# Patient Record
Sex: MALE | Race: BLACK OR AFRICAN AMERICAN | NOT HISPANIC OR LATINO | Employment: OTHER | URBAN - METROPOLITAN AREA
[De-identification: names, ages, dates, MRNs, and addresses within clinical notes are randomized per-mention and may not be internally consistent; named-entity substitution may affect disease eponyms.]

---

## 2018-12-31 ENCOUNTER — OFFICE VISIT (OUTPATIENT)
Dept: URGENT CARE | Facility: CLINIC | Age: 83
End: 2018-12-31
Payer: COMMERCIAL

## 2018-12-31 VITALS
TEMPERATURE: 98 F | HEART RATE: 67 BPM | SYSTOLIC BLOOD PRESSURE: 135 MMHG | HEIGHT: 70 IN | BODY MASS INDEX: 32.93 KG/M2 | WEIGHT: 230 LBS | DIASTOLIC BLOOD PRESSURE: 79 MMHG | RESPIRATION RATE: 16 BRPM | OXYGEN SATURATION: 97 %

## 2018-12-31 DIAGNOSIS — K92.2 LOWER GI BLEEDING: Primary | ICD-10-CM

## 2018-12-31 PROCEDURE — 99204 OFFICE O/P NEW MOD 45 MIN: CPT | Mod: S$GLB,,, | Performed by: EMERGENCY MEDICINE

## 2018-12-31 RX ORDER — FAMOTIDINE 40 MG/1
TABLET, FILM COATED ORAL
Refills: 3 | COMMUNITY
Start: 2018-11-28 | End: 2021-03-29

## 2018-12-31 RX ORDER — AMLODIPINE BESYLATE 10 MG/1
10 TABLET ORAL EVERY MORNING
Refills: 2 | COMMUNITY
Start: 2018-11-12 | End: 2021-03-29 | Stop reason: SDUPTHER

## 2018-12-31 RX ORDER — LOSARTAN POTASSIUM 100 MG/1
50 TABLET ORAL
Refills: 2 | COMMUNITY
Start: 2018-10-23 | End: 2021-03-29 | Stop reason: SDUPTHER

## 2018-12-31 NOTE — PROGRESS NOTES
"Subjective:       Patient ID: Zion Vasquez is a 83 y.o. male.    Vitals:    12/31/18 1236   BP: 135/79   Pulse: 67   Resp: 16   Temp: 97.5 °F (36.4 °C)   SpO2: 97%   Weight: 104.3 kg (230 lb)   Height: 5' 9.5" (1.765 m)       Chief Complaint: Diarrhea    Pt states diarrhea x 2 days after eating fruitcake and drinking spiked eggnog. Pt states he has taken immodium AD. Pt states diarrhea this am was reddish brown causing him concern.     Patient denies being on prescription blood thinners or taking NSAIDs.  Patient has had no previous abdominal surgeries or abdominal medical history.      Diarrhea    This is a new problem. The current episode started in the past 7 days. The problem occurs 5 to 10 times per day. The problem has been unchanged. The stool consistency is described as watery. The patient states that diarrhea does not awaken him from sleep. Pertinent negatives include no abdominal pain, chills, fever or vomiting. Nothing aggravates the symptoms. Treatments tried: immodium AD. The treatment provided mild relief.     Review of Systems   Constitution: Negative for chills and fever.   Cardiovascular: Negative for chest pain.   Respiratory: Negative for shortness of breath.    Musculoskeletal: Negative for back pain.   Gastrointestinal: Positive for diarrhea, hematochezia and melena. Negative for abdominal pain, constipation, hematemesis, nausea and vomiting.   Genitourinary: Negative for dysuria.   All other systems reviewed and are negative.      Objective:      Physical Exam   Constitutional: He is oriented to person, place, and time. He appears well-developed and well-nourished.   HENT:   Head: Normocephalic and atraumatic.   Right Ear: External ear normal.   Left Ear: External ear normal.   Nose: Nose normal.   Mouth/Throat: Mucous membranes are normal.   Eyes: Conjunctivae and lids are normal.   Neck: Trachea normal and full passive range of motion without pain. Neck supple.   Cardiovascular: Normal " rate, regular rhythm and normal heart sounds.   Pulmonary/Chest: Effort normal and breath sounds normal. No respiratory distress.   Abdominal: Soft. Normal appearance and bowel sounds are normal. He exhibits no distension, no abdominal bruit, no pulsatile midline mass and no mass. There is no tenderness.   Genitourinary: Rectal exam shows no mass and no tenderness.   Genitourinary Comments: Patient has gross blood with clots on rectal examination   Musculoskeletal: Normal range of motion. He exhibits no edema.   Neurological: He is alert and oriented to person, place, and time. He has normal strength.   Skin: Skin is warm, dry and intact. He is not diaphoretic. No pallor.   Psychiatric: He has a normal mood and affect. His speech is normal and behavior is normal. Judgment and thought content normal. Cognition and memory are normal.   Nursing note and vitals reviewed.      Assessment:       1. Lower GI bleeding        Plan:       Zion was seen today for diarrhea.    Diagnoses and all orders for this visit:    Lower GI bleeding     Medical decision making:  Patient with 24 hr of diarrhea and blood per rectum.  Patient denies pain or vomiting. Patient denies using blood thinners or NSAIDs at this time.  Patient with no previous abdominal history.  Vital signs are stable at this time.  Patient recommended to go to the emergency room for definitive evaluation and treatment.  Patient will be sent to [facility] for further care.          Patient Instructions       Go to ER NOW    When You Have Gastrointestinal (GI) Bleeding    Blood in your vomit or stool can be a sign of gastrointestinal (GI) bleeding. GI bleeding can be scary. But the cause may not be serious. You should always see a doctor if GI bleeding occurs.  The GI tract  The GI tract is the path through which food travels in the body. Food passes from the mouth down the esophagus (the tube from the mouth to the stomach). Food begins to break down in the  stomach. It then moves through the duodenum, the first part of the small intestine. Nutrients are absorbed as food travels through the small intestine. What is left passes into the colon (large intestine) as waste. The colon removes water from the waste. Waste continues from the colon to the rectum (where stool is stored). Waste then leaves the body through the anus.  Causes of GI bleeding  GI bleeding can be caused by many different problems. Some of the more common causes include:  · Swollen veins in the anus (hemorrhoids)  · Swollen veins in the esophagus (varices)  · Sore on the lining of the GI tract (ulcer)  · Cuts or scrapes in the mouth or throat  · Infection caused by germs such as bacteria or parasites  · Food allergies, such as milk allergy in young children  · Medicines  · Inflammation of the GI tract (gastritis or esophagitis)  · Colitis (Crohn's disease or ulcerative colitis)  · Cancer (tumors or polyps)  · Abnormal pouches in the colon (diverticula)  · Tears in the esophagus or anus  · Nosebleed  · Abnormal blood vessels in the GI tract (angiodysplasia)  Diagnosing the cause of blood in stool  If blood is coming out in your stool, you may have a lower GI tract problem or a very fast upper GI tract bleed. Bleeding from the GI tract can be bright red. Or it may look dark and tarry. Tests may also find blood in your stool that cant be seen with the eye (occult blood). To find out the cause, tests that may be ordered include:  · Blood tests. A blood sample is taken and sent to a lab for exam.  · Hemoccult test. Checks a stool sample for blood.  · Stool culture. Checks a stool sample for bacteria or parasites.  · X-ray, ultrasound, or CT scan. Imaging tests that take pictures of the digestive tract.  · Colonoscopy or sigmoidoscopy. This test uses a flexible tube with a tiny camera. The tube is inserted through your anus into your rectum to see the inside of your colon. Your provider can also take a tiny  tissue sample (biopsy) and treat a bleeding source  Diagnosing the cause of blood in vomit  If you are vomiting blood or something that looks like coffee grounds, you may have an upper GI tract problem. To find the cause, tests that may be done include:  · Upper Endoscopy. A flexible tube with a tiny camera is inserted through your mouth and throat to see inside your upper GI tract. This lets your provider take a tiny tissue sample (biopsy) and treat a bleeding source.  · Nasogastric lavage. This can tell if you have upper GI or lower GI bleeding.  · X-ray, ultrasound, or CT scan. Imaging tests that take pictures of your digestive tract.  · Upper GI series. X-rays of the upper part of your GI tract taken from inside your body.  · Enteroscopy. This sends a flexible tube or a small, swallowed capsule camera into your small intestine.  When to call your healthcare provider  Call your healthcare provider right away if you have any of the following:  · Bleeding from your mouth or anus that can't be stopped  · Fever of 100.4°F (38.0°) or higher  · Bleeding along with feeling lightheaded or dizzy  · Signs of fluid loss (dehydration). These include a dry, sticky mouth, decreased urine output; and very dark urine.  · Belly (abdominal) pain   Date Last Reviewed: 7/1/2016  © 2079-0798 [employer]. [address] [place] [address], [place] [address]. All rights reserved. This information is not intended as a substitute for professional medical care. Always follow your healthcare professional's instructions.

## 2018-12-31 NOTE — PATIENT INSTRUCTIONS
Go to ER NOW    When You Have Gastrointestinal (GI) Bleeding    Blood in your vomit or stool can be a sign of gastrointestinal (GI) bleeding. GI bleeding can be scary. But the cause may not be serious. You should always see a doctor if GI bleeding occurs.  The GI tract  The GI tract is the path through which food travels in the body. Food passes from the mouth down the esophagus (the tube from the mouth to the stomach). Food begins to break down in the stomach. It then moves through the duodenum, the first part of the small intestine. Nutrients are absorbed as food travels through the small intestine. What is left passes into the colon (large intestine) as waste. The colon removes water from the waste. Waste continues from the colon to the rectum (where stool is stored). Waste then leaves the body through the anus.  Causes of GI bleeding  GI bleeding can be caused by many different problems. Some of the more common causes include:  · Swollen veins in the anus (hemorrhoids)  · Swollen veins in the esophagus (varices)  · Sore on the lining of the GI tract (ulcer)  · Cuts or scrapes in the mouth or throat  · Infection caused by germs such as bacteria or parasites  · Food allergies, such as milk allergy in young children  · Medicines  · Inflammation of the GI tract (gastritis or esophagitis)  · Colitis (Crohn's disease or ulcerative colitis)  · Cancer (tumors or polyps)  · Abnormal pouches in the colon (diverticula)  · Tears in the esophagus or anus  · Nosebleed  · Abnormal blood vessels in the GI tract (angiodysplasia)  Diagnosing the cause of blood in stool  If blood is coming out in your stool, you may have a lower GI tract problem or a very fast upper GI tract bleed. Bleeding from the GI tract can be bright red. Or it may look dark and tarry. Tests may also find blood in your stool that cant be seen with the eye (occult blood). To find out the cause, tests that may be ordered include:  · Blood tests. A blood  sample is taken and sent to a lab for exam.  · Hemoccult test. Checks a stool sample for blood.  · Stool culture. Checks a stool sample for bacteria or parasites.  · X-ray, ultrasound, or CT scan. Imaging tests that take pictures of the digestive tract.  · Colonoscopy or sigmoidoscopy. This test uses a flexible tube with a tiny camera. The tube is inserted through your anus into your rectum to see the inside of your colon. Your provider can also take a tiny tissue sample (biopsy) and treat a bleeding source  Diagnosing the cause of blood in vomit  If you are vomiting blood or something that looks like coffee grounds, you may have an upper GI tract problem. To find the cause, tests that may be done include:  · Upper Endoscopy. A flexible tube with a tiny camera is inserted through your mouth and throat to see inside your upper GI tract. This lets your provider take a tiny tissue sample (biopsy) and treat a bleeding source.  · Nasogastric lavage. This can tell if you have upper GI or lower GI bleeding.  · X-ray, ultrasound, or CT scan. Imaging tests that take pictures of your digestive tract.  · Upper GI series. X-rays of the upper part of your GI tract taken from inside your body.  · Enteroscopy. This sends a flexible tube or a small, swallowed capsule camera into your small intestine.  When to call your healthcare provider  Call your healthcare provider right away if you have any of the following:  · Bleeding from your mouth or anus that can't be stopped  · Fever of 100.4°F (38.0°) or higher  · Bleeding along with feeling lightheaded or dizzy  · Signs of fluid loss (dehydration). These include a dry, sticky mouth, decreased urine output; and very dark urine.  · Belly (abdominal) pain   Date Last Reviewed: 7/1/2016  © 5479-4976 [employer]. [address] [place] [address], [place] [address]. All rights reserved. This information is not intended as a substitute for professional medical care. Always follow your  healthcare professional's instructions.

## 2019-04-14 ENCOUNTER — OFFICE VISIT (OUTPATIENT)
Dept: URGENT CARE | Facility: CLINIC | Age: 84
End: 2019-04-14
Payer: COMMERCIAL

## 2019-04-14 DIAGNOSIS — B30.9 VIRAL CONJUNCTIVITIS OF BOTH EYES: Primary | ICD-10-CM

## 2019-04-14 PROCEDURE — 99202 PR OFFICE/OUTPT VISIT, NEW, LEVL II, 15-29 MIN: ICD-10-PCS | Mod: S$GLB,,, | Performed by: HOSPITALIST

## 2019-04-14 PROCEDURE — 99202 OFFICE O/P NEW SF 15 MIN: CPT | Mod: S$GLB,,, | Performed by: HOSPITALIST

## 2019-04-15 NOTE — PROGRESS NOTES
"Subjective:       Patient ID: Zion Vasquez is a 83 y.o. male.    Vitals:    04/15/19 1328   BP: (!) 167/81   Pulse: 71   Resp: 18   Temp: 97.3 °F (36.3 °C)   TempSrc: Oral   SpO2: 95%   Weight: 104.3 kg (230 lb)   Height: 5' 9.5" (1.765 m)       Chief Complaint: Eye Problem    All documentation is being transferred from paper due to downtime with [facility]. Documenting for MA yesterday.    Eye redness, yellowish drainage x2 days.     Eye Problem    Pertinent negatives include no blurred vision, eye redness, fever, nausea, photophobia or vomiting.     Review of Systems   Constitution: Negative for chills and fever.   HENT: Negative for congestion.    Eyes: Negative for blurred vision, pain, photophobia and redness.   Gastrointestinal: Negative for nausea and vomiting.   Neurological: Negative for headaches.       Objective:      Physical Exam    Assessment:       No diagnosis found.    Plan:       There are no diagnoses linked to this encounter.        Erythema and sclera injection b/l. No vision changes. Viral conjunctivitis b/l. See scanned note for more information.     "

## 2019-04-16 VITALS
BODY MASS INDEX: 32.93 KG/M2 | WEIGHT: 230 LBS | RESPIRATION RATE: 18 BRPM | HEIGHT: 70 IN | TEMPERATURE: 97 F | DIASTOLIC BLOOD PRESSURE: 81 MMHG | HEART RATE: 71 BPM | OXYGEN SATURATION: 95 % | SYSTOLIC BLOOD PRESSURE: 167 MMHG

## 2019-11-11 ENCOUNTER — OFFICE VISIT (OUTPATIENT)
Dept: URGENT CARE | Facility: CLINIC | Age: 84
End: 2019-11-11
Payer: COMMERCIAL

## 2019-11-11 VITALS
WEIGHT: 230 LBS | BODY MASS INDEX: 32.93 KG/M2 | HEART RATE: 77 BPM | HEIGHT: 70 IN | OXYGEN SATURATION: 95 % | TEMPERATURE: 98 F | DIASTOLIC BLOOD PRESSURE: 76 MMHG | SYSTOLIC BLOOD PRESSURE: 154 MMHG | RESPIRATION RATE: 18 BRPM

## 2019-11-11 DIAGNOSIS — H93.13 TINNITUS OF BOTH EARS: ICD-10-CM

## 2019-11-11 DIAGNOSIS — H61.23 EXCESSIVE CERUMEN IN BOTH EAR CANALS: Primary | ICD-10-CM

## 2019-11-11 PROCEDURE — 99214 OFFICE O/P EST MOD 30 MIN: CPT | Mod: 25,S$GLB,, | Performed by: NURSE PRACTITIONER

## 2019-11-11 PROCEDURE — 69209 EAR CERUMEN REMOVAL: ICD-10-PCS | Mod: 50,S$GLB,, | Performed by: NURSE PRACTITIONER

## 2019-11-11 PROCEDURE — 99214 PR OFFICE/OUTPT VISIT, EST, LEVL IV, 30-39 MIN: ICD-10-PCS | Mod: 25,S$GLB,, | Performed by: NURSE PRACTITIONER

## 2019-11-11 PROCEDURE — 69209 REMOVE IMPACTED EAR WAX UNI: CPT | Mod: 50,S$GLB,, | Performed by: NURSE PRACTITIONER

## 2019-11-11 NOTE — PATIENT INSTRUCTIONS
Follow up closely with your ear doctor.  Tinnitus (Ringing in the Ears)     Treatment may include maskers and hearing aids.     Tinnitus is the term for a noise in your ear not caused by an outside sound. The noise might be a ringing, clicking, hiss, or roar. It can vary in pitch and may be soft or quite loud. For some people, tinnitus is a minor nuisance. But for others, the noise can make it hard to hear, work, and even sleep. When tinnitus can't be cured, a number of treatments may offer relief.  What causes tinnitus?  Loud noises, hearing loss, and ear wax can cause tinnitus. So can certain medicines. Large amounts of aspirin or caffeine are sometimes to blame. In many cases, the exact cause of tinnitus is unknown.  How is tinnitus treated?  Identifying and removing the cause is the best way to treat tinnitus. For that reason, your healthcare provider may refer you to an otolaryngologist (ear, nose, and throat doctor). Your hearing may also be checked by an audiologist (hearing specialist). If you have hearing loss, wearing a hearing aid may help your tinnitus. When the cause can't be found, the tinnitus itself may be treated. Some of the treatments are listed below, and your healthcare provider can tell you more about them:  · Maskers are small devices that look like hearing aids. They emit a pleasant sound that helps cover up the ringing in your ears. Hearing aids and maskers are sometimes used together.  · Medicines that treat anxiety and depression may ease tinnitus in some people.  · Hypnosis or relaxation therapy may help head noise seem less severe.  · Tinnitus retraining therapy combines counseling and maskers. Both can help take your mind off the tinnitus.  For more information  · American Speech-Hearing-Language Association [phone] www.saida.org  · American Tinnitus Association 235-285-8687 www.nicholas.org  · National [place] on Deafness and other Communication Disorders 120-784-7852  www.nidcd.nih.gov   Date Last Reviewed: 7/1/2016  © 0230-1167 [employer]. [address] [place] [address], [place] [address]. All rights reserved. This information is not intended as a substitute for professional medical care. Always follow your healthcare professional's instructions.

## 2019-11-11 NOTE — PROGRESS NOTES
"Subjective:       Patient ID: Zion Vasquez is a 83 y.o. male.    Vitals:  height is 5' 9.5" (1.765 m) and weight is 104.3 kg (230 lb). His temperature is 98.1 °F (36.7 °C). His blood pressure is 154/76 (abnormal) and his pulse is 77. His respiration is 18 and oxygen saturation is 95%.     Chief Complaint: Otalgia    Patient presents with a complaint of a recurring sound in both of his ears sometimes when he lays down at night for the past week.  Denies any actual pain.  Denies any URI symptoms or drainage. He does go swimming 2 to 3 times a week.  He also wears hearing aids.  He states that he sees his audiologist next in January.    Otalgia    There is pain in both ears. This is a new problem. The current episode started in the past 7 days. The problem occurs constantly. The problem has been unchanged. There has been no fever. Pertinent negatives include no abdominal pain, coughing, diarrhea, ear discharge, headaches, hearing loss, neck pain, rash, rhinorrhea, sore throat or vomiting. He has tried nothing for the symptoms.       Constitution: Negative for chills, fatigue and fever.   HENT: Positive for tinnitus. Negative for ear pain, ear discharge, hearing loss, congestion and sore throat.    Neck: Negative for neck pain and painful lymph nodes.   Cardiovascular: Negative for chest pain and leg swelling.   Eyes: Negative for double vision and blurred vision.   Respiratory: Negative for cough and shortness of breath.    Gastrointestinal: Negative for abdominal pain, nausea, vomiting and diarrhea.   Genitourinary: Negative for dysuria, frequency and urgency.   Musculoskeletal: Negative for joint pain, joint swelling, muscle cramps and muscle ache.   Skin: Negative for color change, pale and rash.   Allergic/Immunologic: Negative for seasonal allergies.   Neurological: Negative for dizziness, history of vertigo, light-headedness, passing out and headaches.   Hematologic/Lymphatic: Negative for swollen lymph " nodes, easy bruising/bleeding and history of blood clots. Does not bruise/bleed easily.   Psychiatric/Behavioral: Negative for nervous/anxious, sleep disturbance and depression. The patient is not nervous/anxious.        Objective:      Physical Exam   Constitutional: He is oriented to person, place, and time. He appears well-developed and well-nourished. He is cooperative.  Non-toxic appearance. He does not have a sickly appearance. He does not appear ill. No distress.   HENT:   Head: Normocephalic and atraumatic.   Right Ear: Hearing, tympanic membrane, external ear and ear canal normal.   Left Ear: Hearing, tympanic membrane, external ear and ear canal normal.   Nose: Nose normal. No mucosal edema, rhinorrhea or nasal deformity. No epistaxis. Right sinus exhibits no maxillary sinus tenderness and no frontal sinus tenderness. Left sinus exhibits no maxillary sinus tenderness and no frontal sinus tenderness.   Mouth/Throat: Uvula is midline, oropharynx is clear and moist and mucous membranes are normal. No trismus in the jaw. Normal dentition. No uvula swelling. No oropharyngeal exudate, posterior oropharyngeal edema or posterior oropharyngeal erythema.   Excessive dried cerumen to the canals   Eyes: Conjunctivae and lids are normal. No scleral icterus.   Neck: Trachea normal, full passive range of motion without pain and phonation normal. Neck supple. No neck rigidity. No edema and no erythema present.   Cardiovascular: Normal rate, regular rhythm, normal heart sounds, intact distal pulses and normal pulses.   Pulmonary/Chest: Effort normal and breath sounds normal. No respiratory distress. He has no decreased breath sounds. He has no rhonchi.   Abdominal: Normal appearance.   Musculoskeletal: Normal range of motion. He exhibits no edema or deformity.   Neurological: He is alert and oriented to person, place, and time. He exhibits normal muscle tone. Coordination normal.   Skin: Skin is warm, dry, intact, not  diaphoretic and not pale.   Psychiatric: He has a normal mood and affect. His speech is normal and behavior is normal. Judgment and thought content normal. Cognition and memory are normal.   Nursing note and vitals reviewed.        Assessment:       1. Excessive cerumen in both ear canals    2. Tinnitus of both ears        Plan:         Excessive cerumen in both ear canals  -     Ear Cerumen Removal    Tinnitus of both ears      Patient Instructions     Follow up closely with your ear doctor.  Tinnitus (Ringing in the Ears)     Treatment may include maskers and hearing aids.     Tinnitus is the term for a noise in your ear not caused by an outside sound. The noise might be a ringing, clicking, hiss, or roar. It can vary in pitch and may be soft or quite loud. For some people, tinnitus is a minor nuisance. But for others, the noise can make it hard to hear, work, and even sleep. When tinnitus can't be cured, a number of treatments may offer relief.  What causes tinnitus?  Loud noises, hearing loss, and ear wax can cause tinnitus. So can certain medicines. Large amounts of aspirin or caffeine are sometimes to blame. In many cases, the exact cause of tinnitus is unknown.  How is tinnitus treated?  Identifying and removing the cause is the best way to treat tinnitus. For that reason, your healthcare provider may refer you to an otolaryngologist (ear, nose, and throat doctor). Your hearing may also be checked by an audiologist (hearing specialist). If you have hearing loss, wearing a hearing aid may help your tinnitus. When the cause can't be found, the tinnitus itself may be treated. Some of the treatments are listed below, and your healthcare provider can tell you more about them:  · Maskers are small devices that look like hearing aids. They emit a pleasant sound that helps cover up the ringing in your ears. Hearing aids and maskers are sometimes used together.  · Medicines that treat anxiety and depression may ease  tinnitus in some people.  · Hypnosis or relaxation therapy may help head noise seem less severe.  · Tinnitus retraining therapy combines counseling and maskers. Both can help take your mind off the tinnitus.  For more information  · American Speech-Hearing-Language Association [phone] www.saida.org  · American Tinnitus Association 300-875-9705 www.nicholas.org  · National [place] on Deafness and other Communication Disorders 886-189-7927 www.nidcd.nih.gov   Date Last Reviewed: 7/1/2016  © 5628-4964 [employer] [address] [place] [address]. All rights reserved. This information is not intended as a substitute for professional medical care. Always follow your healthcare professional's instructions.

## 2019-11-11 NOTE — PROCEDURES
"Ear Cerumen Removal  Date/Time: 11/11/2019 10:40 AM  Performed by: Sole Pierce MA  Authorized by: Bella Beckwith NP     Time out: Immediately prior to procedure a "time out" was called to verify the correct patient, procedure, equipment, support staff and site/side marked as required.    Consent Done?:  Yes (Verbal)    Local anesthetic:  None  Medication Used:  Debrox  Location details:  Both ears  Procedure type: irrigation    Cerumen  Removal Results:  Cerumen completely removed  Patient tolerance:  Patient tolerated the procedure well with no immediate complications     Canals and TM normal post procedure.      "

## 2020-06-01 RX ORDER — AMLODIPINE BESYLATE 10 MG/1
TABLET ORAL
Qty: 90 TABLET | OUTPATIENT
Start: 2020-06-01

## 2020-11-20 ENCOUNTER — OFFICE VISIT (OUTPATIENT)
Dept: URGENT CARE | Facility: CLINIC | Age: 85
End: 2020-11-20
Payer: COMMERCIAL

## 2020-11-20 VITALS
SYSTOLIC BLOOD PRESSURE: 130 MMHG | HEIGHT: 70 IN | WEIGHT: 230 LBS | DIASTOLIC BLOOD PRESSURE: 67 MMHG | TEMPERATURE: 98 F | OXYGEN SATURATION: 97 % | HEART RATE: 88 BPM | BODY MASS INDEX: 32.93 KG/M2 | RESPIRATION RATE: 16 BRPM

## 2020-11-20 DIAGNOSIS — S00.93XA CONTUSION OF HEAD, UNSPECIFIED PART OF HEAD, INITIAL ENCOUNTER: ICD-10-CM
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
## 2020-11-20 PROCEDURE — 99214 OFFICE O/P EST MOD 30 MIN: CPT | Mod: S$GLB,,, | Performed by: FAMILY MEDICINE

## 2020-11-20 PROCEDURE — 99214 PR OFFICE/OUTPT VISIT, EST, LEVL IV, 30-39 MIN: ICD-10-PCS | Mod: S$GLB,,, | Performed by: FAMILY MEDICINE

## 2020-11-20 RX ORDER — A/SINGAPORE/GP1908/2015 IVR-180 (AN A/MICHIGAN/45/2015 (H1N1)PDM09-LIKE VIRUS, A/HONG KONG/4801/2014, NYMC X-263B (H3N2) (AN A/HONG KONG/4801/2014-LIKE VIRUS), AND B/BRISBANE/60/2008, WILD TYPE (A B/BRISBANE/60/2008-LIKE VIRUS) 15; 15; 15 UG/.5ML; UG/.5ML; UG/.5ML
INJECTION, SUSPENSION INTRAMUSCULAR
COMMUNITY
Start: 2020-10-15

## 2020-11-20 RX ORDER — ATORVASTATIN CALCIUM 10 MG/1
TABLET, FILM COATED ORAL
COMMUNITY
Start: 2020-11-12 | End: 2021-03-29 | Stop reason: SDUPTHER

## 2020-11-20 RX ORDER — PANTOPRAZOLE SODIUM 40 MG/1
TABLET, DELAYED RELEASE ORAL
COMMUNITY
Start: 2020-09-16 | End: 2021-03-29 | Stop reason: SDUPTHER

## 2020-11-20 NOTE — PROGRESS NOTES
"Subjective:       Patient ID: Zion Vasquez is a 84 y.o. male.    Vitals:  height is 5' 9.5" (1.765 m) and weight is 104.3 kg (230 lb). His temporal temperature is 98.3 °F (36.8 °C). His blood pressure is 130/67 and his pulse is 88. His respiration is 16 and oxygen saturation is 97%.     Chief Complaint: Head Injury    Patient reports tripped and fell two days ago  In his kitchenand hit the back of his head.Patient  States after he initially dizzy that resolved.  He never had bruising by the severe headache neck pain blurry vision vomiting slurred speech confusion facial numbness extremity weakness.  Yesterday felt normal.  This morning when he woke up the light above bed was moving slightly for 3-4 minutes and then resolved.  And has returned.  He is not on any anticoagulants    Head Injury   The incident occurred 3 to 5 days ago. The injury mechanism was a fall. There was no loss of consciousness. There was no blood loss. The patient is experiencing no pain. Pertinent negatives include no blurred vision. He has tried ice for the symptoms.       Constitution: Negative for fatigue.   HENT: Negative for facial swelling and facial trauma.    Neck: Negative for neck stiffness.   Cardiovascular: Negative for chest trauma.   Eyes: Negative for eye trauma, double vision and blurred vision.   Gastrointestinal: Negative for abdominal trauma, abdominal pain and rectal bleeding.   Genitourinary: Negative for hematuria, genital trauma and pelvic pain.   Musculoskeletal: Positive for trauma (back of head). Negative for pain, joint swelling, abnormal ROM of joint and pain with walking.   Skin: Negative for color change, wound, abrasion and laceration.   Neurological: Negative for dizziness, history of vertigo, light-headedness, coordination disturbances, altered mental status and loss of consciousness.   Hematologic/Lymphatic: Negative for history of bleeding disorder.   Psychiatric/Behavioral: Negative for altered mental " status.       Objective:      Physical Exam   Constitutional: He is oriented to person, place, and time. He appears well-developed. He is cooperative.  Non-toxic appearance. He does not appear ill. No distress.      Comments: Alert oriented well-appearing    HENT:   Head: Normocephalic and atraumatic. Head is without abrasion, without contusion and without laceration.   Ears:   Right Ear: Hearing, tympanic membrane, external ear and ear canal normal. No hemotympanum.   Left Ear: Hearing, tympanic membrane, external ear and ear canal normal. No hemotympanum.   Nose: Nose normal. No mucosal edema, rhinorrhea or nasal deformity. No epistaxis. Right sinus exhibits no maxillary sinus tenderness and no frontal sinus tenderness. Left sinus exhibits no maxillary sinus tenderness and no frontal sinus tenderness.   Mouth/Throat: Uvula is midline, oropharynx is clear and moist and mucous membranes are normal. No trismus in the jaw. Normal dentition. No uvula swelling. No posterior oropharyngeal erythema.   No occipital tenderness, swelling or wounds      Comments: No occipital tenderness, swelling or wounds  Eyes: Pupils are equal, round, and reactive to light. Conjunctivae, EOM and lids are normal. Right eye exhibits no discharge. Left eye exhibits no discharge. No scleral icterus. extraocular movement intact  Neck: Trachea normal, normal range of motion, full passive range of motion without pain and phonation normal. Neck supple. No spinous process tenderness, no muscular tenderness and no pain with movement present. No neck rigidity. No tracheal deviation and normal range of motion present.   Cardiovascular: Normal rate, regular rhythm, normal heart sounds and normal pulses.   Pulmonary/Chest: Effort normal and breath sounds normal. No respiratory distress. He has no decreased breath sounds. He has no wheezes. He has no rhonchi. He has no rales.   Abdominal: Soft. Normal appearance and bowel sounds are normal. He exhibits  no distension, no pulsatile midline mass and no mass. There is no abdominal tenderness.   Musculoskeletal: Normal range of motion.         General: No deformity.   Neurological: He is alert and oriented to person, place, and time. He has normal motor skills, normal sensation, normal strength and intact cranial nerves. No cranial nerve deficit or sensory deficit. He exhibits normal muscle tone. He displays no seizure activity. Gait and coordination normal. Coordination normal. GCS eye subscore is 4. GCS verbal subscore is 5. GCS motor subscore is 6.      Comments: Normal finger to nose, normal EOM, no nystagmus, normal sensation to face and extremities, CN intact,  strength equal bilateral, extremity strength equal bilaterally, no pronator drift     Skin: Skin is warm, dry, intact, not diaphoretic and not pale. Capillary refill takes less than 2 seconds. abrasion, burn, bruising and ecchymosisPsychiatric: His speech is normal and behavior is normal. Judgment and thought content normal.   Nursing note and vitals reviewed.        Assessment:       1. Fall, initial encounter    2. Contusion of head, unspecified part of head, initial encounter        Plan:         Fall, initial encounter    Contusion of head, unspecified part of head, initial encounter     patient with head injury 2 days ago this morning had a brief episode of dizziness.  Otherwise neuro exam normal dizziness has since resolved.  Discussed patient based on age alone  [place] head ct recommends after head injury to get a CT scan,  However he did not want to do that today. Discussed reasoning for imaging.  He does not have any abnormalities on his exam.  Discussed with him ER precautions he is agreeable to this    Patient Instructions     PLEASE READ YOUR DISCHARGE INSTRUCTIONS ENTIRELY AS IT CONTAINS IMPORTANT INFORMATION.    Please go to the emergency room if you experience headache, blurred vision, weakness in one arm or leg, slurred speech,  numbness, inability to walk or talk, confusion, constant dizziness.       Please return or see your primary care doctor if you develop new or worsening symptoms.     You must understand that you have received an Urgent Care treatment only and that you may be released before all of your medical problems are known or treated.    First Aid: Head Injuries  A strong blow to the head may cause swelling and bleeding inside the skull. The resulting pressure can injure the brain (concussion). If you have any doubts identifying a concussion, have a healthcare provider check the victim.  Seek medical help if any of the following is true:  · The victim loses consciousness.  · The victim has convulsions or seizures.  · The victim has unequal pupil size (the black part in the center of th eye is bigger one one side than the other)  · The victim shows any of the following signs of concussion:  ¨ confusion or inability to follow normal conversation  ¨ slurred speech  ¨ dizziness or vision problems  ¨ nausea or vomiting  ¨ muscle weakness or loss of mobility  ¨ memory loss  ¨ sensitivity to noise  ¨ fatigue  Call 911 immediately if the victim has any of the following:  · Prolonged loss of consciousness  · A depressed or spongy area in the skull, or visible bone fragments  · Clear fluid draining out of  the ears or nose  While you wait for help:  1. Reassure the person.  2. Treat for shock by maintaining body temperature and keeping the victim calm.  3. Provide rescue breathing or CPR, if needed.  4. If the person has neck or back pain or is unconscious, he or she might have a spine fracture. They should only  be moved with great precaution and if it is absolutely necessary.   Step 1: Control bleeding  · Apply direct pressure to control bleeding. (Wear gloves or use other protection to avoid contact with victim's blood.)  · Wash a minor surface injury with soap and water after the bleeding stops or is reduced.  · Cover the wound with  a clean dressing and bandage.  Step 2: Ice bumps and bruises  · Place a cold pack or ice on the injury to reduce swelling and pain. Placing a cloth between the injury and the ice pack helps prevent tissue damage from severe cold.  Step 3: Observe the victim  · Watch for vomiting or changes in mood or alertness. If you notice changes, call for medical help. Signs of concussion may not appear for up to 48 hours.  · Tell the person's partner, parent, or roommate about the injury so he or she can continue to observe the victim.  Stitches  If a cut is deep or continues to bleed, or the edges of skin do not stay together evenly, the wound may need to be closed with stitches, tape, staples, or medical glue. All can help speed healing and reduce the risk of infection and the size of the scar. These may be especially important concerns with large wounds, and wounds on the head or other visible body parts.  If you think a wound may need medical care, visit a health care professional as soon as possible. If stitches are needed, they must be applied in the first few hours. A wound that is not properly closed is at risk of serious infection.  Date Last Reviewed: 10/19/2015  © 9519-4417 [employer]. [address] [place] [address], [place] [address]. All rights reserved. This information is not intended as a substitute for professional medical care. Always follow your healthcare professional's instructions.

## 2020-11-20 NOTE — PATIENT INSTRUCTIONS
PLEASE READ YOUR DISCHARGE INSTRUCTIONS ENTIRELY AS IT CONTAINS IMPORTANT INFORMATION.    Please go to the emergency room if you experience headache, blurred vision, weakness in one arm or leg, slurred speech, numbness, inability to walk or talk, confusion, constant dizziness.       Please return or see your primary care doctor if you develop new or worsening symptoms.     You must understand that you have received an Urgent Care treatment only and that you may be released before all of your medical problems are known or treated.    First Aid: Head Injuries  A strong blow to the head may cause swelling and bleeding inside the skull. The resulting pressure can injure the brain (concussion). If you have any doubts identifying a concussion, have a healthcare provider check the victim.  Seek medical help if any of the following is true:  · The victim loses consciousness.  · The victim has convulsions or seizures.  · The victim has unequal pupil size (the black part in the center of th eye is bigger one one side than the other)  · The victim shows any of the following signs of concussion:  ¨ confusion or inability to follow normal conversation  ¨ slurred speech  ¨ dizziness or vision problems  ¨ nausea or vomiting  ¨ muscle weakness or loss of mobility  ¨ memory loss  ¨ sensitivity to noise  ¨ fatigue  Call 911 immediately if the victim has any of the following:  · Prolonged loss of consciousness  · A depressed or spongy area in the skull, or visible bone fragments  · Clear fluid draining out of  the ears or nose  While you wait for help:  1. Reassure the person.  2. Treat for shock by maintaining body temperature and keeping the victim calm.  3. Provide rescue breathing or CPR, if needed.  4. If the person has neck or back pain or is unconscious, he or she might have a spine fracture. They should only  be moved with great precaution and if it is absolutely necessary.   Step 1: Control bleeding  · Apply direct pressure  to control bleeding. (Wear gloves or use other protection to avoid contact with victim's blood.)  · Wash a minor surface injury with soap and water after the bleeding stops or is reduced.  · Cover the wound with a clean dressing and bandage.  Step 2: Ice bumps and bruises  · Place a cold pack or ice on the injury to reduce swelling and pain. Placing a cloth between the injury and the ice pack helps prevent tissue damage from severe cold.  Step 3: Observe the victim  · Watch for vomiting or changes in mood or alertness. If you notice changes, call for medical help. Signs of concussion may not appear for up to 48 hours.  · Tell the person's partner, parent, or roommate about the injury so he or she can continue to observe the victim.  Stitches  If a cut is deep or continues to bleed, or the edges of skin do not stay together evenly, the wound may need to be closed with stitches, tape, staples, or medical glue. All can help speed healing and reduce the risk of infection and the size of the scar. These may be especially important concerns with large wounds, and wounds on the head or other visible body parts.  If you think a wound may need medical care, visit a health care professional as soon as possible. If stitches are needed, they must be applied in the first few hours. A wound that is not properly closed is at risk of serious infection.  Date Last Reviewed: 10/19/2015  © 8552-9390 [employer]. [address] [place] [address], [place] [address]. All rights reserved. This information is not intended as a substitute for professional medical care. Always follow your healthcare professional's instructions.

## 2021-03-15 ENCOUNTER — PATIENT OUTREACH (OUTPATIENT)
Dept: ADMINISTRATIVE | Facility: HOSPITAL | Age: 86
End: 2021-03-15

## 2021-03-29 ENCOUNTER — OFFICE VISIT (OUTPATIENT)
Dept: PRIMARY CARE CLINIC | Facility: CLINIC | Age: 86
End: 2021-03-29
Payer: COMMERCIAL

## 2021-03-29 VITALS
SYSTOLIC BLOOD PRESSURE: 136 MMHG | DIASTOLIC BLOOD PRESSURE: 70 MMHG | OXYGEN SATURATION: 97 % | TEMPERATURE: 98 F | BODY MASS INDEX: 34.95 KG/M2 | HEIGHT: 68 IN | WEIGHT: 230.63 LBS | HEART RATE: 79 BPM

## 2021-03-29 DIAGNOSIS — K44.9 HIATAL HERNIA: ICD-10-CM

## 2021-03-29 DIAGNOSIS — K21.9 GASTROESOPHAGEAL REFLUX DISEASE WITHOUT ESOPHAGITIS: ICD-10-CM

## 2021-03-29 DIAGNOSIS — E78.2 MIXED HYPERLIPIDEMIA: ICD-10-CM

## 2021-03-29 DIAGNOSIS — I10 ESSENTIAL HYPERTENSION: ICD-10-CM

## 2021-03-29 DIAGNOSIS — K64.8 INTERNAL HEMORRHOIDS: ICD-10-CM

## 2021-03-29 DIAGNOSIS — Z76.89 ENCOUNTER TO ESTABLISH CARE: Primary | ICD-10-CM

## 2021-03-29 DIAGNOSIS — Z86.39 PERSONAL HISTORY OF OTHER ENDOCRINE, NUTRITIONAL AND METABOLIC DISEASE: ICD-10-CM

## 2021-03-29 DIAGNOSIS — N18.31 STAGE 3A CHRONIC KIDNEY DISEASE: ICD-10-CM

## 2021-03-29 DIAGNOSIS — E66.01 CLASS 2 SEVERE OBESITY DUE TO EXCESS CALORIES WITH SERIOUS COMORBIDITY AND BODY MASS INDEX (BMI) OF 35.0 TO 35.9 IN ADULT: ICD-10-CM

## 2021-03-29 DIAGNOSIS — N52.03 COMBINED ARTERIAL INSUFFICIENCY AND CORPORO-VENOUS OCCLUSIVE ERECTILE DYSFUNCTION: ICD-10-CM

## 2021-03-29 DIAGNOSIS — Z87.19 HISTORY OF BARRETT'S ESOPHAGUS: ICD-10-CM

## 2021-03-29 DIAGNOSIS — K57.30 DIVERTICULOSIS OF COLON: ICD-10-CM

## 2021-03-29 DIAGNOSIS — Z85.46 HISTORY OF PROSTATE CANCER: ICD-10-CM

## 2021-03-29 DIAGNOSIS — Z86.2 HISTORY OF ANEMIA: ICD-10-CM

## 2021-03-29 PROCEDURE — 1101F PR PT FALLS ASSESS DOC 0-1 FALLS W/OUT INJ PAST YR: ICD-10-PCS | Mod: CPTII,S$GLB,, | Performed by: FAMILY MEDICINE

## 2021-03-29 PROCEDURE — 3078F PR MOST RECENT DIASTOLIC BLOOD PRESSURE < 80 MM HG: ICD-10-PCS | Mod: CPTII,S$GLB,, | Performed by: FAMILY MEDICINE

## 2021-03-29 PROCEDURE — 3288F PR FALLS RISK ASSESSMENT DOCUMENTED: ICD-10-PCS | Mod: CPTII,S$GLB,, | Performed by: FAMILY MEDICINE

## 2021-03-29 PROCEDURE — 99215 OFFICE O/P EST HI 40 MIN: CPT | Mod: S$GLB,,, | Performed by: FAMILY MEDICINE

## 2021-03-29 PROCEDURE — 3075F SYST BP GE 130 - 139MM HG: CPT | Mod: CPTII,S$GLB,, | Performed by: FAMILY MEDICINE

## 2021-03-29 PROCEDURE — 99999 PR PBB SHADOW E&M-EST. PATIENT-LVL IV: ICD-10-PCS | Mod: PBBFAC,,, | Performed by: FAMILY MEDICINE

## 2021-03-29 PROCEDURE — 3288F FALL RISK ASSESSMENT DOCD: CPT | Mod: CPTII,S$GLB,, | Performed by: FAMILY MEDICINE

## 2021-03-29 PROCEDURE — 99999 PR PBB SHADOW E&M-EST. PATIENT-LVL IV: CPT | Mod: PBBFAC,,, | Performed by: FAMILY MEDICINE

## 2021-03-29 PROCEDURE — 1159F MED LIST DOCD IN RCRD: CPT | Mod: S$GLB,,, | Performed by: FAMILY MEDICINE

## 2021-03-29 PROCEDURE — 3075F PR MOST RECENT SYSTOLIC BLOOD PRESS GE 130-139MM HG: ICD-10-PCS | Mod: CPTII,S$GLB,, | Performed by: FAMILY MEDICINE

## 2021-03-29 PROCEDURE — 3078F DIAST BP <80 MM HG: CPT | Mod: CPTII,S$GLB,, | Performed by: FAMILY MEDICINE

## 2021-03-29 PROCEDURE — 1159F PR MEDICATION LIST DOCUMENTED IN MEDICAL RECORD: ICD-10-PCS | Mod: S$GLB,,, | Performed by: FAMILY MEDICINE

## 2021-03-29 PROCEDURE — 99215 PR OFFICE/OUTPT VISIT, EST, LEVL V, 40-54 MIN: ICD-10-PCS | Mod: S$GLB,,, | Performed by: FAMILY MEDICINE

## 2021-03-29 PROCEDURE — 1126F AMNT PAIN NOTED NONE PRSNT: CPT | Mod: S$GLB,,, | Performed by: FAMILY MEDICINE

## 2021-03-29 PROCEDURE — 1101F PT FALLS ASSESS-DOCD LE1/YR: CPT | Mod: CPTII,S$GLB,, | Performed by: FAMILY MEDICINE

## 2021-03-29 PROCEDURE — 1126F PR PAIN SEVERITY QUANTIFIED, NO PAIN PRESENT: ICD-10-PCS | Mod: S$GLB,,, | Performed by: FAMILY MEDICINE

## 2021-03-29 RX ORDER — ATORVASTATIN CALCIUM 10 MG/1
10 TABLET, FILM COATED ORAL NIGHTLY
Qty: 90 TABLET | Refills: 3 | Status: SHIPPED | OUTPATIENT
Start: 2021-03-29 | End: 2022-04-06 | Stop reason: SDUPTHER

## 2021-03-29 RX ORDER — AMLODIPINE BESYLATE 10 MG/1
10 TABLET ORAL EVERY MORNING
Qty: 90 TABLET | Refills: 3 | Status: SHIPPED | OUTPATIENT
Start: 2021-03-29 | End: 2022-04-06 | Stop reason: SDUPTHER

## 2021-03-29 RX ORDER — PANTOPRAZOLE SODIUM 40 MG/1
40 TABLET, DELAYED RELEASE ORAL DAILY
Qty: 90 TABLET | Refills: 3 | Status: SHIPPED | OUTPATIENT
Start: 2021-03-29 | End: 2022-04-06 | Stop reason: SDUPTHER

## 2021-03-29 RX ORDER — LOSARTAN POTASSIUM 50 MG/1
50 TABLET ORAL DAILY
Qty: 90 TABLET | Refills: 3 | Status: SHIPPED | OUTPATIENT
Start: 2021-03-29 | End: 2022-04-06 | Stop reason: SDUPTHER

## 2021-09-29 ENCOUNTER — LAB VISIT (OUTPATIENT)
Dept: LAB | Facility: HOSPITAL | Age: 86
End: 2021-09-29
Attending: FAMILY MEDICINE
Payer: COMMERCIAL

## 2021-09-29 DIAGNOSIS — Z86.39 PERSONAL HISTORY OF OTHER ENDOCRINE, NUTRITIONAL AND METABOLIC DISEASE: ICD-10-CM

## 2021-09-29 DIAGNOSIS — E78.2 MIXED HYPERLIPIDEMIA: ICD-10-CM

## 2021-09-29 DIAGNOSIS — Z85.46 HISTORY OF PROSTATE CANCER: ICD-10-CM

## 2021-09-29 DIAGNOSIS — Z86.2 HISTORY OF ANEMIA: ICD-10-CM

## 2021-09-29 DIAGNOSIS — I10 ESSENTIAL HYPERTENSION: ICD-10-CM

## 2021-09-29 LAB
ALBUMIN SERPL BCP-MCNC: 3.8 G/DL (ref 3.5–5.2)
ALP SERPL-CCNC: 87 U/L (ref 55–135)
ALT SERPL W/O P-5'-P-CCNC: 18 U/L (ref 10–44)
ANION GAP SERPL CALC-SCNC: 14 MMOL/L (ref 8–16)
AST SERPL-CCNC: 19 U/L (ref 10–40)
BASOPHILS # BLD AUTO: 0.04 K/UL (ref 0–0.2)
BASOPHILS NFR BLD: 0.4 % (ref 0–1.9)
BILIRUB SERPL-MCNC: 0.9 MG/DL (ref 0.1–1)
BUN SERPL-MCNC: 18 MG/DL (ref 8–23)
CALCIUM SERPL-MCNC: 9.6 MG/DL (ref 8.7–10.5)
CHLORIDE SERPL-SCNC: 107 MMOL/L (ref 95–110)
CHOLEST SERPL-MCNC: 157 MG/DL (ref 120–199)
CHOLEST/HDLC SERPL: 2.8 {RATIO} (ref 2–5)
CO2 SERPL-SCNC: 18 MMOL/L (ref 23–29)
COMPLEXED PSA SERPL-MCNC: 0.01 NG/ML (ref 0–4)
CREAT SERPL-MCNC: 1.8 MG/DL (ref 0.5–1.4)
DIFFERENTIAL METHOD: ABNORMAL
EOSINOPHIL # BLD AUTO: 0.4 K/UL (ref 0–0.5)
EOSINOPHIL NFR BLD: 3.9 % (ref 0–8)
ERYTHROCYTE [DISTWIDTH] IN BLOOD BY AUTOMATED COUNT: 14 % (ref 11.5–14.5)
EST. GFR  (AFRICAN AMERICAN): 39.4 ML/MIN/1.73 M^2
EST. GFR  (NON AFRICAN AMERICAN): 34 ML/MIN/1.73 M^2
ESTIMATED AVG GLUCOSE: 111 MG/DL (ref 68–131)
FERRITIN SERPL-MCNC: 119 NG/ML (ref 20–300)
GLUCOSE SERPL-MCNC: 104 MG/DL (ref 70–110)
HBA1C MFR BLD: 5.5 % (ref 4–5.6)
HCT VFR BLD AUTO: 43.2 % (ref 40–54)
HDLC SERPL-MCNC: 57 MG/DL (ref 40–75)
HDLC SERPL: 36.3 % (ref 20–50)
HGB BLD-MCNC: 13.6 G/DL (ref 14–18)
IMM GRANULOCYTES # BLD AUTO: 0.03 K/UL (ref 0–0.04)
IMM GRANULOCYTES NFR BLD AUTO: 0.3 % (ref 0–0.5)
IRON SERPL-MCNC: 43 UG/DL (ref 45–160)
LDLC SERPL CALC-MCNC: 82.8 MG/DL (ref 63–159)
LYMPHOCYTES # BLD AUTO: 2.5 K/UL (ref 1–4.8)
LYMPHOCYTES NFR BLD: 25.7 % (ref 18–48)
MCH RBC QN AUTO: 26.4 PG (ref 27–31)
MCHC RBC AUTO-ENTMCNC: 31.5 G/DL (ref 32–36)
MCV RBC AUTO: 84 FL (ref 82–98)
MONOCYTES # BLD AUTO: 1 K/UL (ref 0.3–1)
MONOCYTES NFR BLD: 10.2 % (ref 4–15)
NEUTROPHILS # BLD AUTO: 5.8 K/UL (ref 1.8–7.7)
NEUTROPHILS NFR BLD: 59.5 % (ref 38–73)
NONHDLC SERPL-MCNC: 100 MG/DL
NRBC BLD-RTO: 0 /100 WBC
PLATELET # BLD AUTO: 273 K/UL (ref 150–450)
PMV BLD AUTO: 10 FL (ref 9.2–12.9)
POTASSIUM SERPL-SCNC: 4.3 MMOL/L (ref 3.5–5.1)
PROT SERPL-MCNC: 7.3 G/DL (ref 6–8.4)
RBC # BLD AUTO: 5.15 M/UL (ref 4.6–6.2)
SATURATED IRON: 13 % (ref 20–50)
SODIUM SERPL-SCNC: 139 MMOL/L (ref 136–145)
TOTAL IRON BINDING CAPACITY: 324 UG/DL (ref 250–450)
TRANSFERRIN SERPL-MCNC: 219 MG/DL (ref 200–375)
TRIGL SERPL-MCNC: 86 MG/DL (ref 30–150)
WBC # BLD AUTO: 9.68 K/UL (ref 3.9–12.7)

## 2021-09-29 PROCEDURE — 80061 LIPID PANEL: CPT | Performed by: FAMILY MEDICINE

## 2021-09-29 PROCEDURE — 80053 COMPREHEN METABOLIC PANEL: CPT | Performed by: FAMILY MEDICINE

## 2021-09-29 PROCEDURE — 83036 HEMOGLOBIN GLYCOSYLATED A1C: CPT | Performed by: FAMILY MEDICINE

## 2021-09-29 PROCEDURE — 82728 ASSAY OF FERRITIN: CPT | Performed by: FAMILY MEDICINE

## 2021-09-29 PROCEDURE — 84153 ASSAY OF PSA TOTAL: CPT | Performed by: FAMILY MEDICINE

## 2021-09-29 PROCEDURE — 85025 COMPLETE CBC W/AUTO DIFF WBC: CPT | Performed by: FAMILY MEDICINE

## 2021-09-29 PROCEDURE — 84466 ASSAY OF TRANSFERRIN: CPT | Performed by: FAMILY MEDICINE

## 2021-09-29 PROCEDURE — 36415 COLL VENOUS BLD VENIPUNCTURE: CPT | Mod: PN | Performed by: FAMILY MEDICINE

## 2021-10-06 ENCOUNTER — OFFICE VISIT (OUTPATIENT)
Dept: PRIMARY CARE CLINIC | Facility: CLINIC | Age: 86
End: 2021-10-06
Payer: COMMERCIAL

## 2021-10-06 VITALS
WEIGHT: 229.75 LBS | BODY MASS INDEX: 34.82 KG/M2 | HEIGHT: 68 IN | HEART RATE: 78 BPM | DIASTOLIC BLOOD PRESSURE: 70 MMHG | SYSTOLIC BLOOD PRESSURE: 139 MMHG | OXYGEN SATURATION: 98 % | TEMPERATURE: 98 F

## 2021-10-06 DIAGNOSIS — D50.8 IRON DEFICIENCY ANEMIA SECONDARY TO INADEQUATE DIETARY IRON INTAKE: ICD-10-CM

## 2021-10-06 DIAGNOSIS — K57.30 DIVERTICULOSIS OF COLON: ICD-10-CM

## 2021-10-06 DIAGNOSIS — E78.2 MIXED HYPERLIPIDEMIA: ICD-10-CM

## 2021-10-06 DIAGNOSIS — N52.03 COMBINED ARTERIAL INSUFFICIENCY AND CORPORO-VENOUS OCCLUSIVE ERECTILE DYSFUNCTION: ICD-10-CM

## 2021-10-06 DIAGNOSIS — K44.9 HIATAL HERNIA: ICD-10-CM

## 2021-10-06 DIAGNOSIS — K21.9 GASTROESOPHAGEAL REFLUX DISEASE WITHOUT ESOPHAGITIS: ICD-10-CM

## 2021-10-06 DIAGNOSIS — I10 PRIMARY HYPERTENSION: Primary | ICD-10-CM

## 2021-10-06 DIAGNOSIS — N18.31 STAGE 3A CHRONIC KIDNEY DISEASE: ICD-10-CM

## 2021-10-06 DIAGNOSIS — Z87.19 HISTORY OF BARRETT'S ESOPHAGUS: ICD-10-CM

## 2021-10-06 DIAGNOSIS — Z85.46 HISTORY OF PROSTATE CANCER: ICD-10-CM

## 2021-10-06 DIAGNOSIS — K64.8 INTERNAL HEMORRHOIDS: ICD-10-CM

## 2021-10-06 DIAGNOSIS — E66.09 CLASS 1 OBESITY DUE TO EXCESS CALORIES WITH SERIOUS COMORBIDITY AND BODY MASS INDEX (BMI) OF 34.0 TO 34.9 IN ADULT: ICD-10-CM

## 2021-10-06 DIAGNOSIS — Z71.85 VACCINE COUNSELING: ICD-10-CM

## 2021-10-06 PROCEDURE — 99999 PR PBB SHADOW E&M-EST. PATIENT-LVL IV: CPT | Mod: PBBFAC,,, | Performed by: FAMILY MEDICINE

## 2021-10-06 PROCEDURE — 3078F DIAST BP <80 MM HG: CPT | Mod: CPTII,S$GLB,, | Performed by: FAMILY MEDICINE

## 2021-10-06 PROCEDURE — 1159F PR MEDICATION LIST DOCUMENTED IN MEDICAL RECORD: ICD-10-PCS | Mod: CPTII,S$GLB,, | Performed by: FAMILY MEDICINE

## 2021-10-06 PROCEDURE — 3075F SYST BP GE 130 - 139MM HG: CPT | Mod: CPTII,S$GLB,, | Performed by: FAMILY MEDICINE

## 2021-10-06 PROCEDURE — 1101F PT FALLS ASSESS-DOCD LE1/YR: CPT | Mod: CPTII,S$GLB,, | Performed by: FAMILY MEDICINE

## 2021-10-06 PROCEDURE — 3288F PR FALLS RISK ASSESSMENT DOCUMENTED: ICD-10-PCS | Mod: CPTII,S$GLB,, | Performed by: FAMILY MEDICINE

## 2021-10-06 PROCEDURE — 3078F PR MOST RECENT DIASTOLIC BLOOD PRESSURE < 80 MM HG: ICD-10-PCS | Mod: CPTII,S$GLB,, | Performed by: FAMILY MEDICINE

## 2021-10-06 PROCEDURE — 3288F FALL RISK ASSESSMENT DOCD: CPT | Mod: CPTII,S$GLB,, | Performed by: FAMILY MEDICINE

## 2021-10-06 PROCEDURE — 1101F PR PT FALLS ASSESS DOC 0-1 FALLS W/OUT INJ PAST YR: ICD-10-PCS | Mod: CPTII,S$GLB,, | Performed by: FAMILY MEDICINE

## 2021-10-06 PROCEDURE — 1160F RVW MEDS BY RX/DR IN RCRD: CPT | Mod: CPTII,S$GLB,, | Performed by: FAMILY MEDICINE

## 2021-10-06 PROCEDURE — 3075F PR MOST RECENT SYSTOLIC BLOOD PRESS GE 130-139MM HG: ICD-10-PCS | Mod: CPTII,S$GLB,, | Performed by: FAMILY MEDICINE

## 2021-10-06 PROCEDURE — 1126F PR PAIN SEVERITY QUANTIFIED, NO PAIN PRESENT: ICD-10-PCS | Mod: CPTII,S$GLB,, | Performed by: FAMILY MEDICINE

## 2021-10-06 PROCEDURE — 1159F MED LIST DOCD IN RCRD: CPT | Mod: CPTII,S$GLB,, | Performed by: FAMILY MEDICINE

## 2021-10-06 PROCEDURE — 99999 PR PBB SHADOW E&M-EST. PATIENT-LVL IV: ICD-10-PCS | Mod: PBBFAC,,, | Performed by: FAMILY MEDICINE

## 2021-10-06 PROCEDURE — 99214 OFFICE O/P EST MOD 30 MIN: CPT | Mod: S$GLB,,, | Performed by: FAMILY MEDICINE

## 2021-10-06 PROCEDURE — 1160F PR REVIEW ALL MEDS BY PRESCRIBER/CLIN PHARMACIST DOCUMENTED: ICD-10-PCS | Mod: CPTII,S$GLB,, | Performed by: FAMILY MEDICINE

## 2021-10-06 PROCEDURE — 99214 PR OFFICE/OUTPT VISIT, EST, LEVL IV, 30-39 MIN: ICD-10-PCS | Mod: S$GLB,,, | Performed by: FAMILY MEDICINE

## 2021-10-06 PROCEDURE — 1126F AMNT PAIN NOTED NONE PRSNT: CPT | Mod: CPTII,S$GLB,, | Performed by: FAMILY MEDICINE

## 2021-10-20 ENCOUNTER — CLINICAL SUPPORT (OUTPATIENT)
Dept: PRIMARY CARE CLINIC | Facility: CLINIC | Age: 86
End: 2021-10-20
Payer: COMMERCIAL

## 2021-10-20 VITALS — SYSTOLIC BLOOD PRESSURE: 132 MMHG | OXYGEN SATURATION: 96 % | DIASTOLIC BLOOD PRESSURE: 74 MMHG | HEART RATE: 71 BPM

## 2021-10-20 PROCEDURE — 99999 PR PBB SHADOW E&M-EST. PATIENT-LVL II: ICD-10-PCS | Mod: PBBFAC,,,

## 2021-10-20 PROCEDURE — 99999 PR PBB SHADOW E&M-EST. PATIENT-LVL II: CPT | Mod: PBBFAC,,,

## 2021-10-22 DIAGNOSIS — N18.31 STAGE 3A CHRONIC KIDNEY DISEASE: Primary | ICD-10-CM

## 2021-10-25 ENCOUNTER — LAB VISIT (OUTPATIENT)
Dept: LAB | Facility: HOSPITAL | Age: 86
End: 2021-10-25
Attending: INTERNAL MEDICINE
Payer: COMMERCIAL

## 2021-10-25 DIAGNOSIS — N18.31 STAGE 3A CHRONIC KIDNEY DISEASE: ICD-10-CM

## 2021-10-25 LAB
ANION GAP SERPL CALC-SCNC: 12 MMOL/L (ref 8–16)
BASOPHILS # BLD AUTO: 0.04 K/UL (ref 0–0.2)
BASOPHILS NFR BLD: 0.5 % (ref 0–1.9)
BUN SERPL-MCNC: 15 MG/DL (ref 8–23)
CALCIUM SERPL-MCNC: 9.3 MG/DL (ref 8.7–10.5)
CHLORIDE SERPL-SCNC: 109 MMOL/L (ref 95–110)
CO2 SERPL-SCNC: 19 MMOL/L (ref 23–29)
CREAT SERPL-MCNC: 1.6 MG/DL (ref 0.5–1.4)
DIFFERENTIAL METHOD: ABNORMAL
EOSINOPHIL # BLD AUTO: 0.4 K/UL (ref 0–0.5)
EOSINOPHIL NFR BLD: 4.8 % (ref 0–8)
ERYTHROCYTE [DISTWIDTH] IN BLOOD BY AUTOMATED COUNT: 14.1 % (ref 11.5–14.5)
EST. GFR  (AFRICAN AMERICAN): 45.4 ML/MIN/1.73 M^2
EST. GFR  (NON AFRICAN AMERICAN): 39.3 ML/MIN/1.73 M^2
GLUCOSE SERPL-MCNC: 128 MG/DL (ref 70–110)
HCT VFR BLD AUTO: 41.6 % (ref 40–54)
HGB BLD-MCNC: 13.1 G/DL (ref 14–18)
IMM GRANULOCYTES # BLD AUTO: 0.02 K/UL (ref 0–0.04)
IMM GRANULOCYTES NFR BLD AUTO: 0.2 % (ref 0–0.5)
LYMPHOCYTES # BLD AUTO: 2.2 K/UL (ref 1–4.8)
LYMPHOCYTES NFR BLD: 25.3 % (ref 18–48)
MCH RBC QN AUTO: 26.4 PG (ref 27–31)
MCHC RBC AUTO-ENTMCNC: 31.5 G/DL (ref 32–36)
MCV RBC AUTO: 84 FL (ref 82–98)
MONOCYTES # BLD AUTO: 1 K/UL (ref 0.3–1)
MONOCYTES NFR BLD: 12.1 % (ref 4–15)
NEUTROPHILS # BLD AUTO: 4.9 K/UL (ref 1.8–7.7)
NEUTROPHILS NFR BLD: 57.1 % (ref 38–73)
NRBC BLD-RTO: 0 /100 WBC
PLATELET # BLD AUTO: 281 K/UL (ref 150–450)
PMV BLD AUTO: 10.1 FL (ref 9.2–12.9)
POTASSIUM SERPL-SCNC: 3.8 MMOL/L (ref 3.5–5.1)
RBC # BLD AUTO: 4.97 M/UL (ref 4.6–6.2)
SODIUM SERPL-SCNC: 140 MMOL/L (ref 136–145)
WBC # BLD AUTO: 8.63 K/UL (ref 3.9–12.7)

## 2021-10-25 PROCEDURE — 80048 BASIC METABOLIC PNL TOTAL CA: CPT | Performed by: INTERNAL MEDICINE

## 2021-10-25 PROCEDURE — 85025 COMPLETE CBC W/AUTO DIFF WBC: CPT | Performed by: INTERNAL MEDICINE

## 2021-10-25 PROCEDURE — 36415 COLL VENOUS BLD VENIPUNCTURE: CPT | Mod: PN | Performed by: INTERNAL MEDICINE

## 2021-10-26 ENCOUNTER — PATIENT OUTREACH (OUTPATIENT)
Dept: ADMINISTRATIVE | Facility: OTHER | Age: 86
End: 2021-10-26
Payer: COMMERCIAL

## 2021-10-27 ENCOUNTER — OFFICE VISIT (OUTPATIENT)
Dept: NEPHROLOGY | Facility: CLINIC | Age: 86
End: 2021-10-27
Payer: COMMERCIAL

## 2021-10-27 VITALS
HEART RATE: 68 BPM | DIASTOLIC BLOOD PRESSURE: 70 MMHG | OXYGEN SATURATION: 96 % | HEIGHT: 68 IN | BODY MASS INDEX: 34.55 KG/M2 | SYSTOLIC BLOOD PRESSURE: 160 MMHG | WEIGHT: 227.94 LBS

## 2021-10-27 DIAGNOSIS — N18.31 STAGE 3A CHRONIC KIDNEY DISEASE: ICD-10-CM

## 2021-10-27 PROCEDURE — 3078F DIAST BP <80 MM HG: CPT | Mod: CPTII,S$GLB,, | Performed by: INTERNAL MEDICINE

## 2021-10-27 PROCEDURE — 3077F PR MOST RECENT SYSTOLIC BLOOD PRESSURE >= 140 MM HG: ICD-10-PCS | Mod: CPTII,S$GLB,, | Performed by: INTERNAL MEDICINE

## 2021-10-27 PROCEDURE — 1101F PT FALLS ASSESS-DOCD LE1/YR: CPT | Mod: CPTII,S$GLB,, | Performed by: INTERNAL MEDICINE

## 2021-10-27 PROCEDURE — 99204 PR OFFICE/OUTPT VISIT, NEW, LEVL IV, 45-59 MIN: ICD-10-PCS | Mod: S$GLB,,, | Performed by: INTERNAL MEDICINE

## 2021-10-27 PROCEDURE — 99204 OFFICE O/P NEW MOD 45 MIN: CPT | Mod: S$GLB,,, | Performed by: INTERNAL MEDICINE

## 2021-10-27 PROCEDURE — 99999 PR PBB SHADOW E&M-EST. PATIENT-LVL III: ICD-10-PCS | Mod: PBBFAC,,, | Performed by: INTERNAL MEDICINE

## 2021-10-27 PROCEDURE — 1159F PR MEDICATION LIST DOCUMENTED IN MEDICAL RECORD: ICD-10-PCS | Mod: CPTII,S$GLB,, | Performed by: INTERNAL MEDICINE

## 2021-10-27 PROCEDURE — 3078F PR MOST RECENT DIASTOLIC BLOOD PRESSURE < 80 MM HG: ICD-10-PCS | Mod: CPTII,S$GLB,, | Performed by: INTERNAL MEDICINE

## 2021-10-27 PROCEDURE — 3077F SYST BP >= 140 MM HG: CPT | Mod: CPTII,S$GLB,, | Performed by: INTERNAL MEDICINE

## 2021-10-27 PROCEDURE — 1126F AMNT PAIN NOTED NONE PRSNT: CPT | Mod: CPTII,S$GLB,, | Performed by: INTERNAL MEDICINE

## 2021-10-27 PROCEDURE — 3288F FALL RISK ASSESSMENT DOCD: CPT | Mod: CPTII,S$GLB,, | Performed by: INTERNAL MEDICINE

## 2021-10-27 PROCEDURE — 99999 PR PBB SHADOW E&M-EST. PATIENT-LVL III: CPT | Mod: PBBFAC,,, | Performed by: INTERNAL MEDICINE

## 2021-10-27 PROCEDURE — 1126F PR PAIN SEVERITY QUANTIFIED, NO PAIN PRESENT: ICD-10-PCS | Mod: CPTII,S$GLB,, | Performed by: INTERNAL MEDICINE

## 2021-10-27 PROCEDURE — 1101F PR PT FALLS ASSESS DOC 0-1 FALLS W/OUT INJ PAST YR: ICD-10-PCS | Mod: CPTII,S$GLB,, | Performed by: INTERNAL MEDICINE

## 2021-10-27 PROCEDURE — 1159F MED LIST DOCD IN RCRD: CPT | Mod: CPTII,S$GLB,, | Performed by: INTERNAL MEDICINE

## 2021-10-27 PROCEDURE — 3288F PR FALLS RISK ASSESSMENT DOCUMENTED: ICD-10-PCS | Mod: CPTII,S$GLB,, | Performed by: INTERNAL MEDICINE

## 2021-10-27 RX ORDER — SODIUM BICARBONATE 650 MG/1
650 TABLET ORAL 2 TIMES DAILY
Qty: 60 TABLET | Refills: 11 | Status: SHIPPED | OUTPATIENT
Start: 2021-10-27 | End: 2022-09-15

## 2022-02-24 DIAGNOSIS — N18.31 STAGE 3A CHRONIC KIDNEY DISEASE: Primary | ICD-10-CM

## 2022-04-06 ENCOUNTER — OFFICE VISIT (OUTPATIENT)
Dept: PRIMARY CARE CLINIC | Facility: CLINIC | Age: 87
End: 2022-04-06
Payer: COMMERCIAL

## 2022-04-06 VITALS
SYSTOLIC BLOOD PRESSURE: 134 MMHG | DIASTOLIC BLOOD PRESSURE: 70 MMHG | HEIGHT: 68 IN | OXYGEN SATURATION: 97 % | HEART RATE: 75 BPM | BODY MASS INDEX: 31.61 KG/M2 | TEMPERATURE: 99 F | WEIGHT: 208.56 LBS

## 2022-04-06 DIAGNOSIS — Z87.19 HISTORY OF BARRETT'S ESOPHAGUS: ICD-10-CM

## 2022-04-06 DIAGNOSIS — E78.2 MIXED HYPERLIPIDEMIA: ICD-10-CM

## 2022-04-06 DIAGNOSIS — D50.8 IRON DEFICIENCY ANEMIA SECONDARY TO INADEQUATE DIETARY IRON INTAKE: ICD-10-CM

## 2022-04-06 DIAGNOSIS — Z13.6 ENCOUNTER FOR LIPID SCREENING FOR CARDIOVASCULAR DISEASE: ICD-10-CM

## 2022-04-06 DIAGNOSIS — Z85.46 HISTORY OF PROSTATE CANCER: ICD-10-CM

## 2022-04-06 DIAGNOSIS — K64.8 INTERNAL HEMORRHOIDS: ICD-10-CM

## 2022-04-06 DIAGNOSIS — K57.30 DIVERTICULOSIS OF COLON: ICD-10-CM

## 2022-04-06 DIAGNOSIS — Z13.220 ENCOUNTER FOR LIPID SCREENING FOR CARDIOVASCULAR DISEASE: ICD-10-CM

## 2022-04-06 DIAGNOSIS — I10 PRIMARY HYPERTENSION: Primary | ICD-10-CM

## 2022-04-06 DIAGNOSIS — Z01.84 IMMUNITY STATUS TESTING: ICD-10-CM

## 2022-04-06 DIAGNOSIS — K44.9 HIATAL HERNIA: ICD-10-CM

## 2022-04-06 DIAGNOSIS — Z12.5 SCREENING FOR MALIGNANT NEOPLASM OF PROSTATE: ICD-10-CM

## 2022-04-06 DIAGNOSIS — E66.09 CLASS 1 OBESITY DUE TO EXCESS CALORIES WITH SERIOUS COMORBIDITY AND BODY MASS INDEX (BMI) OF 31.0 TO 31.9 IN ADULT: ICD-10-CM

## 2022-04-06 DIAGNOSIS — K21.9 GASTROESOPHAGEAL REFLUX DISEASE WITHOUT ESOPHAGITIS: ICD-10-CM

## 2022-04-06 DIAGNOSIS — N18.31 STAGE 3A CHRONIC KIDNEY DISEASE: ICD-10-CM

## 2022-04-06 DIAGNOSIS — Z86.2 HISTORY OF ANEMIA: ICD-10-CM

## 2022-04-06 DIAGNOSIS — N52.03 COMBINED ARTERIAL INSUFFICIENCY AND CORPORO-VENOUS OCCLUSIVE ERECTILE DYSFUNCTION: ICD-10-CM

## 2022-04-06 PROCEDURE — 3078F DIAST BP <80 MM HG: CPT | Mod: CPTII,S$GLB,, | Performed by: FAMILY MEDICINE

## 2022-04-06 PROCEDURE — 3075F PR MOST RECENT SYSTOLIC BLOOD PRESS GE 130-139MM HG: ICD-10-PCS | Mod: CPTII,S$GLB,, | Performed by: FAMILY MEDICINE

## 2022-04-06 PROCEDURE — 1101F PT FALLS ASSESS-DOCD LE1/YR: CPT | Mod: CPTII,S$GLB,, | Performed by: FAMILY MEDICINE

## 2022-04-06 PROCEDURE — 1101F PR PT FALLS ASSESS DOC 0-1 FALLS W/OUT INJ PAST YR: ICD-10-PCS | Mod: CPTII,S$GLB,, | Performed by: FAMILY MEDICINE

## 2022-04-06 PROCEDURE — 1126F AMNT PAIN NOTED NONE PRSNT: CPT | Mod: CPTII,S$GLB,, | Performed by: FAMILY MEDICINE

## 2022-04-06 PROCEDURE — 99999 PR PBB SHADOW E&M-EST. PATIENT-LVL IV: ICD-10-PCS | Mod: PBBFAC,,, | Performed by: FAMILY MEDICINE

## 2022-04-06 PROCEDURE — 99999 PR PBB SHADOW E&M-EST. PATIENT-LVL IV: CPT | Mod: PBBFAC,,, | Performed by: FAMILY MEDICINE

## 2022-04-06 PROCEDURE — 3288F FALL RISK ASSESSMENT DOCD: CPT | Mod: CPTII,S$GLB,, | Performed by: FAMILY MEDICINE

## 2022-04-06 PROCEDURE — 3078F PR MOST RECENT DIASTOLIC BLOOD PRESSURE < 80 MM HG: ICD-10-PCS | Mod: CPTII,S$GLB,, | Performed by: FAMILY MEDICINE

## 2022-04-06 PROCEDURE — 1160F RVW MEDS BY RX/DR IN RCRD: CPT | Mod: CPTII,S$GLB,, | Performed by: FAMILY MEDICINE

## 2022-04-06 PROCEDURE — 3075F SYST BP GE 130 - 139MM HG: CPT | Mod: CPTII,S$GLB,, | Performed by: FAMILY MEDICINE

## 2022-04-06 PROCEDURE — 1126F PR PAIN SEVERITY QUANTIFIED, NO PAIN PRESENT: ICD-10-PCS | Mod: CPTII,S$GLB,, | Performed by: FAMILY MEDICINE

## 2022-04-06 PROCEDURE — 1159F MED LIST DOCD IN RCRD: CPT | Mod: CPTII,S$GLB,, | Performed by: FAMILY MEDICINE

## 2022-04-06 PROCEDURE — 99214 PR OFFICE/OUTPT VISIT, EST, LEVL IV, 30-39 MIN: ICD-10-PCS | Mod: S$GLB,,, | Performed by: FAMILY MEDICINE

## 2022-04-06 PROCEDURE — 99214 OFFICE O/P EST MOD 30 MIN: CPT | Mod: S$GLB,,, | Performed by: FAMILY MEDICINE

## 2022-04-06 PROCEDURE — 1160F PR REVIEW ALL MEDS BY PRESCRIBER/CLIN PHARMACIST DOCUMENTED: ICD-10-PCS | Mod: CPTII,S$GLB,, | Performed by: FAMILY MEDICINE

## 2022-04-06 PROCEDURE — 3288F PR FALLS RISK ASSESSMENT DOCUMENTED: ICD-10-PCS | Mod: CPTII,S$GLB,, | Performed by: FAMILY MEDICINE

## 2022-04-06 PROCEDURE — 1159F PR MEDICATION LIST DOCUMENTED IN MEDICAL RECORD: ICD-10-PCS | Mod: CPTII,S$GLB,, | Performed by: FAMILY MEDICINE

## 2022-04-06 RX ORDER — PANTOPRAZOLE SODIUM 40 MG/1
40 TABLET, DELAYED RELEASE ORAL DAILY
Qty: 90 TABLET | Refills: 3 | Status: ON HOLD | OUTPATIENT
Start: 2022-04-06 | End: 2022-06-14 | Stop reason: SDUPTHER

## 2022-04-06 RX ORDER — ATORVASTATIN CALCIUM 10 MG/1
10 TABLET, FILM COATED ORAL NIGHTLY
Qty: 90 TABLET | Refills: 3 | Status: SHIPPED | OUTPATIENT
Start: 2022-04-06 | End: 2023-07-12 | Stop reason: SDUPTHER

## 2022-04-06 RX ORDER — LOSARTAN POTASSIUM 50 MG/1
50 TABLET ORAL DAILY
Qty: 90 TABLET | Refills: 3 | Status: SHIPPED | OUTPATIENT
Start: 2022-04-06 | End: 2023-08-30 | Stop reason: SDUPTHER

## 2022-04-06 RX ORDER — AMLODIPINE BESYLATE 10 MG/1
10 TABLET ORAL EVERY MORNING
Qty: 90 TABLET | Refills: 3 | Status: SHIPPED | OUTPATIENT
Start: 2022-04-06 | End: 2023-02-15 | Stop reason: SDUPTHER

## 2022-04-06 NOTE — PROGRESS NOTES
Subjective:       Patient ID: Zion Vasquez is a 86 y.o. male.    Chief Complaint: Follow up      85 yo male presents for follow up.    - Hypertension  He is asymptomatic. He tolerates amlodipine and losartan. No adverse events. He is adherent to medication.  BP reasonably well controlled.    - Hyperlipidemia  No acute concerns. He is on atorvastatin without any side effects.    - Hx of lower GI bleeding and Anemia.  1/2/2019  Hospital Course: THE PATIENT WAS ADMITTED WITH HEMATOCHEZIA. HIS HEMOGLOBIN ON ADMIT WAS 11.7. HE WAS PLACED ON IV PROTON AND HAD AN EGD DONE JANUARY 1ST THAT SHOWED JONES'S ESOPHAGUS AS WELL AS A HIATAL HERNIA AND COLONOSCOPY DONE JANUARY 7 THAT SHOWED 6 MM POLYP THAT WAS SESSILE FOR WHICH PATIENT HAD POLYPECTOMY. THE COLONOSCOPY ALSO SHOWED MULTIPLE DIVERTICULA EXTENDING FROM THE SIGMOID COLON TO THE ASCENDING COLON WITH NO EVIDENCE OF DIVERTICULAR BLEEDING. HEMOGLOBIN AT THE TIME OF DISCHARGE WAS 11.7  He continues on Protonix. No further bleeding episodes reported.  He is not on iron therapy.    GERD, Hx of Jones's esophagus, hiatal hernia  No alarm symptoms    Diverticulosis of colon  No new bleeding episodes or signs of infection    Internal hemorrhoids  No acute bleeding events    - CKD, stage 3  He follows with Nephrologist and was started on sodium bicarbonate for metabolic acidosis. Kidney function stable. He is not on NSAIDs.    - Hx of Prostate cancer.  He plans to continue seeing Dr. Ward, Urology. He wants me to order PSA for repeat in 6 months.    - Obesity  Working on weight loss goals.    Up to date on vaccines with exception of shingles: he states he never had chicken pox, willing to do serological testin.    The following portions of the patient's history were reviewed and updated as appropriate: allergies, current medications, past family history, past medical history, past social history, past surgical history and problem list.      Follow-up  Associated symptoms  "include arthralgias. Pertinent negatives include no abdominal pain, chest pain, chills, congestion, diaphoresis, fatigue, fever, headaches, myalgias, nausea, neck pain, rash, sore throat, vomiting or weakness.     Review of Systems   Constitutional: Negative for activity change, appetite change, chills, diaphoresis, fatigue, fever and unexpected weight change.   HENT: Negative for nasal congestion, dental problem, facial swelling, nosebleeds, postnasal drip, rhinorrhea, sore throat, tinnitus and trouble swallowing.    Eyes: Negative for pain, discharge, itching and visual disturbance.   Respiratory: Negative for apnea, chest tightness, shortness of breath, wheezing and stridor.    Cardiovascular: Negative for chest pain, palpitations and leg swelling.   Gastrointestinal: Negative for abdominal distention, abdominal pain, constipation, diarrhea, nausea, rectal pain and vomiting.   Endocrine: Negative for cold intolerance, heat intolerance and polyuria.   Genitourinary: Negative for difficulty urinating, dysuria, frequency, hematuria and urgency.   Musculoskeletal: Positive for arthralgias. Negative for gait problem, myalgias, neck pain and neck stiffness.   Integumentary:  Negative for color change and rash.   Neurological: Negative for dizziness, tremors, seizures, syncope, facial asymmetry, weakness and headaches.   Hematological: Negative for adenopathy. Does not bruise/bleed easily.   Psychiatric/Behavioral: Negative for agitation, confusion, hallucinations, self-injury and suicidal ideas. The patient is not hyperactive.           Objective:       Vitals:    04/06/22 1423   BP: 134/70   Pulse: 75   Temp: 98.8 °F (37.1 °C)   TempSrc: Oral   SpO2: 97%   Weight: 94.6 kg (208 lb 8.9 oz)   Height: 5' 8" (1.727 m)     Physical Exam  Constitutional:       General: He is not in acute distress.     Appearance: He is well-developed. He is obese. He is not diaphoretic.   HENT:      Head: Normocephalic and atraumatic.      " Right Ear: External ear normal.      Left Ear: External ear normal.   Eyes:      General: No scleral icterus.        Right eye: No discharge.         Left eye: No discharge.      Conjunctiva/sclera: Conjunctivae normal.      Pupils: Pupils are equal, round, and reactive to light.   Neck:      Thyroid: No thyromegaly.   Cardiovascular:      Rate and Rhythm: Normal rate and regular rhythm.      Heart sounds: Normal heart sounds. No murmur heard.    No friction rub. No gallop.   Pulmonary:      Effort: Pulmonary effort is normal. No respiratory distress.      Breath sounds: Normal breath sounds.   Chest:      Chest wall: No tenderness.   Abdominal:      General: Bowel sounds are normal. There is distension.      Palpations: Abdomen is soft. There is no mass.      Tenderness: There is no abdominal tenderness. There is no guarding or rebound.   Musculoskeletal:         General: Normal range of motion.      Cervical back: Normal range of motion and neck supple.   Lymphadenopathy:      Cervical: No cervical adenopathy.   Skin:     General: Skin is warm.      Capillary Refill: Capillary refill takes less than 2 seconds.      Findings: No rash.   Neurological:      Mental Status: He is alert and oriented to person, place, and time.      Cranial Nerves: No cranial nerve deficit.      Motor: No abnormal muscle tone.      Coordination: Coordination normal.      Deep Tendon Reflexes: Reflexes normal.   Psychiatric:         Thought Content: Thought content normal.         Judgment: Judgment normal.         No visits with results within 14 Day(s) from this visit.   Latest known visit with results is:   Lab Visit on 10/25/2021   Component Date Value Ref Range Status    WBC 10/25/2021 8.63  3.90 - 12.70 K/uL Final    RBC 10/25/2021 4.97  4.60 - 6.20 M/uL Final    Hemoglobin 10/25/2021 13.1 (A) 14.0 - 18.0 g/dL Final    Hematocrit 10/25/2021 41.6  40.0 - 54.0 % Final    MCV 10/25/2021 84  82 - 98 fL Final    MCH 10/25/2021 26.4  (A) 27.0 - 31.0 pg Final    MCHC 10/25/2021 31.5 (A) 32.0 - 36.0 g/dL Final    RDW 10/25/2021 14.1  11.5 - 14.5 % Final    Platelets 10/25/2021 281  150 - 450 K/uL Final    MPV 10/25/2021 10.1  9.2 - 12.9 fL Final    Immature Granulocytes 10/25/2021 0.2  0.0 - 0.5 % Final    Gran # (ANC) 10/25/2021 4.9  1.8 - 7.7 K/uL Final    Immature Grans (Abs) 10/25/2021 0.02  0.00 - 0.04 K/uL Final    Comment: Mild elevation in immature granulocytes is non specific and   can be seen in a variety of conditions including stress response,   acute inflammation, trauma and pregnancy. Correlation with other   laboratory and clinical findings is essential.      Lymph # 10/25/2021 2.2  1.0 - 4.8 K/uL Final    Mono # 10/25/2021 1.0  0.3 - 1.0 K/uL Final    Eos # 10/25/2021 0.4  0.0 - 0.5 K/uL Final    Baso # 10/25/2021 0.04  0.00 - 0.20 K/uL Final    nRBC 10/25/2021 0  0 /100 WBC Final    Gran % 10/25/2021 57.1  38.0 - 73.0 % Final    Lymph % 10/25/2021 25.3  18.0 - 48.0 % Final    Mono % 10/25/2021 12.1  4.0 - 15.0 % Final    Eosinophil % 10/25/2021 4.8  0.0 - 8.0 % Final    Basophil % 10/25/2021 0.5  0.0 - 1.9 % Final    Differential Method 10/25/2021 Automated   Final    Sodium 10/25/2021 140  136 - 145 mmol/L Final    Potassium 10/25/2021 3.8  3.5 - 5.1 mmol/L Final    Chloride 10/25/2021 109  95 - 110 mmol/L Final    CO2 10/25/2021 19 (A) 23 - 29 mmol/L Final    Glucose 10/25/2021 128 (A) 70 - 110 mg/dL Final    BUN 10/25/2021 15  8 - 23 mg/dL Final    Creatinine 10/25/2021 1.6 (A) 0.5 - 1.4 mg/dL Final    Calcium 10/25/2021 9.3  8.7 - 10.5 mg/dL Final    Anion Gap 10/25/2021 12  8 - 16 mmol/L Final    eGFR if African American 10/25/2021 45.4 (A) >60 mL/min/1.73 m^2 Final    eGFR if non  10/25/2021 39.3 (A) >60 mL/min/1.73 m^2 Final    Comment: Calculation used to obtain the estimated glomerular filtration  rate (eGFR) is the CKD-EPI equation.          Assessment:       1. Primary  hypertension    2. Mixed hyperlipidemia    3. Stage 3a chronic kidney disease    4. Class 1 obesity due to excess calories with serious comorbidity and body mass index (BMI) of 31.0 to 31.9 in adult    5. Encounter for lipid screening for cardiovascular disease    6. Immunity status testing    7. History of prostate cancer    8. 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 Iron deficiency anemia secondary to inadequate dietary iron intake    11. History of anemia    12. Gastroesophageal reflux disease without esophagitis    13. Hiatal hernia    14. History of Martinez's esophagus    15. Diverticulosis of colon    16. Internal hemorrhoids        Plan:       1. Primary hypertension  -     CBC Auto Differential; Future  -     Comprehensive Metabolic Panel; Future  -     Cancel: Microalbumin/Creatinine Ratio, Urine  -     losartan (COZAAR) 50 MG tablet; Take 1 tablet (50 mg total) by mouth once daily.  Dispense: 90 tablet; Refill: 3  -     amLODIPine (NORVASC) 10 MG tablet; Take 1 tablet (10 mg total) by mouth every morning.  Dispense: 90 tablet; Refill: 3  -     Microalbumin/Creatinine Ratio, Urine; Future    2. Mixed hyperlipidemia  -     atorvastatin (LIPITOR) 10 MG tablet; Take 1 tablet (10 mg total) by mouth every evening.  Dispense: 90 tablet; Refill: 3    3. Stage 3a chronic kidney disease  Avoid NSAIDs. Encourage adequate hydration. Follow with nephrology.  Continue to monitor    4. Class 1 obesity due to excess calories with serious comorbidity and body mass index (BMI) of 31.0 to 31.9 in adult  Encourage healthy lifestyle changes through diet and exercise    5. Encounter for lipid screening for cardiovascular disease  -     CBC Auto Differential; Future  -     Comprehensive Metabolic Panel; Future  -     Lipid Panel; Future    6. Immunity status testing  -     Varicella Zoster Antibody, IgG; Future  To determine if he  needs shingles vaccine or chicken pox vaccine    7. History of prostate cancer  -     PROSTATE SPECIFIC ANTIGEN, DIAGNOSTIC; Future  Follows with Dr. Ward, Urology    8. 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 Not on pharmacotherapy.    10. Iron deficiency anemia secondary to inadequate dietary iron intake  11. History of anemia  Continue to monitor. Asymptomatic.    12. Gastroesophageal reflux disease without esophagitis  13. Hiatal hernia  14. Hx of Martinez's  -     pantoprazole (PROTONIX) 40 MG tablet; Take 1 tablet (40 mg total) by mouth once daily.  Dispense: 90 tablet; Refill: 3    15. Diverticulosis of colon  No adverse events    16. Internal hemorrhoids  No acute pain or acute bleeding episodes      Disclaimer: This note has been generated using voice-recognition software. There may be typographical errors that have been missed during proof-reading

## 2022-04-20 ENCOUNTER — TELEPHONE (OUTPATIENT)
Dept: NEPHROLOGY | Facility: CLINIC | Age: 87
End: 2022-04-20
Payer: COMMERCIAL

## 2022-04-22 ENCOUNTER — PATIENT OUTREACH (OUTPATIENT)
Dept: ADMINISTRATIVE | Facility: OTHER | Age: 87
End: 2022-04-22
Payer: COMMERCIAL

## 2022-04-22 ENCOUNTER — LAB VISIT (OUTPATIENT)
Dept: LAB | Facility: HOSPITAL | Age: 87
End: 2022-04-22
Attending: INTERNAL MEDICINE
Payer: COMMERCIAL

## 2022-04-22 DIAGNOSIS — N18.31 STAGE 3A CHRONIC KIDNEY DISEASE: ICD-10-CM

## 2022-04-22 PROCEDURE — 85025 COMPLETE CBC W/AUTO DIFF WBC: CPT | Performed by: INTERNAL MEDICINE

## 2022-04-22 PROCEDURE — 80048 BASIC METABOLIC PNL TOTAL CA: CPT | Performed by: INTERNAL MEDICINE

## 2022-04-22 PROCEDURE — 36415 COLL VENOUS BLD VENIPUNCTURE: CPT | Mod: PN | Performed by: INTERNAL MEDICINE

## 2022-04-22 NOTE — PROGRESS NOTES
Health Maintenance Due   Topic Date Due    Shingles Vaccine (1 of 2) Never done     Updates were requested from care everywhere.  Chart was reviewed for overdue Proactive [facility] Encounters (LOYDA) topics (CRS, Breast Cancer Screening, Eye exam)  Health Maintenance has been updated.  LINKS immunization registry triggered.  Immunizations were reconciled.

## 2022-04-23 LAB
ANION GAP SERPL CALC-SCNC: 9 MMOL/L (ref 8–16)
BASOPHILS # BLD AUTO: 0.03 K/UL (ref 0–0.2)
BASOPHILS NFR BLD: 0.4 % (ref 0–1.9)
BUN SERPL-MCNC: 16 MG/DL (ref 8–23)
CALCIUM SERPL-MCNC: 9.6 MG/DL (ref 8.7–10.5)
CHLORIDE SERPL-SCNC: 107 MMOL/L (ref 95–110)
CO2 SERPL-SCNC: 23 MMOL/L (ref 23–29)
CREAT SERPL-MCNC: 1.8 MG/DL (ref 0.5–1.4)
DIFFERENTIAL METHOD: ABNORMAL
EOSINOPHIL # BLD AUTO: 0.3 K/UL (ref 0–0.5)
EOSINOPHIL NFR BLD: 4.6 % (ref 0–8)
ERYTHROCYTE [DISTWIDTH] IN BLOOD BY AUTOMATED COUNT: 14 % (ref 11.5–14.5)
EST. GFR  (AFRICAN AMERICAN): 39.1 ML/MIN/1.73 M^2
EST. GFR  (NON AFRICAN AMERICAN): 33.8 ML/MIN/1.73 M^2
GLUCOSE SERPL-MCNC: 120 MG/DL (ref 70–110)
HCT VFR BLD AUTO: 42.5 % (ref 40–54)
HGB BLD-MCNC: 13.4 G/DL (ref 14–18)
IMM GRANULOCYTES # BLD AUTO: 0.01 K/UL (ref 0–0.04)
IMM GRANULOCYTES NFR BLD AUTO: 0.1 % (ref 0–0.5)
LYMPHOCYTES # BLD AUTO: 2.1 K/UL (ref 1–4.8)
LYMPHOCYTES NFR BLD: 29.2 % (ref 18–48)
MCH RBC QN AUTO: 26.6 PG (ref 27–31)
MCHC RBC AUTO-ENTMCNC: 31.5 G/DL (ref 32–36)
MCV RBC AUTO: 85 FL (ref 82–98)
MONOCYTES # BLD AUTO: 1 K/UL (ref 0.3–1)
MONOCYTES NFR BLD: 13.2 % (ref 4–15)
NEUTROPHILS # BLD AUTO: 3.8 K/UL (ref 1.8–7.7)
NEUTROPHILS NFR BLD: 52.5 % (ref 38–73)
NRBC BLD-RTO: 0 /100 WBC
PLATELET # BLD AUTO: 291 K/UL (ref 150–450)
PMV BLD AUTO: 9.8 FL (ref 9.2–12.9)
POTASSIUM SERPL-SCNC: 4.3 MMOL/L (ref 3.5–5.1)
RBC # BLD AUTO: 5.03 M/UL (ref 4.6–6.2)
SODIUM SERPL-SCNC: 139 MMOL/L (ref 136–145)
WBC # BLD AUTO: 7.18 K/UL (ref 3.9–12.7)

## 2022-04-26 ENCOUNTER — OFFICE VISIT (OUTPATIENT)
Dept: NEPHROLOGY | Facility: CLINIC | Age: 87
End: 2022-04-26
Payer: COMMERCIAL

## 2022-04-26 VITALS
OXYGEN SATURATION: 96 % | HEIGHT: 68 IN | HEART RATE: 77 BPM | DIASTOLIC BLOOD PRESSURE: 80 MMHG | WEIGHT: 219.81 LBS | BODY MASS INDEX: 33.31 KG/M2 | SYSTOLIC BLOOD PRESSURE: 142 MMHG

## 2022-04-26 DIAGNOSIS — N18.32 STAGE 3B CHRONIC KIDNEY DISEASE: Primary | ICD-10-CM

## 2022-04-26 PROCEDURE — 1159F MED LIST DOCD IN RCRD: CPT | Mod: CPTII,S$GLB,, | Performed by: INTERNAL MEDICINE

## 2022-04-26 PROCEDURE — 3079F PR MOST RECENT DIASTOLIC BLOOD PRESSURE 80-89 MM HG: ICD-10-PCS | Mod: CPTII,S$GLB,, | Performed by: INTERNAL MEDICINE

## 2022-04-26 PROCEDURE — 3288F PR FALLS RISK ASSESSMENT DOCUMENTED: ICD-10-PCS | Mod: CPTII,S$GLB,, | Performed by: INTERNAL MEDICINE

## 2022-04-26 PROCEDURE — 99214 OFFICE O/P EST MOD 30 MIN: CPT | Mod: S$GLB,,, | Performed by: INTERNAL MEDICINE

## 2022-04-26 PROCEDURE — 99999 PR PBB SHADOW E&M-EST. PATIENT-LVL III: CPT | Mod: PBBFAC,,, | Performed by: INTERNAL MEDICINE

## 2022-04-26 PROCEDURE — 3077F PR MOST RECENT SYSTOLIC BLOOD PRESSURE >= 140 MM HG: ICD-10-PCS | Mod: CPTII,S$GLB,, | Performed by: INTERNAL MEDICINE

## 2022-04-26 PROCEDURE — 1126F PR PAIN SEVERITY QUANTIFIED, NO PAIN PRESENT: ICD-10-PCS | Mod: CPTII,S$GLB,, | Performed by: INTERNAL MEDICINE

## 2022-04-26 PROCEDURE — 99214 PR OFFICE/OUTPT VISIT, EST, LEVL IV, 30-39 MIN: ICD-10-PCS | Mod: S$GLB,,, | Performed by: INTERNAL MEDICINE

## 2022-04-26 PROCEDURE — 1101F PR PT FALLS ASSESS DOC 0-1 FALLS W/OUT INJ PAST YR: ICD-10-PCS | Mod: CPTII,S$GLB,, | Performed by: INTERNAL MEDICINE

## 2022-04-26 PROCEDURE — 1159F PR MEDICATION LIST DOCUMENTED IN MEDICAL RECORD: ICD-10-PCS | Mod: CPTII,S$GLB,, | Performed by: INTERNAL MEDICINE

## 2022-04-26 PROCEDURE — 1101F PT FALLS ASSESS-DOCD LE1/YR: CPT | Mod: CPTII,S$GLB,, | Performed by: INTERNAL MEDICINE

## 2022-04-26 PROCEDURE — 3079F DIAST BP 80-89 MM HG: CPT | Mod: CPTII,S$GLB,, | Performed by: INTERNAL MEDICINE

## 2022-04-26 PROCEDURE — 3288F FALL RISK ASSESSMENT DOCD: CPT | Mod: CPTII,S$GLB,, | Performed by: INTERNAL MEDICINE

## 2022-04-26 PROCEDURE — 3077F SYST BP >= 140 MM HG: CPT | Mod: CPTII,S$GLB,, | Performed by: INTERNAL MEDICINE

## 2022-04-26 PROCEDURE — 99999 PR PBB SHADOW E&M-EST. PATIENT-LVL III: ICD-10-PCS | Mod: PBBFAC,,, | Performed by: INTERNAL MEDICINE

## 2022-04-26 PROCEDURE — 1126F AMNT PAIN NOTED NONE PRSNT: CPT | Mod: CPTII,S$GLB,, | Performed by: INTERNAL MEDICINE

## 2022-04-26 NOTE — PROGRESS NOTES
Progress Note  Nephrology      Referring physician: Ai Orr MD    Reason for visit: CKD     SUBJECTIVE:   86 y.o. male  has a past medical history of GERD (gastroesophageal reflux disease), Hyperlipemia, Hypertension, and Obesity. who has been following up in renal clinic for ckd management   The patient denies taking NSAIDs or new antibiotics, recreational drugs, recent episode of dehydration, diarrhea, nausea or vomiting, acute illness, hospitalization or exposure to IV radiocontrast.     ROS:  General: negative for chills, or fatigue  ENT: No epistaxis or headaches  Hematological and Lymphatic: No bleeding problems or blood clots.  Endocrine: No skin changes or temperature intolerance  Respiratory: No cough, shortness of breath, or wheezing  Cardiovascular: No chest pain or dyspnea   Gastrointestinal: No abdominal pain, change in bowel habits  Genito-Urinary: No dysuria, trouble voiding, or hematuria  Musculoskeletal: ROS: negative for - joint pain, joint stiffness, joint swelling, muscle pain or muscular weakness  Neurological: No focal weakness, no numbness  Dermatological: No rash or ulcers    OBJECTIVE:     Vitals:    04/26/22 1041   BP: (!) 142/80   Pulse: 77          Physical Exam:  General: no distress, well nourished  HENT: PERRLA, Normal mouth nose and ears.  Neck: no JVD and thyroid not enlarged, symmetric, no tenderness/mass/nodules  Lungs: clear to auscultation bilaterally and normal respiratory effort  Cardiovascular: regular rate and rhythm, S1, S2 normal, no murmur, click, rub or gallop.   Abdomen: soft, non-tender non-distented; bowel sounds normal  Skin: No rashes or lesions  Musculoskeletal:no edema in LE, no deformities.   Lymph Nodes: No cervical or supraclavicular adenopathy  Neurologic: Normal strength and tone. No focal numbness or weakness          Medications:    Current Outpatient Medications:     amLODIPine (NORVASC) 10 MG tablet, Take 1 tablet (10 mg total) by mouth  every morning., Disp: 90 tablet, Rfl: 3    atorvastatin (LIPITOR) 10 MG tablet, Take 1 tablet (10 mg total) by mouth every evening., Disp: 90 tablet, Rfl: 3    FLUAD QUAD 2020-21,65Y UP,,PF, 60 mcg (15 mcg x 4)/0.5 mL Syrg, ADM 0.5ML IM UTD, Disp: , Rfl:     losartan (COZAAR) 50 MG tablet, Take 1 tablet (50 mg total) by mouth once daily., Disp: 90 tablet, Rfl: 3    pantoprazole (PROTONIX) 40 MG tablet, Take 1 tablet (40 mg total) by mouth once daily., Disp: 90 tablet, Rfl: 3    sodium bicarbonate 650 MG tablet, Take 1 tablet (650 mg total) by mouth 2 (two) times daily., Disp: 60 tablet, Rfl: 11         Laboratory:  Lab Results   Component Value Date    CREATININE 1.8 (H) 04/22/2022       Prot/Creat Ratio, Urine   Date Value Ref Range Status   04/06/2022 0.05 0.00 - 0.20 Final   10/25/2021 0.07 0.00 - 0.20 Final       Lab Results   Component Value Date     04/22/2022    K 4.3 04/22/2022    CO2 23 04/22/2022       last PTH   Lab Results   Component Value Date    CALCIUM 9.6 04/22/2022       Lab Results   Component Value Date    HGB 13.4 (L) 04/22/2022        Lab Results   Component Value Date    HGBA1C 5.5 09/29/2021       Lab Results   Component Value Date    LDLCALC 82.8 09/29/2021       Other Labs were reviewed      ASSESSMENT/PLAN:     1- CKD IIIa : likely 2/2 HTN , stable   - labs from 4/22/22 showed scr 1.8  and GFR 39  -UA unremarkable   -Avoid NSAIDs intake     2-HTN ; controlled   -Continue current BP meds regimen     3- h/o prostate cancer:   - managed by urology      4-metabolic acidosis : improved    po bicarb            RTC in 6m      SUZY IBARRA MD  NEPHROLOGY ATTENDING

## 2022-06-12 ENCOUNTER — HOSPITAL ENCOUNTER (OUTPATIENT)
Facility: HOSPITAL | Age: 87
Discharge: HOME | End: 2022-06-14
Attending: EMERGENCY MEDICINE | Admitting: INTERNAL MEDICINE
Payer: COMMERCIAL

## 2022-06-12 DIAGNOSIS — K44.9 HIATAL HERNIA: ICD-10-CM

## 2022-06-12 DIAGNOSIS — K92.2 GI BLEED: Primary | ICD-10-CM

## 2022-06-12 LAB
ABO + RH BLD: NORMAL
ALBUMIN SERPL BCP-MCNC: 3 G/DL (ref 3.5–5.2)
ALP SERPL-CCNC: 73 U/L (ref 55–135)
ALT SERPL W/O P-5'-P-CCNC: 10 U/L (ref 10–44)
ANION GAP SERPL CALC-SCNC: 9 MMOL/L (ref 8–16)
AST SERPL-CCNC: 19 U/L (ref 10–40)
BASOPHILS # BLD AUTO: 0.02 K/UL (ref 0–0.2)
BASOPHILS # BLD AUTO: 0.03 K/UL (ref 0–0.2)
BASOPHILS NFR BLD: 0.3 % (ref 0–1.9)
BILIRUB SERPL-MCNC: 0.7 MG/DL (ref 0.1–1)
BLD GP AB SCN CELLS X3 SERPL QL: NORMAL
BUN SERPL-MCNC: 30 MG/DL (ref 8–23)
CALCIUM SERPL-MCNC: 8.3 MG/DL (ref 8.7–10.5)
CHLORIDE SERPL-SCNC: 110 MMOL/L (ref 95–110)
CO2 SERPL-SCNC: 17 MMOL/L (ref 23–29)
CREAT SERPL-MCNC: 1.8 MG/DL (ref 0.5–1.4)
DIFFERENTIAL METHOD: ABNORMAL
EOSINOPHIL # BLD AUTO: 0.1 K/UL (ref 0–0.5)
EOSINOPHIL # BLD AUTO: 0.1 K/UL (ref 0–0.5)
EOSINOPHIL # BLD AUTO: 0.2 K/UL (ref 0–0.5)
EOSINOPHIL # BLD AUTO: 0.3 K/UL (ref 0–0.5)
EOSINOPHIL NFR BLD: 1 % (ref 0–8)
EOSINOPHIL NFR BLD: 1.5 % (ref 0–8)
EOSINOPHIL NFR BLD: 1.9 % (ref 0–8)
EOSINOPHIL NFR BLD: 3.6 % (ref 0–8)
ERYTHROCYTE [DISTWIDTH] IN BLOOD BY AUTOMATED COUNT: 13.6 % (ref 11.5–14.5)
ERYTHROCYTE [DISTWIDTH] IN BLOOD BY AUTOMATED COUNT: 13.8 % (ref 11.5–14.5)
ERYTHROCYTE [DISTWIDTH] IN BLOOD BY AUTOMATED COUNT: 13.9 % (ref 11.5–14.5)
ERYTHROCYTE [DISTWIDTH] IN BLOOD BY AUTOMATED COUNT: 14.1 % (ref 11.5–14.5)
EST. GFR  (AFRICAN AMERICAN): 39.1 ML/MIN/1.73 M^2
EST. GFR  (NON AFRICAN AMERICAN): 33.8 ML/MIN/1.73 M^2
GLUCOSE SERPL-MCNC: 110 MG/DL (ref 70–110)
HCT VFR BLD AUTO: 29.3 % (ref 40–54)
HCT VFR BLD AUTO: 30.3 % (ref 40–54)
HCT VFR BLD AUTO: 30.9 % (ref 40–54)
HCT VFR BLD AUTO: 33.6 % (ref 40–54)
HGB BLD-MCNC: 10.2 G/DL (ref 14–18)
HGB BLD-MCNC: 11 G/DL (ref 14–18)
HGB BLD-MCNC: 9.8 G/DL (ref 14–18)
HGB BLD-MCNC: 9.9 G/DL (ref 14–18)
IMM GRANULOCYTES # BLD AUTO: 0.02 K/UL (ref 0–0.04)
IMM GRANULOCYTES # BLD AUTO: 0.03 K/UL (ref 0–0.04)
IMM GRANULOCYTES # BLD AUTO: 0.03 K/UL (ref 0–0.04)
IMM GRANULOCYTES # BLD AUTO: 0.04 K/UL (ref 0–0.04)
IMM GRANULOCYTES NFR BLD AUTO: 0.3 % (ref 0–0.5)
IMM GRANULOCYTES NFR BLD AUTO: 0.4 % (ref 0–0.5)
LYMPHOCYTES # BLD AUTO: 0.9 K/UL (ref 1–4.8)
LYMPHOCYTES # BLD AUTO: 1.5 K/UL (ref 1–4.8)
LYMPHOCYTES # BLD AUTO: 1.9 K/UL (ref 1–4.8)
LYMPHOCYTES # BLD AUTO: 2.1 K/UL (ref 1–4.8)
LYMPHOCYTES NFR BLD: 12.5 % (ref 18–48)
LYMPHOCYTES NFR BLD: 14.3 % (ref 18–48)
LYMPHOCYTES NFR BLD: 21.2 % (ref 18–48)
LYMPHOCYTES NFR BLD: 24.2 % (ref 18–48)
MCH RBC QN AUTO: 26.5 PG (ref 27–31)
MCH RBC QN AUTO: 26.6 PG (ref 27–31)
MCH RBC QN AUTO: 26.8 PG (ref 27–31)
MCH RBC QN AUTO: 26.8 PG (ref 27–31)
MCHC RBC AUTO-ENTMCNC: 32.7 G/DL (ref 32–36)
MCHC RBC AUTO-ENTMCNC: 32.7 G/DL (ref 32–36)
MCHC RBC AUTO-ENTMCNC: 33 G/DL (ref 32–36)
MCHC RBC AUTO-ENTMCNC: 33.4 G/DL (ref 32–36)
MCV RBC AUTO: 80 FL (ref 82–98)
MCV RBC AUTO: 81 FL (ref 82–98)
MCV RBC AUTO: 81 FL (ref 82–98)
MCV RBC AUTO: 82 FL (ref 82–98)
MONOCYTES # BLD AUTO: 0.8 K/UL (ref 0.3–1)
MONOCYTES # BLD AUTO: 1 K/UL (ref 0.3–1)
MONOCYTES # BLD AUTO: 1.1 K/UL (ref 0.3–1)
MONOCYTES # BLD AUTO: 1.1 K/UL (ref 0.3–1)
MONOCYTES NFR BLD: 10.2 % (ref 4–15)
MONOCYTES NFR BLD: 10.8 % (ref 4–15)
MONOCYTES NFR BLD: 11.1 % (ref 4–15)
MONOCYTES NFR BLD: 12.6 % (ref 4–15)
NEUTROPHILS # BLD AUTO: 5.1 K/UL (ref 1.8–7.7)
NEUTROPHILS # BLD AUTO: 5.3 K/UL (ref 1.8–7.7)
NEUTROPHILS # BLD AUTO: 6.1 K/UL (ref 1.8–7.7)
NEUTROPHILS # BLD AUTO: 7.7 K/UL (ref 1.8–7.7)
NEUTROPHILS NFR BLD: 59 % (ref 38–73)
NEUTROPHILS NFR BLD: 66.3 % (ref 38–73)
NEUTROPHILS NFR BLD: 73 % (ref 38–73)
NEUTROPHILS NFR BLD: 74.3 % (ref 38–73)
NRBC BLD-RTO: 0 /100 WBC
PLATELET # BLD AUTO: 200 K/UL (ref 150–450)
PLATELET # BLD AUTO: 212 K/UL (ref 150–450)
PLATELET # BLD AUTO: 216 K/UL (ref 150–450)
PLATELET # BLD AUTO: 219 K/UL (ref 150–450)
PMV BLD AUTO: 9.2 FL (ref 9.2–12.9)
PMV BLD AUTO: 9.3 FL (ref 9.2–12.9)
PMV BLD AUTO: 9.5 FL (ref 9.2–12.9)
PMV BLD AUTO: 9.5 FL (ref 9.2–12.9)
POTASSIUM SERPL-SCNC: 4.2 MMOL/L (ref 3.5–5.1)
PROT SERPL-MCNC: 6 G/DL (ref 6–8.4)
RBC # BLD AUTO: 3.65 M/UL (ref 4.6–6.2)
RBC # BLD AUTO: 3.7 M/UL (ref 4.6–6.2)
RBC # BLD AUTO: 3.83 M/UL (ref 4.6–6.2)
RBC # BLD AUTO: 4.15 M/UL (ref 4.6–6.2)
SODIUM SERPL-SCNC: 136 MMOL/L (ref 136–145)
WBC # BLD AUTO: 10.56 K/UL (ref 3.9–12.7)
WBC # BLD AUTO: 7.19 K/UL (ref 3.9–12.7)
WBC # BLD AUTO: 8.72 K/UL (ref 3.9–12.7)
WBC # BLD AUTO: 9.17 K/UL (ref 3.9–12.7)

## 2022-06-12 PROCEDURE — 96361 HYDRATE IV INFUSION ADD-ON: CPT

## 2022-06-12 PROCEDURE — 99285 EMERGENCY DEPT VISIT HI MDM: CPT | Mod: 25

## 2022-06-12 PROCEDURE — 36415 COLL VENOUS BLD VENIPUNCTURE: CPT | Performed by: NURSE PRACTITIONER

## 2022-06-12 PROCEDURE — 25000003 PHARM REV CODE 250: Performed by: INTERNAL MEDICINE

## 2022-06-12 PROCEDURE — 85025 COMPLETE CBC W/AUTO DIFF WBC: CPT | Performed by: INTERNAL MEDICINE

## 2022-06-12 PROCEDURE — 82272 OCCULT BLD FECES 1-3 TESTS: CPT

## 2022-06-12 PROCEDURE — 85025 COMPLETE CBC W/AUTO DIFF WBC: CPT | Mod: 91 | Performed by: PHYSICIAN ASSISTANT

## 2022-06-12 PROCEDURE — 99220 PR INITIAL OBSERVATION CARE,LEVL III: CPT | Mod: ,,, | Performed by: PHYSICIAN ASSISTANT

## 2022-06-12 PROCEDURE — 96374 THER/PROPH/DIAG INJ IV PUSH: CPT

## 2022-06-12 PROCEDURE — 80053 COMPREHEN METABOLIC PANEL: CPT | Performed by: PHYSICIAN ASSISTANT

## 2022-06-12 PROCEDURE — C9113 INJ PANTOPRAZOLE SODIUM, VIA: HCPCS | Performed by: INTERNAL MEDICINE

## 2022-06-12 PROCEDURE — 63600175 PHARM REV CODE 636 W HCPCS: Performed by: INTERNAL MEDICINE

## 2022-06-12 PROCEDURE — 85025 COMPLETE CBC W/AUTO DIFF WBC: CPT | Mod: 91 | Performed by: NURSE PRACTITIONER

## 2022-06-12 PROCEDURE — 86901 BLOOD TYPING SEROLOGIC RH(D): CPT | Performed by: PHYSICIAN ASSISTANT

## 2022-06-12 PROCEDURE — 99284 PR EMERGENCY DEPT VISIT,LEVEL IV: ICD-10-PCS | Mod: ,,, | Performed by: EMERGENCY MEDICINE

## 2022-06-12 PROCEDURE — G0378 HOSPITAL OBSERVATION PER HR: HCPCS

## 2022-06-12 PROCEDURE — 25000003 PHARM REV CODE 250: Performed by: NURSE PRACTITIONER

## 2022-06-12 PROCEDURE — 99284 EMERGENCY DEPT VISIT MOD MDM: CPT | Mod: ,,, | Performed by: EMERGENCY MEDICINE

## 2022-06-12 PROCEDURE — 96376 TX/PRO/DX INJ SAME DRUG ADON: CPT

## 2022-06-12 PROCEDURE — 99220 PR INITIAL OBSERVATION CARE,LEVL III: ICD-10-PCS | Mod: ,,, | Performed by: PHYSICIAN ASSISTANT

## 2022-06-12 RX ORDER — IBUPROFEN 200 MG
24 TABLET ORAL
Status: DISCONTINUED | OUTPATIENT
Start: 2022-06-12 | End: 2022-06-14 | Stop reason: HOSPADM

## 2022-06-12 RX ORDER — ACETAMINOPHEN 325 MG/1
650 TABLET ORAL EVERY 6 HOURS PRN
Status: DISCONTINUED | OUTPATIENT
Start: 2022-06-12 | End: 2022-06-14 | Stop reason: HOSPADM

## 2022-06-12 RX ORDER — SODIUM BICARBONATE 650 MG/1
650 TABLET ORAL 2 TIMES DAILY
Status: DISCONTINUED | OUTPATIENT
Start: 2022-06-12 | End: 2022-06-14 | Stop reason: HOSPADM

## 2022-06-12 RX ORDER — ATORVASTATIN CALCIUM 10 MG/1
10 TABLET, FILM COATED ORAL NIGHTLY
Status: DISCONTINUED | OUTPATIENT
Start: 2022-06-12 | End: 2022-06-14 | Stop reason: HOSPADM

## 2022-06-12 RX ORDER — GLUCAGON 1 MG
1 KIT INJECTION
Status: DISCONTINUED | OUTPATIENT
Start: 2022-06-12 | End: 2022-06-14 | Stop reason: HOSPADM

## 2022-06-12 RX ORDER — ONDANSETRON 2 MG/ML
4 INJECTION INTRAMUSCULAR; INTRAVENOUS EVERY 8 HOURS PRN
Status: DISCONTINUED | OUTPATIENT
Start: 2022-06-12 | End: 2022-06-14 | Stop reason: HOSPADM

## 2022-06-12 RX ORDER — ACETAMINOPHEN 500 MG
1000 TABLET ORAL EVERY 6 HOURS PRN
COMMUNITY
End: 2022-06-12 | Stop reason: CLARIF

## 2022-06-12 RX ORDER — IBUPROFEN 200 MG
16 TABLET ORAL
Status: DISCONTINUED | OUTPATIENT
Start: 2022-06-12 | End: 2022-06-14 | Stop reason: HOSPADM

## 2022-06-12 RX ORDER — PANTOPRAZOLE SODIUM 40 MG/10ML
40 INJECTION, POWDER, LYOPHILIZED, FOR SOLUTION INTRAVENOUS 2 TIMES DAILY
Status: DISCONTINUED | OUTPATIENT
Start: 2022-06-12 | End: 2022-06-14 | Stop reason: HOSPADM

## 2022-06-12 RX ORDER — ACETAMINOPHEN 500 MG
1000 TABLET ORAL EVERY 6 HOURS PRN
COMMUNITY

## 2022-06-12 RX ADMIN — PANTOPRAZOLE SODIUM 40 MG: 40 INJECTION, POWDER, FOR SOLUTION INTRAVENOUS at 12:06

## 2022-06-12 RX ADMIN — SODIUM BICARBONATE 650 MG TABLET 650 MG: at 09:06

## 2022-06-12 RX ADMIN — PANTOPRAZOLE SODIUM 40 MG: 40 INJECTION, POWDER, FOR SOLUTION INTRAVENOUS at 09:06

## 2022-06-12 RX ADMIN — SODIUM BICARBONATE 650 MG TABLET 650 MG: at 12:06

## 2022-06-12 RX ADMIN — ATORVASTATIN CALCIUM 10 MG: 10 TABLET, FILM COATED ORAL at 09:06

## 2022-06-12 RX ADMIN — SODIUM CHLORIDE 500 ML: 0.9 INJECTION, SOLUTION INTRAVENOUS at 08:06

## 2022-06-12 NOTE — ED NOTES
Pt is alert and oriented x 4 and states start having bleeding from rectal area x 2 days ago. But this am got worst , 20 gauge piv right hand heplock/intact. Pt is stable with son at bedside

## 2022-06-12 NOTE — NURSING
ED has requested tele box twice.  None on unit.  Called tele oliver at ext [phone] to request that an MSU box be sent to ED hold 21 for this patient at tube number 21.

## 2022-06-12 NOTE — HPI
Zion Vasquez is a 86 y.o. male with PMHx significant for HTN, GERD, and diverticulosis admitted to hospital medicine for GI bleed. Patient reports loose brown stool mixed with blood that began 3 days ago. States he took imodium at home with no relief. Last episode was this morning PTA. Denies fever/chills, CP, SOB, abdominal pain, n/v, urinary symptoms, lightheadedness/dizziness, weakness.    In the ED, AFVSS. Hgb 10.2 (BL ~13). Hct 30.9 (BL ~42). Cr 1.8 (BL). WBC 7.19.

## 2022-06-12 NOTE — HPI
Mr. Zion Vasquez is an 86 year old male for whom GI is consulted with concern for rectal bleeding. He has a PMH significant for CKD III, HTN and obesity.     Patient reports onset of maroon colored stools beginning on 06/10/2022 that initially resolved after one episode, but then redeveloped on 06/12. He denies association with abdominal pain, nausea, vomiting, NSAID usage, dizziness, preceding melena, and prior abdominal surgeries. He reports symptoms are similar to previous episode in 2019 for which he was hospitalized at [facility] and underwent a colonoscopy showing diverticulosis, however he does not recall if procedure found active bleeding or if any interventions were performed. Due to symptoms, he reported to ED here on 06/12.     Hospital Course: On arrival, vital signs normal on room air. Labs notable for microcytic anemia (Hgb 10.2 from 13 in 04/2022), normal platelets, and elevated BUN (30). He was admitted to hospital medicine and GI consulted.     He has no prior endoscopic procedures on file here.

## 2022-06-12 NOTE — ASSESSMENT & PLAN NOTE
- slight elevated on admit  - continue home losartan 50mg daily and amlodipine 10mg daily  - consider holding if significant blood lose

## 2022-06-12 NOTE — ASSESSMENT & PLAN NOTE
Hx of Diverticulitis  Hx of Internal Hemorrhoids  - Keep patient NPO  - Follow H/H with CBC q4h  - Transfuse as needed for Hgb <7 or <8 with acute bleeding  - Type and screen and blood consent  - IV Protonix 40mg BID  - IVF hydration   - 2 large bore IV access  - anti-emetics as needed  - GI consulted, appreciate recs

## 2022-06-12 NOTE — ASSESSMENT & PLAN NOTE
This is an 86 year old male with a PMH significant for CKD III, HTN, obesity, and reported diverticulosis (based on reports of previous hospitalization at [facility] in 2019 for lower GI bleeding) who presented on 06/12 with approximately three day duration of painless hematochezia. He is hemodynamically stable currently. Etiology of presentation suspected to be from recurrent diverticular bleeding.     Our recommendations are as follows:      Maintain IV access with at least 2 large IVs (18 gauge or larger)   Continued IVF resuscitation as tolerated with attention to active co-morbidities.    RBC transfusion as indicated if Hgb <7 g/dL.    Please correct any coagulopathy with platelets and FFP to a goal of platelets >50K and INR <2.0.    Discontinue all antiplatelet, anti-coagulation, and NSAID products.    Okay for clear liquid diet.    Will monitor for chance of spontaneous resolution before decision regarding need for repeat colonoscopy.    Please obtain colonoscopy report from [facility].

## 2022-06-12 NOTE — H&P
[facility] - Emergency Dept  Hospital Medicine  History & Physical    Patient Name: Zion Vasquez  MRN: [id number]  Patient Class: OP- Observation  Admission Date: 6/12/2022  Attending Physician: Cali Dotson MD   Primary Care Provider: Ai Orr MD         Patient information was obtained from relative(s), past medical records and ER records.     Subjective:     Principal Problem:GI bleed    Chief Complaint:   Chief Complaint   Patient presents with    Rectal Bleeding     Past 3 days, diarrhea with red blood and maroon stool, increased today. Has been taking immodium with no relief. Denies abd or rectal pain. Hx diverticulitis.         HPI: Zion Vasquez is a 86 y.o. male with PMHx significant for HTN, GERD, and diverticulosis admitted to hospital medicine for GI bleed. Patient reports loose brown stool mixed with blood that began 3 days ago. States he took imodium at home with no relief. Last episode was this morning PTA. Denies fever/chills, CP, SOB, abdominal pain, n/v, urinary symptoms, lightheadedness/dizziness, weakness.    In the ED, AFVSS. Hgb 10.2 (BL ~13). Hct 30.9 (BL ~42). Cr 1.8 (BL). WBC 7.19.      Past Medical History:   Diagnosis Date    GERD (gastroesophageal reflux disease)     Hyperlipemia     Hypertension     Obesity        No past surgical history on file.    Review of patient's allergies indicates:  No Known Allergies    No current facility-administered medications on file prior to encounter.     Current Outpatient Medications on File Prior to Encounter   Medication Sig    amLODIPine (NORVASC) 10 MG tablet Take 1 tablet (10 mg total) by mouth every morning.    atorvastatin (LIPITOR) 10 MG tablet Take 1 tablet (10 mg total) by mouth every evening.    losartan (COZAAR) 50 MG tablet Take 1 tablet (50 mg total) by mouth once daily.    pantoprazole (PROTONIX) 40 MG tablet Take 1 tablet (40 mg total) by mouth once daily.    FLUAD QUAD 2020-21,65Y UP,,PF, 60 mcg (15 mcg  x 4)/0.5 mL Syrg ADM 0.5ML IM UTD    sodium bicarbonate 650 MG tablet Take 1 tablet (650 mg total) by mouth 2 (two) times daily.     Family History       Problem Relation (Age of Onset)    Cancer Mother    No Known Problems Father, Sister, Brother, Sister          Tobacco Use    Smoking status: Never Smoker    Smokeless tobacco: Never Used   Substance and Sexual Activity    Alcohol use: Yes     Comment: occasionally    Drug use: Never    Sexual activity: Not on file     Review of Systems   Constitutional:  Negative for activity change, chills, diaphoresis, fatigue and fever.   HENT:  Negative for congestion, facial swelling, rhinorrhea and sore throat.    Eyes:  Negative for photophobia, itching and visual disturbance.   Respiratory:  Negative for cough, chest tightness, shortness of breath and wheezing.    Cardiovascular:  Negative for chest pain, palpitations and leg swelling.   Gastrointestinal:  Positive for blood in stool. Negative for abdominal distention, abdominal pain, constipation, diarrhea, nausea and vomiting.   Genitourinary:  Negative for difficulty urinating, dysuria, frequency, hematuria and urgency.   Musculoskeletal:  Negative for arthralgias, back pain and neck stiffness.   Neurological:  Negative for dizziness, tremors, seizures, syncope, weakness, light-headedness, numbness and headaches.   Psychiatric/Behavioral:  Negative for agitation, confusion and hallucinations.    Objective:     Vital Signs (Most Recent):  Temp: 97.4 °F (36.3 °C) (06/12/22 0755)  Pulse: 71 (06/12/22 0946)  Resp: 18 (06/12/22 0755)  BP: (!) 144/78 (06/12/22 0946)  SpO2: 98 % (06/12/22 0755)   Vital Signs (24h Range):  Temp:  [97.4 °F (36.3 °C)] 97.4 °F (36.3 °C)  Pulse:  [63-81] 71  Resp:  [18] 18  SpO2:  [98 %] 98 %  BP: (133-144)/(65-78) 144/78     Weight: 99.8 kg (220 lb)  Body mass index is 33.45 kg/m².    Physical Exam  Vitals and nursing note reviewed.   Constitutional:       General: He is not in acute  distress.     Appearance: He is well-developed. He is obese. He is not diaphoretic.   HENT:      Head: Normocephalic and atraumatic.      Right Ear: External ear normal.      Left Ear: External ear normal.      Nose: Nose normal. No congestion.      Mouth/Throat:      Pharynx: Oropharynx is clear.   Eyes:      General: No scleral icterus.     Extraocular Movements: Extraocular movements intact.   Cardiovascular:      Rate and Rhythm: Normal rate and regular rhythm.      Pulses: Normal pulses.      Heart sounds: Normal heart sounds. No murmur heard.  Pulmonary:      Effort: Pulmonary effort is normal. No respiratory distress.      Breath sounds: Normal breath sounds. No wheezing or rales.   Abdominal:      General: Bowel sounds are normal. There is no distension.      Palpations: Abdomen is soft.      Tenderness: There is no abdominal tenderness. There is no guarding or rebound.   Musculoskeletal:      Cervical back: Normal range of motion.      Right lower leg: No edema.      Left lower leg: No edema.   Skin:     General: Skin is warm and dry.      Capillary Refill: Capillary refill takes less than 2 seconds.   Neurological:      General: No focal deficit present.      Mental Status: He is alert and oriented to person, place, and time. Mental status is at baseline.   Psychiatric:         Mood and Affect: Mood normal.         Behavior: Behavior normal.         Thought Content: Thought content normal.           Significant Labs: All pertinent labs within the past 24 hours have been reviewed.  CBC:   Recent Labs   Lab 06/12/22  0834   WBC 7.19   HGB 10.2*   HCT 30.9*        CMP:   Recent Labs   Lab 06/12/22  0834      K 4.2      CO2 17*      BUN 30*   CREATININE 1.8*   CALCIUM 8.3*   PROT 6.0   ALBUMIN 3.0*   BILITOT 0.7   ALKPHOS 73   AST 19   ALT 10   ANIONGAP 9   EGFRNONAA 33.8*       Significant Imaging: I have reviewed all pertinent imaging results/findings within the past 24  hours.      Assessment/Plan:     * GI bleed  Hx of Diverticulitis  Hx of Internal Hemorrhoids  - Keep patient NPO  - Follow H/H with CBC q4h  - Transfuse as needed for Hgb <7 or <8 with acute bleeding  - Type and screen and blood consent  - IV Protonix 40mg BID  - IVF hydration   - 2 large bore IV access  - anti-emetics as needed  - GI consulted, appreciate recs    Stage 3a chronic kidney disease  - chronic, stable  - Cr 1.8 (BL)  - avoid nephrotoxic meds  - monitor with chem profile    GERD (gastroesophageal reflux disease)  - PPI    Hyperlipemia  - continue home lipitor 10mg QHS    Hypertension  - slight elevated on admit  - continue home losartan 50mg daily and amlodipine 10mg daily  - consider holding if significant blood lose      VTE Risk Mitigation (From admission, onward)         Ordered     IP VTE HIGH RISK PATIENT  Once         06/12/22 0957     Place sequential compression device  Until discontinued         06/12/22 0957                   JAVIER MalaveC  Department of Hospital Medicine   [facility] - Emergency Dept

## 2022-06-12 NOTE — ED NOTES
Pt reports diarrhea after drinking a smoothie on Friday, followed by 2 episodes of red blood mixed with dk brown stool.  Pt reports that he took Imodium without relief.  Pt states hx diverticulitis, denies abd pain, nausea, vomiting.        LOC: The patient is awake, alert and aware of environment with an appropriate affect, the patient is oriented x 3 and speaking appropriately.  APPEARANCE: Patient resting comfortably and in no acute distress, patient is clean and well groomed, patient's clothing is properly fastened.  SKIN: The skin is warm and dry, color consistent with ethnicity, patient has normal skin turgor and moist mucus membranes, skin intact, no breakdown or bruising noted.  MUSCULOSKELETAL: Patient moving all extremities spontaneously, no obvious swelling or deformities noted.  RESPIRATORY: Airway is open and patent, respirations are spontaneous, patient has a normal effort and rate, no accessory muscle use noted.  ABDOMEN: Soft and non tender to palpation, no distention noted.

## 2022-06-12 NOTE — PHARMACY MED REC
"    Admission Medication History     The home medication history was taken by Willard Mercer.    You may go to "Admission" then "Reconcile Home Medications" tabs to review and/or act upon these items.      The home medication list has been updated by the Pharmacy department.    Please read ALL comments highlighted in yellow.    Please address this information as you see fit.     Feel free to contact us if you have any questions or require assistance.      Medications listed below were obtained from: Patient/family  Medication Sig    acetaminophen (TYLENOL) 500 MG tablet   Take 1,000 mg by mouth every 6 (six) hours as needed for Pain.    amLODIPine (NORVASC) 10 MG tablet   Take 1 tablet (10 mg total) by mouth every morning.    atorvastatin (LIPITOR) 10 MG tablet   Take 1 tablet (10 mg total) by mouth every evening.    losartan (COZAAR) 50 MG tablet   Take 1 tablet (50 mg total) by mouth once daily.    pantoprazole (PROTONIX) 40 MG tablet   Take 1 tablet (40 mg total) by mouth once daily.    sodium bicarbonate 650 MG tablet   Take 1 tablet (650 mg total) by mouth 2 (two) times daily.    FLUAD QUAD 2020-21,65Y UP,,PF, 60 mcg (15 mcg x 4)/0.5 mL Syrg ADM 0.5ML IM UTD         Potential issues to be addressed PRIOR TO DISCHARGE  Please discuss with the patient barriers to adherence with medication treatment plans  Patient requires education regarding drug therapies     Willard Mercer  [id number]                .          "

## 2022-06-12 NOTE — CONSULTS
[facility] - Emergency Dept  Gastroenterology  Consult Note    Patient Name: Zion Vasquez  MRN: [id number]  Admission Date: 6/12/2022  Hospital Length of Stay: 0 days  Code Status: Full Code   Attending Provider: Cali Dotson MD   Consulting Provider: Mario Monteiro MD  Primary Care Physician: Ai Orr MD  Principal Problem:GI bleed    Inpatient consult to Gastroenterology  Consult performed by: Mario Monteiro MD  Consult ordered by: Cali Dotson MD        Subjective:     HPI:  Mr. Zion Vasquez is an 86 year old male for whom GI is consulted with concern for rectal bleeding. He has a PMH significant for CKD III, HTN and obesity.     Patient reports onset of maroon colored stools beginning on 06/10/2022 that initially resolved after one episode, but then redeveloped on 06/12. He denies association with abdominal pain, nausea, vomiting, NSAID usage, dizziness, preceding melena, and prior abdominal surgeries. He reports symptoms are similar to previous episode in 2019 for which he was hospitalized at [facility] and underwent a colonoscopy showing diverticulosis, however he does not recall if procedure found active bleeding or if any interventions were performed. Due to symptoms, he reported to ED here on 06/12.     Hospital Course: On arrival, vital signs normal on room air. Labs notable for microcytic anemia (Hgb 10.2 from 13 in 04/2022), normal platelets, and elevated BUN (30). He was admitted to hospital medicine and GI consulted.     He has no prior endoscopic procedures on file here.         Past Medical History:   Diagnosis Date    GERD (gastroesophageal reflux disease)     Hyperlipemia     Hypertension     Obesity        No past surgical history on file.    Review of patient's allergies indicates:  No Known Allergies  Family History       Problem Relation (Age of Onset)    Cancer Mother    No Known Problems Father, Sister, Brother, Sister          Tobacco Use    Smoking  status: Never Smoker    Smokeless tobacco: Never Used   Substance and Sexual Activity    Alcohol use: Yes     Comment: occasionally    Drug use: Never    Sexual activity: Not on file     Review of Systems   Constitutional:  Positive for fatigue. Negative for appetite change, chills and fever.   HENT:  Negative for congestion, sore throat and trouble swallowing.    Eyes:  Negative for photophobia, pain and discharge.   Respiratory:  Negative for cough and shortness of breath.    Cardiovascular:  Negative for chest pain and palpitations.   Gastrointestinal:  Positive for blood in stool and constipation. Negative for abdominal distention, abdominal pain, diarrhea, nausea, rectal pain and vomiting.   Genitourinary:  Negative for difficulty urinating.   Musculoskeletal:  Negative for back pain, myalgias and neck pain.   Skin:  Negative for pallor and rash.   Neurological:  Negative for light-headedness, numbness and headaches.   Objective:     Vital Signs (Most Recent):  Temp: 97.4 °F (36.3 °C) (06/12/22 0755)  Pulse: 71 (06/12/22 0946)  Resp: 18 (06/12/22 0755)  BP: (!) 144/78 (06/12/22 0946)  SpO2: 98 % (06/12/22 0755)   Vital Signs (24h Range):  Temp:  [97.4 °F (36.3 °C)] 97.4 °F (36.3 °C)  Pulse:  [63-81] 71  Resp:  [18] 18  SpO2:  [98 %] 98 %  BP: (133-144)/(65-78) 144/78     Weight: 99.8 kg (220 lb) (06/12/22 0755)  Body mass index is 33.45 kg/m².    No intake or output data in the 24 hours ending 06/12/22 1133    Lines/Drains/Airways       Peripheral Intravenous Line  Duration                  Peripheral IV - Single Lumen 06/12/22 0838 20 G Left Hand <1 day                    Physical Exam  Constitutional:       Appearance: Normal appearance. He is obese. He is not ill-appearing.   Eyes:      General: No scleral icterus.     Conjunctiva/sclera: Conjunctivae normal.   Cardiovascular:      Rate and Rhythm: Normal rate and regular rhythm.      Pulses: Normal pulses.      Heart sounds: Normal heart sounds.    Pulmonary:      Effort: Pulmonary effort is normal. No respiratory distress.      Breath sounds: Normal breath sounds.   Abdominal:      General: Bowel sounds are normal. There is no distension.      Palpations: Abdomen is soft.      Tenderness: There is no abdominal tenderness.   Skin:     General: Skin is warm and dry.      Findings: No bruising or rash.   Neurological:      Mental Status: He is alert and oriented to person, place, and time.       Significant Labs:  All pertinent lab results from the last 24 hours have been reviewed.    Significant Imaging:  Imaging results within the past 24 hours have been reviewed.    Assessment/Plan:     * GI bleed  This is an 86 year old male with a PMH significant for CKD III, HTN, obesity, and reported diverticulosis (based on reports of previous hospitalization at [facility] in 2019 for lower GI bleeding) who presented on 06/12 with approximately three day duration of painless hematochezia. He is hemodynamically stable currently. Etiology of presentation suspected to be from recurrent diverticular bleeding.     Our recommendations are as follows:      Maintain IV access with at least 2 large IVs (18 gauge or larger)   Continued IVF resuscitation as tolerated with attention to active co-morbidities.    RBC transfusion as indicated if Hgb <7 g/dL.    Please correct any coagulopathy with platelets and FFP to a goal of platelets >50K and INR <2.0.    Discontinue all antiplatelet, anti-coagulation, and NSAID products.    Okay for clear liquid diet.    Will monitor for chance of spontaneous resolution before decision regarding need for repeat colonoscopy.    Please obtain colonoscopy report from [facility].           Thank you for your consult. I will follow-up with patient. Please contact us if you have any additional questions.    Mario Monteiro MD  Gastroenterology  [facility] - Emergency Dept

## 2022-06-12 NOTE — SUBJECTIVE & OBJECTIVE
Past Medical History:   Diagnosis Date    GERD (gastroesophageal reflux disease)     Hyperlipemia     Hypertension     Obesity        No past surgical history on file.    Review of patient's allergies indicates:  No Known Allergies    No current facility-administered medications on file prior to encounter.     Current Outpatient Medications on File Prior to Encounter   Medication Sig    amLODIPine (NORVASC) 10 MG tablet Take 1 tablet (10 mg total) by mouth every morning.    atorvastatin (LIPITOR) 10 MG tablet Take 1 tablet (10 mg total) by mouth every evening.    losartan (COZAAR) 50 MG tablet Take 1 tablet (50 mg total) by mouth once daily.    pantoprazole (PROTONIX) 40 MG tablet Take 1 tablet (40 mg total) by mouth once daily.    FLUAD QUAD 2020-21,65Y UP,,PF, 60 mcg (15 mcg x 4)/0.5 mL Syrg ADM 0.5ML IM UTD    sodium bicarbonate 650 MG tablet Take 1 tablet (650 mg total) by mouth 2 (two) times daily.     Family History       Problem Relation (Age of Onset)    Cancer Mother    No Known Problems Father, Sister, Brother, Sister          Tobacco Use    Smoking status: Never Smoker    Smokeless tobacco: Never Used   Substance and Sexual Activity    Alcohol use: Yes     Comment: occasionally    Drug use: Never    Sexual activity: Not on file     Review of Systems   Constitutional:  Negative for activity change, chills, diaphoresis, fatigue and fever.   HENT:  Negative for congestion, facial swelling, rhinorrhea and sore throat.    Eyes:  Negative for photophobia, itching and visual disturbance.   Respiratory:  Negative for cough, chest tightness, shortness of breath and wheezing.    Cardiovascular:  Negative for chest pain, palpitations and leg swelling.   Gastrointestinal:  Positive for blood in stool. Negative for abdominal distention, abdominal pain, constipation, diarrhea, nausea and vomiting.   Genitourinary:  Negative for difficulty urinating, dysuria, frequency, hematuria and urgency.   Musculoskeletal:   Negative for arthralgias, back pain and neck stiffness.   Neurological:  Negative for dizziness, tremors, seizures, syncope, weakness, light-headedness, numbness and headaches.   Psychiatric/Behavioral:  Negative for agitation, confusion and hallucinations.    Objective:     Vital Signs (Most Recent):  Temp: 97.4 °F (36.3 °C) (06/12/22 0755)  Pulse: 71 (06/12/22 0946)  Resp: 18 (06/12/22 0755)  BP: (!) 144/78 (06/12/22 0946)  SpO2: 98 % (06/12/22 0755)   Vital Signs (24h Range):  Temp:  [97.4 °F (36.3 °C)] 97.4 °F (36.3 °C)  Pulse:  [63-81] 71  Resp:  [18] 18  SpO2:  [98 %] 98 %  BP: (133-144)/(65-78) 144/78     Weight: 99.8 kg (220 lb)  Body mass index is 33.45 kg/m².    Physical Exam  Vitals and nursing note reviewed.   Constitutional:       General: He is not in acute distress.     Appearance: He is well-developed. He is obese. He is not diaphoretic.   HENT:      Head: Normocephalic and atraumatic.      Right Ear: External ear normal.      Left Ear: External ear normal.      Nose: Nose normal. No congestion.      Mouth/Throat:      Pharynx: Oropharynx is clear.   Eyes:      General: No scleral icterus.     Extraocular Movements: Extraocular movements intact.   Cardiovascular:      Rate and Rhythm: Normal rate and regular rhythm.      Pulses: Normal pulses.      Heart sounds: Normal heart sounds. No murmur heard.  Pulmonary:      Effort: Pulmonary effort is normal. No respiratory distress.      Breath sounds: Normal breath sounds. No wheezing or rales.   Abdominal:      General: Bowel sounds are normal. There is no distension.      Palpations: Abdomen is soft.      Tenderness: There is no abdominal tenderness. There is no guarding or rebound.   Musculoskeletal:      Cervical back: Normal range of motion.      Right lower leg: No edema.      Left lower leg: No edema.   Skin:     General: Skin is warm and dry.      Capillary Refill: Capillary refill takes less than 2 seconds.   Neurological:      General: No focal  deficit present.      Mental Status: He is alert and oriented to person, place, and time. Mental status is at baseline.   Psychiatric:         Mood and Affect: Mood normal.         Behavior: Behavior normal.         Thought Content: Thought content normal.           Significant Labs: All pertinent labs within the past 24 hours have been reviewed.  CBC:   Recent Labs   Lab 06/12/22  0834   WBC 7.19   HGB 10.2*   HCT 30.9*        CMP:   Recent Labs   Lab 06/12/22  0834      K 4.2      CO2 17*      BUN 30*   CREATININE 1.8*   CALCIUM 8.3*   PROT 6.0   ALBUMIN 3.0*   BILITOT 0.7   ALKPHOS 73   AST 19   ALT 10   ANIONGAP 9   EGFRNONAA 33.8*       Significant Imaging: I have reviewed all pertinent imaging results/findings within the past 24 hours.

## 2022-06-12 NOTE — ED PROVIDER NOTES
Encounter Date: 6/12/2022       History     Chief Complaint   Patient presents with    Rectal Bleeding     Past 3 days, diarrhea with red blood and maroon stool, increased today. Has been taking immodium with no relief. Denies abd or rectal pain. Hx diverticulitis.      This is an 86 year old male with a PMH of HTN, GERD, diverticulitis presenting to the ED with a chief complaint of rectal bleeding. He reports onset of diarrhea 3 days ago. He describes brown stool mixed with blood. He took imodium initially with relief. He had another episode last night and again this morning that he describes as hematochezia. He denies fever, chills, URI symptoms, lightheadedness, dizziness, chest pain, SOB, nausea/vomiting, abdominal pain, changes in urination. He states he was hospitalized with diverticulitis once previously at [facility].         Review of patient's allergies indicates:  No Known Allergies  Past Medical History:   Diagnosis Date    GERD (gastroesophageal reflux disease)     Hyperlipemia     Hypertension     Obesity      No past surgical history on file.  Family History   Problem Relation Age of Onset    Cancer Mother         ? ovarian    No Known Problems Father     No Known Problems Sister     No Known Problems Brother     No Known Problems Sister      Social History     Tobacco Use    Smoking status: Never Smoker    Smokeless tobacco: Never Used   Substance Use Topics    Alcohol use: Yes     Comment: occasionally    Drug use: Never     Review of Systems   Constitutional: Negative for appetite change, chills and fever.   HENT: Negative for sore throat.    Respiratory: Negative for cough and shortness of breath.    Cardiovascular: Negative for chest pain.   Gastrointestinal: Positive for blood in stool and diarrhea. Negative for nausea and vomiting.   Genitourinary: Negative for dysuria.   Musculoskeletal: Negative for back pain.   Skin: Negative for rash.   Neurological: Negative for dizziness,  weakness and light-headedness.   Hematological: Does not bruise/bleed easily.       Physical Exam     Initial Vitals [06/12/22 0755]   BP Pulse Resp Temp SpO2   133/70 81 18 97.4 °F (36.3 °C) 98 %      MAP       --         Physical Exam    Constitutional: He appears well-developed and well-nourished. No distress.   HENT:   Head: Atraumatic.   Eyes: Conjunctivae and EOM are normal. Pupils are equal, round, and reactive to light.   Cardiovascular: Normal rate, regular rhythm and normal heart sounds.   Pulmonary/Chest: Breath sounds normal. No respiratory distress. He has no wheezes. He has no rhonchi. He has no rales.   Abdominal: Abdomen is soft. Bowel sounds are normal. There is no abdominal tenderness.   Genitourinary: Rectum:      Guaiac result positive.   Guaiac positive stool. : Acceptable.   Genitourinary Comments: JENNIFER with maroon heme positive stool       Neurological: He is alert and oriented to person, place, and time.   Skin: Skin is warm and dry. No rash noted.         ED Course   Procedures  Labs Reviewed   CBC W/ AUTO DIFFERENTIAL - Abnormal; Notable for the following components:       Result Value    RBC 3.83 (*)     Hemoglobin 10.2 (*)     Hematocrit 30.9 (*)     MCV 81 (*)     MCH 26.6 (*)     Lymph # 0.9 (*)     Gran % 74.3 (*)     Lymph % 12.5 (*)     All other components within normal limits   COMPREHENSIVE METABOLIC PANEL - Abnormal; Notable for the following components:    CO2 17 (*)     BUN 30 (*)     Creatinine 1.8 (*)     Calcium 8.3 (*)     Albumin 3.0 (*)     eGFR if  39.1 (*)     eGFR if non  33.8 (*)     All other components within normal limits   CBC W/ AUTO DIFFERENTIAL   TYPE & SCREEN          Imaging Results    None          Medications   atorvastatin tablet 10 mg (has no administration in time range)   sodium bicarbonate tablet 650 mg (has no administration in time range)   pantoprazole injection 40 mg (has no administration in time  range)     Medical Decision Making:   History:   Old Medical Records: I decided to obtain old medical records.  Old Records Summarized: records from clinic visits.  Clinical Tests:   Lab Tests: Ordered and Reviewed  Radiological Study: Ordered and Reviewed  Other:   I have discussed this case with another health care provider.       <> Summary of the Discussion: Hospital medicine       APC / Resident Notes:   86 y.o. year old male presenting with rectal bleeding.    DDx includes but is not limited to lower GI bleed, diverticular disease, anemia.    ED workup reveals 3 point drop in Hgb from 13>10 since April, PLT normal, Cr 1.8 with BUN 30, CO2 17    Plan  Observation for GI bleed    Discussed findings and plan with patient who verbalized understanding and agrees with the plan and course of treatment. I discussed the care of this patient with my supervising physician.         Attending Attestation:     Physician Attestation Statement for NP/PA:   I discussed this assessment and plan of this patient with the NP/PA, but I did not personally examine the patient. The face to face encounter was performed by the NP/PA.                       Clinical Impression:   Final diagnoses:  [K92.2] GI bleed          ED Disposition Condition    Observation               Latisha Robison PA-C  06/12/22 1140       Lori Redmond MD  06/13/22 9106

## 2022-06-12 NOTE — SUBJECTIVE & OBJECTIVE
Past Medical History:   Diagnosis Date    GERD (gastroesophageal reflux disease)     Hyperlipemia     Hypertension     Obesity        No past surgical history on file.    Review of patient's allergies indicates:  No Known Allergies  Family History       Problem Relation (Age of Onset)    Cancer Mother    No Known Problems Father, Sister, Brother, Sister          Tobacco Use    Smoking status: Never Smoker    Smokeless tobacco: Never Used   Substance and Sexual Activity    Alcohol use: Yes     Comment: occasionally    Drug use: Never    Sexual activity: Not on file     Review of Systems   Constitutional:  Positive for fatigue. Negative for appetite change, chills and fever.   HENT:  Negative for congestion, sore throat and trouble swallowing.    Eyes:  Negative for photophobia, pain and discharge.   Respiratory:  Negative for cough and shortness of breath.    Cardiovascular:  Negative for chest pain and palpitations.   Gastrointestinal:  Positive for blood in stool and constipation. Negative for abdominal distention, abdominal pain, diarrhea, nausea, rectal pain and vomiting.   Genitourinary:  Negative for difficulty urinating.   Musculoskeletal:  Negative for back pain, myalgias and neck pain.   Skin:  Negative for pallor and rash.   Neurological:  Negative for light-headedness, numbness and headaches.   Objective:     Vital Signs (Most Recent):  Temp: 97.4 °F (36.3 °C) (06/12/22 0755)  Pulse: 71 (06/12/22 0946)  Resp: 18 (06/12/22 0755)  BP: (!) 144/78 (06/12/22 0946)  SpO2: 98 % (06/12/22 0755)   Vital Signs (24h Range):  Temp:  [97.4 °F (36.3 °C)] 97.4 °F (36.3 °C)  Pulse:  [63-81] 71  Resp:  [18] 18  SpO2:  [98 %] 98 %  BP: (133-144)/(65-78) 144/78     Weight: 99.8 kg (220 lb) (06/12/22 0755)  Body mass index is 33.45 kg/m².    No intake or output data in the 24 hours ending 06/12/22 1133    Lines/Drains/Airways       Peripheral Intravenous Line  Duration                  Peripheral IV - Single Lumen 06/12/22  0838 20 G Left Hand <1 day                    Physical Exam  Constitutional:       Appearance: Normal appearance. He is obese. He is not ill-appearing.   Eyes:      General: No scleral icterus.     Conjunctiva/sclera: Conjunctivae normal.   Cardiovascular:      Rate and Rhythm: Normal rate and regular rhythm.      Pulses: Normal pulses.      Heart sounds: Normal heart sounds.   Pulmonary:      Effort: Pulmonary effort is normal. No respiratory distress.      Breath sounds: Normal breath sounds.   Abdominal:      General: Bowel sounds are normal. There is no distension.      Palpations: Abdomen is soft.      Tenderness: There is no abdominal tenderness.   Skin:     General: Skin is warm and dry.      Findings: No bruising or rash.   Neurological:      Mental Status: He is alert and oriented to person, place, and time.       Significant Labs:  All pertinent lab results from the last 24 hours have been reviewed.    Significant Imaging:  Imaging results within the past 24 hours have been reviewed.

## 2022-06-13 LAB
ANION GAP SERPL CALC-SCNC: 8 MMOL/L (ref 8–16)
BASOPHILS # BLD AUTO: 0.02 K/UL (ref 0–0.2)
BASOPHILS # BLD AUTO: 0.03 K/UL (ref 0–0.2)
BASOPHILS NFR BLD: 0.2 % (ref 0–1.9)
BASOPHILS NFR BLD: 0.3 % (ref 0–1.9)
BUN SERPL-MCNC: 27 MG/DL (ref 8–23)
CALCIUM SERPL-MCNC: 8.4 MG/DL (ref 8.7–10.5)
CHLORIDE SERPL-SCNC: 110 MMOL/L (ref 95–110)
CO2 SERPL-SCNC: 18 MMOL/L (ref 23–29)
CREAT SERPL-MCNC: 1.5 MG/DL (ref 0.5–1.4)
DIFFERENTIAL METHOD: ABNORMAL
DIFFERENTIAL METHOD: ABNORMAL
EOSINOPHIL # BLD AUTO: 0.2 K/UL (ref 0–0.5)
EOSINOPHIL # BLD AUTO: 0.2 K/UL (ref 0–0.5)
EOSINOPHIL NFR BLD: 2 % (ref 0–8)
EOSINOPHIL NFR BLD: 2.5 % (ref 0–8)
ERYTHROCYTE [DISTWIDTH] IN BLOOD BY AUTOMATED COUNT: 14 % (ref 11.5–14.5)
ERYTHROCYTE [DISTWIDTH] IN BLOOD BY AUTOMATED COUNT: 14.2 % (ref 11.5–14.5)
EST. GFR  (AFRICAN AMERICAN): 48.7 ML/MIN/1.73 M^2
EST. GFR  (NON AFRICAN AMERICAN): 42.1 ML/MIN/1.73 M^2
GLUCOSE SERPL-MCNC: 85 MG/DL (ref 70–110)
HCT VFR BLD AUTO: 29.3 % (ref 40–54)
HCT VFR BLD AUTO: 31.5 % (ref 40–54)
HGB BLD-MCNC: 10.1 G/DL (ref 14–18)
HGB BLD-MCNC: 9.4 G/DL (ref 14–18)
IMM GRANULOCYTES # BLD AUTO: 0.02 K/UL (ref 0–0.04)
IMM GRANULOCYTES # BLD AUTO: 0.02 K/UL (ref 0–0.04)
IMM GRANULOCYTES NFR BLD AUTO: 0.2 % (ref 0–0.5)
IMM GRANULOCYTES NFR BLD AUTO: 0.2 % (ref 0–0.5)
LYMPHOCYTES # BLD AUTO: 1.9 K/UL (ref 1–4.8)
LYMPHOCYTES # BLD AUTO: 2.3 K/UL (ref 1–4.8)
LYMPHOCYTES NFR BLD: 21.5 % (ref 18–48)
LYMPHOCYTES NFR BLD: 23.7 % (ref 18–48)
MCH RBC QN AUTO: 26.8 PG (ref 27–31)
MCH RBC QN AUTO: 26.9 PG (ref 27–31)
MCHC RBC AUTO-ENTMCNC: 32.1 G/DL (ref 32–36)
MCHC RBC AUTO-ENTMCNC: 32.1 G/DL (ref 32–36)
MCV RBC AUTO: 84 FL (ref 82–98)
MCV RBC AUTO: 84 FL (ref 82–98)
MONOCYTES # BLD AUTO: 1.1 K/UL (ref 0.3–1)
MONOCYTES # BLD AUTO: 1.3 K/UL (ref 0.3–1)
MONOCYTES NFR BLD: 12.7 % (ref 4–15)
MONOCYTES NFR BLD: 13.1 % (ref 4–15)
NEUTROPHILS # BLD AUTO: 4.9 K/UL (ref 1.8–7.7)
NEUTROPHILS # BLD AUTO: 6.6 K/UL (ref 1.8–7.7)
NEUTROPHILS NFR BLD: 60.3 % (ref 38–73)
NEUTROPHILS NFR BLD: 63.3 % (ref 38–73)
NRBC BLD-RTO: 0 /100 WBC
NRBC BLD-RTO: 0 /100 WBC
PLATELET # BLD AUTO: 203 K/UL (ref 150–450)
PLATELET # BLD AUTO: 206 K/UL (ref 150–450)
PMV BLD AUTO: 10.1 FL (ref 9.2–12.9)
PMV BLD AUTO: 9.6 FL (ref 9.2–12.9)
POTASSIUM SERPL-SCNC: 4.1 MMOL/L (ref 3.5–5.1)
RBC # BLD AUTO: 3.51 M/UL (ref 4.6–6.2)
RBC # BLD AUTO: 3.75 M/UL (ref 4.6–6.2)
SODIUM SERPL-SCNC: 136 MMOL/L (ref 136–145)
WBC # BLD AUTO: 10.45 K/UL (ref 3.9–12.7)
WBC # BLD AUTO: 8.1 K/UL (ref 3.9–12.7)

## 2022-06-13 PROCEDURE — 63600175 PHARM REV CODE 636 W HCPCS: Performed by: INTERNAL MEDICINE

## 2022-06-13 PROCEDURE — 25000003 PHARM REV CODE 250: Performed by: INTERNAL MEDICINE

## 2022-06-13 PROCEDURE — 85025 COMPLETE CBC W/AUTO DIFF WBC: CPT | Performed by: INTERNAL MEDICINE

## 2022-06-13 PROCEDURE — 99204 OFFICE O/P NEW MOD 45 MIN: CPT | Mod: ,,,

## 2022-06-13 PROCEDURE — 96376 TX/PRO/DX INJ SAME DRUG ADON: CPT

## 2022-06-13 PROCEDURE — 36415 COLL VENOUS BLD VENIPUNCTURE: CPT | Performed by: NURSE PRACTITIONER

## 2022-06-13 PROCEDURE — 99215 OFFICE O/P EST HI 40 MIN: CPT | Mod: ,,, | Performed by: INTERNAL MEDICINE

## 2022-06-13 PROCEDURE — 80048 BASIC METABOLIC PNL TOTAL CA: CPT | Performed by: NURSE PRACTITIONER

## 2022-06-13 PROCEDURE — G0378 HOSPITAL OBSERVATION PER HR: HCPCS

## 2022-06-13 PROCEDURE — 36415 COLL VENOUS BLD VENIPUNCTURE: CPT | Performed by: INTERNAL MEDICINE

## 2022-06-13 PROCEDURE — C9113 INJ PANTOPRAZOLE SODIUM, VIA: HCPCS | Performed by: INTERNAL MEDICINE

## 2022-06-13 PROCEDURE — 99226 PR SUBSEQUENT OBSERVATION CARE,LEVEL III: CPT | Mod: ,,, | Performed by: PHYSICIAN ASSISTANT

## 2022-06-13 PROCEDURE — 99215 PR OFFICE/OUTPT VISIT, EST, LEVL V, 40-54 MIN: ICD-10-PCS | Mod: ,,, | Performed by: INTERNAL MEDICINE

## 2022-06-13 PROCEDURE — 99226 PR SUBSEQUENT OBSERVATION CARE,LEVEL III: ICD-10-PCS | Mod: ,,, | Performed by: PHYSICIAN ASSISTANT

## 2022-06-13 PROCEDURE — 99204 PR OFFICE/OUTPT VISIT, NEW, LEVL IV, 45-59 MIN: ICD-10-PCS | Mod: ,,,

## 2022-06-13 RX ADMIN — PANTOPRAZOLE SODIUM 40 MG: 40 INJECTION, POWDER, FOR SOLUTION INTRAVENOUS at 09:06

## 2022-06-13 RX ADMIN — PANTOPRAZOLE SODIUM 40 MG: 40 INJECTION, POWDER, FOR SOLUTION INTRAVENOUS at 08:06

## 2022-06-13 RX ADMIN — SODIUM BICARBONATE 650 MG TABLET 650 MG: at 08:06

## 2022-06-13 RX ADMIN — SODIUM BICARBONATE 650 MG TABLET 650 MG: at 09:06

## 2022-06-13 RX ADMIN — ATORVASTATIN CALCIUM 10 MG: 10 TABLET, FILM COATED ORAL at 09:06

## 2022-06-13 NOTE — HOSPITAL COURSE
Pt placed in observation for concern of GIB. Pt w/ anemia on CBC, however hemoglobin stable. Pt initiated on GIB pathway and received IV protonix. GI consulted, no scope at the time. Pt monitored and Hgb stable w/ no signs of overt bleeding. Pt medically ready for discharge home. Pt given protonix 40 mg bid x4 weeks and miralax for constipation per Pt request. Pt instructed to increase his daily fiber intake and was given reference pamphlet on how. Given ambulatory ref to GI in 4 weeks. Pt educated on hospital course and plan, verbally agrees and understands. All questions answered.

## 2022-06-13 NOTE — PLAN OF CARE
Problem: Adjustment to Illness (Gastrointestinal Bleeding)  Goal: Optimal Coping with Acute Illness  Outcome: Ongoing, Progressing     Problem: Adult Inpatient Plan of Care  Goal: Plan of Care Review  Outcome: Ongoing, Progressing  Goal: Absence of Hospital-Acquired Illness or Injury  Outcome: Ongoing, Progressing  Goal: Optimal Comfort and Wellbeing  Outcome: Ongoing, Progressing

## 2022-06-13 NOTE — ASSESSMENT & PLAN NOTE
Hx of Diverticulitis  Hx of Internal Hemorrhoids  - regular diet, per GI  - Follow H/H with CBC q4h  - Transfuse as needed for Hgb <7 or <8 with acute bleeding  - Type and screen and blood consent  - IV Protonix 40mg BID  - IVF hydration   - 2 large bore IV access  - anti-emetics as needed  - GI consulted, appreciate recs  Will continue to monitor for chance of spontaneous resolution before decision regarding need for repeat colonoscopy.

## 2022-06-13 NOTE — PLAN OF CARE
Patient in bed awake and denied any acute distress, call button and other personal items within reach. Son at the bedside  Problem: Adjustment to Illness (Gastrointestinal Bleeding)  Goal: Optimal Coping with Acute Illness  Outcome: Ongoing, Progressing     Problem: Bleeding (Gastrointestinal Bleeding)  Goal: Hemostasis  Outcome: Ongoing, Progressing     Problem: Adult Inpatient Plan of Care  Goal: Plan of Care Review  Outcome: Ongoing, Progressing  Goal: Patient-Specific Goal (Individualized)  Outcome: Ongoing, Progressing  Goal: Absence of Hospital-Acquired Illness or Injury  Outcome: Ongoing, Progressing  Goal: Optimal Comfort and Wellbeing  Outcome: Ongoing, Progressing  Goal: Readiness for Transition of Care  Outcome: Ongoing, Progressing

## 2022-06-13 NOTE — SUBJECTIVE & OBJECTIVE
Interval History: Pt seen and evaluated at bedside this am. Pt noted to have an episode of bloody stool last night. Today Hgb 10.1 today which is improved from yesterday. Will continue to monitor overnight per GI rec    Review of Systems   Constitutional:  Negative for activity change, appetite change, chills, diaphoresis, fatigue and fever.   HENT:  Negative for congestion, facial swelling, rhinorrhea, sore throat and trouble swallowing.    Eyes:  Negative for photophobia, itching and visual disturbance.   Respiratory:  Negative for cough, chest tightness, shortness of breath and wheezing.    Cardiovascular:  Negative for chest pain, palpitations and leg swelling.   Gastrointestinal:  Positive for blood in stool. Negative for abdominal distention, abdominal pain, constipation, diarrhea, nausea, rectal pain and vomiting.   Genitourinary:  Negative for difficulty urinating, dysuria, frequency, hematuria and urgency.   Musculoskeletal:  Negative for arthralgias, back pain and neck stiffness.   Neurological:  Negative for dizziness, tremors, seizures, syncope, weakness, light-headedness, numbness and headaches.   Psychiatric/Behavioral:  Negative for agitation, confusion and hallucinations.    Objective:     Vital Signs (Most Recent):  Temp: 98 °F (36.7 °C) (06/13/22 1058)  Pulse: 62 (06/13/22 1120)  Resp: 16 (06/13/22 1058)  BP: 135/65 (06/13/22 1058)  SpO2: 99 % (06/13/22 1058) Vital Signs (24h Range):  Temp:  [96.5 °F (35.8 °C)-98.5 °F (36.9 °C)] 98 °F (36.7 °C)  Pulse:  [61-81] 62  Resp:  [15-20] 16  SpO2:  [96 %-100 %] 99 %  BP: (106-174)/(65-79) 135/65     Weight: 99.7 kg (219 lb 12.8 oz)  Body mass index is 31.99 kg/m².    Intake/Output Summary (Last 24 hours) at 6/13/2022 1340  Last data filed at 6/12/2022 2203  Gross per 24 hour   Intake 560 ml   Output --   Net 560 ml      Physical Exam  Vitals and nursing note reviewed.   Constitutional:       General: He is not in acute distress.     Appearance: He is  well-developed. He is obese. He is not diaphoretic.   HENT:      Head: Normocephalic and atraumatic.      Right Ear: External ear normal.      Left Ear: External ear normal.      Nose: Nose normal. No congestion.      Mouth/Throat:      Pharynx: Oropharynx is clear.   Eyes:      General: No scleral icterus.     Extraocular Movements: Extraocular movements intact.   Cardiovascular:      Rate and Rhythm: Normal rate and regular rhythm.      Pulses: Normal pulses.      Heart sounds: Normal heart sounds. No murmur heard.  Pulmonary:      Effort: Pulmonary effort is normal. No respiratory distress.      Breath sounds: Normal breath sounds. No wheezing or rales.   Abdominal:      General: Bowel sounds are normal. There is no distension.      Palpations: Abdomen is soft.      Tenderness: There is no abdominal tenderness. There is no guarding or rebound.   Musculoskeletal:      Cervical back: Normal range of motion.      Right lower leg: No edema.      Left lower leg: No edema.   Skin:     General: Skin is warm and dry.      Capillary Refill: Capillary refill takes less than 2 seconds.   Neurological:      General: No focal deficit present.      Mental Status: He is alert and oriented to person, place, and time. Mental status is at baseline.   Psychiatric:         Mood and Affect: Mood normal.         Behavior: Behavior normal.         Thought Content: Thought content normal.       Significant Labs: All pertinent labs within the past 24 hours have been reviewed.    Significant Imaging: I have reviewed all pertinent imaging results/findings within the past 24 hours.

## 2022-06-13 NOTE — PLAN OF CARE
[facility] - Med Surg  Initial Discharge Assessment       Primary Care Provider: Ai Orr MD    Admission Diagnosis: GI bleed [K92.2]    Admission Date: 6/12/2022  Expected Discharge Date: 6/14/2022    Discharge Barriers Identified: None    Payor: BLUE CROSS BLUE SHIELD / Plan: BCBS FEDERAL STANDARD / Product Type: PPO /     Extended Emergency Contact Information  Primary Emergency Contact: Zion Vasquez Jr  Address: [address] [place] [address] [facility]  Home Phone: [phone]  Mobile Phone: [phone]  Relation: Son  Secondary Emergency Contact: Zion Vasquez Jr  Mobile Phone: [phone]  Relation: Son  Preferred language: English   needed? No    Discharge Plan A: Home with family  Discharge Plan B: Home with family      [id number] [place], [place] - [address] AT [facility] [address]  [address]  [facility] [address]  Phone: [phone] Fax: [phone]    CM spoke with patient and son at bedside. Pt lives with his son in a 1 story home at the address on the facesheet.  No home health. No home o2. No dme, ambulatory.  Son is available to take patient home upon dc.  Son wants updates early for discharge so that he can coordinate taking him home in a timely manner.  Son's cell phone number is [phone].      Dimple Ferguson RN [facility] [phone]  Initial Assessment (most recent)     Adult Discharge Assessment - 06/13/22 1626        Discharge Assessment    Assessment Type Discharge Planning Assessment     Confirmed/corrected address, phone number and insurance Yes     Confirmed Demographics Correct on Facesheet     Source of Information patient;family     Does patient/caregiver understand observation status Yes     Communicated BAKARI with patient/caregiver Yes     Reason For Admission gi bleed     Lives With child(usha), adult     Do you expect to return to your current living situation? Yes     Do you  have help at home or someone to help you manage your care at home? Yes     Who are your caregiver(s) and their phone number(s)? oliva krishnan jr (son) [phone]     Prior to hospitilization cognitive status: Alert/Oriented     Current cognitive status: Alert/Oriented     Walking or Climbing Stairs Difficulty none     Dressing/Bathing Difficulty none     Home Layout Able to live on 1st floor     Equipment Currently Used at Home none     Readmission within 30 days? No     Patient currently being followed by outpatient case management? No     Do you currently have service(s) that help you manage your care at home? No     Do you take prescription medications? Yes     Do you have prescription coverage? Yes     Coverage bcbs     Do you have any problems affording any of your prescribed medications? No     Is the patient taking medications as prescribed? yes     Who is going to help you get home at discharge? oliva krishnan jr (son) [phone]     How do you get to doctors appointments? family or friend will provide     Are you on dialysis? No     Do you take coumadin? No     Discharge Plan A Home with family     Discharge Plan B Home with family     DME Needed Upon Discharge  none     Discharge Plan discussed with: Patient     Discharge Barriers Identified None        Relationship/Environment    Name(s) of Who Lives With Patient oliva krishnan jr (son) [phone]

## 2022-06-13 NOTE — PROGRESS NOTES
[facility] Surg  Gastroenterology  Progress Note    Patient Name: Zion Vasquez  MRN: [id number]  Admission Date: 6/12/2022  Hospital Length of Stay: 0 days  Code Status: Full Code   Attending Provider: Cali Dotson MD  Consulting Provider: Loly Duron NP  Primary Care Physician: Ai Orr MD  Principal Problem: GI bleed      Subjective:     Interval History: Per nursing, the patient had 1 loose stool overnight, maroon in color. No other adverse events overnight. Patient has had no other bowel movements since that time. Patient denies nausea, vomiting, and abdominal pain. Hgb (10.1) and Hct (31.5) improving. Patient tolerating regular diet ordered by Hospitalist Service.     Results from 2019 colonoscopy at [facility] have yet to be received.     Review of Systems   Constitutional:  Negative for activity change, appetite change and fatigue.   HENT:  Negative for sore throat and trouble swallowing.    Respiratory:  Negative for shortness of breath.    Cardiovascular:  Negative for chest pain and palpitations.   Gastrointestinal:  Positive for blood in stool. Negative for abdominal pain, nausea, rectal pain and vomiting.   Neurological:  Negative for dizziness and headaches.   Objective:     Vital Signs (Most Recent):  Temp: 96.5 °F (35.8 °C) (06/13/22 0802)  Pulse: 70 (06/13/22 0803)  Resp: 20 (06/13/22 0802)  BP: (!) 160/77 (06/13/22 0803)  SpO2: 97 % (06/13/22 0803)   Vital Signs (24h Range):  Temp:  [96.5 °F (35.8 °C)-98.5 °F (36.9 °C)] 96.5 °F (35.8 °C)  Pulse:  [61-81] 70  Resp:  [15-20] 20  SpO2:  [96 %-100 %] 97 %  BP: (106-174)/(65-79) 160/77     Weight: 99.7 kg (219 lb 12.8 oz) (06/12/22 2100)  Body mass index is 31.99 kg/m².      Intake/Output Summary (Last 24 hours) at 6/13/2022 0857  Last data filed at 6/12/2022 2209  Gross per 24 hour   Intake 560 ml   Output --   Net 560 ml       Lines/Drains/Airways       Peripheral Intravenous Line  Duration                   Peripheral IV - Single Lumen 06/12/22 0838 20 G Left Hand 1 day                    Physical Exam  Vitals and nursing note reviewed.   Constitutional:       Appearance: He is not ill-appearing.   HENT:      Head: Normocephalic.      Mouth/Throat:      Mouth: Mucous membranes are moist.   Eyes:      General: No scleral icterus.  Cardiovascular:      Rate and Rhythm: Normal rate and regular rhythm.   Pulmonary:      Effort: Pulmonary effort is normal. No respiratory distress.   Abdominal:      General: Bowel sounds are normal. There is distension.      Tenderness: There is no abdominal tenderness. There is no guarding.   Neurological:      Mental Status: He is alert and oriented to person, place, and time.       Significant Labs:  CBC:   Recent Labs   Lab 06/12/22  2053 06/13/22  0349 06/13/22  0827   WBC 10.56 8.10 10.45   HGB 9.9* 9.4* 10.1*   HCT 30.3* 29.3* 31.5*    203 206         Significant Imaging:  Imaging results within the past 24 hours have been reviewed.    Assessment/Plan:     * GI bleed  This is an 86 year old male with a PMH significant for CKD III, HTN, obesity, and reported diverticulosis who presented on 06/12/22 with approximately three day duration of painless hematochezia. He is currently hemodynamically stable with Hgb 10.1 and Hct 31.5. Etiology of presentation suspected to be from recurrent diverticular bleeding.     Reccommendations:    Maintain IV access with at least 2 large IVs (18 gauge or larger)   Continued IVF resuscitation as tolerated with attention to active co-morbidities.    RBC transfusion as indicated if Hgb <7 g/dL.    Please correct any coagulopathy with platelets and FFP to a goal of platelets >50K and INR <2.0.    Discontinue all antiplatelet, anti-coagulation, and NSAID products.    Will continue to monitor for chance of spontaneous resolution before decision regarding need for repeat colonoscopy.    Please obtain colonoscopy report from [facility].    Continue  regular diet as tolerated          Thank you for your consult. GI will follow-up with patient. Please contact us if you have any additional questions.    Loly Duron NP  Gastroenterology  [facility] Surg

## 2022-06-13 NOTE — SUBJECTIVE & OBJECTIVE
Subjective:     Interval History: Per nursing, the patient had 1 loose stool overnight, maroon in color. No other adverse events overnight. Patient has had no other bowel movements since that time. Patient denies nausea, vomiting, and abdominal pain. Hgb (10.1) and Hct (31.5) improving. Patient tolerating regular diet ordered by Hospitalist Service.     Results from 2019 colonoscopy at [facility] have yet to be received.     Review of Systems   Constitutional:  Negative for activity change, appetite change and fatigue.   HENT:  Negative for sore throat and trouble swallowing.    Respiratory:  Negative for shortness of breath.    Cardiovascular:  Negative for chest pain and palpitations.   Gastrointestinal:  Positive for blood in stool. Negative for abdominal pain, nausea, rectal pain and vomiting.   Neurological:  Negative for dizziness and headaches.   Objective:     Vital Signs (Most Recent):  Temp: 96.5 °F (35.8 °C) (06/13/22 0802)  Pulse: 70 (06/13/22 0803)  Resp: 20 (06/13/22 0802)  BP: (!) 160/77 (06/13/22 0803)  SpO2: 97 % (06/13/22 0803)   Vital Signs (24h Range):  Temp:  [96.5 °F (35.8 °C)-98.5 °F (36.9 °C)] 96.5 °F (35.8 °C)  Pulse:  [61-81] 70  Resp:  [15-20] 20  SpO2:  [96 %-100 %] 97 %  BP: (106-174)/(65-79) 160/77     Weight: 99.7 kg (219 lb 12.8 oz) (06/12/22 2100)  Body mass index is 31.99 kg/m².      Intake/Output Summary (Last 24 hours) at 6/13/2022 0857  Last data filed at 6/12/2022 2203  Gross per 24 hour   Intake 560 ml   Output --   Net 560 ml       Lines/Drains/Airways       Peripheral Intravenous Line  Duration                  Peripheral IV - Single Lumen 06/12/22 0838 20 G Left Hand 1 day                    Physical Exam  Vitals and nursing note reviewed.   Constitutional:       Appearance: He is not ill-appearing.   HENT:      Head: Normocephalic.      Mouth/Throat:      Mouth: Mucous membranes are moist.   Eyes:      General: No scleral icterus.  Cardiovascular:      Rate and  Rhythm: Normal rate and regular rhythm.   Pulmonary:      Effort: Pulmonary effort is normal. No respiratory distress.   Abdominal:      General: Bowel sounds are normal. There is distension.      Tenderness: There is no abdominal tenderness. There is no guarding.   Neurological:      Mental Status: He is alert and oriented to person, place, and time.       Significant Labs:  CBC:   Recent Labs   Lab 06/12/22  2053 06/13/22  0349 06/13/22  0827   WBC 10.56 8.10 10.45   HGB 9.9* 9.4* 10.1*   HCT 30.3* 29.3* 31.5*    203 206         Significant Imaging:  Imaging results within the past 24 hours have been reviewed.

## 2022-06-13 NOTE — ASSESSMENT & PLAN NOTE
- chronic, stable  - Cr 1.8 (BL), 1.5 today  - avoid nephrotoxic meds  - monitor with chem profile

## 2022-06-13 NOTE — NURSING
Patient aaox4, pleasant. Decreased hearing in both ears. Noted some rectal bleeding. Ambulatory with assistance. Bed in lowest position with wheels locked and bed alarm set with call light in reach. Patient remains NPO except with small sips of water while administering meds.

## 2022-06-13 NOTE — PROGRESS NOTES
[facility] Medicine  Progress Note    Patient Name: Zion Vasquez  MRN: [id number]  Patient Class: OP- Observation   Admission Date: 6/12/2022  Length of Stay: 0 days  Attending Physician: Cali Dotson MD  Primary Care Provider: Ai Orr MD        Subjective:     Principal Problem:GI bleed        HPI:  Zion Vasquez is a 86 y.o. male with PMHx significant for HTN, GERD, and diverticulosis admitted to hospital medicine for GI bleed. Patient reports loose brown stool mixed with blood that began 3 days ago. States he took imodium at home with no relief. Last episode was this morning PTA. Denies fever/chills, CP, SOB, abdominal pain, n/v, urinary symptoms, lightheadedness/dizziness, weakness.    In the ED, AFVSS. Hgb 10.2 (BL ~13). Hct 30.9 (BL ~42). Cr 1.8 (BL). WBC 7.19.      Overview/Hospital Course:  Pt placed in observation for concern of GIB. Pt w/ anemia on CBC, however hemoglobin stable. Pt initiated on GIB pathway and received IV protonix. GI consulted and monitoring s/s at this time.       Interval History: Pt seen and evaluated at bedside this am. Pt noted to have an episode of bloody stool last night. Today Hgb 10.1 today which is improved from yesterday. Will continue to monitor overnight per GI rec    Review of Systems   Constitutional:  Negative for activity change, appetite change, chills, diaphoresis, fatigue and fever.   HENT:  Negative for congestion, facial swelling, rhinorrhea, sore throat and trouble swallowing.    Eyes:  Negative for photophobia, itching and visual disturbance.   Respiratory:  Negative for cough, chest tightness, shortness of breath and wheezing.    Cardiovascular:  Negative for chest pain, palpitations and leg swelling.   Gastrointestinal:  Positive for blood in stool. Negative for abdominal distention, abdominal pain, constipation, diarrhea, nausea, rectal pain and vomiting.   Genitourinary:  Negative for difficulty urinating, dysuria,  frequency, hematuria and urgency.   Musculoskeletal:  Negative for arthralgias, back pain and neck stiffness.   Neurological:  Negative for dizziness, tremors, seizures, syncope, weakness, light-headedness, numbness and headaches.   Psychiatric/Behavioral:  Negative for agitation, confusion and hallucinations.    Objective:     Vital Signs (Most Recent):  Temp: 98 °F (36.7 °C) (06/13/22 1058)  Pulse: 62 (06/13/22 1120)  Resp: 16 (06/13/22 1058)  BP: 135/65 (06/13/22 1058)  SpO2: 99 % (06/13/22 1058) Vital Signs (24h Range):  Temp:  [96.5 °F (35.8 °C)-98.5 °F (36.9 °C)] 98 °F (36.7 °C)  Pulse:  [61-81] 62  Resp:  [15-20] 16  SpO2:  [96 %-100 %] 99 %  BP: (106-174)/(65-79) 135/65     Weight: 99.7 kg (219 lb 12.8 oz)  Body mass index is 31.99 kg/m².    Intake/Output Summary (Last 24 hours) at 6/13/2022 1340  Last data filed at 6/12/2022 2203  Gross per 24 hour   Intake 560 ml   Output --   Net 560 ml      Physical Exam  Vitals and nursing note reviewed.   Constitutional:       General: He is not in acute distress.     Appearance: He is well-developed. He is obese. He is not diaphoretic.   HENT:      Head: Normocephalic and atraumatic.      Right Ear: External ear normal.      Left Ear: External ear normal.      Nose: Nose normal. No congestion.      Mouth/Throat:      Pharynx: Oropharynx is clear.   Eyes:      General: No scleral icterus.     Extraocular Movements: Extraocular movements intact.   Cardiovascular:      Rate and Rhythm: Normal rate and regular rhythm.      Pulses: Normal pulses.      Heart sounds: Normal heart sounds. No murmur heard.  Pulmonary:      Effort: Pulmonary effort is normal. No respiratory distress.      Breath sounds: Normal breath sounds. No wheezing or rales.   Abdominal:      General: Bowel sounds are normal. There is no distension.      Palpations: Abdomen is soft.      Tenderness: There is no abdominal tenderness. There is no guarding or rebound.   Musculoskeletal:      Cervical back:  Normal range of motion.      Right lower leg: No edema.      Left lower leg: No edema.   Skin:     General: Skin is warm and dry.      Capillary Refill: Capillary refill takes less than 2 seconds.   Neurological:      General: No focal deficit present.      Mental Status: He is alert and oriented to person, place, and time. Mental status is at baseline.   Psychiatric:         Mood and Affect: Mood normal.         Behavior: Behavior normal.         Thought Content: Thought content normal.       Significant Labs: All pertinent labs within the past 24 hours have been reviewed.    Significant Imaging: I have reviewed all pertinent imaging results/findings within the past 24 hours.      Assessment/Plan:      * GI bleed  Hx of Diverticulitis  Hx of Internal Hemorrhoids  - regular diet, per GI  - Follow H/H with CBC q4h  - Transfuse as needed for Hgb <7 or <8 with acute bleeding  - Type and screen and blood consent  - IV Protonix 40mg BID  - IVF hydration   - 2 large bore IV access  - anti-emetics as needed  - GI consulted, appreciate recs  Will continue to monitor for chance of spontaneous resolution before decision regarding need for repeat colonoscopy.       Stage 3a chronic kidney disease  - chronic, stable  - Cr 1.8 (BL), 1.5 today  - avoid nephrotoxic meds  - monitor with chem profile    GERD (gastroesophageal reflux disease)  - PPI    Hyperlipemia  - continue home lipitor 10mg QHS    Hypertension  - slight elevated on admit  - continue home losartan 50mg daily and amlodipine 10mg daily  - consider holding if significant blood lose      VTE Risk Mitigation (From admission, onward)         Ordered     IP VTE HIGH RISK PATIENT  Once         06/12/22 0957     Place sequential compression device  Until discontinued         06/12/22 0957                Discharge Planning   BAKARI: 6/14/2022     Code Status: Full Code   Is the patient medically ready for discharge?: No    Reason for patient still in hospital (select all that  apply): Patient trending condition, Laboratory test, Treatment and Consult recommendations                     Shaw Padilla PA-C  Department of Hospital Medicine   [facility] Surg

## 2022-06-14 VITALS
BODY MASS INDEX: 31.47 KG/M2 | WEIGHT: 219.81 LBS | DIASTOLIC BLOOD PRESSURE: 74 MMHG | SYSTOLIC BLOOD PRESSURE: 162 MMHG | HEART RATE: 64 BPM | TEMPERATURE: 98 F | OXYGEN SATURATION: 95 % | RESPIRATION RATE: 18 BRPM | HEIGHT: 70 IN

## 2022-06-14 LAB
ANION GAP SERPL CALC-SCNC: 10 MMOL/L (ref 8–16)
BASOPHILS # BLD AUTO: 0.03 K/UL (ref 0–0.2)
BASOPHILS NFR BLD: 0.4 % (ref 0–1.9)
BUN SERPL-MCNC: 24 MG/DL (ref 8–23)
CALCIUM SERPL-MCNC: 8.5 MG/DL (ref 8.7–10.5)
CHLORIDE SERPL-SCNC: 112 MMOL/L (ref 95–110)
CO2 SERPL-SCNC: 17 MMOL/L (ref 23–29)
CREAT SERPL-MCNC: 1.5 MG/DL (ref 0.5–1.4)
DIFFERENTIAL METHOD: ABNORMAL
EOSINOPHIL # BLD AUTO: 0.3 K/UL (ref 0–0.5)
EOSINOPHIL NFR BLD: 4.1 % (ref 0–8)
ERYTHROCYTE [DISTWIDTH] IN BLOOD BY AUTOMATED COUNT: 14.1 % (ref 11.5–14.5)
EST. GFR  (AFRICAN AMERICAN): 48.7 ML/MIN/1.73 M^2
EST. GFR  (NON AFRICAN AMERICAN): 42.1 ML/MIN/1.73 M^2
GLUCOSE SERPL-MCNC: 109 MG/DL (ref 70–110)
HCT VFR BLD AUTO: 25.1 % (ref 40–54)
HGB BLD-MCNC: 8.2 G/DL (ref 14–18)
IMM GRANULOCYTES # BLD AUTO: 0.02 K/UL (ref 0–0.04)
IMM GRANULOCYTES NFR BLD AUTO: 0.3 % (ref 0–0.5)
LYMPHOCYTES # BLD AUTO: 2.1 K/UL (ref 1–4.8)
LYMPHOCYTES NFR BLD: 26.9 % (ref 18–48)
MCH RBC QN AUTO: 26.8 PG (ref 27–31)
MCHC RBC AUTO-ENTMCNC: 32.7 G/DL (ref 32–36)
MCV RBC AUTO: 82 FL (ref 82–98)
MONOCYTES # BLD AUTO: 1.1 K/UL (ref 0.3–1)
MONOCYTES NFR BLD: 14.8 % (ref 4–15)
NEUTROPHILS # BLD AUTO: 4.1 K/UL (ref 1.8–7.7)
NEUTROPHILS NFR BLD: 53.5 % (ref 38–73)
NRBC BLD-RTO: 0 /100 WBC
PLATELET # BLD AUTO: 192 K/UL (ref 150–450)
PMV BLD AUTO: 9.8 FL (ref 9.2–12.9)
POTASSIUM SERPL-SCNC: 4.3 MMOL/L (ref 3.5–5.1)
RBC # BLD AUTO: 3.06 M/UL (ref 4.6–6.2)
SODIUM SERPL-SCNC: 139 MMOL/L (ref 136–145)
WBC # BLD AUTO: 7.62 K/UL (ref 3.9–12.7)

## 2022-06-14 PROCEDURE — 36415 COLL VENOUS BLD VENIPUNCTURE: CPT | Performed by: PHYSICIAN ASSISTANT

## 2022-06-14 PROCEDURE — 96376 TX/PRO/DX INJ SAME DRUG ADON: CPT

## 2022-06-14 PROCEDURE — 99214 PR OFFICE/OUTPT VISIT, EST, LEVL IV, 30-39 MIN: ICD-10-PCS | Mod: ,,, | Performed by: FAMILY MEDICINE

## 2022-06-14 PROCEDURE — 85025 COMPLETE CBC W/AUTO DIFF WBC: CPT | Performed by: INTERNAL MEDICINE

## 2022-06-14 PROCEDURE — 99217 PR OBSERVATION CARE DISCHARGE: ICD-10-PCS | Mod: ,,, | Performed by: PHYSICIAN ASSISTANT

## 2022-06-14 PROCEDURE — 63600175 PHARM REV CODE 636 W HCPCS: Performed by: INTERNAL MEDICINE

## 2022-06-14 PROCEDURE — C9113 INJ PANTOPRAZOLE SODIUM, VIA: HCPCS | Performed by: INTERNAL MEDICINE

## 2022-06-14 PROCEDURE — 36415 COLL VENOUS BLD VENIPUNCTURE: CPT | Performed by: INTERNAL MEDICINE

## 2022-06-14 PROCEDURE — G0378 HOSPITAL OBSERVATION PER HR: HCPCS

## 2022-06-14 PROCEDURE — 80048 BASIC METABOLIC PNL TOTAL CA: CPT | Performed by: PHYSICIAN ASSISTANT

## 2022-06-14 PROCEDURE — 25000003 PHARM REV CODE 250: Performed by: PHYSICIAN ASSISTANT

## 2022-06-14 PROCEDURE — 99214 OFFICE O/P EST MOD 30 MIN: CPT | Mod: ,,, | Performed by: FAMILY MEDICINE

## 2022-06-14 PROCEDURE — 99217 PR OBSERVATION CARE DISCHARGE: CPT | Mod: ,,, | Performed by: PHYSICIAN ASSISTANT

## 2022-06-14 PROCEDURE — 25000003 PHARM REV CODE 250: Performed by: INTERNAL MEDICINE

## 2022-06-14 RX ORDER — AMLODIPINE BESYLATE 10 MG/1
10 TABLET ORAL EVERY MORNING
Status: DISCONTINUED | OUTPATIENT
Start: 2022-06-14 | End: 2022-06-14 | Stop reason: HOSPADM

## 2022-06-14 RX ORDER — PANTOPRAZOLE SODIUM 40 MG/1
40 TABLET, DELAYED RELEASE ORAL 2 TIMES DAILY
Qty: 90 TABLET | Refills: 3 | Status: SHIPPED | OUTPATIENT
Start: 2022-06-14 | End: 2023-02-15 | Stop reason: SDUPTHER

## 2022-06-14 RX ORDER — LOSARTAN POTASSIUM 50 MG/1
50 TABLET ORAL DAILY
Status: DISCONTINUED | OUTPATIENT
Start: 2022-06-14 | End: 2022-06-14 | Stop reason: HOSPADM

## 2022-06-14 RX ORDER — POLYETHYLENE GLYCOL 3350 17 G/17G
17 POWDER, FOR SOLUTION ORAL DAILY PRN
Qty: 1020 G | Refills: 0 | Status: ON HOLD | OUTPATIENT
Start: 2022-06-14 | End: 2022-11-28 | Stop reason: SDUPTHER

## 2022-06-14 RX ADMIN — SODIUM BICARBONATE 650 MG TABLET 650 MG: at 08:06

## 2022-06-14 RX ADMIN — PANTOPRAZOLE SODIUM 40 MG: 40 INJECTION, POWDER, FOR SOLUTION INTRAVENOUS at 08:06

## 2022-06-14 RX ADMIN — AMLODIPINE BESYLATE 10 MG: 10 TABLET ORAL at 02:06

## 2022-06-14 RX ADMIN — LOSARTAN POTASSIUM 50 MG: 50 TABLET, FILM COATED ORAL at 02:06

## 2022-06-14 NOTE — PROGRESS NOTES
[facility] Surg  Gastroenterology  Progress Note    Patient Name: Zion Vasquez  MRN: [id number]  Admission Date: 6/12/2022  Hospital Length of Stay: 0 days  Code Status: Full Code   Attending Provider: Cali Dotson MD  Consulting Provider: Lorna Le DNP  Primary Care Physician: Ai Orr MD  Principal Problem: GI bleed      Subjective:   GI initially consulted on 6/12 for maroon stools.    Interval History: denies BM or rectal bleeding overnight or this morning.  Tolerating full diet without complaint.  Denies abdominal pain, nausea, or vomiting.  Concerned about increasing BPs.  Hgb this AM 8.2 (10.1 yesterday AM).  He has not required blood products since admission into the hospital.    Review of Systems   Gastrointestinal:  Negative for abdominal pain, anal bleeding, blood in stool and vomiting.   Objective:     Vital Signs (Most Recent):  Temp: 98.1 °F (36.7 °C) (06/14/22 0735)  Pulse: 69 (06/14/22 0735)  Resp: 20 (06/14/22 0735)  BP: (!) 169/78 (06/14/22 0735)  SpO2: 96 % (06/14/22 0735)   Vital Signs (24h Range):  Temp:  [97.6 °F (36.4 °C)-98.7 °F (37.1 °C)] 98.1 °F (36.7 °C)  Pulse:  [57-69] 69  Resp:  [16-20] 20  SpO2:  [95 %-99 %] 96 %  BP: (132-169)/(65-78) 169/78     Weight: 99.7 kg (219 lb 12.8 oz) (06/12/22 2100)  Body mass index is 31.99 kg/m².      Intake/Output Summary (Last 24 hours) at 6/14/2022 1042  Last data filed at 6/13/2022 1300  Gross per 24 hour   Intake 222 ml   Output 200 ml   Net 22 ml       Lines/Drains/Airways       Peripheral Intravenous Line  Duration                  Peripheral IV - Single Lumen 06/12/22 0838 20 G Left Hand 2 days                    Physical Exam  Vitals reviewed.   Constitutional:       General: He is not in acute distress.     Appearance: He is not ill-appearing.   HENT:      Head: Normocephalic and atraumatic.   Eyes:      Extraocular Movements: Extraocular movements intact.   Pulmonary:      Effort: Pulmonary effort is normal.  No respiratory distress.   Skin:     General: Skin is warm and dry.      Capillary Refill: Capillary refill takes less than 2 seconds.   Neurological:      General: No focal deficit present.      Mental Status: He is alert and oriented to person, place, and time. Mental status is at baseline.   Psychiatric:         Mood and Affect: Mood normal.         Behavior: Behavior normal.         Thought Content: Thought content normal.       Significant Labs:  CBC:   Recent Labs   Lab 06/13/22  0349 06/13/22  0827 06/14/22  0435   WBC 8.10 10.45 7.62   HGB 9.4* 10.1* 8.2*   HCT 29.3* 31.5* 25.1*    206 192     CMP:   Recent Labs   Lab 06/14/22  0805      CALCIUM 8.5*      K 4.3   CO2 17*   *   BUN 24*   CREATININE 1.5*     Coagulation: No results for input(s): PT, INR, APTT in the last 48 hours.  All pertinent lab results from the last 24 hours have been reviewed.      Significant Imaging:  Imaging results within the past 24 hours have been reviewed.    Assessment/Plan:     * GI bleed  This is an 86 year old male with a PMH significant for CKD III, HTN, obesity, and reported diverticulosis who presented on 06/12/22 with approximately three day duration of painless hematochezia. He is currently hemodynamically stable with Hgb 10.1 and Hct 31.5. Etiology of presentation suspected to be from recurrent diverticular bleeding.     6/14:  No further bleeding episodes since 6/12.  hgb 8.1 this AM.  No complaints.    Reccommendations:    Recommend against continued blood draws in absence of active GI bleeding.   From GI standpoint, if no longer having overt GI bleeding, ok for d/c.   Please obtain colonoscopy report from [facility] - discussed with case management this AM.   Continue regular diet as tolerated.  Recommend daily fiber supplementation indefinitely. Please provide with high-fiber food recommendations at d/c.   Please contact GI with any acute changes in patient's clinical status          Thank  you for your consult. I will sign off. Please contact us if you have any additional questions.    Lorna Le DNP  [facility] Gastroenterology  [facility] Surg

## 2022-06-14 NOTE — ASSESSMENT & PLAN NOTE
This is an 86 year old male with a PMH significant for CKD III, HTN, obesity, and reported diverticulosis who presented on 06/12/22 with approximately three day duration of painless hematochezia. He is currently hemodynamically stable with Hgb 10.1 and Hct 31.5. Etiology of presentation suspected to be from recurrent diverticular bleeding.     6/14:  No further bleeding episodes since 6/12.  hgb 8.1 this AM.  No complaints.    Reccommendations:    Recommend against continued blood draws in absence of active GI bleeding.   From GI standpoint, if no longer having overt GI bleeding, ok for d/c.   Please obtain colonoscopy report from [facility] - discussed with case management this AM.   Continue regular diet as tolerated.  Recommend daily fiber supplementation indefinitely. Please provide with high-fiber food recommendations at d/c.   Please contact GI with any acute changes in patient's clinical status

## 2022-06-14 NOTE — PLAN OF CARE
Problem: Adjustment to Illness (Gastrointestinal Bleeding)  Goal: Optimal Coping with Acute Illness  Outcome: Adequate for Care Transition     Problem: Bleeding (Gastrointestinal Bleeding)  Goal: Hemostasis  Outcome: Adequate for Care Transition     Problem: Adult Inpatient Plan of Care  Goal: Plan of Care Review  Outcome: Adequate for Care Transition  Goal: Patient-Specific Goal (Individualized)  Outcome: Adequate for Care Transition  Goal: Absence of Hospital-Acquired Illness or Injury  Outcome: Adequate for Care Transition  Goal: Optimal Comfort and Wellbeing  Outcome: Adequate for Care Transition  Goal: Readiness for Transition of Care  Outcome: Adequate for Care Transition

## 2022-06-14 NOTE — PLAN OF CARE
Carmela simmons fax release of information to [facility] for GI records, will follow and update GI team.

## 2022-06-14 NOTE — SUBJECTIVE & OBJECTIVE
Subjective:   GI initially consulted on 6/12 for maroon stools.    Interval History: denies BM or rectal bleeding overnight or this morning.  Tolerating full diet without complaint.  Denies abdominal pain, nausea, or vomiting.  Concerned about increasing BPs.  Hgb this AM 8.2 (10.1 yesterday AM).  He has not required blood products since admission into the hospital.    Review of Systems   Gastrointestinal:  Negative for abdominal pain, anal bleeding, blood in stool and vomiting.   Objective:     Vital Signs (Most Recent):  Temp: 98.1 °F (36.7 °C) (06/14/22 0735)  Pulse: 69 (06/14/22 0735)  Resp: 20 (06/14/22 0735)  BP: (!) 169/78 (06/14/22 0735)  SpO2: 96 % (06/14/22 0735)   Vital Signs (24h Range):  Temp:  [97.6 °F (36.4 °C)-98.7 °F (37.1 °C)] 98.1 °F (36.7 °C)  Pulse:  [57-69] 69  Resp:  [16-20] 20  SpO2:  [95 %-99 %] 96 %  BP: (132-169)/(65-78) 169/78     Weight: 99.7 kg (219 lb 12.8 oz) (06/12/22 2100)  Body mass index is 31.99 kg/m².      Intake/Output Summary (Last 24 hours) at 6/14/2022 1042  Last data filed at 6/13/2022 1300  Gross per 24 hour   Intake 222 ml   Output 200 ml   Net 22 ml       Lines/Drains/Airways       Peripheral Intravenous Line  Duration                  Peripheral IV - Single Lumen 06/12/22 0838 20 G Left Hand 2 days                    Physical Exam  Vitals reviewed.   Constitutional:       General: He is not in acute distress.     Appearance: He is not ill-appearing.   HENT:      Head: Normocephalic and atraumatic.   Eyes:      Extraocular Movements: Extraocular movements intact.   Pulmonary:      Effort: Pulmonary effort is normal. No respiratory distress.   Skin:     General: Skin is warm and dry.      Capillary Refill: Capillary refill takes less than 2 seconds.   Neurological:      General: No focal deficit present.      Mental Status: He is alert and oriented to person, place, and time. Mental status is at baseline.   Psychiatric:         Mood and Affect: Mood normal.          Behavior: Behavior normal.         Thought Content: Thought content normal.       Significant Labs:  CBC:   Recent Labs   Lab 06/13/22  0349 06/13/22  0827 06/14/22  0435   WBC 8.10 10.45 7.62   HGB 9.4* 10.1* 8.2*   HCT 29.3* 31.5* 25.1*    206 192     CMP:   Recent Labs   Lab 06/14/22  0805      CALCIUM 8.5*      K 4.3   CO2 17*   *   BUN 24*   CREATININE 1.5*     Coagulation: No results for input(s): PT, INR, APTT in the last 48 hours.  All pertinent lab results from the last 24 hours have been reviewed.      Significant Imaging:  Imaging results within the past 24 hours have been reviewed.

## 2022-06-14 NOTE — DISCHARGE SUMMARY
[facility] Medicine  Discharge Summary      Patient Name: Zion Vasquez  MRN: [id number]  Patient Class: OP- Observation  Admission Date: 6/12/2022  Hospital Length of Stay: 0 days  Discharge Date and Time: 6/14/2022  5:56 PM  Attending Physician: Misha Kent MD   Discharging Provider: Shaw Padilla PA-C  Primary Care Provider: Ai Orr MD  [facility] Medicine Team: [facility] MED Y Shaw Padilla PA-C    HPI:   Zion Vasquez is a 86 y.o. male with PMHx significant for HTN, GERD, and diverticulosis admitted to hospital medicine for GI bleed. Patient reports loose brown stool mixed with blood that began 3 days ago. States he took imodium at home with no relief. Last episode was this morning PTA. Denies fever/chills, CP, SOB, abdominal pain, n/v, urinary symptoms, lightheadedness/dizziness, weakness.    In the ED, AFVSS. Hgb 10.2 (BL ~13). Hct 30.9 (BL ~42). Cr 1.8 (BL). WBC 7.19.      * No surgery found *      Hospital Course:   Pt placed in observation for concern of GIB. Pt w/ anemia on CBC, however hemoglobin stable. Pt initiated on GIB pathway and received IV protonix. GI consulted, no scope at the time. Pt monitored and Hgb stable w/ no signs of overt bleeding. Pt medically ready for discharge home. Pt given protonix 40 mg bid x4 weeks and miralax for constipation per Pt request. Pt instructed to increase his daily fiber intake and was given reference pamphlet on how. Given ambulatory ref to GI in 4 weeks. Pt educated on hospital course and plan, verbally agrees and understands. All questions answered.        Goals of Care Treatment Preferences:  Code Status: Full Code      Consults:   Consults (From admission, onward)        Status Ordering Provider     Inpatient consult to Gastroenterology  Once        Provider:  (Not yet assigned)    Completed MISHA KENT          * GI bleed  Hx of Diverticulitis  Hx of Internal Hemorrhoids  - regular diet, per GI  -  Follow H/H with CBC q4h  - Transfuse as needed for Hgb <7 or <8 with acute bleeding  - Type and screen and blood consent  - IV Protonix 40mg BID  - IVF hydration   - 2 large bore IV access  - anti-emetics as needed  - GI consulted, appreciate recs  Will continue to monitor for chance of spontaneous resolution before decision regarding need for repeat colonoscopy.         Final Active Diagnoses:    Diagnosis Date Noted POA    PRINCIPAL PROBLEM:  GI bleed [K92.2] 06/12/2022 Yes    Diverticulosis of colon [K57.30] 03/29/2021 Yes    Stage 3a chronic kidney disease [N18.31] 03/29/2021 Yes    Internal hemorrhoids [K64.8] 03/29/2021 Yes    GERD (gastroesophageal reflux disease) [K21.9]  Yes    Hyperlipemia [E78.5]  Yes    Hypertension [I10]  Yes      Problems Resolved During this Admission:       Discharged Condition: good    Disposition: Home or Self Care    Follow Up:    Patient Instructions:      Ambulatory referral/consult to Gastroenterology   Standing Status: Future   Referral Priority: Urgent Referral Type: Consultation   Referral Reason: Specialty Services Required   Requested Specialty: Gastroenterology     Diet Adult Regular   Order Comments: High fiber     Notify your health care provider if you experience any of the following:  temperature >100.4     Notify your health care provider if you experience any of the following:  persistent nausea and vomiting or diarrhea     Notify your health care provider if you experience any of the following:  persistent dizziness, light-headedness, or visual disturbances     Notify your health care provider if you experience any of the following:  increased confusion or weakness     Activity as tolerated       Significant Diagnostic Studies: Labs: All labs within the past 24 hours have been reviewed    Pending Diagnostic Studies:     None         Medications:  Reconciled Home Medications:      Medication List      START taking these medications    CLEARLAX 17 gram/dose  powder  Generic drug: polyethylene glycol  Dissolve one capful (17 g) in liquid and take by mouth daily as needed.        CHANGE how you take these medications    pantoprazole 40 MG tablet  Commonly known as: PROTONIX  Take 1 tablet (40 mg total) by mouth 2 (two) times daily.  What changed: when to take this        CONTINUE taking these medications    acetaminophen 500 MG tablet  Commonly known as: TYLENOL  Take 1,000 mg by mouth every 6 (six) hours as needed for Pain.     amLODIPine 10 MG tablet  Commonly known as: NORVASC  Take 1 tablet (10 mg total) by mouth every morning.     atorvastatin 10 MG tablet  Commonly known as: LIPITOR  Take 1 tablet (10 mg total) by mouth every evening.     FLUAD QUAD 2020-21(65Y UP)(PF) 60 mcg (15 mcg x 4)/0.5 mL Syrg  Generic drug: flu vac 2020 [id number](PF)  ADM 0.5ML IM UTD     losartan 50 MG tablet  Commonly known as: COZAAR  Take 1 tablet (50 mg total) by mouth once daily.     sodium bicarbonate 650 MG tablet  Take 1 tablet (650 mg total) by mouth 2 (two) times daily.            Indwelling Lines/Drains at time of discharge:   Lines/Drains/Airways     None                 Time spent on the discharge of patient: 36 minutes         Shaw Padilla PA-C  Department of Hospital Medicine  [facility]

## 2022-10-06 ENCOUNTER — LAB VISIT (OUTPATIENT)
Dept: LAB | Facility: HOSPITAL | Age: 87
End: 2022-10-06
Attending: FAMILY MEDICINE
Payer: COMMERCIAL

## 2022-10-06 DIAGNOSIS — Z85.46 HISTORY OF PROSTATE CANCER: ICD-10-CM

## 2022-10-06 DIAGNOSIS — Z12.5 SCREENING FOR MALIGNANT NEOPLASM OF PROSTATE: ICD-10-CM

## 2022-10-06 DIAGNOSIS — Z13.220 ENCOUNTER FOR LIPID SCREENING FOR CARDIOVASCULAR DISEASE: ICD-10-CM

## 2022-10-06 DIAGNOSIS — I10 PRIMARY HYPERTENSION: ICD-10-CM

## 2022-10-06 DIAGNOSIS — Z13.6 ENCOUNTER FOR LIPID SCREENING FOR CARDIOVASCULAR DISEASE: ICD-10-CM

## 2022-10-06 DIAGNOSIS — Z01.84 IMMUNITY STATUS TESTING: ICD-10-CM

## 2022-10-06 LAB
ALBUMIN SERPL BCP-MCNC: 3.6 G/DL (ref 3.5–5.2)
ALP SERPL-CCNC: 86 U/L (ref 55–135)
ALT SERPL W/O P-5'-P-CCNC: 10 U/L (ref 10–44)
ANION GAP SERPL CALC-SCNC: 13 MMOL/L (ref 8–16)
AST SERPL-CCNC: 20 U/L (ref 10–40)
BASOPHILS # BLD AUTO: 0.04 K/UL (ref 0–0.2)
BASOPHILS NFR BLD: 0.6 % (ref 0–1.9)
BILIRUB SERPL-MCNC: 0.9 MG/DL (ref 0.1–1)
BUN SERPL-MCNC: 16 MG/DL (ref 8–23)
CALCIUM SERPL-MCNC: 9.5 MG/DL (ref 8.7–10.5)
CHLORIDE SERPL-SCNC: 108 MMOL/L (ref 95–110)
CHOLEST SERPL-MCNC: 153 MG/DL (ref 120–199)
CHOLEST/HDLC SERPL: 2.6 {RATIO} (ref 2–5)
CO2 SERPL-SCNC: 22 MMOL/L (ref 23–29)
COMPLEXED PSA SERPL-MCNC: <0.01 NG/ML (ref 0–4)
CREAT SERPL-MCNC: 2 MG/DL (ref 0.5–1.4)
DIFFERENTIAL METHOD: ABNORMAL
EOSINOPHIL # BLD AUTO: 0.3 K/UL (ref 0–0.5)
EOSINOPHIL NFR BLD: 4.9 % (ref 0–8)
ERYTHROCYTE [DISTWIDTH] IN BLOOD BY AUTOMATED COUNT: 14.4 % (ref 11.5–14.5)
EST. GFR  (NO RACE VARIABLE): 32.3 ML/MIN/1.73 M^2
GLUCOSE SERPL-MCNC: 105 MG/DL (ref 70–110)
HCT VFR BLD AUTO: 40.5 % (ref 40–54)
HDLC SERPL-MCNC: 59 MG/DL (ref 40–75)
HDLC SERPL: 38.6 % (ref 20–50)
HGB BLD-MCNC: 12.1 G/DL (ref 14–18)
IMM GRANULOCYTES # BLD AUTO: 0.01 K/UL (ref 0–0.04)
IMM GRANULOCYTES NFR BLD AUTO: 0.1 % (ref 0–0.5)
LDLC SERPL CALC-MCNC: 81.4 MG/DL (ref 63–159)
LYMPHOCYTES # BLD AUTO: 2.7 K/UL (ref 1–4.8)
LYMPHOCYTES NFR BLD: 39.7 % (ref 18–48)
MCH RBC QN AUTO: 24 PG (ref 27–31)
MCHC RBC AUTO-ENTMCNC: 29.9 G/DL (ref 32–36)
MCV RBC AUTO: 80 FL (ref 82–98)
MONOCYTES # BLD AUTO: 0.9 K/UL (ref 0.3–1)
MONOCYTES NFR BLD: 13.4 % (ref 4–15)
NEUTROPHILS # BLD AUTO: 2.8 K/UL (ref 1.8–7.7)
NEUTROPHILS NFR BLD: 41.3 % (ref 38–73)
NONHDLC SERPL-MCNC: 94 MG/DL
NRBC BLD-RTO: 0 /100 WBC
PLATELET # BLD AUTO: 301 K/UL (ref 150–450)
PMV BLD AUTO: 10.6 FL (ref 9.2–12.9)
POTASSIUM SERPL-SCNC: 4.5 MMOL/L (ref 3.5–5.1)
PROT SERPL-MCNC: 7.3 G/DL (ref 6–8.4)
RBC # BLD AUTO: 5.05 M/UL (ref 4.6–6.2)
SODIUM SERPL-SCNC: 143 MMOL/L (ref 136–145)
TRIGL SERPL-MCNC: 63 MG/DL (ref 30–150)
WBC # BLD AUTO: 6.8 K/UL (ref 3.9–12.7)

## 2022-10-06 PROCEDURE — 84153 ASSAY OF PSA TOTAL: CPT | Performed by: FAMILY MEDICINE

## 2022-10-06 PROCEDURE — 80053 COMPREHEN METABOLIC PANEL: CPT | Performed by: FAMILY MEDICINE

## 2022-10-06 PROCEDURE — 80061 LIPID PANEL: CPT | Performed by: FAMILY MEDICINE

## 2022-10-06 PROCEDURE — 85025 COMPLETE CBC W/AUTO DIFF WBC: CPT | Performed by: FAMILY MEDICINE

## 2022-10-06 PROCEDURE — 86787 VARICELLA-ZOSTER ANTIBODY: CPT | Performed by: FAMILY MEDICINE

## 2022-10-06 PROCEDURE — 36415 COLL VENOUS BLD VENIPUNCTURE: CPT | Mod: PN | Performed by: FAMILY MEDICINE

## 2022-10-10 LAB
VARICELLA INTERPRETATION: POSITIVE
VARICELLA ZOSTER IGG: 3.04 ISR (ref 0–0.9)

## 2022-10-12 NOTE — PROGRESS NOTES
Subjective:      Patient ID: Zion Vasquez is a 86 y.o. male.    Chief Complaint: Establish Care and Annual Exam    87 yo male presents for follow up.     - Hypertension  He tolerates amlodipine and losartan. No adverse events. He is adherent to medication. Office BP is 132/70 . No CP/SOB/lightheadedness/dizziness/palpitations.        - Hyperlipidemia  No acute concerns. He is on atorvastatin without any side effects.     - Hx of lower GI bleeding and Anemia on 1/2/2019  Hospital Course: At that time admitted for hematochezia. EGD showed Martinez's as well as hiatal hernia. Colonoscopy at that time showed 6mm polyp that was excised and diverticula.  He continues on Protonix. No further bleeding episodes reported.  He is not on iron therapy.     GERD, Hx of Martinez's esophagus, hiatal hernia  No alarming symptoms at this time     Diverticulosis of colon  No new bleeding episodes or signs of infection     Internal hemorrhoids  No acute bleeding events     - CKD, stage 3  He follows with Nephrologist and was started on sodium bicarbonate for metabolic acidosis. Kidney function stable. He is not on NSAIDs.     - Hx of Prostate cancer.  He plans to continue seeing Dr. Ward, Urology. Last PSA within normal limits     - Obesity  Working on weight loss goals.       Denies any chest pain, shortness of breath, nausea vomiting constipation diarrhea, blood in stool, heartburn    Review of Systems   Constitutional:  Negative for chills, fever and weight loss.   HENT:  Negative for congestion, ear pain and sore throat.    Eyes:  Negative for double vision.   Respiratory:  Negative for cough and shortness of breath.    Cardiovascular:  Negative for chest pain, palpitations and leg swelling.   Gastrointestinal:  Negative for abdominal pain, heartburn, nausea and vomiting.   Skin:  Negative for rash.   Neurological:  Negative for dizziness, tingling and headaches.   Psychiatric/Behavioral:  Negative for depression.          Current Outpatient Medications:     acetaminophen (TYLENOL) 500 MG tablet, Take 1,000 mg by mouth every 6 (six) hours as needed for Pain., Disp: , Rfl:     amLODIPine (NORVASC) 10 MG tablet, Take 1 tablet (10 mg total) by mouth every morning., Disp: 90 tablet, Rfl: 3    atorvastatin (LIPITOR) 10 MG tablet, Take 1 tablet (10 mg total) by mouth every evening., Disp: 90 tablet, Rfl: 3    losartan (COZAAR) 50 MG tablet, Take 1 tablet (50 mg total) by mouth once daily., Disp: 90 tablet, Rfl: 3    pantoprazole (PROTONIX) 40 MG tablet, Take 1 tablet (40 mg total) by mouth 2 (two) times daily., Disp: 90 tablet, Rfl: 3    FLUAD QUAD 2020-21,65Y UP,,PF, 60 mcg (15 mcg x 4)/0.5 mL Syrg, ADM 0.5ML IM UTD, Disp: , Rfl:     pneumoc 20-fabio conj-dip cr,PF, (PREVNAR-20, PF,) 0.5 mL Syrg injection, Inject 0.5 mLs into the muscle., Disp: 0.5 mL, Rfl: 0    polyethylene glycol (GLYCOLAX) 17 gram/dose powder, Dissolve one capful (17 g) in liquid and take by mouth daily as needed. (Patient not taking: Reported on 10/13/2022), Disp: 1020 g, Rfl: 0    sodium bicarbonate 650 MG tablet, TAKE 1 TABLET(650 MG) BY MOUTH TWICE DAILY (Patient not taking: Reported on 10/13/2022), Disp: 180 tablet, Rfl: 2    Lab Results   Component Value Date    HGBA1C 5.5 09/29/2021     Lab Results   Component Value Date    MICALBCREAT 32.5 (H) 09/29/2021     Lab Results   Component Value Date    LDLCALC 81.4 10/06/2022    LDLCALC 82.8 09/29/2021    CHOL 153 10/06/2022    HDL 59 10/06/2022    TRIG 63 10/06/2022       Lab Results   Component Value Date     10/06/2022    K 4.5 10/06/2022     10/06/2022    CO2 22 (L) 10/06/2022     10/06/2022    BUN 16 10/06/2022    CREATININE 2.0 (H) 10/06/2022    CALCIUM 9.5 10/06/2022    PROT 7.3 10/06/2022    ALBUMIN 3.6 10/06/2022    BILITOT 0.9 10/06/2022    ALKPHOS 86 10/06/2022    AST 20 10/06/2022    ALT 10 10/06/2022    ANIONGAP 13 10/06/2022    ESTGFRAFRICA 48.7 (A) 06/14/2022    EGFRNONAA 42.1 (A)  "06/14/2022    WBC 6.80 10/06/2022    HGB 12.1 (L) 10/06/2022    HGB 8.2 (L) 06/14/2022    HCT 40.5 10/06/2022    MCV 80 (L) 10/06/2022     10/06/2022    PSA 0.01 09/29/2021    PSADIAG <0.01 10/06/2022       Lab Results   Component Value Date    FERRITIN 119 09/29/2021    IRON 43 (L) 09/29/2021    TRANSFERRIN 219 09/29/2021    TIBC 324 09/29/2021    FESATURATED 13 (L) 09/29/2021         Past Medical History:   Diagnosis Date    GERD (gastroesophageal reflux disease)     Hyperlipemia     Hypertension     Obesity      History reviewed. No pertinent surgical history.  Social History     Social History Narrative    Not on file     Family History   Problem Relation Age of Onset    Cancer Mother         ? ovarian    No Known Problems Father     No Known Problems Sister     No Known Problems Brother     No Known Problems Sister      Vitals:    10/13/22 0740 10/13/22 0814   BP: (!) 142/70 132/70   Pulse: 68    Resp: 18    Temp: 98 °F (36.7 °C)    SpO2: 98%    Weight: 96.9 kg (213 lb 10 oz)    Height: 5' 9.5" (1.765 m)    PainSc: 0-No pain      Objective:   Physical Exam  Vitals and nursing note reviewed.   Constitutional:       Appearance: Normal appearance.   HENT:      Head: Normocephalic and atraumatic.      Right Ear: Tympanic membrane, ear canal and external ear normal.      Left Ear: Tympanic membrane, ear canal and external ear normal.      Nose: Nose normal.      Mouth/Throat:      Mouth: Mucous membranes are moist.      Pharynx: Oropharynx is clear.   Eyes:      Extraocular Movements: Extraocular movements intact.      Pupils: Pupils are equal, round, and reactive to light.   Cardiovascular:      Rate and Rhythm: Normal rate and regular rhythm.      Pulses: Normal pulses.      Heart sounds: Normal heart sounds.   Pulmonary:      Breath sounds: Normal breath sounds.   Abdominal:      General: Bowel sounds are normal.      Palpations: Abdomen is soft.   Musculoskeletal:      Cervical back: Normal range of " motion and neck supple.   Skin:     General: Skin is warm.   Neurological:      General: No focal deficit present.      Mental Status: He is alert and oriented to person, place, and time.     Assessment/Plan     Zion Vasquez is a 86 y.o.male with:    Routine general medical examination at a health care facility  -     Comprehensive Metabolic Panel; Future; Expected date: 04/13/2023  -     CBC Auto Differential; Future; Expected date: 04/13/2023    Primary hypertension  -     Comprehensive Metabolic Panel; Future; Expected date: 04/13/2023  -     TSH; Future; Expected date: 04/13/2023    Mixed hyperlipidemia  -     Lipid Panel; Future; Expected date: 04/13/2023    Stage 3a chronic kidney disease  -     Comprehensive Metabolic Panel; Future; Expected date: 04/13/2023  -     CBC Auto Differential; Future; Expected date: 04/13/2023    Gastroesophageal reflux disease without esophagitis    Internal hemorrhoids  -     CBC Auto Differential; Future; Expected date: 04/13/2023    History of Martinez's esophagus  -     CBC Auto Differential; Future; Expected date: 04/13/2023  - Cont protonix BID    Diverticulosis of colon    History of prostate cancer  -     PSA, SCREENING; Future; Expected date: 04/13/2023    Severe obesity   - discussed weight loss plans    Chronic conditions status updated as per HPI.  Other than changes above, cont current medications and maintain follow up with specialists.  Return to clinic in Follow up in about 6 months (around 4/13/2023).      Brigida Wall MD  [facility]    Patient Instructions   6 months for well visit or sooner if needed.   I ordered blood work for you, labs are fasting. Please do not eat or drink anything other than water for 6-8 hrs prior to your lab work. Please get your blood work done about 1 week before you come back for follow up.

## 2022-10-13 ENCOUNTER — OFFICE VISIT (OUTPATIENT)
Dept: PRIMARY CARE CLINIC | Facility: CLINIC | Age: 87
End: 2022-10-13
Payer: COMMERCIAL

## 2022-10-13 VITALS
OXYGEN SATURATION: 98 % | DIASTOLIC BLOOD PRESSURE: 70 MMHG | HEIGHT: 70 IN | RESPIRATION RATE: 18 BRPM | HEART RATE: 68 BPM | WEIGHT: 213.63 LBS | SYSTOLIC BLOOD PRESSURE: 132 MMHG | BODY MASS INDEX: 30.58 KG/M2 | TEMPERATURE: 98 F

## 2022-10-13 DIAGNOSIS — E66.01 SEVERE OBESITY: ICD-10-CM

## 2022-10-13 DIAGNOSIS — E78.2 MIXED HYPERLIPIDEMIA: ICD-10-CM

## 2022-10-13 DIAGNOSIS — K57.30 DIVERTICULOSIS OF COLON: ICD-10-CM

## 2022-10-13 DIAGNOSIS — K21.9 GASTROESOPHAGEAL REFLUX DISEASE WITHOUT ESOPHAGITIS: ICD-10-CM

## 2022-10-13 DIAGNOSIS — N18.31 STAGE 3A CHRONIC KIDNEY DISEASE: ICD-10-CM

## 2022-10-13 DIAGNOSIS — K64.8 INTERNAL HEMORRHOIDS: ICD-10-CM

## 2022-10-13 DIAGNOSIS — Z85.46 HISTORY OF PROSTATE CANCER: ICD-10-CM

## 2022-10-13 DIAGNOSIS — Z87.19 HISTORY OF BARRETT'S ESOPHAGUS: ICD-10-CM

## 2022-10-13 DIAGNOSIS — I10 PRIMARY HYPERTENSION: ICD-10-CM

## 2022-10-13 DIAGNOSIS — Z00.00 ROUTINE GENERAL MEDICAL EXAMINATION AT A HEALTH CARE FACILITY: Primary | ICD-10-CM

## 2022-10-13 PROCEDURE — 1101F PT FALLS ASSESS-DOCD LE1/YR: CPT | Mod: CPTII,S$GLB,, | Performed by: INTERNAL MEDICINE

## 2022-10-13 PROCEDURE — 99214 PR OFFICE/OUTPT VISIT, EST, LEVL IV, 30-39 MIN: ICD-10-PCS | Mod: S$GLB,,, | Performed by: INTERNAL MEDICINE

## 2022-10-13 PROCEDURE — 1126F AMNT PAIN NOTED NONE PRSNT: CPT | Mod: CPTII,S$GLB,, | Performed by: INTERNAL MEDICINE

## 2022-10-13 PROCEDURE — 1159F MED LIST DOCD IN RCRD: CPT | Mod: CPTII,S$GLB,, | Performed by: INTERNAL MEDICINE

## 2022-10-13 PROCEDURE — 3288F FALL RISK ASSESSMENT DOCD: CPT | Mod: CPTII,S$GLB,, | Performed by: INTERNAL MEDICINE

## 2022-10-13 PROCEDURE — 99214 OFFICE O/P EST MOD 30 MIN: CPT | Mod: S$GLB,,, | Performed by: INTERNAL MEDICINE

## 2022-10-13 PROCEDURE — 1160F RVW MEDS BY RX/DR IN RCRD: CPT | Mod: CPTII,S$GLB,, | Performed by: INTERNAL MEDICINE

## 2022-10-13 PROCEDURE — 1159F PR MEDICATION LIST DOCUMENTED IN MEDICAL RECORD: ICD-10-PCS | Mod: CPTII,S$GLB,, | Performed by: INTERNAL MEDICINE

## 2022-10-13 PROCEDURE — 3288F PR FALLS RISK ASSESSMENT DOCUMENTED: ICD-10-PCS | Mod: CPTII,S$GLB,, | Performed by: INTERNAL MEDICINE

## 2022-10-13 PROCEDURE — 1101F PR PT FALLS ASSESS DOC 0-1 FALLS W/OUT INJ PAST YR: ICD-10-PCS | Mod: CPTII,S$GLB,, | Performed by: INTERNAL MEDICINE

## 2022-10-13 PROCEDURE — 3078F DIAST BP <80 MM HG: CPT | Mod: CPTII,S$GLB,, | Performed by: INTERNAL MEDICINE

## 2022-10-13 PROCEDURE — 99999 PR PBB SHADOW E&M-EST. PATIENT-LVL V: ICD-10-PCS | Mod: PBBFAC,,, | Performed by: INTERNAL MEDICINE

## 2022-10-13 PROCEDURE — 99999 PR PBB SHADOW E&M-EST. PATIENT-LVL V: CPT | Mod: PBBFAC,,, | Performed by: INTERNAL MEDICINE

## 2022-10-13 PROCEDURE — 3075F SYST BP GE 130 - 139MM HG: CPT | Mod: CPTII,S$GLB,, | Performed by: INTERNAL MEDICINE

## 2022-10-13 PROCEDURE — 3078F PR MOST RECENT DIASTOLIC BLOOD PRESSURE < 80 MM HG: ICD-10-PCS | Mod: CPTII,S$GLB,, | Performed by: INTERNAL MEDICINE

## 2022-10-13 PROCEDURE — 1126F PR PAIN SEVERITY QUANTIFIED, NO PAIN PRESENT: ICD-10-PCS | Mod: CPTII,S$GLB,, | Performed by: INTERNAL MEDICINE

## 2022-10-13 PROCEDURE — 1160F PR REVIEW ALL MEDS BY PRESCRIBER/CLIN PHARMACIST DOCUMENTED: ICD-10-PCS | Mod: CPTII,S$GLB,, | Performed by: INTERNAL MEDICINE

## 2022-10-13 PROCEDURE — 3075F PR MOST RECENT SYSTOLIC BLOOD PRESS GE 130-139MM HG: ICD-10-PCS | Mod: CPTII,S$GLB,, | Performed by: INTERNAL MEDICINE

## 2022-10-13 NOTE — PATIENT INSTRUCTIONS
6 months for well visit or sooner if needed.   I ordered blood work for you, labs are fasting. Please do not eat or drink anything other than water for 6-8 hrs prior to your lab work. Please get your blood work done about 1 week before you come back for follow up.

## 2022-10-15 RX ORDER — SODIUM BICARBONATE 650 MG/1
TABLET ORAL
Qty: 180 TABLET | Refills: 2 | Status: SHIPPED | OUTPATIENT
Start: 2022-10-15 | End: 2022-10-17 | Stop reason: SDUPTHER

## 2022-10-17 RX ORDER — SODIUM BICARBONATE 650 MG/1
TABLET ORAL
Qty: 180 TABLET | Refills: 2 | Status: SHIPPED | OUTPATIENT
Start: 2022-10-17 | End: 2022-12-13 | Stop reason: ALTCHOICE

## 2022-11-24 ENCOUNTER — HOSPITAL ENCOUNTER (INPATIENT)
Facility: HOSPITAL | Age: 87
LOS: 1 days | Discharge: HOME | DRG: 378 | End: 2022-11-28
Attending: STUDENT IN AN ORGANIZED HEALTH CARE EDUCATION/TRAINING PROGRAM | Admitting: STUDENT IN AN ORGANIZED HEALTH CARE EDUCATION/TRAINING PROGRAM
Payer: COMMERCIAL

## 2022-11-24 DIAGNOSIS — K62.5 BRBPR (BRIGHT RED BLOOD PER RECTUM): Primary | ICD-10-CM

## 2022-11-24 DIAGNOSIS — K92.2 GI BLEED: ICD-10-CM

## 2022-11-24 DIAGNOSIS — K92.2 LOWER GI BLEED: ICD-10-CM

## 2022-11-24 DIAGNOSIS — N18.31 STAGE 3A CHRONIC KIDNEY DISEASE: ICD-10-CM

## 2022-11-24 DIAGNOSIS — R07.9 CHEST PAIN: ICD-10-CM

## 2022-11-24 DIAGNOSIS — Z85.46 HISTORY OF PROSTATE CANCER: ICD-10-CM

## 2022-11-24 PROBLEM — N17.9 AKI (ACUTE KIDNEY INJURY): Status: ACTIVE | Noted: 2022-11-24

## 2022-11-24 LAB
ABO + RH BLD: NORMAL
ALBUMIN SERPL BCP-MCNC: 3.4 G/DL (ref 3.5–5.2)
ALP SERPL-CCNC: 76 U/L (ref 55–135)
ALT SERPL W/O P-5'-P-CCNC: 9 U/L (ref 10–44)
ANION GAP SERPL CALC-SCNC: 11 MMOL/L (ref 8–16)
AST SERPL-CCNC: 19 U/L (ref 10–40)
BASOPHILS # BLD AUTO: 0.03 K/UL (ref 0–0.2)
BASOPHILS # BLD AUTO: 0.03 K/UL (ref 0–0.2)
BASOPHILS # BLD AUTO: 0.04 K/UL (ref 0–0.2)
BASOPHILS NFR BLD: 0.3 % (ref 0–1.9)
BASOPHILS NFR BLD: 0.3 % (ref 0–1.9)
BASOPHILS NFR BLD: 0.4 % (ref 0–1.9)
BILIRUB SERPL-MCNC: 1 MG/DL (ref 0.1–1)
BLD GP AB SCN CELLS X3 SERPL QL: NORMAL
BUN SERPL-MCNC: 20 MG/DL (ref 8–23)
CALCIUM SERPL-MCNC: 9.1 MG/DL (ref 8.7–10.5)
CHLORIDE SERPL-SCNC: 109 MMOL/L (ref 95–110)
CO2 SERPL-SCNC: 17 MMOL/L (ref 23–29)
CREAT SERPL-MCNC: 2.1 MG/DL (ref 0.5–1.4)
DIFFERENTIAL METHOD: ABNORMAL
EOSINOPHIL # BLD AUTO: 0.1 K/UL (ref 0–0.5)
EOSINOPHIL # BLD AUTO: 0.1 K/UL (ref 0–0.5)
EOSINOPHIL # BLD AUTO: 0.2 K/UL (ref 0–0.5)
EOSINOPHIL NFR BLD: 0.7 % (ref 0–8)
EOSINOPHIL NFR BLD: 1.5 % (ref 0–8)
EOSINOPHIL NFR BLD: 1.9 % (ref 0–8)
ERYTHROCYTE [DISTWIDTH] IN BLOOD BY AUTOMATED COUNT: 17.1 % (ref 11.5–14.5)
ERYTHROCYTE [DISTWIDTH] IN BLOOD BY AUTOMATED COUNT: 17.2 % (ref 11.5–14.5)
ERYTHROCYTE [DISTWIDTH] IN BLOOD BY AUTOMATED COUNT: 17.2 % (ref 11.5–14.5)
EST. GFR  (NO RACE VARIABLE): 30.5 ML/MIN/1.73 M^2
GLUCOSE SERPL-MCNC: 138 MG/DL (ref 70–110)
HCT VFR BLD AUTO: 31.3 % (ref 40–54)
HCT VFR BLD AUTO: 31.7 % (ref 40–54)
HCT VFR BLD AUTO: 32.9 % (ref 40–54)
HGB BLD-MCNC: 10.4 G/DL (ref 14–18)
HGB BLD-MCNC: 9.8 G/DL (ref 14–18)
HGB BLD-MCNC: 9.8 G/DL (ref 14–18)
IMM GRANULOCYTES # BLD AUTO: 0.03 K/UL (ref 0–0.04)
IMM GRANULOCYTES NFR BLD AUTO: 0.3 % (ref 0–0.5)
INR PPP: 1 (ref 0.8–1.2)
LYMPHOCYTES # BLD AUTO: 1.1 K/UL (ref 1–4.8)
LYMPHOCYTES # BLD AUTO: 2.1 K/UL (ref 1–4.8)
LYMPHOCYTES # BLD AUTO: 3.6 K/UL (ref 1–4.8)
LYMPHOCYTES NFR BLD: 11.7 % (ref 18–48)
LYMPHOCYTES NFR BLD: 24.5 % (ref 18–48)
LYMPHOCYTES NFR BLD: 31.3 % (ref 18–48)
MCH RBC QN AUTO: 24 PG (ref 27–31)
MCH RBC QN AUTO: 24.1 PG (ref 27–31)
MCH RBC QN AUTO: 24.2 PG (ref 27–31)
MCHC RBC AUTO-ENTMCNC: 30.9 G/DL (ref 32–36)
MCHC RBC AUTO-ENTMCNC: 31.3 G/DL (ref 32–36)
MCHC RBC AUTO-ENTMCNC: 31.6 G/DL (ref 32–36)
MCV RBC AUTO: 76 FL (ref 82–98)
MCV RBC AUTO: 77 FL (ref 82–98)
MCV RBC AUTO: 78 FL (ref 82–98)
MONOCYTES # BLD AUTO: 0.9 K/UL (ref 0.3–1)
MONOCYTES # BLD AUTO: 1.1 K/UL (ref 0.3–1)
MONOCYTES # BLD AUTO: 1.7 K/UL (ref 0.3–1)
MONOCYTES NFR BLD: 12.9 % (ref 4–15)
MONOCYTES NFR BLD: 14.9 % (ref 4–15)
MONOCYTES NFR BLD: 9.2 % (ref 4–15)
NEUTROPHILS # BLD AUTO: 5.3 K/UL (ref 1.8–7.7)
NEUTROPHILS # BLD AUTO: 5.8 K/UL (ref 1.8–7.7)
NEUTROPHILS # BLD AUTO: 7.6 K/UL (ref 1.8–7.7)
NEUTROPHILS NFR BLD: 51.2 % (ref 38–73)
NEUTROPHILS NFR BLD: 60.5 % (ref 38–73)
NEUTROPHILS NFR BLD: 77.8 % (ref 38–73)
NRBC BLD-RTO: 0 /100 WBC
PLATELET # BLD AUTO: 262 K/UL (ref 150–450)
PLATELET # BLD AUTO: 270 K/UL (ref 150–450)
PLATELET # BLD AUTO: 286 K/UL (ref 150–450)
PMV BLD AUTO: 10.1 FL (ref 9.2–12.9)
PMV BLD AUTO: 9.6 FL (ref 9.2–12.9)
PMV BLD AUTO: 9.8 FL (ref 9.2–12.9)
POCT GLUCOSE: 108 MG/DL (ref 70–110)
POCT GLUCOSE: 130 MG/DL (ref 70–110)
POCT GLUCOSE: 89 MG/DL (ref 70–110)
POTASSIUM SERPL-SCNC: 4.2 MMOL/L (ref 3.5–5.1)
PROT SERPL-MCNC: 6.9 G/DL (ref 6–8.4)
PROTHROMBIN TIME: 10.8 SEC (ref 9–12.5)
RBC # BLD AUTO: 4.05 M/UL (ref 4.6–6.2)
RBC # BLD AUTO: 4.08 M/UL (ref 4.6–6.2)
RBC # BLD AUTO: 4.31 M/UL (ref 4.6–6.2)
SODIUM SERPL-SCNC: 137 MMOL/L (ref 136–145)
WBC # BLD AUTO: 11.36 K/UL (ref 3.9–12.7)
WBC # BLD AUTO: 8.75 K/UL (ref 3.9–12.7)
WBC # BLD AUTO: 9.76 K/UL (ref 3.9–12.7)

## 2022-11-24 PROCEDURE — 99285 EMERGENCY DEPT VISIT HI MDM: CPT | Mod: ,,, | Performed by: STUDENT IN AN ORGANIZED HEALTH CARE EDUCATION/TRAINING PROGRAM

## 2022-11-24 PROCEDURE — 85610 PROTHROMBIN TIME: CPT | Performed by: STUDENT IN AN ORGANIZED HEALTH CARE EDUCATION/TRAINING PROGRAM

## 2022-11-24 PROCEDURE — 99285 EMERGENCY DEPT VISIT HI MDM: CPT | Mod: 25

## 2022-11-24 PROCEDURE — G0378 HOSPITAL OBSERVATION PER HR: HCPCS

## 2022-11-24 PROCEDURE — 93010 EKG 12-LEAD: ICD-10-PCS | Mod: ,,, | Performed by: INTERNAL MEDICINE

## 2022-11-24 PROCEDURE — 99215 OFFICE O/P EST HI 40 MIN: CPT | Mod: ,,, | Performed by: INTERNAL MEDICINE

## 2022-11-24 PROCEDURE — 93010 ELECTROCARDIOGRAM REPORT: CPT | Mod: ,,, | Performed by: INTERNAL MEDICINE

## 2022-11-24 PROCEDURE — 82962 GLUCOSE BLOOD TEST: CPT

## 2022-11-24 PROCEDURE — 63600175 PHARM REV CODE 636 W HCPCS: Performed by: STUDENT IN AN ORGANIZED HEALTH CARE EDUCATION/TRAINING PROGRAM

## 2022-11-24 PROCEDURE — 99285 PR EMERGENCY DEPT VISIT,LEVEL V: ICD-10-PCS | Mod: ,,, | Performed by: STUDENT IN AN ORGANIZED HEALTH CARE EDUCATION/TRAINING PROGRAM

## 2022-11-24 PROCEDURE — 85025 COMPLETE CBC W/AUTO DIFF WBC: CPT | Mod: 91

## 2022-11-24 PROCEDURE — 93005 ELECTROCARDIOGRAM TRACING: CPT

## 2022-11-24 PROCEDURE — 96374 THER/PROPH/DIAG INJ IV PUSH: CPT

## 2022-11-24 PROCEDURE — 80053 COMPREHEN METABOLIC PANEL: CPT | Performed by: STUDENT IN AN ORGANIZED HEALTH CARE EDUCATION/TRAINING PROGRAM

## 2022-11-24 PROCEDURE — 85025 COMPLETE CBC W/AUTO DIFF WBC: CPT | Performed by: STUDENT IN AN ORGANIZED HEALTH CARE EDUCATION/TRAINING PROGRAM

## 2022-11-24 PROCEDURE — 25000003 PHARM REV CODE 250: Performed by: STUDENT IN AN ORGANIZED HEALTH CARE EDUCATION/TRAINING PROGRAM

## 2022-11-24 PROCEDURE — C9113 INJ PANTOPRAZOLE SODIUM, VIA: HCPCS | Performed by: STUDENT IN AN ORGANIZED HEALTH CARE EDUCATION/TRAINING PROGRAM

## 2022-11-24 PROCEDURE — 86850 RBC ANTIBODY SCREEN: CPT | Performed by: STUDENT IN AN ORGANIZED HEALTH CARE EDUCATION/TRAINING PROGRAM

## 2022-11-24 PROCEDURE — 25000003 PHARM REV CODE 250

## 2022-11-24 PROCEDURE — 93010 ELECTROCARDIOGRAM REPORT: CPT | Mod: 76,,, | Performed by: INTERNAL MEDICINE

## 2022-11-24 PROCEDURE — 99220 PR INITIAL OBSERVATION CARE,LEVL III: ICD-10-PCS | Mod: ,,,

## 2022-11-24 PROCEDURE — 99215 PR OFFICE/OUTPT VISIT, EST, LEVL V, 40-54 MIN: ICD-10-PCS | Mod: ,,, | Performed by: INTERNAL MEDICINE

## 2022-11-24 PROCEDURE — 94761 N-INVAS EAR/PLS OXIMETRY MLT: CPT

## 2022-11-24 PROCEDURE — 36415 COLL VENOUS BLD VENIPUNCTURE: CPT

## 2022-11-24 PROCEDURE — 99220 PR INITIAL OBSERVATION CARE,LEVL III: CPT | Mod: ,,,

## 2022-11-24 PROCEDURE — 63600175 PHARM REV CODE 636 W HCPCS

## 2022-11-24 RX ORDER — POLYETHYLENE GLYCOL 3350 17 G/17G
17 POWDER, FOR SOLUTION ORAL DAILY PRN
Status: DISCONTINUED | OUTPATIENT
Start: 2022-11-24 | End: 2022-11-28

## 2022-11-24 RX ORDER — AMLODIPINE BESYLATE 10 MG/1
10 TABLET ORAL EVERY MORNING
Status: DISCONTINUED | OUTPATIENT
Start: 2022-11-25 | End: 2022-11-26

## 2022-11-24 RX ORDER — BISACODYL 10 MG
10 SUPPOSITORY, RECTAL RECTAL DAILY PRN
Status: DISCONTINUED | OUTPATIENT
Start: 2022-11-24 | End: 2022-11-28 | Stop reason: HOSPADM

## 2022-11-24 RX ORDER — IBUPROFEN 200 MG
24 TABLET ORAL
Status: DISCONTINUED | OUTPATIENT
Start: 2022-11-24 | End: 2022-11-28 | Stop reason: HOSPADM

## 2022-11-24 RX ORDER — POLYETHYLENE GLYCOL 3350, SODIUM SULFATE ANHYDROUS, SODIUM BICARBONATE, SODIUM CHLORIDE, POTASSIUM CHLORIDE 236; 22.74; 6.74; 5.86; 2.97 G/4L; G/4L; G/4L; G/4L; G/4L
4000 POWDER, FOR SOLUTION ORAL ONCE
Status: COMPLETED | OUTPATIENT
Start: 2022-11-24 | End: 2022-11-24

## 2022-11-24 RX ORDER — SODIUM CHLORIDE 0.9 % (FLUSH) 0.9 %
10 SYRINGE (ML) INJECTION
Status: DISCONTINUED | OUTPATIENT
Start: 2022-11-24 | End: 2022-11-28 | Stop reason: HOSPADM

## 2022-11-24 RX ORDER — GLUCAGON 1 MG
1 KIT INJECTION
Status: DISCONTINUED | OUTPATIENT
Start: 2022-11-24 | End: 2022-11-28 | Stop reason: HOSPADM

## 2022-11-24 RX ORDER — LOSARTAN POTASSIUM 50 MG/1
50 TABLET ORAL DAILY
Status: DISCONTINUED | OUTPATIENT
Start: 2022-11-24 | End: 2022-11-24

## 2022-11-24 RX ORDER — IBUPROFEN 200 MG
16 TABLET ORAL
Status: DISCONTINUED | OUTPATIENT
Start: 2022-11-24 | End: 2022-11-28 | Stop reason: HOSPADM

## 2022-11-24 RX ORDER — PROMETHAZINE HYDROCHLORIDE 25 MG/1
25 TABLET ORAL EVERY 6 HOURS PRN
Status: DISCONTINUED | OUTPATIENT
Start: 2022-11-24 | End: 2022-11-28 | Stop reason: HOSPADM

## 2022-11-24 RX ORDER — LOSARTAN POTASSIUM 50 MG/1
50 TABLET ORAL DAILY
Status: DISCONTINUED | OUTPATIENT
Start: 2022-11-25 | End: 2022-11-26

## 2022-11-24 RX ORDER — PANTOPRAZOLE SODIUM 40 MG/10ML
80 INJECTION, POWDER, LYOPHILIZED, FOR SOLUTION INTRAVENOUS
Status: COMPLETED | OUTPATIENT
Start: 2022-11-24 | End: 2022-11-24

## 2022-11-24 RX ORDER — TALC
6 POWDER (GRAM) TOPICAL NIGHTLY PRN
Status: DISCONTINUED | OUTPATIENT
Start: 2022-11-24 | End: 2022-11-28 | Stop reason: HOSPADM

## 2022-11-24 RX ORDER — ATORVASTATIN CALCIUM 10 MG/1
10 TABLET, FILM COATED ORAL NIGHTLY
Status: DISCONTINUED | OUTPATIENT
Start: 2022-11-24 | End: 2022-11-28 | Stop reason: HOSPADM

## 2022-11-24 RX ORDER — PANTOPRAZOLE SODIUM 40 MG/10ML
40 INJECTION, POWDER, LYOPHILIZED, FOR SOLUTION INTRAVENOUS 2 TIMES DAILY
Status: DISCONTINUED | OUTPATIENT
Start: 2022-11-25 | End: 2022-11-28 | Stop reason: HOSPADM

## 2022-11-24 RX ORDER — IPRATROPIUM BROMIDE AND ALBUTEROL SULFATE 2.5; .5 MG/3ML; MG/3ML
3 SOLUTION RESPIRATORY (INHALATION) EVERY 4 HOURS PRN
Status: DISCONTINUED | OUTPATIENT
Start: 2022-11-24 | End: 2022-11-28 | Stop reason: HOSPADM

## 2022-11-24 RX ORDER — ONDANSETRON 8 MG/1
8 TABLET, ORALLY DISINTEGRATING ORAL EVERY 8 HOURS PRN
Status: DISCONTINUED | OUTPATIENT
Start: 2022-11-24 | End: 2022-11-28 | Stop reason: HOSPADM

## 2022-11-24 RX ORDER — ACETAMINOPHEN 325 MG/1
650 TABLET ORAL EVERY 4 HOURS PRN
Status: DISCONTINUED | OUTPATIENT
Start: 2022-11-24 | End: 2022-11-28 | Stop reason: HOSPADM

## 2022-11-24 RX ORDER — AMLODIPINE BESYLATE 10 MG/1
10 TABLET ORAL EVERY MORNING
Status: DISCONTINUED | OUTPATIENT
Start: 2022-11-24 | End: 2022-11-24

## 2022-11-24 RX ORDER — SODIUM BICARBONATE 650 MG/1
650 TABLET ORAL 2 TIMES DAILY
Status: DISCONTINUED | OUTPATIENT
Start: 2022-11-24 | End: 2022-11-28 | Stop reason: HOSPADM

## 2022-11-24 RX ADMIN — POLYETHYLENE GLYCOL 3350, SODIUM SULFATE ANHYDROUS, SODIUM BICARBONATE, SODIUM CHLORIDE, POTASSIUM CHLORIDE 4000 ML: 236; 22.74; 6.74; 5.86; 2.97 POWDER, FOR SOLUTION ORAL at 05:11

## 2022-11-24 RX ADMIN — SODIUM CHLORIDE, SODIUM LACTATE, POTASSIUM CHLORIDE, AND CALCIUM CHLORIDE 1000 ML: .6; .31; .03; .02 INJECTION, SOLUTION INTRAVENOUS at 04:11

## 2022-11-24 RX ADMIN — PANTOPRAZOLE SODIUM 80 MG: 40 INJECTION, POWDER, FOR SOLUTION INTRAVENOUS at 09:11

## 2022-11-24 RX ADMIN — ATORVASTATIN CALCIUM 10 MG: 10 TABLET, FILM COATED ORAL at 09:11

## 2022-11-24 RX ADMIN — SODIUM BICARBONATE 650 MG TABLET 650 MG: at 09:11

## 2022-11-24 NOTE — ED PROVIDER NOTES
Encounter Date: 11/24/2022       History     Chief Complaint   Patient presents with    Rectal Bleeding     Bowel movement early this morning, reports blood in stool. Bright red. Formed stool. States he has been constipated recently. Denies abdominal pain. Arrives with sample in bag of blood .      Zion Vasquez is an 85 y/o male with relevant history of known diverticulosis, Martinez's esophagus, hiatal hernia, HTN, and prior GI bleed (for which he was hospitalized 06/22) presenting to ED after several episodes of BRBPR this morning. BRBPR is new since this morning, episodic x3, without known aggravating or relieving factors, and without associated fatigue, sob, chest pain, diarrhea, or constipation.  He states he woke up at 4am and passed a loose bowel movement with bright red blood followed by another similar episode shortly after. He then took immodium approximately 1 hr before passing another bloody stool. He denies any abdominal pain, nausea, dizziness, weakness, or other accompanying symptoms.  Per chart review he had a colon polyp removed in 2019, unable to find more recent colonoscopy in available records. Currently taking protonix daily    The history is provided by the patient and medical records.   Review of patient's allergies indicates:  No Known Allergies  Past Medical History:   Diagnosis Date    GERD (gastroesophageal reflux disease)     Hyperlipemia     Hypertension     Obesity      No past surgical history on file.  Family History   Problem Relation Age of Onset    Cancer Mother         ? ovarian    No Known Problems Father     No Known Problems Sister     No Known Problems Brother     No Known Problems Sister      Social History     Tobacco Use    Smoking status: Never    Smokeless tobacco: Never   Substance Use Topics    Alcohol use: Yes     Comment: occasionally    Drug use: Never     Review of Systems   Constitutional:  Negative for chills and fever.   HENT:  Negative for congestion and sore  throat.    Eyes:  Negative for pain and visual disturbance.   Respiratory:  Negative for cough and shortness of breath.    Cardiovascular:  Negative for chest pain and leg swelling.   Gastrointestinal:  Positive for blood in stool. Negative for abdominal pain, constipation, diarrhea, nausea and vomiting.   Endocrine: Negative for polydipsia and polyuria.   Genitourinary:  Negative for dysuria and hematuria.   Musculoskeletal:  Negative for arthralgias and back pain.   Skin:  Negative for color change and rash.   Neurological:  Negative for dizziness, syncope, weakness and headaches.     Physical Exam     Initial Vitals   BP Pulse Resp Temp SpO2   11/24/22 0847 11/24/22 0846 11/24/22 0846 11/24/22 0846 11/24/22 0846   (S) (!) 99/59 78 18 97.8 °F (36.6 °C) 98 %      MAP       --                Physical Exam    Nursing note and vitals reviewed.  Constitutional: He appears well-developed and well-nourished. He is not diaphoretic. No distress.   HENT:   Head: Normocephalic and atraumatic.   Eyes: Conjunctivae and EOM are normal. Pupils are equal, round, and reactive to light.   Neck: Neck supple.   Normal range of motion.  Cardiovascular:  Normal rate, regular rhythm, normal heart sounds and intact distal pulses.           No murmur heard.  Pulmonary/Chest: Breath sounds normal. No respiratory distress. He has no wheezes. He has no rhonchi. He has no rales.   No increased WOB or tachypnea   Abdominal:   Large abdomen, mild distension without any tenderness to light or deep palpation. Remains soft   Genitourinary:    Genitourinary Comments: No stool in vault. Bright red blood on rectal exam, without active oozing.   External: no hemorrhoid or fissure noted     Musculoskeletal:         General: No tenderness or edema.      Cervical back: Normal range of motion and neck supple.     Neurological: He is alert and oriented to person, place, and time.   Skin: Skin is warm and dry. Capillary refill takes less than 2 seconds.        ED Course   Procedures  Labs Reviewed   CBC W/ AUTO DIFFERENTIAL - Abnormal; Notable for the following components:       Result Value    RBC 4.31 (*)     Hemoglobin 10.4 (*)     Hematocrit 32.9 (*)     MCV 76 (*)     MCH 24.1 (*)     MCHC 31.6 (*)     RDW 17.1 (*)     Gran % 77.8 (*)     Lymph % 11.7 (*)     All other components within normal limits   COMPREHENSIVE METABOLIC PANEL - Abnormal; Notable for the following components:    CO2 17 (*)     Glucose 138 (*)     Creatinine 2.1 (*)     Albumin 3.4 (*)     ALT 9 (*)     eGFR 30.5 (*)     All other components within normal limits   PROTIME-INR   TYPE & SCREEN          Imaging Results    None          Medications   sodium chloride 0.9% bolus 500 mL (500 mLs Intravenous Not Given 11/24/22 0929)   pantoprazole injection 80 mg (80 mg Intravenous Given 11/24/22 0932)     Medical Decision Making:   History:   Old Medical Records: I decided to obtain old medical records.  Old Records Summarized: records from previous admission(s).       <> Summary of Records: Was admitted in 06/2022 for suspected GI bleed, GI consulted but didn't scope the patient  Initial Assessment:   Hemodynamically stable 86M not on blood thinners presents with BRBPR  Differential Diagnosis:   Lower GI bleed, upper GI bleed, angiodysplasia, malignancy, internal hemorrhoid, gastritis/PUD, doubt diverticulitis (no pain, diarrhea)  Clinical Tests:   Lab Tests: Ordered and Reviewed  Radiological Study: Ordered and Reviewed  Medical Tests: Ordered and Reviewed  ED Management:  See ED course           ED Course as of 11/24/22 1113   Thu Nov 24, 2022   1006 BP(!)(S): 99/59 [NN]   1006 Hemoglobin(!): 10.4 [NN]   1012 CBC auto differential(!)  CBC shows baseline anemia with hgb 10.4 without leukocytosis and or thrombocytopenia [BD]   1013 INR: 1.0 [BD]   1015 Hemoglobin(!): 10.4 [NN]   1056 Comprehensive metabolic panel(!)  CMP shows suspected baseline impaired renal function with Cr 2.1 and normal BUN.  Low bicarb at 17 without elevated LFTs or electrolyte derangements [BD]   1056 Creatinine(!): 2.1  Cr 2.0 one month ago [NN]   1100 Patient's laboratory workup is largely unremarkable for acute changes.  Given his age and risk factors, he will be admitted to Hospital Medicine for a suspected acute lower GI bleed.  Patient agrees with and is comfortable with the plan.  He remains hemodynamically stable at this time. [BD]      ED Course User Index  [BD] Marlon Tucker MD  [NN] Joyce Sheppard MD                   Clinical Impression:   Final diagnoses:  [K92.2] GI bleed  [K92.2] Lower GI bleed  [K62.5] BRBPR (bright red blood per rectum) (Primary)  [N18.31] Stage 3a chronic kidney disease  [Z85.46] History of prostate cancer      ED Disposition Condition    Observation Stable                Marlon Tucker MD  Resident  11/24/22 4117

## 2022-11-24 NOTE — ASSESSMENT & PLAN NOTE
Improving  Patient with acute kidney injury likely due to IVVD/dehydration CARYL is currently stable. Labs reviewed- Renal function/electrolytes with Estimated Creatinine Clearance: 30.1 mL/min (A) (based on SCr of 2.1 mg/dL (H)). according to latest data. Monitor urine output and serial BMP and adjust therapy as needed. Avoid nephrotoxins and renally dose meds for GFR listed above.   - Cr 2.1 (~BL 1.5-1.8) on admission>> 1.9  - give 1L LR bolus

## 2022-11-24 NOTE — CONSULTS
[facility]  Gastroenterology  Consult Note    Patient Name: Zion Vasquez  MRN: [id number]  Admission Date: 11/24/2022  Hospital Length of Stay: 0 days  Code Status: Full Code   Attending Provider:  Dr. Alvarez  Consulting Provider: Slava Tomas MD  Primary Care Physician: Brigida Wall MD  Principal Problem:GI bleed    Inpatient consult to Gastroenterology  Consult performed by: Slava Tomas MD  Consult ordered by: Gloria Sheridan PA-C      Subjective:     HPI: Zion Vasquez is a 86 y.o. male with history of CKD III, HTN, obesity, and diverticulosis presenting to ED after several episodes (2-3) of BRBPR this morning.  He denies melena, hematemesis, nausea, vomiting or significant straining.  In the ER, labs were significant for a hemoglobin of 10.4 (close to his baseline for the past year), hematocrit 32.9, creatinine 2.1.  His blood pressure was 99/59 mm of Hg on arrival but improved with starting IV fluids.  GI has been consulted for possible colonoscopy.    He was evaluated by our service in June 2022 when he came in with 3 days of painless hematochezia.  At that time, the bleeding resolved without intervention.  He also had a previous episode in 2019 for which he was hospitalized at [facility] and underwent a colonoscopy showing diverticulosis.  He is not on any blood thinners.        Past Medical History:   Diagnosis Date    GERD (gastroesophageal reflux disease)     Hyperlipemia     Hypertension     Obesity        No past surgical history on file.    Family History   Problem Relation Age of Onset    Cancer Mother         ? ovarian    No Known Problems Father     No Known Problems Sister     No Known Problems Brother     No Known Problems Sister        Social History     Socioeconomic History    Marital status:    Tobacco Use    Smoking status: Never    Smokeless tobacco: Never   Substance and Sexual Activity    Alcohol use: Yes     Comment: occasionally    Drug use: Never        No current facility-administered medications on file prior to encounter.     Current Outpatient Medications on File Prior to Encounter   Medication Sig Dispense Refill    acetaminophen (TYLENOL) 500 MG tablet Take 1,000 mg by mouth every 6 (six) hours as needed for Pain.      amLODIPine (NORVASC) 10 MG tablet Take 1 tablet (10 mg total) by mouth every morning. 90 tablet 3    atorvastatin (LIPITOR) 10 MG tablet Take 1 tablet (10 mg total) by mouth every evening. 90 tablet 3    FLUAD QUAD 2020-21,65Y UP,,PF, 60 mcg (15 mcg x 4)/0.5 mL Syrg ADM 0.5ML IM UTD      losartan (COZAAR) 50 MG tablet Take 1 tablet (50 mg total) by mouth once daily. 90 tablet 3    pantoprazole (PROTONIX) 40 MG tablet Take 1 tablet (40 mg total) by mouth 2 (two) times daily. 90 tablet 3    pneumoc 20-fabio conj-dip cr,PF, (PREVNAR-20, PF,) 0.5 mL Syrg injection Inject 0.5 mLs into the muscle. 0.5 mL 0    polyethylene glycol (GLYCOLAX) 17 gram/dose powder Dissolve one capful (17 g) in liquid and take by mouth daily as needed. (Patient not taking: Reported on 10/13/2022) 1020 g 0    sodium bicarbonate 650 MG tablet TAKE 1 TABLET(650 MG) BY MOUTH TWICE DAILY  Strength: 650 mg 180 tablet 2       Review of patient's allergies indicates:  No Known Allergies    Review of Systems   Constitutional:  Negative for chills and fever.   HENT:  Negative for congestion and sore throat.    Eyes:  Negative for pain and visual disturbance.   Respiratory:  Negative for cough and shortness of breath.    Cardiovascular:  Negative for chest pain and leg swelling.   Gastrointestinal:  Positive for blood in stool. Negative for abdominal pain, constipation, diarrhea, nausea and vomiting.   Endocrine: Negative for polydipsia and polyuria.   Genitourinary:  Negative for dysuria and hematuria.   Musculoskeletal:  Negative for arthralgias and back pain.   Skin:  Negative for color change and rash.   Neurological:  Negative for dizziness, syncope, weakness and headaches.      Objective:     Vitals:    11/24/22 1350   BP: (!) 155/66   Pulse:    Resp:    Temp:      Constitutional: He appears well-developed and well-nourished. He is not diaphoretic. No distress.   HENT:   Head: Normocephalic and atraumatic.   Eyes: Conjunctivae and EOM are normal. Pupils are equal, round, and reactive to light.   Neck: Neck supple.   Normal range of motion.  Cardiovascular:  Normal rate, regular rhythm, normal heart sounds and intact distal pulses.           No murmur heard.  Pulmonary/Chest: Breath sounds normal. No respiratory distress. He has no wheezes. He has no rhonchi. He has no rales.   No increased WOB or tachypnea   Abdominal:   Large abdomen, mild distension without any tenderness to light or deep palpation. Remains soft   Genitourinary:    Genitourinary Comments: No stool in vault. Bright red blood on rectal exam, without active oozing.   External: no hemorrhoid or fissure noted      Musculoskeletal:         General: No tenderness or edema.      Cervical back: Normal range of motion and neck supple.      Neurological: He is alert and oriented to person, place, and time.   Skin: Skin is warm and dry. Capillary refill takes less than 2 seconds.  ICU working to.  Happy Thanksgiving!    Significant Labs:  Recent Labs   Lab 11/24/22  0948   HGB 10.4*       Lab Results   Component Value Date    WBC 9.76 11/24/2022    HGB 10.4 (L) 11/24/2022    HCT 32.9 (L) 11/24/2022    MCV 76 (L) 11/24/2022     11/24/2022       Lab Results   Component Value Date     11/24/2022    K 4.2 11/24/2022     11/24/2022    CO2 17 (L) 11/24/2022    BUN 20 11/24/2022    CREATININE 2.1 (H) 11/24/2022    CALCIUM 9.1 11/24/2022    ANIONGAP 11 11/24/2022    ESTGFRAFRICA 48.7 (A) 06/14/2022    EGFRNONAA 42.1 (A) 06/14/2022       Lab Results   Component Value Date    ALT 9 (L) 11/24/2022    AST 19 11/24/2022    ALKPHOS 76 11/24/2022    BILITOT 1.0 11/24/2022       Lab Results   Component Value Date    INR 1.0  11/24/2022       Significant Imaging:  Reviewed pertinent radiology findings.       Assessment/Plan:     Zion Vasquez is a 86 y.o. male with history of CKD III, HTN, obesity, and diverticulosis presenting to ED after several episodes (2-3) of BRBPR this morning.  Hb 10.4 (close to his baseline for the past year), hematocrit 32.9, creatinine 2.1.  Now hemodynamically stable.  Not on blood thinners.  Admitted to our service in June 2022 for hematochezia, which spontaneously resolved (presumed to be diverticular).  He also had a previous episode in 2019 for which he was hospitalized at [facility] and underwent a colonoscopy showing diverticulosis.      Problem List:  Hematochezia  History of diverticulosis  History of Martinez's esophagus    Recommendations:  - Plan for colonoscopy tomorrow  - Clear liquid diet today and NPO at midnight before the procedure  - Golytely prep to start at 6pm, to be completed within 4 hours if possible  - Monitor Hgb q 8 hrs  -  given he has a history of internal hemorrhoids, if redemonstrated on colonoscopy tomorrow:  Anusol suppositories b.i.d. for 7 days and Sitz bath  - Transfuse to keep Hgb >7, plts >50  - Hold anticoagulants if safe to do so per primary team  - If becomes hemodynamically unstable with associated large volume hematochezia, recommend STAT CTA and IR embolization if positive     Thank you for involving us in the care of Zion Vasquez. Please call with any additional questions, concerns or changes in the patient's clinical status. We will continue to follow.     Slava Tomas MD  Gastroenterology Fellow PGY IV  [facility]

## 2022-11-24 NOTE — HPI
Pt [facility] a [facility]    LOV 11/4/19  NOV 7/27/20  LF 1/28/19 for #90 with 3 refills   Zion Vasquez is an 85 y/o male with a history of known diverticulosis, Martinez's esophagus, hiatal hernia, HTN, and prior GI bleed (for which he was hospitalized 06/22) presenting to ED after 3 episodes of BRBPR this morning. Patient noticed bright red blood in toilet bowl around 4:00 AM. Took an imodium but had 2 more BM with bright red blood/ water. Patient reports having one previous episode in June but did not have any intervention at that time. Otherwise has no further complaints. Denies extensive NSAID use. Denies melena. Denies fatigue, headache, dizziness, lightheadedness, chest pain, shortness of breath, abdominal pain, n/v, constipation and diarrhea. Has been taking his Protonix regularly. Last colonoscopy was 2019.     ED: AF, hypotensive to 99/59 ( improved to 137/63) Hgb 10.4 (~ at baseline). Cr 2.1 (~ BL 1.5-1.8). Given pantoprazole 80 mg IV. Admitted to observation

## 2022-11-24 NOTE — ED NOTES
"Pt reports ambulating without assistance at home and states " I don't use anything to walk around except my two legs. I even hae an exercise bike that I can workout my upper and lower body on."    Admit info / head to toe assessment done.   "

## 2022-11-24 NOTE — ED NOTES
LR started per chat message from provider, ZHEN Castro. Report to floor called. Awaiting tele box for transport at this time.

## 2022-11-24 NOTE — PROGRESS NOTES
[facility] - Emergency Dept  Hospital Medicine  Progress Note    Patient Name: Zion Vasquez  MRN: [id number]  Patient Class: OP- Observation   Admission Date: 11/24/2022  Length of Stay: 0 days  Attending Physician: Cali Dotson MD  Primary Care Provider: Brigida Wall MD        Subjective:     Principal Problem:GI bleed        HPI:  Zion Vasquez is an 87 y/o male with a history of known diverticulosis, Martinez's esophagus, hiatal hernia, HTN, and prior GI bleed (for which he was hospitalized 06/22) presenting to ED after 3 episodes of BRBPR this morning. Patient noticed bright red blood in toilet bowl around 4:00 AM. Took an imodium but had 2 more BM with bright red blood/ water in the toilet bowl. Patient reports having one previous episode in June but did not have any intervention at that time. Otherwise has no further complaints. Denies extensive NSAID use. Denies melena. Denies fatigue, headache, dizziness, lightheadedness, chest pain, shortness of breath, abdominal pain, n/v, constipation and diarrhea. Has been taking his Protonix regularly. Last colonoscopy was 2019.     ED: AF, hypotensive to 99/59 ( improved to 137/63) Hgb 10.4 (~ at baseline). Cr 2.1 (~ BL 1.5-1.8). Given pantoprazole 80 mg IV. Admitted to observation      Overview/Hospital Course:  No notes on file    Past Medical History:   Diagnosis Date    GERD (gastroesophageal reflux disease)     Hyperlipemia     Hypertension     Obesity        No past surgical history on file.    Review of patient's allergies indicates:  No Known Allergies    No current facility-administered medications on file prior to encounter.     Current Outpatient Medications on File Prior to Encounter   Medication Sig    acetaminophen (TYLENOL) 500 MG tablet Take 1,000 mg by mouth every 6 (six) hours as needed for Pain.    amLODIPine (NORVASC) 10 MG tablet Take 1 tablet (10 mg total) by mouth every morning.    atorvastatin (LIPITOR) 10 MG tablet Take 1 tablet  (10 mg total) by mouth every evening.    FLUAD QUAD 2020-21,65Y UP,,PF, 60 mcg (15 mcg x 4)/0.5 mL Syrg ADM 0.5ML IM UTD    losartan (COZAAR) 50 MG tablet Take 1 tablet (50 mg total) by mouth once daily.    pantoprazole (PROTONIX) 40 MG tablet Take 1 tablet (40 mg total) by mouth 2 (two) times daily.    pneumoc 20-fabio conj-dip cr,PF, (PREVNAR-20, PF,) 0.5 mL Syrg injection Inject 0.5 mLs into the muscle.    polyethylene glycol (GLYCOLAX) 17 gram/dose powder Dissolve one capful (17 g) in liquid and take by mouth daily as needed. (Patient not taking: Reported on 10/13/2022)    sodium bicarbonate 650 MG tablet TAKE 1 TABLET(650 MG) BY MOUTH TWICE DAILY  Strength: 650 mg     Family History       Problem Relation (Age of Onset)    Cancer Mother    No Known Problems Father, Sister, Brother, Sister          Tobacco Use    Smoking status: Never    Smokeless tobacco: Never   Substance and Sexual Activity    Alcohol use: Yes     Comment: occasionally    Drug use: Never    Sexual activity: Not on file     Review of Systems   Constitutional:  Negative for appetite change, chills, fatigue and fever.   HENT:  Negative for congestion, sinus pressure, sore throat and trouble swallowing.    Respiratory:  Negative for cough, chest tightness, shortness of breath and wheezing.    Cardiovascular:  Negative for chest pain, palpitations and leg swelling.   Gastrointestinal:  Positive for blood in stool. Negative for abdominal distention, abdominal pain, anal bleeding, constipation, diarrhea, nausea and vomiting.   Genitourinary:  Negative for dysuria, frequency, hematuria and urgency.   Musculoskeletal:  Negative for arthralgias, gait problem and myalgias.   Neurological:  Negative for dizziness, tremors, syncope, weakness, light-headedness, numbness and headaches.   Objective:     Vital Signs (Most Recent):  Temp: 97.8 °F (36.6 °C) (11/24/22 0846)  Pulse: 61 (11/24/22 1202)  Resp: 16 (11/24/22 1202)  BP: (!) 157/76 (11/24/22  1202)  SpO2: 98 % (11/24/22 1202)   Vital Signs (24h Range):  Temp:  [97.8 °F (36.6 °C)] 97.8 °F (36.6 °C)  Pulse:  [61-78] 61  Resp:  [16-25] 16  SpO2:  [98 %-99 %] 98 %  BP: ()/(59-77) 157/76     Weight: 95.7 kg (211 lb)  Body mass index is 30.71 kg/m².    Physical Exam  Vitals and nursing note reviewed.   Constitutional:       General: He is not in acute distress.     Appearance: He is well-developed. He is not ill-appearing or diaphoretic.   HENT:      Head: Normocephalic and atraumatic.      Nose: No congestion.      Mouth/Throat:      Pharynx: No oropharyngeal exudate.   Eyes:      Conjunctiva/sclera: Conjunctivae normal.      Pupils: Pupils are equal, round, and reactive to light.   Cardiovascular:      Rate and Rhythm: Normal rate and regular rhythm.      Heart sounds: Normal heart sounds.   Pulmonary:      Effort: Pulmonary effort is normal. No respiratory distress.      Breath sounds: Normal breath sounds. No wheezing.   Abdominal:      General: Bowel sounds are normal. There is no distension.      Palpations: Abdomen is soft.      Tenderness: There is no abdominal tenderness.   Genitourinary:     Comments: Per ED Note: No stool in vault. Bright red blood on rectal exam, without active oozing.   External: no hemorrhoid or fissure noted   Musculoskeletal:         General: No tenderness. Normal range of motion.      Cervical back: Normal range of motion and neck supple.      Right lower leg: No edema.      Left lower leg: No edema.   Lymphadenopathy:      Cervical: No cervical adenopathy.   Skin:     General: Skin is warm and dry.      Capillary Refill: Capillary refill takes less than 2 seconds.      Findings: No rash.   Neurological:      Mental Status: He is alert and oriented to person, place, and time.      Cranial Nerves: No cranial nerve deficit.      Sensory: No sensory deficit.      Coordination: Coordination normal.   Psychiatric:         Behavior: Behavior normal.         Thought Content:  Thought content normal.         Judgment: Judgment normal.         CRANIAL NERVES     CN III, IV, VI   Pupils are equal, round, and reactive to light.     Significant Labs: All pertinent labs within the past 24 hours have been reviewed.  BMP:   Recent Labs   Lab 11/24/22  0937   *      K 4.2      CO2 17*   BUN 20   CREATININE 2.1*   CALCIUM 9.1     CBC:   Recent Labs   Lab 11/24/22  0948   WBC 9.76   HGB 10.4*   HCT 32.9*          Significant Imaging: I have reviewed all pertinent imaging results/findings within the past 24 hours.      Assessment/Plan:      * GI bleed  Internal hemorrhoids  Diverticulosis of colon  86 y.o. with previous history of GI bleed (June 2022) who reports BRBBR x 3 episodes since 4:00 AM. Denies dizziness, lightheadedeness or fatigue.  - GI Bleed pathway initiated  - orthostatics positive   - Hgb 10.4 (around baseline)  - Last colonoscopy 2019  - Serial H/H's q8  - transfuse Hgb <7  - Maintain IV access with 2 large bore IVs  - Intravascular resuscitation/support with IVFs   - Protonix 80mg IV bolus x 1 then Protonix 40mg IV BID  - Discontinue all NSAIDs and Heparin products  - will correct any coagulopathy with platelets and FFP to a goal of platelets >50K and INR <2.0  - GI consulted, appreciate recs   - Clear liquid diet today and NPO at midnight before the procedure   - Golytely prep to start at 6pm, to be completed within 4 hours if possible   - Monitor Hgb q 8 hrs   -  given he has a history of internal hemorrhoids, if redemonstrated on colonoscopy tomorrow:  Anusol   suppositories b.i.d. for 7 days and Sitz bath   - Transfuse to keep Hgb >7, plts >50   - Hold anticoagulants if safe to do so per primary team   - If becomes hemodynamically unstable with associated large volume hematochezia, recommend STAT   CTA and IR embolization if positive    CARYL (acute kidney injury)  Patient with acute kidney injury likely due to IVVD/dehydration CARYL is currently stable.  Labs reviewed- Renal function/electrolytes with Estimated Creatinine Clearance: 30.1 mL/min (A) (based on SCr of 2.1 mg/dL (H)). according to latest data. Monitor urine output and serial BMP and adjust therapy as needed. Avoid nephrotoxins and renally dose meds for GFR listed above.   - Cr 2.1 (~BL 1.5-1.8)  - give 1L LR bolus    Stage 3a chronic kidney disease  Creatine stable for now. BMP reviewed- noted Estimated Creatinine Clearance: 30.1 mL/min (A) (based on SCr of 2.1 mg/dL (H)). according to latest data. Monitor UOP and serial BMP and adjust therapy as needed. Renally dose meds.  - avoid nephrotoxic medications    GERD (gastroesophageal reflux disease)  - continue protonix 40mg BID    Hyperlipemia  - continue statin      Hypertension  Hypotensive on admission  - hold antihypertensives until the next AM  - will give 1L LR bolus      VTE Risk Mitigation (From admission, onward)         Ordered     IP VTE HIGH RISK PATIENT  Once         11/24/22 1118     Place sequential compression device  Until discontinued         11/24/22 1118     IP VTE HIGH RISK PATIENT  Once         11/24/22 1118     Reason for No Pharmacological VTE Prophylaxis  Once        Question:  Reasons:  Answer:  Active Bleeding    11/24/22 1118     Place sequential compression device  Until discontinued         11/24/22 1117                Discharge Planning   BAKARI: 11/26/2022     Code Status: Full Code   Is the patient medically ready for discharge?: No    Reason for patient still in hospital (select all that apply): Patient trending condition, Laboratory test, Treatment and Consult recommendations                     Gloria Sheridan PA-C  Department of Hospital Medicine   [facility] - Emergency Dept

## 2022-11-24 NOTE — SUBJECTIVE & OBJECTIVE
Past Medical History:   Diagnosis Date    GERD (gastroesophageal reflux disease)     Hyperlipemia     Hypertension     Obesity        No past surgical history on file.    Review of patient's allergies indicates:  No Known Allergies    No current facility-administered medications on file prior to encounter.     Current Outpatient Medications on File Prior to Encounter   Medication Sig    acetaminophen (TYLENOL) 500 MG tablet Take 1,000 mg by mouth every 6 (six) hours as needed for Pain.    amLODIPine (NORVASC) 10 MG tablet Take 1 tablet (10 mg total) by mouth every morning.    atorvastatin (LIPITOR) 10 MG tablet Take 1 tablet (10 mg total) by mouth every evening.    FLUAD QUAD 2020-21,65Y UP,,PF, 60 mcg (15 mcg x 4)/0.5 mL Syrg ADM 0.5ML IM UTD    losartan (COZAAR) 50 MG tablet Take 1 tablet (50 mg total) by mouth once daily.    pantoprazole (PROTONIX) 40 MG tablet Take 1 tablet (40 mg total) by mouth 2 (two) times daily.    pneumoc 20-faibo conj-dip cr,PF, (PREVNAR-20, PF,) 0.5 mL Syrg injection Inject 0.5 mLs into the muscle.    polyethylene glycol (GLYCOLAX) 17 gram/dose powder Dissolve one capful (17 g) in liquid and take by mouth daily as needed. (Patient not taking: Reported on 10/13/2022)    sodium bicarbonate 650 MG tablet TAKE 1 TABLET(650 MG) BY MOUTH TWICE DAILY  Strength: 650 mg     Family History       Problem Relation (Age of Onset)    Cancer Mother    No Known Problems Father, Sister, Brother, Sister          Tobacco Use    Smoking status: Never    Smokeless tobacco: Never   Substance and Sexual Activity    Alcohol use: Yes     Comment: occasionally    Drug use: Never    Sexual activity: Not on file     Review of Systems   Constitutional:  Negative for appetite change, chills, fatigue and fever.   HENT:  Negative for congestion, sinus pressure, sore throat and trouble swallowing.    Respiratory:  Negative for cough, chest tightness, shortness of breath and wheezing.    Cardiovascular:  Negative for chest  pain, palpitations and leg swelling.   Gastrointestinal:  Positive for blood in stool. Negative for abdominal distention, abdominal pain, anal bleeding, constipation, diarrhea, nausea and vomiting.   Genitourinary:  Negative for dysuria, frequency, hematuria and urgency.   Musculoskeletal:  Negative for arthralgias, gait problem and myalgias.   Neurological:  Negative for dizziness, tremors, syncope, weakness, light-headedness, numbness and headaches.   Objective:     Vital Signs (Most Recent):  Temp: 97.8 °F (36.6 °C) (11/24/22 0846)  Pulse: 61 (11/24/22 1202)  Resp: 16 (11/24/22 1202)  BP: (!) 157/76 (11/24/22 1202)  SpO2: 98 % (11/24/22 1202)   Vital Signs (24h Range):  Temp:  [97.8 °F (36.6 °C)] 97.8 °F (36.6 °C)  Pulse:  [61-78] 61  Resp:  [16-25] 16  SpO2:  [98 %-99 %] 98 %  BP: ()/(59-77) 157/76     Weight: 95.7 kg (211 lb)  Body mass index is 30.71 kg/m².    Physical Exam  Vitals and nursing note reviewed.   Constitutional:       General: He is not in acute distress.     Appearance: He is well-developed. He is not ill-appearing or diaphoretic.   HENT:      Head: Normocephalic and atraumatic.      Nose: No congestion.      Mouth/Throat:      Pharynx: No oropharyngeal exudate.   Eyes:      Conjunctiva/sclera: Conjunctivae normal.      Pupils: Pupils are equal, round, and reactive to light.   Cardiovascular:      Rate and Rhythm: Normal rate and regular rhythm.      Heart sounds: Normal heart sounds.   Pulmonary:      Effort: Pulmonary effort is normal. No respiratory distress.      Breath sounds: Normal breath sounds. No wheezing.   Abdominal:      General: Bowel sounds are normal. There is no distension.      Palpations: Abdomen is soft.      Tenderness: There is no abdominal tenderness.   Genitourinary:     Comments: Per ED Note: No stool in vault. Bright red blood on rectal exam, without active oozing.   External: no hemorrhoid or fissure noted   Musculoskeletal:         General: No tenderness. Normal  range of motion.      Cervical back: Normal range of motion and neck supple.      Right lower leg: No edema.      Left lower leg: No edema.   Lymphadenopathy:      Cervical: No cervical adenopathy.   Skin:     General: Skin is warm and dry.      Capillary Refill: Capillary refill takes less than 2 seconds.      Findings: No rash.   Neurological:      Mental Status: He is alert and oriented to person, place, and time.      Cranial Nerves: No cranial nerve deficit.      Sensory: No sensory deficit.      Coordination: Coordination normal.   Psychiatric:         Behavior: Behavior normal.         Thought Content: Thought content normal.         Judgment: Judgment normal.         CRANIAL NERVES     CN III, IV, VI   Pupils are equal, round, and reactive to light.     Significant Labs: All pertinent labs within the past 24 hours have been reviewed.  BMP:   Recent Labs   Lab 11/24/22  0937   *      K 4.2      CO2 17*   BUN 20   CREATININE 2.1*   CALCIUM 9.1     CBC:   Recent Labs   Lab 11/24/22  0948   WBC 9.76   HGB 10.4*   HCT 32.9*          Significant Imaging: I have reviewed all pertinent imaging results/findings within the past 24 hours.

## 2022-11-24 NOTE — EKG INTERPRETATIONS - EMERGENCY DEPT.
EKG with normal sinus rhythm, rate 64, normal intervals, left anterior fascicular block, no STEMI.

## 2022-11-24 NOTE — ASSESSMENT & PLAN NOTE
Creatine stable for now. BMP reviewed- noted Estimated Creatinine Clearance: 30.1 mL/min (A) (based on SCr of 2.1 mg/dL (H)). according to latest data. Monitor UOP and serial BMP and adjust therapy as needed. Renally dose meds.  - avoid nephrotoxic medications

## 2022-11-24 NOTE — ASSESSMENT & PLAN NOTE
Internal hemorrhoids  Diverticulosis of colon  86 y.o. with previous history of GI bleed (June 2022 with no intervention) who reports BRBBR x 3 episodes since 4:00 AM. Denies dizziness, lightheadedeness or fatigue.  - GI Bleed pathway initiated  - orthostatics positive   - Hgb 9.5 (~BL 10)  - Last colonoscopy 2019   - Serial H/H's q8  - transfuse Hgb <7  - Maintain IV access with 2 large bore IVs  - Intravascular resuscitation/support with IVFs   - Protonix 80mg IV bolus x 1 then Protonix 40mg IV BID  - Discontinue all NSAIDs and Heparin products  - will correct any coagulopathy with platelets and FFP to a goal of platelets >50K and INR <2.0  - GI consulted, appreciate recs   - Clear liquid diet today    - Patient may pass some old blood today.    - Monitor Hgb q 8 hrs. Transfuse to keep Hgb >7, plts >50. If Hb is stable by tomorrow, can discharge.    - Hold anticoagulants if safe to do so per primary team   - If becomes hemodynamically unstable with associated large volume hematochezia, recommend STAT   CTA and IR embolization if positive    - We will follow

## 2022-11-25 ENCOUNTER — ANESTHESIA EVENT (OUTPATIENT)
Dept: ENDOSCOPY | Facility: HOSPITAL | Age: 87
DRG: 378 | End: 2022-11-25
Payer: MEDICARE

## 2022-11-25 ENCOUNTER — ANESTHESIA (OUTPATIENT)
Dept: ENDOSCOPY | Facility: HOSPITAL | Age: 87
DRG: 378 | End: 2022-11-25
Payer: COMMERCIAL

## 2022-11-25 LAB
ALBUMIN SERPL BCP-MCNC: 3.2 G/DL (ref 3.5–5.2)
ALP SERPL-CCNC: 62 U/L (ref 55–135)
ALT SERPL W/O P-5'-P-CCNC: 9 U/L (ref 10–44)
ANION GAP SERPL CALC-SCNC: 11 MMOL/L (ref 8–16)
AST SERPL-CCNC: 22 U/L (ref 10–40)
BASOPHILS # BLD AUTO: 0.02 K/UL (ref 0–0.2)
BASOPHILS # BLD AUTO: 0.02 K/UL (ref 0–0.2)
BASOPHILS # BLD AUTO: 0.04 K/UL (ref 0–0.2)
BASOPHILS NFR BLD: 0.2 % (ref 0–1.9)
BASOPHILS NFR BLD: 0.3 % (ref 0–1.9)
BASOPHILS NFR BLD: 0.5 % (ref 0–1.9)
BILIRUB SERPL-MCNC: 1.5 MG/DL (ref 0.1–1)
BILIRUB UR QL STRIP: NEGATIVE
BUN SERPL-MCNC: 18 MG/DL (ref 8–23)
CALCIUM SERPL-MCNC: 8.9 MG/DL (ref 8.7–10.5)
CHLORIDE SERPL-SCNC: 107 MMOL/L (ref 95–110)
CLARITY UR REFRACT.AUTO: CLEAR
CO2 SERPL-SCNC: 21 MMOL/L (ref 23–29)
COLOR UR AUTO: YELLOW
CREAT SERPL-MCNC: 1.9 MG/DL (ref 0.5–1.4)
DIFFERENTIAL METHOD: ABNORMAL
EOSINOPHIL # BLD AUTO: 0.1 K/UL (ref 0–0.5)
EOSINOPHIL # BLD AUTO: 0.1 K/UL (ref 0–0.5)
EOSINOPHIL # BLD AUTO: 0.2 K/UL (ref 0–0.5)
EOSINOPHIL NFR BLD: 0.6 % (ref 0–8)
EOSINOPHIL NFR BLD: 1.3 % (ref 0–8)
EOSINOPHIL NFR BLD: 2.7 % (ref 0–8)
ERYTHROCYTE [DISTWIDTH] IN BLOOD BY AUTOMATED COUNT: 17.2 % (ref 11.5–14.5)
ERYTHROCYTE [DISTWIDTH] IN BLOOD BY AUTOMATED COUNT: 17.2 % (ref 11.5–14.5)
ERYTHROCYTE [DISTWIDTH] IN BLOOD BY AUTOMATED COUNT: 17.3 % (ref 11.5–14.5)
EST. GFR  (NO RACE VARIABLE): 34.4 ML/MIN/1.73 M^2
GLUCOSE SERPL-MCNC: 112 MG/DL (ref 70–110)
GLUCOSE UR QL STRIP: NEGATIVE
HCT VFR BLD AUTO: 25.6 % (ref 40–54)
HCT VFR BLD AUTO: 26.3 % (ref 40–54)
HCT VFR BLD AUTO: 29.8 % (ref 40–54)
HGB BLD-MCNC: 8.1 G/DL (ref 14–18)
HGB BLD-MCNC: 8.4 G/DL (ref 14–18)
HGB BLD-MCNC: 9.5 G/DL (ref 14–18)
HGB UR QL STRIP: NEGATIVE
IMM GRANULOCYTES # BLD AUTO: 0.02 K/UL (ref 0–0.04)
IMM GRANULOCYTES # BLD AUTO: 0.02 K/UL (ref 0–0.04)
IMM GRANULOCYTES # BLD AUTO: 0.07 K/UL (ref 0–0.04)
IMM GRANULOCYTES NFR BLD AUTO: 0.2 % (ref 0–0.5)
IMM GRANULOCYTES NFR BLD AUTO: 0.3 % (ref 0–0.5)
IMM GRANULOCYTES NFR BLD AUTO: 0.9 % (ref 0–0.5)
KETONES UR QL STRIP: NEGATIVE
LEUKOCYTE ESTERASE UR QL STRIP: NEGATIVE
LYMPHOCYTES # BLD AUTO: 1.8 K/UL (ref 1–4.8)
LYMPHOCYTES # BLD AUTO: 2.1 K/UL (ref 1–4.8)
LYMPHOCYTES # BLD AUTO: 2.3 K/UL (ref 1–4.8)
LYMPHOCYTES NFR BLD: 20.5 % (ref 18–48)
LYMPHOCYTES NFR BLD: 26.8 % (ref 18–48)
LYMPHOCYTES NFR BLD: 28.9 % (ref 18–48)
MAGNESIUM SERPL-MCNC: 2 MG/DL (ref 1.6–2.6)
MCH RBC QN AUTO: 24.4 PG (ref 27–31)
MCH RBC QN AUTO: 24.5 PG (ref 27–31)
MCH RBC QN AUTO: 24.5 PG (ref 27–31)
MCHC RBC AUTO-ENTMCNC: 31.6 G/DL (ref 32–36)
MCHC RBC AUTO-ENTMCNC: 31.9 G/DL (ref 32–36)
MCHC RBC AUTO-ENTMCNC: 31.9 G/DL (ref 32–36)
MCV RBC AUTO: 77 FL (ref 82–98)
MONOCYTES # BLD AUTO: 0.9 K/UL (ref 0.3–1)
MONOCYTES # BLD AUTO: 1.1 K/UL (ref 0.3–1)
MONOCYTES # BLD AUTO: 1.1 K/UL (ref 0.3–1)
MONOCYTES NFR BLD: 10.5 % (ref 4–15)
MONOCYTES NFR BLD: 13.7 % (ref 4–15)
MONOCYTES NFR BLD: 14.1 % (ref 4–15)
NEUTROPHILS # BLD AUTO: 4.3 K/UL (ref 1.8–7.7)
NEUTROPHILS # BLD AUTO: 4.5 K/UL (ref 1.8–7.7)
NEUTROPHILS # BLD AUTO: 6.1 K/UL (ref 1.8–7.7)
NEUTROPHILS NFR BLD: 53.3 % (ref 38–73)
NEUTROPHILS NFR BLD: 57.2 % (ref 38–73)
NEUTROPHILS NFR BLD: 68 % (ref 38–73)
NITRITE UR QL STRIP: NEGATIVE
NRBC BLD-RTO: 0 /100 WBC
PH UR STRIP: 6 [PH] (ref 5–8)
PHOSPHATE SERPL-MCNC: 2.7 MG/DL (ref 2.7–4.5)
PLATELET # BLD AUTO: 212 K/UL (ref 150–450)
PLATELET # BLD AUTO: 241 K/UL (ref 150–450)
PLATELET # BLD AUTO: 243 K/UL (ref 150–450)
PMV BLD AUTO: 9.3 FL (ref 9.2–12.9)
PMV BLD AUTO: 9.8 FL (ref 9.2–12.9)
PMV BLD AUTO: 9.9 FL (ref 9.2–12.9)
POTASSIUM SERPL-SCNC: 4.1 MMOL/L (ref 3.5–5.1)
PROT SERPL-MCNC: 6.4 G/DL (ref 6–8.4)
PROT UR QL STRIP: NEGATIVE
RBC # BLD AUTO: 3.31 M/UL (ref 4.6–6.2)
RBC # BLD AUTO: 3.44 M/UL (ref 4.6–6.2)
RBC # BLD AUTO: 3.87 M/UL (ref 4.6–6.2)
SODIUM SERPL-SCNC: 139 MMOL/L (ref 136–145)
SP GR UR STRIP: 1.01 (ref 1–1.03)
URN SPEC COLLECT METH UR: NORMAL
WBC # BLD AUTO: 7.92 K/UL (ref 3.9–12.7)
WBC # BLD AUTO: 8.04 K/UL (ref 3.9–12.7)
WBC # BLD AUTO: 8.97 K/UL (ref 3.9–12.7)

## 2022-11-25 PROCEDURE — 84100 ASSAY OF PHOSPHORUS: CPT

## 2022-11-25 PROCEDURE — G0378 HOSPITAL OBSERVATION PER HR: HCPCS

## 2022-11-25 PROCEDURE — 37000009 HC ANESTHESIA EA ADD 15 MINS: Performed by: INTERNAL MEDICINE

## 2022-11-25 PROCEDURE — D9220A PRA ANESTHESIA: Mod: CRNA,,, | Performed by: NURSE ANESTHETIST, CERTIFIED REGISTERED

## 2022-11-25 PROCEDURE — 63600175 PHARM REV CODE 636 W HCPCS: Performed by: NURSE ANESTHETIST, CERTIFIED REGISTERED

## 2022-11-25 PROCEDURE — 99226 PR SUBSEQUENT OBSERVATION CARE,LEVEL III: ICD-10-PCS | Mod: ,,,

## 2022-11-25 PROCEDURE — 81003 URINALYSIS AUTO W/O SCOPE: CPT

## 2022-11-25 PROCEDURE — 25000003 PHARM REV CODE 250: Performed by: NURSE ANESTHETIST, CERTIFIED REGISTERED

## 2022-11-25 PROCEDURE — C9113 INJ PANTOPRAZOLE SODIUM, VIA: HCPCS

## 2022-11-25 PROCEDURE — 99226 PR SUBSEQUENT OBSERVATION CARE,LEVEL III: CPT | Mod: ,,,

## 2022-11-25 PROCEDURE — 37000008 HC ANESTHESIA 1ST 15 MINUTES: Performed by: INTERNAL MEDICINE

## 2022-11-25 PROCEDURE — 45378 DIAGNOSTIC COLONOSCOPY: CPT | Mod: 22,,, | Performed by: INTERNAL MEDICINE

## 2022-11-25 PROCEDURE — D9220A PRA ANESTHESIA: ICD-10-PCS | Mod: ANES,,, | Performed by: ANESTHESIOLOGY

## 2022-11-25 PROCEDURE — 85025 COMPLETE CBC W/AUTO DIFF WBC: CPT | Mod: 91

## 2022-11-25 PROCEDURE — 80053 COMPREHEN METABOLIC PANEL: CPT

## 2022-11-25 PROCEDURE — 63600175 PHARM REV CODE 636 W HCPCS

## 2022-11-25 PROCEDURE — 25000003 PHARM REV CODE 250

## 2022-11-25 PROCEDURE — 45378 PR COLONOSCOPY,DIAGNOSTIC: ICD-10-PCS | Mod: 22,,, | Performed by: INTERNAL MEDICINE

## 2022-11-25 PROCEDURE — D9220A PRA ANESTHESIA: ICD-10-PCS | Mod: CRNA,,, | Performed by: NURSE ANESTHETIST, CERTIFIED REGISTERED

## 2022-11-25 PROCEDURE — 94761 N-INVAS EAR/PLS OXIMETRY MLT: CPT

## 2022-11-25 PROCEDURE — 36415 COLL VENOUS BLD VENIPUNCTURE: CPT

## 2022-11-25 PROCEDURE — D9220A PRA ANESTHESIA: Mod: ANES,,, | Performed by: ANESTHESIOLOGY

## 2022-11-25 PROCEDURE — 83735 ASSAY OF MAGNESIUM: CPT

## 2022-11-25 PROCEDURE — 45378 DIAGNOSTIC COLONOSCOPY: CPT | Performed by: INTERNAL MEDICINE

## 2022-11-25 RX ORDER — HALOPERIDOL 5 MG/ML
0.5 INJECTION INTRAMUSCULAR EVERY 10 MIN PRN
Status: CANCELLED | OUTPATIENT
Start: 2022-11-25

## 2022-11-25 RX ORDER — HYDROMORPHONE HYDROCHLORIDE 1 MG/ML
0.2 INJECTION, SOLUTION INTRAMUSCULAR; INTRAVENOUS; SUBCUTANEOUS EVERY 5 MIN PRN
Status: CANCELLED | OUTPATIENT
Start: 2022-11-25

## 2022-11-25 RX ORDER — LIDOCAINE HYDROCHLORIDE 10 MG/ML
INJECTION, SOLUTION INTRAVENOUS
Status: DISCONTINUED | OUTPATIENT
Start: 2022-11-25 | End: 2022-11-25

## 2022-11-25 RX ORDER — ONDANSETRON 2 MG/ML
4 INJECTION INTRAMUSCULAR; INTRAVENOUS ONCE AS NEEDED
Status: CANCELLED | OUTPATIENT
Start: 2022-11-25 | End: 2034-04-23

## 2022-11-25 RX ORDER — EPHEDRINE SULFATE 50 MG/ML
INJECTION, SOLUTION INTRAVENOUS
Status: DISCONTINUED | OUTPATIENT
Start: 2022-11-25 | End: 2022-11-25

## 2022-11-25 RX ORDER — SODIUM CHLORIDE 9 MG/ML
INJECTION, SOLUTION INTRAVENOUS CONTINUOUS
Status: CANCELLED | OUTPATIENT
Start: 2022-11-25

## 2022-11-25 RX ORDER — PHENYLEPHRINE HYDROCHLORIDE 10 MG/ML
INJECTION INTRAVENOUS
Status: DISCONTINUED | OUTPATIENT
Start: 2022-11-25 | End: 2022-11-25

## 2022-11-25 RX ORDER — PROPOFOL 10 MG/ML
VIAL (ML) INTRAVENOUS
Status: DISCONTINUED | OUTPATIENT
Start: 2022-11-25 | End: 2022-11-25

## 2022-11-25 RX ORDER — PROPOFOL 10 MG/ML
VIAL (ML) INTRAVENOUS CONTINUOUS PRN
Status: DISCONTINUED | OUTPATIENT
Start: 2022-11-25 | End: 2022-11-25

## 2022-11-25 RX ORDER — SODIUM CHLORIDE 0.9 % (FLUSH) 0.9 %
3 SYRINGE (ML) INJECTION
Status: CANCELLED | OUTPATIENT
Start: 2022-11-25

## 2022-11-25 RX ADMIN — SODIUM CHLORIDE: 9 INJECTION, SOLUTION INTRAVENOUS at 08:11

## 2022-11-25 RX ADMIN — EPHEDRINE SULFATE 15 MG: 50 INJECTION INTRAVENOUS at 08:11

## 2022-11-25 RX ADMIN — SODIUM BICARBONATE 650 MG TABLET 650 MG: at 10:11

## 2022-11-25 RX ADMIN — LOSARTAN POTASSIUM 50 MG: 50 TABLET, FILM COATED ORAL at 10:11

## 2022-11-25 RX ADMIN — PROPOFOL 50 MG: 10 INJECTION, EMULSION INTRAVENOUS at 08:11

## 2022-11-25 RX ADMIN — PANTOPRAZOLE SODIUM 40 MG: 40 INJECTION, POWDER, FOR SOLUTION INTRAVENOUS at 08:11

## 2022-11-25 RX ADMIN — AMLODIPINE BESYLATE 10 MG: 10 TABLET ORAL at 06:11

## 2022-11-25 RX ADMIN — SODIUM BICARBONATE 650 MG TABLET 650 MG: at 08:11

## 2022-11-25 RX ADMIN — PHENYLEPHRINE HYDROCHLORIDE 100 MCG: 10 INJECTION INTRAVENOUS at 08:11

## 2022-11-25 RX ADMIN — LIDOCAINE HYDROCHLORIDE 40 MG: 10 INJECTION, SOLUTION INTRAVENOUS at 08:11

## 2022-11-25 RX ADMIN — Medication 150 MCG/KG/MIN: at 08:11

## 2022-11-25 RX ADMIN — PANTOPRAZOLE SODIUM 40 MG: 40 INJECTION, POWDER, FOR SOLUTION INTRAVENOUS at 10:11

## 2022-11-25 RX ADMIN — EPHEDRINE SULFATE 20 MG: 50 INJECTION INTRAVENOUS at 09:11

## 2022-11-25 RX ADMIN — ATORVASTATIN CALCIUM 10 MG: 10 TABLET, FILM COATED ORAL at 08:11

## 2022-11-25 RX ADMIN — PHENYLEPHRINE HYDROCHLORIDE 150 MCG: 10 INJECTION INTRAVENOUS at 09:11

## 2022-11-25 RX ADMIN — PROPOFOL 40 MG: 10 INJECTION, EMULSION INTRAVENOUS at 09:11

## 2022-11-25 NOTE — TRANSFER OF CARE
Anesthesia Transfer of Care Note    Patient: Zion Vasquez    Procedure(s) Performed: Procedure(s) (LRB):  COLONOSCOPY (N/A)    Patient location: PACU    Anesthesia Type: general    Transport from OR: Transported from OR on 2-3 L/min O2 by NC with adequate spontaneous ventilation    Post pain: adequate analgesia    Post assessment: no apparent anesthetic complications    Post vital signs: stable    Level of consciousness: sedated    Nausea/Vomiting: no nausea/vomiting    Complications: none    Transfer of care protocol was followed      Last vitals:   Visit Vitals  /75   Pulse 72   Temp 36.7 °C (98.1 °F)   Resp 16   Wt 95.7 kg (211 lb)   SpO2 99%   BMI 30.71 kg/m²

## 2022-11-25 NOTE — H&P
[facility] [address] [place] [address] Medicine  History & Physical    Patient Name: Zion Vasquez  MRN: [id number]  Patient Class: OP- Observation  Admission Date: 11/24/2022  Attending Physician: Cali Dotson MD   Primary Care Provider: Brigida Wall MD         Patient information was obtained from patient and ER records.     Subjective:     Principal Problem:GI bleed    Chief Complaint:   Chief Complaint   Patient presents with    Rectal Bleeding     Bowel movement early this morning, reports blood in stool. Bright red. Formed stool. States he has been constipated recently. Denies abdominal pain. Arrives with sample in bag of blood .         HPI: Zion Vasquez is an 87 y/o male with a history of known diverticulosis, Martinez's esophagus, hiatal hernia, HTN, and prior GI bleed (for which he was hospitalized 06/22) presenting to ED after 3 episodes of BRBPR this morning. Patient noticed bright red blood in toilet bowl around 4:00 AM. Took an imodium but had 2 more BM with bright red blood/ water. Patient reports having one previous episode in June but did not have any intervention at that time. Otherwise has no further complaints. Denies extensive NSAID use. Denies melena. Denies fatigue, headache, dizziness, lightheadedness, chest pain, shortness of breath, abdominal pain, n/v, constipation and diarrhea. Has been taking his Protonix regularly. Last colonoscopy was 2019.     ED: AF, hypotensive to 99/59 ( improved to 137/63) Hgb 10.4 (~ at baseline). Cr 2.1 (~ BL 1.5-1.8). Given pantoprazole 80 mg IV. Admitted to observation      Past Medical History:   Diagnosis Date    GERD (gastroesophageal reflux disease)     Hyperlipemia     Hypertension     Obesity        No past surgical history on file.    Review of patient's allergies indicates:  No Known Allergies    No current facility-administered medications on file prior to encounter.     Current Outpatient Medications on File Prior to Encounter    Medication Sig    amLODIPine (NORVASC) 10 MG tablet Take 1 tablet (10 mg total) by mouth every morning.    atorvastatin (LIPITOR) 10 MG tablet Take 1 tablet (10 mg total) by mouth every evening.    losartan (COZAAR) 50 MG tablet Take 1 tablet (50 mg total) by mouth once daily.    sodium bicarbonate 650 MG tablet TAKE 1 TABLET(650 MG) BY MOUTH TWICE DAILY  Strength: 650 mg    acetaminophen (TYLENOL) 500 MG tablet Take 1,000 mg by mouth every 6 (six) hours as needed for Pain.    FLUAD QUAD 2020-21,65Y UP,,PF, 60 mcg (15 mcg x 4)/0.5 mL Syrg ADM 0.5ML IM UTD    pantoprazole (PROTONIX) 40 MG tablet Take 1 tablet (40 mg total) by mouth 2 (two) times daily.    pneumoc 20-fabio conj-dip cr,PF, (PREVNAR-20, PF,) 0.5 mL Syrg injection Inject 0.5 mLs into the muscle.    polyethylene glycol (GLYCOLAX) 17 gram/dose powder Dissolve one capful (17 g) in liquid and take by mouth daily as needed. (Patient not taking: Reported on 10/13/2022)     Family History       Problem Relation (Age of Onset)    Cancer Mother    No Known Problems Father, Sister, Brother, Sister          Tobacco Use    Smoking status: Never    Smokeless tobacco: Never   Substance and Sexual Activity    Alcohol use: Yes     Comment: occasionally    Drug use: Never    Sexual activity: Not on file     Review of Systems   Constitutional:  Negative for appetite change, chills, fatigue and fever.   HENT:  Negative for congestion, sinus pressure, sore throat and trouble swallowing.    Respiratory:  Negative for cough, chest tightness, shortness of breath and wheezing.    Cardiovascular:  Negative for chest pain, palpitations and leg swelling.   Gastrointestinal:  Positive for blood in stool. Negative for abdominal distention, abdominal pain, anal bleeding, constipation, diarrhea, nausea and vomiting.   Genitourinary:  Negative for dysuria, frequency, hematuria and urgency.   Musculoskeletal:  Negative for arthralgias, gait problem and myalgias.    Neurological:  Negative for dizziness, tremors, syncope, weakness, light-headedness, numbness and headaches.   Objective:     Vital Signs (Most Recent):  Temp: 97.9 °F (36.6 °C) (11/25/22 0600)  Pulse: 73 (11/25/22 0600)  Resp: 18 (11/25/22 0148)  BP: (!) 157/77 (11/25/22 0600)  SpO2: 98 % (11/25/22 0600)   Vital Signs (24h Range):  Temp:  [97.8 °F (36.6 °C)-98.2 °F (36.8 °C)] 97.9 °F (36.6 °C)  Pulse:  [57-78] 73  Resp:  [13-25] 18  SpO2:  [95 %-99 %] 98 %  BP: ()/(53-77) 157/77     Weight: 95.7 kg (211 lb)  Body mass index is 30.71 kg/m².    Physical Exam  Vitals and nursing note reviewed.   Constitutional:       General: He is not in acute distress.     Appearance: He is well-developed. He is not ill-appearing or diaphoretic.   HENT:      Head: Normocephalic and atraumatic.      Nose: No congestion.      Mouth/Throat:      Pharynx: No oropharyngeal exudate.   Eyes:      Conjunctiva/sclera: Conjunctivae normal.      Pupils: Pupils are equal, round, and reactive to light.   Cardiovascular:      Rate and Rhythm: Normal rate and regular rhythm.      Heart sounds: Normal heart sounds.   Pulmonary:      Effort: Pulmonary effort is normal. No respiratory distress.      Breath sounds: Normal breath sounds. No wheezing.   Abdominal:      General: Bowel sounds are normal. There is no distension.      Palpations: Abdomen is soft.      Tenderness: There is no abdominal tenderness.   Genitourinary:     Comments: Per ED Note: No stool in vault. Bright red blood on rectal exam, without active oozing.   External: no hemorrhoid or fissure noted   Musculoskeletal:         General: No tenderness. Normal range of motion.      Cervical back: Normal range of motion and neck supple.      Right lower leg: No edema.      Left lower leg: No edema.   Lymphadenopathy:      Cervical: No cervical adenopathy.   Skin:     General: Skin is warm and dry.      Capillary Refill: Capillary refill takes less than 2 seconds.      Findings: No  rash.   Neurological:      Mental Status: He is alert and oriented to person, place, and time.      Cranial Nerves: No cranial nerve deficit.      Sensory: No sensory deficit.      Coordination: Coordination normal.   Psychiatric:         Behavior: Behavior normal.         Thought Content: Thought content normal.         Judgment: Judgment normal.         CRANIAL NERVES     CN III, IV, VI   Pupils are equal, round, and reactive to light.     Significant Labs: All pertinent labs within the past 24 hours have been reviewed.  BMP:   Recent Labs   Lab 11/25/22  0504   *      K 4.1      CO2 21*   BUN 18   CREATININE 1.9*   CALCIUM 8.9   MG 2.0     CBC:   Recent Labs   Lab 11/24/22  1800 11/24/22  2144 11/25/22  0504   WBC 8.75 11.36 8.97   HGB 9.8* 9.8* 9.5*   HCT 31.7* 31.3* 29.8*    270 243       Significant Imaging: I have reviewed all pertinent imaging results/findings within the past 24 hours.    Assessment/Plan:     * GI bleed  Internal hemorrhoids  Diverticulosis of colon  86 y.o. with previous history of GI bleed (June 2022) who reports BRBBR x 3 episodes since 4:00 AM. Denies dizziness, lightheadedeness or fatigue.  - GI Bleed pathway initiated  - orthostatics positive   - Hgb 10.4 (around baseline)  - Last colonoscopy 2019  - Serial H/H's q8  - transfuse Hgb <7  - Maintain IV access with 2 large bore IVs  - Intravascular resuscitation/support with IVFs   - Protonix 80mg IV bolus x 1 then Protonix 40mg IV BID  - Discontinue all NSAIDs and Heparin products  - will correct any coagulopathy with platelets and FFP to a goal of platelets >50K and INR <2.0  - GI consulted, appreciate recs   - Clear liquid diet today and NPO at midnight before the procedure   - Golytely prep to start at 6pm, to be completed within 4 hours if possible   - Monitor Hgb q 8 hrs   -  given he has a history of internal hemorrhoids, if redemonstrated on colonoscopy tomorrow:  Anusol   suppositories b.i.d. for 7 days  and Sitz bath   - Transfuse to keep Hgb >7, plts >50   - Hold anticoagulants if safe to do so per primary team   - If becomes hemodynamically unstable with associated large volume hematochezia, recommend STAT   CTA and IR embolization if positive    CARYL (acute kidney injury)  Patient with acute kidney injury likely due to IVVD/dehydration CARYL is currently stable. Labs reviewed- Renal function/electrolytes with Estimated Creatinine Clearance: 30.1 mL/min (A) (based on SCr of 2.1 mg/dL (H)). according to latest data. Monitor urine output and serial BMP and adjust therapy as needed. Avoid nephrotoxins and renally dose meds for GFR listed above.   - Cr 2.1 (~BL 1.5-1.8)  - give 1L LR bolus    Stage 3a chronic kidney disease  Creatine stable for now. BMP reviewed- noted Estimated Creatinine Clearance: 30.1 mL/min (A) (based on SCr of 2.1 mg/dL (H)). according to latest data. Monitor UOP and serial BMP and adjust therapy as needed. Renally dose meds.  - avoid nephrotoxic medications    GERD (gastroesophageal reflux disease)  - continue protonix 40mg BID    Hyperlipemia  - continue statin      Hypertension  Hypotensive on admission  - hold antihypertensives until the next AM  - will give 1L LR bolus      VTE Risk Mitigation (From admission, onward)         Ordered     IP VTE HIGH RISK PATIENT  Once         11/24/22 1118     Place sequential compression device  Until discontinued         11/24/22 1118     IP VTE HIGH RISK PATIENT  Once         11/24/22 1118     Reason for No Pharmacological VTE Prophylaxis  Once        Question:  Reasons:  Answer:  Active Bleeding    11/24/22 1118     Place sequential compression device  Until discontinued         11/24/22 1117                   Gloria Sheridan PA-C  Department of Hospital Medicine   [facility]

## 2022-11-25 NOTE — PROGRESS NOTES
[facility] [address] [place] [address] Medicine  Progress Note    Patient Name: Zion Vasquez  MRN: [id number]  Patient Class: OP- Observation   Admission Date: 11/24/2022  Length of Stay: 0 days  Attending Physician: Cali Dotson MD  Primary Care Provider: Brigida Wall MD        Subjective:     Principal Problem:GI bleed        HPI:  Zion Vasquez is an 85 y/o male with a history of known diverticulosis, Martinez's esophagus, hiatal hernia, HTN, and prior GI bleed (for which he was hospitalized 06/22) presenting to ED after 3 episodes of BRBPR this morning. Patient noticed bright red blood in toilet bowl around 4:00 AM. Took an imodium but had 2 more BM with bright red blood/ water. Patient reports having one previous episode in June but did not have any intervention at that time. Otherwise has no further complaints. Denies extensive NSAID use. Denies melena. Denies fatigue, headache, dizziness, lightheadedness, chest pain, shortness of breath, abdominal pain, n/v, constipation and diarrhea. Has been taking his Protonix regularly. Last colonoscopy was 2019.     ED: AF, hypotensive to 99/59 ( improved to 137/63) Hgb 10.4 (~ at baseline). Cr 2.1 (~ BL 1.5-1.8). Given pantoprazole 80 mg IV. Admitted to observation      Overview/Hospital Course:  Zion Vasquez is a 86 y.o. male admitted to observation for GI bleed. Coloscopy with GI performed with large amounts of bright red blood noted without clear lesions. Continued to monitor serial CBCs overnight.       Interval History: NAEON. AF, VSS. Patient with no complaints this AM. Has not  had a BM today. Felt a little dizzy after scope but otherwise is doing well. Colonoscopy done by GI with copious amounts of bright blood in colon. Will continue to monitor serial CBCs throughout the day. GI will reassess in the AM. CLD today. Continue to monitor.     Review of Systems   Constitutional:  Negative for appetite change, chills, fatigue and fever.   HENT:  Negative  for congestion, sinus pressure, sore throat and trouble swallowing.    Respiratory:  Negative for cough, chest tightness, shortness of breath and wheezing.    Cardiovascular:  Negative for chest pain, palpitations and leg swelling.   Gastrointestinal:  Positive for blood in stool. Negative for abdominal distention, abdominal pain, anal bleeding, constipation, diarrhea, nausea and vomiting.   Genitourinary:  Negative for dysuria, frequency, hematuria and urgency.   Musculoskeletal:  Negative for arthralgias, gait problem and myalgias.   Neurological:  Negative for dizziness, tremors, syncope, weakness, light-headedness, numbness and headaches.   Objective:     Vital Signs (Most Recent):  Temp: 97 °F (36.1 °C) (11/25/22 0945)  Pulse: 73 (11/25/22 1015)  Resp: 20 (11/25/22 1000)  BP: 112/69 (11/25/22 1015)  SpO2: 99 % (11/25/22 1015) Vital Signs (24h Range):  Temp:  [97 °F (36.1 °C)-98.2 °F (36.8 °C)] 97 °F (36.1 °C)  Pulse:  [57-78] 73  Resp:  [13-25] 20  SpO2:  [95 %-100 %] 99 %  BP: (103-164)/(53-91) 112/69     Weight: 95.7 kg (211 lb)  Body mass index is 30.71 kg/m².    Intake/Output Summary (Last 24 hours) at 11/25/2022 1041  Last data filed at 11/25/2022 0936  Gross per 24 hour   Intake 1100 ml   Output --   Net 1100 ml      Physical Exam  Vitals and nursing note reviewed.   Constitutional:       General: He is not in acute distress.     Appearance: He is well-developed. He is not ill-appearing or diaphoretic.   HENT:      Head: Normocephalic and atraumatic.      Nose: No congestion.      Mouth/Throat:      Pharynx: No oropharyngeal exudate.   Eyes:      Conjunctiva/sclera: Conjunctivae normal.      Pupils: Pupils are equal, round, and reactive to light.   Cardiovascular:      Rate and Rhythm: Normal rate and regular rhythm.      Heart sounds: Normal heart sounds.   Pulmonary:      Effort: Pulmonary effort is normal. No respiratory distress.      Breath sounds: Normal breath sounds. No wheezing.   Abdominal:       General: Bowel sounds are normal. There is no distension.      Palpations: Abdomen is soft.      Tenderness: There is no abdominal tenderness.   Genitourinary:     Comments: Per ED Note: No stool in vault. Bright red blood on rectal exam, without active oozing.   External: no hemorrhoid or fissure noted   Musculoskeletal:         General: No tenderness. Normal range of motion.      Cervical back: Normal range of motion and neck supple.      Right lower leg: No edema.      Left lower leg: No edema.   Lymphadenopathy:      Cervical: No cervical adenopathy.   Skin:     General: Skin is warm and dry.      Capillary Refill: Capillary refill takes less than 2 seconds.      Findings: No rash.   Neurological:      Mental Status: He is alert and oriented to person, place, and time.      Cranial Nerves: No cranial nerve deficit.      Sensory: No sensory deficit.      Coordination: Coordination normal.   Psychiatric:         Behavior: Behavior normal.         Thought Content: Thought content normal.         Judgment: Judgment normal.       Significant Labs: All pertinent labs within the past 24 hours have been reviewed.  BMP:   Recent Labs   Lab 11/25/22  0504   *      K 4.1      CO2 21*   BUN 18   CREATININE 1.9*   CALCIUM 8.9   MG 2.0     CBC:   Recent Labs   Lab 11/24/22  1800 11/24/22  2144 11/25/22  0504   WBC 8.75 11.36 8.97   HGB 9.8* 9.8* 9.5*   HCT 31.7* 31.3* 29.8*    270 243       Significant Imaging: I have reviewed all pertinent imaging results/findings within the past 24 hours.      Assessment/Plan:      * GI bleed  Internal hemorrhoids  Diverticulosis of colon  86 y.o. with previous history of GI bleed (June 2022 with no intervention) who reports BRBBR x 3 episodes since 4:00 AM. Denies dizziness, lightheadedeness or fatigue.  - GI Bleed pathway initiated  - orthostatics positive   - Hgb 9.5 (~BL 10)  - Last colonoscopy 2019   - Serial H/H's q8  - transfuse Hgb <7  - Maintain IV access  with 2 large bore IVs  - Intravascular resuscitation/support with IVFs   - Protonix 80mg IV bolus x 1 then Protonix 40mg IV BID  - Discontinue all NSAIDs and Heparin products  - will correct any coagulopathy with platelets and FFP to a goal of platelets >50K and INR <2.0  - GI consulted, appreciate recs   - Clear liquid diet today    - Patient may pass some old blood today.    - Monitor Hgb q 8 hrs. Transfuse to keep Hgb >7, plts >50. If Hb is stable by tomorrow, can discharge.    - Hold anticoagulants if safe to do so per primary team   - If becomes hemodynamically unstable with associated large volume hematochezia, recommend STAT   CTA and IR embolization if positive    - We will follow    CARYL (acute kidney injury)  Improving  Patient with acute kidney injury likely due to IVVD/dehydration CARYL is currently stable. Labs reviewed- Renal function/electrolytes with Estimated Creatinine Clearance: 30.1 mL/min (A) (based on SCr of 2.1 mg/dL (H)). according to latest data. Monitor urine output and serial BMP and adjust therapy as needed. Avoid nephrotoxins and renally dose meds for GFR listed above.   - Cr 2.1 (~BL 1.5-1.8) on admission>> 1.9  - give 1L LR bolus    Stage 3a chronic kidney disease  Creatine stable for now. BMP reviewed- noted Estimated Creatinine Clearance: 30.1 mL/min (A) (based on SCr of 2.1 mg/dL (H)). according to latest data. Monitor UOP and serial BMP and adjust therapy as needed. Renally dose meds.  - avoid nephrotoxic medications    GERD (gastroesophageal reflux disease)  - continue protonix 40mg BID    Hyperlipemia  - continue statin      Hypertension  Stable  - continue amlodipine and losartan      VTE Risk Mitigation (From admission, onward)         Ordered     IP VTE HIGH RISK PATIENT  Once         11/24/22 1118     Place sequential compression device  Until discontinued         11/24/22 1118     IP VTE HIGH RISK PATIENT  Once         11/24/22 1118     Reason for No Pharmacological VTE  Prophylaxis  Once        Question:  Reasons:  Answer:  Active Bleeding    11/24/22 1118     Place sequential compression device  Until discontinued         11/24/22 1117                Discharge Planning   BAKARI: 11/26/2022     Code Status: Full Code   Is the patient medically ready for discharge?: No    Reason for patient still in hospital (select all that apply): Patient trending condition, Laboratory test, Treatment and Consult recommendations  Discharge Plan A: Home with family                  Gloria Sheridan PA-C  Department of Hospital Medicine   [facility] - Observation 11H

## 2022-11-25 NOTE — PLAN OF CARE
[facility]  Initial Discharge Assessment       Primary Care Provider: Brigida Wall MD    Admission Diagnosis: GI bleed [K92.2]  Lower GI bleed [K92.2]  BRBPR (bright red blood per rectum) [K62.5]  History of prostate cancer [Z85.46]  Chest pain [R07.9]  Stage 3a chronic kidney disease [N18.31]    Admission Date: 11/24/2022  Expected Discharge Date: 11/26/2022         Payor: BLUE CROSS BLUE SHIELD / Plan: [facility] FEDERAL STANDARD / Product Type: PPO /     Extended Emergency Contact Information  Primary Emergency Contact: Zion Vasquez Jr  Mobile Phone: [phone]  Relation: Son  Preferred language: English   needed? No  Secondary Emergency Contact: Zion Vasquez Jr  Address: [address] [place] [address] [facility]  Home Phone: [phone]  Relation: Son    Discharge Plan A: (P) Home with family  Discharge Plan B: (P) Home with family      "[id number] [place], [place] - [address] AT [facility] [employer]  [place]  [address]  [facility] [address]  Phone: [phone] Fax: [phone]             SW completed Discharge Planning Assessment with patient via bedside. Discharge planning booklet given to patient/family and whiteboard updated with BAKARI and phone #. All questions answered.    Patient reported that his son will provide transportation upon discharge.     Patient reported that his son lives at home with him, and prior to hospitalization he was independent with his ADL's. Patient reported that he is not on dialysis and does not go to a Coumadin clinic.      Patient lives in a [facility] with 0 steps to enter.       Sammi Valero LMSW  [facility]  Ext. [phone]

## 2022-11-25 NOTE — ANESTHESIA PREPROCEDURE EVALUATION
11/25/2022  Zion Vasquez is a 86 y.o., male.      Pre-op Assessment    I have reviewed the Patient Summary Reports.     I have reviewed the Nursing Notes. I have reviewed the NPO Status.   I have reviewed the Medications.     Review of Systems  Anesthesia Hx:  No problems with previous Anesthesia  History of prior surgery of interest to airway management or planning: Previous anesthesia: General  Denies Personal Hx of Anesthesia complications.   Cardiovascular:   Hypertension    Pulmonary:  Pulmonary Normal    Renal/:   Chronic Renal Disease    Hepatic/GI:   Hiatal Hernia, GERD    Neurological:  Neurology Normal    Endocrine:  Endocrine Normal      Patient Active Problem List   Diagnosis    Obesity    Hypertension    Hyperlipemia    GERD (gastroesophageal reflux disease)    Stage 3a chronic kidney disease    History of prostate cancer    History of anemia    Diverticulosis of colon    Internal hemorrhoids    Hiatal hernia    History of Martinez's esophagus    Combined arterial insufficiency and corporo-venous occlusive erectile dysfunction    Iron deficiency anemia secondary to inadequate dietary iron intake    GI bleed    CARYL (acute kidney injury)     Past Medical History:   Diagnosis Date    GERD (gastroesophageal reflux disease)     Hyperlipemia     Hypertension     Obesity      History reviewed. No pertinent surgical history.      Physical Exam  General: Well nourished, Cooperative and Alert    Airway:  Mallampati: II   Mouth Opening: Normal  TM Distance: Normal  Tongue: Normal  Neck ROM: Normal ROM    Dental:  Intact    Chest/Lungs:  Clear to auscultation, Normal Respiratory Rate    Heart:  Rate: Normal  Rhythm: Regular Rhythm  Sounds: Normal        Anesthesia Plan  Type of Anesthesia, risks & benefits discussed:    Anesthesia Type: Gen Natural Airway, Gen ETT, MAC  Intra-op  Monitoring Plan: Standard ASA Monitors  Post Op Pain Control Plan: multimodal analgesia  Induction:  IV  Airway Plan: Direct, Post-Induction  Informed Consent: Informed consent signed with the Patient and all parties understand the risks and agree with anesthesia plan.  All questions answered.   ASA Score: 2  Day of Surgery Review of History & Physical: H&P Update referred to the surgeon/provider.    Ready For Surgery From Anesthesia Perspective.     .

## 2022-11-25 NOTE — PLAN OF CARE
Problem: Adjustment to Illness (Gastrointestinal Bleeding)  Goal: Optimal Coping with Acute Illness  Outcome: Ongoing, Progressing     Problem: Bleeding (Gastrointestinal Bleeding)  Goal: Hemostasis  Outcome: Ongoing, Progressing     Problem: Adult Inpatient Plan of Care  Goal: Plan of Care Review  Outcome: Ongoing, Progressing     Problem: Fall Injury Risk  Goal: Absence of Fall and Fall-Related Injury  Outcome: Ongoing, Progressing

## 2022-11-25 NOTE — SUBJECTIVE & OBJECTIVE
Past Medical History:   Diagnosis Date    GERD (gastroesophageal reflux disease)     Hyperlipemia     Hypertension     Obesity        No past surgical history on file.    Review of patient's allergies indicates:  No Known Allergies    No current facility-administered medications on file prior to encounter.     Current Outpatient Medications on File Prior to Encounter   Medication Sig    amLODIPine (NORVASC) 10 MG tablet Take 1 tablet (10 mg total) by mouth every morning.    atorvastatin (LIPITOR) 10 MG tablet Take 1 tablet (10 mg total) by mouth every evening.    losartan (COZAAR) 50 MG tablet Take 1 tablet (50 mg total) by mouth once daily.    sodium bicarbonate 650 MG tablet TAKE 1 TABLET(650 MG) BY MOUTH TWICE DAILY  Strength: 650 mg    acetaminophen (TYLENOL) 500 MG tablet Take 1,000 mg by mouth every 6 (six) hours as needed for Pain.    FLUAD QUAD 2020-21,65Y UP,,PF, 60 mcg (15 mcg x 4)/0.5 mL Syrg ADM 0.5ML IM UTD    pantoprazole (PROTONIX) 40 MG tablet Take 1 tablet (40 mg total) by mouth 2 (two) times daily.    pneumoc 20-fabio conj-dip cr,PF, (PREVNAR-20, PF,) 0.5 mL Syrg injection Inject 0.5 mLs into the muscle.    polyethylene glycol (GLYCOLAX) 17 gram/dose powder Dissolve one capful (17 g) in liquid and take by mouth daily as needed. (Patient not taking: Reported on 10/13/2022)     Family History       Problem Relation (Age of Onset)    Cancer Mother    No Known Problems Father, Sister, Brother, Sister          Tobacco Use    Smoking status: Never    Smokeless tobacco: Never   Substance and Sexual Activity    Alcohol use: Yes     Comment: occasionally    Drug use: Never    Sexual activity: Not on file     Review of Systems   Constitutional:  Negative for appetite change, chills, fatigue and fever.   HENT:  Negative for congestion, sinus pressure, sore throat and trouble swallowing.    Respiratory:  Negative for cough, chest tightness, shortness of breath and wheezing.    Cardiovascular:  Negative for chest  pain, palpitations and leg swelling.   Gastrointestinal:  Positive for blood in stool. Negative for abdominal distention, abdominal pain, anal bleeding, constipation, diarrhea, nausea and vomiting.   Genitourinary:  Negative for dysuria, frequency, hematuria and urgency.   Musculoskeletal:  Negative for arthralgias, gait problem and myalgias.   Neurological:  Negative for dizziness, tremors, syncope, weakness, light-headedness, numbness and headaches.   Objective:     Vital Signs (Most Recent):  Temp: 97.9 °F (36.6 °C) (11/25/22 0600)  Pulse: 73 (11/25/22 0600)  Resp: 18 (11/25/22 0148)  BP: (!) 157/77 (11/25/22 0600)  SpO2: 98 % (11/25/22 0600)   Vital Signs (24h Range):  Temp:  [97.8 °F (36.6 °C)-98.2 °F (36.8 °C)] 97.9 °F (36.6 °C)  Pulse:  [57-78] 73  Resp:  [13-25] 18  SpO2:  [95 %-99 %] 98 %  BP: ()/(53-77) 157/77     Weight: 95.7 kg (211 lb)  Body mass index is 30.71 kg/m².    Physical Exam  Vitals and nursing note reviewed.   Constitutional:       General: He is not in acute distress.     Appearance: He is well-developed. He is not ill-appearing or diaphoretic.   HENT:      Head: Normocephalic and atraumatic.      Nose: No congestion.      Mouth/Throat:      Pharynx: No oropharyngeal exudate.   Eyes:      Conjunctiva/sclera: Conjunctivae normal.      Pupils: Pupils are equal, round, and reactive to light.   Cardiovascular:      Rate and Rhythm: Normal rate and regular rhythm.      Heart sounds: Normal heart sounds.   Pulmonary:      Effort: Pulmonary effort is normal. No respiratory distress.      Breath sounds: Normal breath sounds. No wheezing.   Abdominal:      General: Bowel sounds are normal. There is no distension.      Palpations: Abdomen is soft.      Tenderness: There is no abdominal tenderness.   Genitourinary:     Comments: Per ED Note: No stool in vault. Bright red blood on rectal exam, without active oozing.   External: no hemorrhoid or fissure noted   Musculoskeletal:         General: No  tenderness. Normal range of motion.      Cervical back: Normal range of motion and neck supple.      Right lower leg: No edema.      Left lower leg: No edema.   Lymphadenopathy:      Cervical: No cervical adenopathy.   Skin:     General: Skin is warm and dry.      Capillary Refill: Capillary refill takes less than 2 seconds.      Findings: No rash.   Neurological:      Mental Status: He is alert and oriented to person, place, and time.      Cranial Nerves: No cranial nerve deficit.      Sensory: No sensory deficit.      Coordination: Coordination normal.   Psychiatric:         Behavior: Behavior normal.         Thought Content: Thought content normal.         Judgment: Judgment normal.         CRANIAL NERVES     CN III, IV, VI   Pupils are equal, round, and reactive to light.     Significant Labs: All pertinent labs within the past 24 hours have been reviewed.  BMP:   Recent Labs   Lab 11/25/22  0504   *      K 4.1      CO2 21*   BUN 18   CREATININE 1.9*   CALCIUM 8.9   MG 2.0     CBC:   Recent Labs   Lab 11/24/22  1800 11/24/22  2144 11/25/22  0504   WBC 8.75 11.36 8.97   HGB 9.8* 9.8* 9.5*   HCT 31.7* 31.3* 29.8*    270 243       Significant Imaging: I have reviewed all pertinent imaging results/findings within the past 24 hours.

## 2022-11-25 NOTE — HOSPITAL COURSE
Zion Vasquez is a 86 y.o. male admitted to observation for GI bleed. Coloscopy with GI performed with large amounts of bright red blood noted without clear lesions. Continued to monitor serial CBCs overnight w/ stable Hgb 8.5 11/26, however Pt then had a large bloody BM w/ following Hgb 7.8. CTA abdomen negative active bleed. Hgb stable. Lower GIB likely diverticular. Pt informed of return precautions for further bleeding and symptoms of anemia. Given ambulatory referral to GI. Pt medically ready for discharge home. Pt educated on hospital course and plan, verbally agrees and understands. All questions answered.

## 2022-11-25 NOTE — H&P
Short Stay Endoscopy History and Physical    PCP - Brigida Wall MD    Procedure - colonoscopy  ASA - per anesthesia  Mallampati - per anesthesia  History of Anesthesia problems - no  Family history Anesthesia problems -  no   Plan of anesthesia - General    HPI:  This is a 86 y.o. male here for evaluation of :  Hematochezia    ROS:  Constitutional: No fevers, chills  CV: No chest pain  Pulm: No shortness of breath  GI: see HPI  Derm: No rash    Medical History:  has a past medical history of GERD (gastroesophageal reflux disease), Hyperlipemia, Hypertension, and Obesity.    Surgical History:  has no past surgical history on file.    Family History: family history includes Cancer in his mother; No Known Problems in his brother, father, sister, and sister.. Otherwise no colon cancer, inflammatory bowel disease, or GI malignancies.    Social History:  reports that he has never smoked. He has never used smokeless tobacco. He reports current alcohol use. He reports that he does not use drugs.    Review of patient's allergies indicates:  No Known Allergies    Medications:   Medications Prior to Admission   Medication Sig Dispense Refill Last Dose    amLODIPine (NORVASC) 10 MG tablet Take 1 tablet (10 mg total) by mouth every morning. 90 tablet 3 11/23/2022    atorvastatin (LIPITOR) 10 MG tablet Take 1 tablet (10 mg total) by mouth every evening. 90 tablet 3 11/23/2022    losartan (COZAAR) 50 MG tablet Take 1 tablet (50 mg total) by mouth once daily. 90 tablet 3 11/23/2022    sodium bicarbonate 650 MG tablet TAKE 1 TABLET(650 MG) BY MOUTH TWICE DAILY  Strength: 650 mg 180 tablet 2 11/23/2022    acetaminophen (TYLENOL) 500 MG tablet Take 1,000 mg by mouth every 6 (six) hours as needed for Pain.   Unknown    FLUAD QUAD 2020-21,65Y UP,,PF, 60 mcg (15 mcg x 4)/0.5 mL Syrg ADM 0.5ML IM UTD   Unknown    pantoprazole (PROTONIX) 40 MG tablet Take 1 tablet (40 mg total) by mouth 2 (two) times daily. 90 tablet 3 Unknown     pneumoc 20-fabio conj-dip cr,PF, (PREVNAR-20, PF,) 0.5 mL Syrg injection Inject 0.5 mLs into the muscle. 0.5 mL 0 Unknown    polyethylene glycol (GLYCOLAX) 17 gram/dose powder Dissolve one capful (17 g) in liquid and take by mouth daily as needed. (Patient not taking: Reported on 10/13/2022) 1020 g 0 Unknown         Physical Exam:    Vital Signs:   Vitals:    11/25/22 0758   BP: 132/75   Pulse: 72   Resp: 16   Temp: 98.1 °F (36.7 °C)       General Appearance: Well appearing in no acute distress  Eyes:    No scleral icterus  ENT: lips and tongue normal  Lungs: no use of accessory muscles  Heart:  normal rate, regular rhythm  Abdomen: Soft, non tender, non distended   Extremities: no edema  Skin: No rash      Labs:  Lab Results   Component Value Date    WBC 8.97 11/25/2022    HGB 9.5 (L) 11/25/2022    HCT 29.8 (L) 11/25/2022     11/25/2022    CHOL 153 10/06/2022    TRIG 63 10/06/2022    HDL 59 10/06/2022    ALT 9 (L) 11/25/2022    AST 22 11/25/2022     11/25/2022    K 4.1 11/25/2022     11/25/2022    CREATININE 1.9 (H) 11/25/2022    BUN 18 11/25/2022    CO2 21 (L) 11/25/2022    PSA 0.01 09/29/2021    INR 1.0 11/24/2022    HGBA1C 5.5 09/29/2021       I have explained the risks and benefits of endoscopy procedures to the patient including but not limited to bleeding, perforation, infection, and death.  The patient was asked if they understand and allowed to ask any further questions to their satisfaction.    Slava Tomas MD   PGY-IV, GI

## 2022-11-25 NOTE — PROVATION PATIENT INSTRUCTIONS
Discharge Summary/Instructions after an Endoscopic Procedure  Patient Name: Zion Vasquez  Patient MRN: [id number]  Patient YOB: 1935 Friday, November 25, 2022  Smith Alvarez MD  Dear patient,  As a result of recent federal legislation (The Federal Cures Act), you may   receive lab or pathology results from your procedure in your [facility]   account before your physician is able to contact you. Your physician or   their representative will relay the results to you with their   recommendations at their soonest availability.  Thank you,  RESTRICTIONS:  During your procedure today, you received medications for sedation.  These   medications may affect your judgment, balance and coordination.  Therefore,   for 24 hours, you have the following restrictions:   - DO NOT drive a car, operate machinery, make legal/financial decisions,   sign important papers or drink alcohol.    ACTIVITY:  Today: no heavy lifting, straining or running due to procedural   sedation/anesthesia.  The following day: return to full activity including work.  DIET:  Eat and drink normally unless instructed otherwise.     TREATMENT FOR COMMON SIDE EFFECTS:  - Mild abdominal pain, nausea, belching, bloating or excessive gas:  rest,   eat lightly and use a heating pad.  - Sore Throat: treat with throat lozenges and/or gargle with warm salt   water.  - Because air was used during the procedure, expelling large amounts of air   from your rectum or belching is normal.  - If a bowel prep was taken, you may not have a bowel movement for 1-3 days.    This is normal.  SYMPTOMS TO WATCH FOR AND REPORT TO YOUR PHYSICIAN:  1. Abdominal pain or bloating, other than gas cramps.  2. Chest pain.  3. Back pain.  4. Signs of infection such as: chills or fever occurring within 24 hours   after the procedure.  5. Rectal bleeding, which would show as bright red, maroon, or black stools.   (A tablespoon of blood from the rectum is not serious, especially  if   hemorrhoids are present.)  6. Vomiting.  7. Weakness or dizziness.  GO DIRECTLY TO THE NEAREST EMERGENCY ROOM IF YOU HAVE ANY OF THE FOLLOWING:      Difficulty breathing              Chills and/or fever over 101 F   Persistent vomiting and/or vomiting blood   Severe abdominal pain   Severe chest pain   Black, tarry stools   Bleeding- more than one tablespoon   Any other symptom or condition that you feel may need urgent attention  Your doctor recommends these additional instructions:  If any biopsies were taken, your doctors clinic will contact you in 1 to 2   weeks with any results.  - Return patient to [facility] for ongoing care.   - Clear liquid diet.   - Continue present medications.   - Monitor clinical course for signs of rebleeding overnight.  If bleeding   recurs, then would repeat colonoscopy.  Call GI fellow on-call for signs of   recurrent bleeding - would give additional 2 liters of bowel prep if this   occurs.  If no further bleeding by tomorrow, then can discharge home   tomorrow.   - Repeat colonoscopy PRN for retreatment.   For questions, problems or results please call your physician - Smith Alvarez MD at Work:  [phone].  [facility] [place], EMERGENCY ROOM PHONE NUMBER: [phone]  IF A COMPLICATION OR EMERGENCY SITUATION ARISES AND YOU ARE UNABLE TO REACH   YOUR PHYSICIAN - GO DIRECTLY TO THE EMERGENCY ROOM.  Smith Alvarez MD  11/25/2022 9:56:58 AM  This report has been verified and signed electronically.  Dear patient,  As a result of recent federal legislation (The Federal Cures Act), you may   receive lab or pathology results from your procedure in your [facility]   account before your physician is able to contact you. Your physician or   their representative will relay the results to you with their   recommendations at their soonest availability.  Thank you,  PROVATION

## 2022-11-25 NOTE — NURSING
Pt returned from endoscopy at this time. VSS and as charted in flowsheet. Safety precautions in place

## 2022-11-25 NOTE — SUBJECTIVE & OBJECTIVE
Interval History: NAEON. AF, VSS. Patient with no complaints this AM. Has not  had a BM today. Felt a little dizzy after scope but otherwise is doing well. Colonoscopy done by GI with copious amounts of bright blood in colon. Will continue to monitor serial CBCs throughout the day. GI will reassess in the AM. CLD today. Continue to monitor.     Review of Systems   Constitutional:  Negative for appetite change, chills, fatigue and fever.   HENT:  Negative for congestion, sinus pressure, sore throat and trouble swallowing.    Respiratory:  Negative for cough, chest tightness, shortness of breath and wheezing.    Cardiovascular:  Negative for chest pain, palpitations and leg swelling.   Gastrointestinal:  Positive for blood in stool. Negative for abdominal distention, abdominal pain, anal bleeding, constipation, diarrhea, nausea and vomiting.   Genitourinary:  Negative for dysuria, frequency, hematuria and urgency.   Musculoskeletal:  Negative for arthralgias, gait problem and myalgias.   Neurological:  Negative for dizziness, tremors, syncope, weakness, light-headedness, numbness and headaches.   Objective:     Vital Signs (Most Recent):  Temp: 97 °F (36.1 °C) (11/25/22 0945)  Pulse: 73 (11/25/22 1015)  Resp: 20 (11/25/22 1000)  BP: 112/69 (11/25/22 1015)  SpO2: 99 % (11/25/22 1015) Vital Signs (24h Range):  Temp:  [97 °F (36.1 °C)-98.2 °F (36.8 °C)] 97 °F (36.1 °C)  Pulse:  [57-78] 73  Resp:  [13-25] 20  SpO2:  [95 %-100 %] 99 %  BP: (103-164)/(53-91) 112/69     Weight: 95.7 kg (211 lb)  Body mass index is 30.71 kg/m².    Intake/Output Summary (Last 24 hours) at 11/25/2022 1041  Last data filed at 11/25/2022 0936  Gross per 24 hour   Intake 1100 ml   Output --   Net 1100 ml      Physical Exam  Vitals and nursing note reviewed.   Constitutional:       General: He is not in acute distress.     Appearance: He is well-developed. He is not ill-appearing or diaphoretic.   HENT:      Head: Normocephalic and atraumatic.       Nose: No congestion.      Mouth/Throat:      Pharynx: No oropharyngeal exudate.   Eyes:      Conjunctiva/sclera: Conjunctivae normal.      Pupils: Pupils are equal, round, and reactive to light.   Cardiovascular:      Rate and Rhythm: Normal rate and regular rhythm.      Heart sounds: Normal heart sounds.   Pulmonary:      Effort: Pulmonary effort is normal. No respiratory distress.      Breath sounds: Normal breath sounds. No wheezing.   Abdominal:      General: Bowel sounds are normal. There is no distension.      Palpations: Abdomen is soft.      Tenderness: There is no abdominal tenderness.   Genitourinary:     Comments: Per ED Note: No stool in vault. Bright red blood on rectal exam, without active oozing.   External: no hemorrhoid or fissure noted   Musculoskeletal:         General: No tenderness. Normal range of motion.      Cervical back: Normal range of motion and neck supple.      Right lower leg: No edema.      Left lower leg: No edema.   Lymphadenopathy:      Cervical: No cervical adenopathy.   Skin:     General: Skin is warm and dry.      Capillary Refill: Capillary refill takes less than 2 seconds.      Findings: No rash.   Neurological:      Mental Status: He is alert and oriented to person, place, and time.      Cranial Nerves: No cranial nerve deficit.      Sensory: No sensory deficit.      Coordination: Coordination normal.   Psychiatric:         Behavior: Behavior normal.         Thought Content: Thought content normal.         Judgment: Judgment normal.       Significant Labs: All pertinent labs within the past 24 hours have been reviewed.  BMP:   Recent Labs   Lab 11/25/22  0504   *      K 4.1      CO2 21*   BUN 18   CREATININE 1.9*   CALCIUM 8.9   MG 2.0     CBC:   Recent Labs   Lab 11/24/22  1800 11/24/22  2144 11/25/22  0504   WBC 8.75 11.36 8.97   HGB 9.8* 9.8* 9.5*   HCT 31.7* 31.3* 29.8*    270 243       Significant Imaging: I have reviewed all pertinent imaging  results/findings within the past 24 hours.

## 2022-11-25 NOTE — TREATMENT PLAN
BRIEF GI TREATMENT PLAN    Significant findings on colonoscopy (11/25/2022):                         - Blood in the entire examined colon. Almost                          completely cleared with copious lavage.                          - Diverticulosis in the entire examined colon.                          - Two 5 to 15 mm polyps in the proximal ascending                          colon. Resection not attempted.                          - One 5 mm polyp in the descending colon.                          Resection not attempted.                          - The examined portion of the ileum was normal. No                          blood within the ileum.                          - No specimens collected.                          - Despite multiple advancements and withdrawals                          through the colon, no reaccumulation or blood or                          sources of bleeding found. Given that no blood was                          found in the ileum, the cause of bleeding is most                          certainly diverticular.                          - Increased procedural services (modifier .22) due                          to increased complexity and time greater than 2x                          standard deviation (as documented above).      PLAN:  - Clear liquid diet today   - Patient may pass some old blood today.   - Monitor Hgb q 8 hrs. Transfuse to keep Hgb >7, plts >50. If Hb is stable by tomorrow, can discharge.   - Hold anticoagulants if safe to do so per primary team  - If becomes hemodynamically unstable with associated large volume hematochezia, recommend STAT CTA and IR embolization if positive   - We will follow.     Slava Tomas MD  PGY-IV, Gastroenterology

## 2022-11-26 LAB
ALBUMIN SERPL BCP-MCNC: 2.9 G/DL (ref 3.5–5.2)
ALP SERPL-CCNC: 58 U/L (ref 55–135)
ALT SERPL W/O P-5'-P-CCNC: 10 U/L (ref 10–44)
ANION GAP SERPL CALC-SCNC: 6 MMOL/L (ref 8–16)
AST SERPL-CCNC: 26 U/L (ref 10–40)
BASOPHILS # BLD AUTO: 0.03 K/UL (ref 0–0.2)
BASOPHILS # BLD AUTO: 0.03 K/UL (ref 0–0.2)
BASOPHILS NFR BLD: 0.4 % (ref 0–1.9)
BASOPHILS NFR BLD: 0.5 % (ref 0–1.9)
BILIRUB SERPL-MCNC: 1.5 MG/DL (ref 0.1–1)
BUN SERPL-MCNC: 13 MG/DL (ref 8–23)
CALCIUM SERPL-MCNC: 8.7 MG/DL (ref 8.7–10.5)
CHLORIDE SERPL-SCNC: 110 MMOL/L (ref 95–110)
CO2 SERPL-SCNC: 23 MMOL/L (ref 23–29)
CREAT SERPL-MCNC: 1.7 MG/DL (ref 0.5–1.4)
DIFFERENTIAL METHOD: ABNORMAL
DIFFERENTIAL METHOD: ABNORMAL
EOSINOPHIL # BLD AUTO: 0.3 K/UL (ref 0–0.5)
EOSINOPHIL # BLD AUTO: 0.3 K/UL (ref 0–0.5)
EOSINOPHIL NFR BLD: 4 % (ref 0–8)
EOSINOPHIL NFR BLD: 5.1 % (ref 0–8)
ERYTHROCYTE [DISTWIDTH] IN BLOOD BY AUTOMATED COUNT: 17.3 % (ref 11.5–14.5)
ERYTHROCYTE [DISTWIDTH] IN BLOOD BY AUTOMATED COUNT: 17.5 % (ref 11.5–14.5)
EST. GFR  (NO RACE VARIABLE): 39.3 ML/MIN/1.73 M^2
GLUCOSE SERPL-MCNC: 110 MG/DL (ref 70–110)
HCT VFR BLD AUTO: 24.6 % (ref 40–54)
HCT VFR BLD AUTO: 27 % (ref 40–54)
HGB BLD-MCNC: 7.8 G/DL (ref 14–18)
HGB BLD-MCNC: 8.5 G/DL (ref 14–18)
IMM GRANULOCYTES # BLD AUTO: 0.01 K/UL (ref 0–0.04)
IMM GRANULOCYTES # BLD AUTO: 0.02 K/UL (ref 0–0.04)
IMM GRANULOCYTES NFR BLD AUTO: 0.2 % (ref 0–0.5)
IMM GRANULOCYTES NFR BLD AUTO: 0.3 % (ref 0–0.5)
LYMPHOCYTES # BLD AUTO: 1.3 K/UL (ref 1–4.8)
LYMPHOCYTES # BLD AUTO: 1.5 K/UL (ref 1–4.8)
LYMPHOCYTES NFR BLD: 18.2 % (ref 18–48)
LYMPHOCYTES NFR BLD: 24.1 % (ref 18–48)
MCH RBC QN AUTO: 24.6 PG (ref 27–31)
MCH RBC QN AUTO: 24.7 PG (ref 27–31)
MCHC RBC AUTO-ENTMCNC: 31.5 G/DL (ref 32–36)
MCHC RBC AUTO-ENTMCNC: 31.7 G/DL (ref 32–36)
MCV RBC AUTO: 78 FL (ref 82–98)
MCV RBC AUTO: 78 FL (ref 82–98)
MONOCYTES # BLD AUTO: 0.8 K/UL (ref 0.3–1)
MONOCYTES # BLD AUTO: 1 K/UL (ref 0.3–1)
MONOCYTES NFR BLD: 13.7 % (ref 4–15)
MONOCYTES NFR BLD: 13.9 % (ref 4–15)
NEUTROPHILS # BLD AUTO: 3.5 K/UL (ref 1.8–7.7)
NEUTROPHILS # BLD AUTO: 4.4 K/UL (ref 1.8–7.7)
NEUTROPHILS NFR BLD: 56.4 % (ref 38–73)
NEUTROPHILS NFR BLD: 63.2 % (ref 38–73)
NRBC BLD-RTO: 0 /100 WBC
NRBC BLD-RTO: 0 /100 WBC
PLATELET # BLD AUTO: 214 K/UL (ref 150–450)
PLATELET # BLD AUTO: 232 K/UL (ref 150–450)
PMV BLD AUTO: 9.7 FL (ref 9.2–12.9)
PMV BLD AUTO: 9.8 FL (ref 9.2–12.9)
POTASSIUM SERPL-SCNC: 4.6 MMOL/L (ref 3.5–5.1)
PROT SERPL-MCNC: 5.7 G/DL (ref 6–8.4)
RBC # BLD AUTO: 3.16 M/UL (ref 4.6–6.2)
RBC # BLD AUTO: 3.46 M/UL (ref 4.6–6.2)
SODIUM SERPL-SCNC: 139 MMOL/L (ref 136–145)
WBC # BLD AUTO: 6.13 K/UL (ref 3.9–12.7)
WBC # BLD AUTO: 6.96 K/UL (ref 3.9–12.7)

## 2022-11-26 PROCEDURE — 99226 PR SUBSEQUENT OBSERVATION CARE,LEVEL III: ICD-10-PCS | Mod: ,,, | Performed by: PHYSICIAN ASSISTANT

## 2022-11-26 PROCEDURE — G0378 HOSPITAL OBSERVATION PER HR: HCPCS

## 2022-11-26 PROCEDURE — 36415 COLL VENOUS BLD VENIPUNCTURE: CPT

## 2022-11-26 PROCEDURE — 85025 COMPLETE CBC W/AUTO DIFF WBC: CPT | Mod: 91

## 2022-11-26 PROCEDURE — 99226 PR SUBSEQUENT OBSERVATION CARE,LEVEL III: CPT | Mod: ,,, | Performed by: PHYSICIAN ASSISTANT

## 2022-11-26 PROCEDURE — 80053 COMPREHEN METABOLIC PANEL: CPT

## 2022-11-26 PROCEDURE — 85025 COMPLETE CBC W/AUTO DIFF WBC: CPT | Performed by: PHYSICIAN ASSISTANT

## 2022-11-26 PROCEDURE — 63600175 PHARM REV CODE 636 W HCPCS

## 2022-11-26 PROCEDURE — C9113 INJ PANTOPRAZOLE SODIUM, VIA: HCPCS

## 2022-11-26 PROCEDURE — 25000003 PHARM REV CODE 250

## 2022-11-26 PROCEDURE — 25500020 PHARM REV CODE 255: Performed by: INTERNAL MEDICINE

## 2022-11-26 PROCEDURE — 36415 COLL VENOUS BLD VENIPUNCTURE: CPT | Performed by: PHYSICIAN ASSISTANT

## 2022-11-26 RX ADMIN — SODIUM BICARBONATE 650 MG TABLET 650 MG: at 08:11

## 2022-11-26 RX ADMIN — PANTOPRAZOLE SODIUM 40 MG: 40 INJECTION, POWDER, FOR SOLUTION INTRAVENOUS at 08:11

## 2022-11-26 RX ADMIN — LOSARTAN POTASSIUM 50 MG: 50 TABLET, FILM COATED ORAL at 08:11

## 2022-11-26 RX ADMIN — IOHEXOL 100 ML: 350 INJECTION, SOLUTION INTRAVENOUS at 07:11

## 2022-11-26 RX ADMIN — ATORVASTATIN CALCIUM 10 MG: 10 TABLET, FILM COATED ORAL at 08:11

## 2022-11-26 RX ADMIN — AMLODIPINE BESYLATE 10 MG: 10 TABLET ORAL at 06:11

## 2022-11-26 NOTE — SUBJECTIVE & OBJECTIVE
Interval History: NAEON. Pt seen and evaluated at bedside this am. Pt c/o large bloody BM this am about 1.5 cups observed. Hgb decreased from 8.5 to 7.8 hours following the BM. Will obtained CTA abdomen today to assess for need for embolization.    Review of Systems   Constitutional:  Negative for appetite change, chills, fatigue and fever.   HENT:  Negative for congestion, sinus pressure, sore throat and trouble swallowing.    Respiratory:  Negative for cough, chest tightness, shortness of breath and wheezing.    Cardiovascular:  Negative for chest pain, palpitations and leg swelling.   Gastrointestinal:  Positive for blood in stool. Negative for abdominal distention, abdominal pain, anal bleeding, constipation, diarrhea, nausea and vomiting.   Genitourinary:  Negative for dysuria, frequency, hematuria and urgency.   Musculoskeletal:  Negative for arthralgias, gait problem and myalgias.   Neurological:  Negative for dizziness, tremors, syncope, weakness, light-headedness, numbness and headaches.   Objective:     Vital Signs (Most Recent):  Temp: 97.5 °F (36.4 °C) (11/26/22 1125)  Pulse: 67 (11/26/22 1125)  Resp: 18 (11/26/22 1125)  BP: 122/70 (11/26/22 1125)  SpO2: 99 % (11/26/22 1125) Vital Signs (24h Range):  Temp:  [97.4 °F (36.3 °C)-98.6 °F (37 °C)] 97.5 °F (36.4 °C)  Pulse:  [63-76] 67  Resp:  [18] 18  SpO2:  [96 %-99 %] 99 %  BP: (122-147)/(59-71) 122/70     Weight: 95.7 kg (211 lb)  Body mass index is 30.71 kg/m².    Intake/Output Summary (Last 24 hours) at 11/26/2022 1335  Last data filed at 11/26/2022 0459  Gross per 24 hour   Intake --   Output 575 ml   Net -575 ml      Physical Exam  Vitals and nursing note reviewed.   Constitutional:       General: He is not in acute distress.     Appearance: He is well-developed. He is not ill-appearing or diaphoretic.   HENT:      Head: Normocephalic and atraumatic.      Nose: No congestion.      Mouth/Throat:      Pharynx: No oropharyngeal exudate.   Eyes:       Conjunctiva/sclera: Conjunctivae normal.      Pupils: Pupils are equal, round, and reactive to light.   Cardiovascular:      Rate and Rhythm: Normal rate and regular rhythm.      Heart sounds: Normal heart sounds.   Pulmonary:      Effort: Pulmonary effort is normal. No respiratory distress.      Breath sounds: Normal breath sounds. No wheezing.   Abdominal:      General: Bowel sounds are normal. There is no distension.      Palpations: Abdomen is soft.      Tenderness: There is no abdominal tenderness.   Genitourinary:     Comments:  Bright red blood on rectal exam.  External: no hemorrhoid or fissure noted   Musculoskeletal:         General: No tenderness. Normal range of motion.      Cervical back: Normal range of motion and neck supple.      Right lower leg: No edema.      Left lower leg: No edema.   Lymphadenopathy:      Cervical: No cervical adenopathy.   Skin:     General: Skin is warm and dry.      Capillary Refill: Capillary refill takes less than 2 seconds.      Findings: No rash.   Neurological:      Mental Status: He is alert and oriented to person, place, and time.      Cranial Nerves: No cranial nerve deficit.      Sensory: No sensory deficit.      Coordination: Coordination normal.   Psychiatric:         Behavior: Behavior normal.         Thought Content: Thought content normal.         Judgment: Judgment normal.       Significant Labs: All pertinent labs within the past 24 hours have been reviewed.  CBC:   Recent Labs   Lab 11/25/22  1627 11/26/22  0811 11/26/22  1303   WBC 8.04 6.13 6.96   HGB 8.1* 8.5* 7.8*   HCT 25.6* 27.0* 24.6*    232 214       Significant Imaging: I have reviewed all pertinent imaging results/findings within the past 24 hours.

## 2022-11-26 NOTE — PROGRESS NOTES
[facility] Observation [address] [place] [address] Medicine  Progress Note    Patient Name: Zion Vasquez  MRN: [id number]  Patient Class: OP- Observation   Admission Date: 11/24/2022  Length of Stay: 0 days  Attending Physician: Cali Dotson MD  Primary Care Provider: Brigida Wall MD        Subjective:     Principal Problem:GI bleed        HPI:  Zion Vasquez is an 87 y/o male with a history of known diverticulosis, Martinez's esophagus, hiatal hernia, HTN, and prior GI bleed (for which he was hospitalized 06/22) presenting to ED after 3 episodes of BRBPR this morning. Patient noticed bright red blood in toilet bowl around 4:00 AM. Took an imodium but had 2 more BM with bright red blood/ water. Patient reports having one previous episode in June but did not have any intervention at that time. Otherwise has no further complaints. Denies extensive NSAID use. Denies melena. Denies fatigue, headache, dizziness, lightheadedness, chest pain, shortness of breath, abdominal pain, n/v, constipation and diarrhea. Has been taking his Protonix regularly. Last colonoscopy was 2019.     ED: AF, hypotensive to 99/59 ( improved to 137/63) Hgb 10.4 (~ at baseline). Cr 2.1 (~ BL 1.5-1.8). Given pantoprazole 80 mg IV. Admitted to observation      Overview/Hospital Course:  Zion Vasquez is a 86 y.o. male admitted to observation for GI bleed. Coloscopy with GI performed with large amounts of bright red blood noted without clear lesions. Continued to monitor serial CBCs overnight w/ stable Hgb 8.5 11/26, however Pt then had a large bloody BM w/ following Hgb 7.8. CTA abdomen ordered.       Interval History: NAEON. Pt seen and evaluated at bedside this am. Pt c/o large bloody BM this am about 1.5 cups observed. Hgb decreased from 8.5 to 7.8 hours following the BM. Will obtained CTA abdomen today to assess for need for embolization.    Review of Systems   Constitutional:  Negative for appetite change, chills, fatigue and fever.   HENT:   Negative for congestion, sinus pressure, sore throat and trouble swallowing.    Respiratory:  Negative for cough, chest tightness, shortness of breath and wheezing.    Cardiovascular:  Negative for chest pain, palpitations and leg swelling.   Gastrointestinal:  Positive for blood in stool. Negative for abdominal distention, abdominal pain, anal bleeding, constipation, diarrhea, nausea and vomiting.   Genitourinary:  Negative for dysuria, frequency, hematuria and urgency.   Musculoskeletal:  Negative for arthralgias, gait problem and myalgias.   Neurological:  Negative for dizziness, tremors, syncope, weakness, light-headedness, numbness and headaches.   Objective:     Vital Signs (Most Recent):  Temp: 97.5 °F (36.4 °C) (11/26/22 1125)  Pulse: 67 (11/26/22 1125)  Resp: 18 (11/26/22 1125)  BP: 122/70 (11/26/22 1125)  SpO2: 99 % (11/26/22 1125) Vital Signs (24h Range):  Temp:  [97.4 °F (36.3 °C)-98.6 °F (37 °C)] 97.5 °F (36.4 °C)  Pulse:  [63-76] 67  Resp:  [18] 18  SpO2:  [96 %-99 %] 99 %  BP: (122-147)/(59-71) 122/70     Weight: 95.7 kg (211 lb)  Body mass index is 30.71 kg/m².    Intake/Output Summary (Last 24 hours) at 11/26/2022 1335  Last data filed at 11/26/2022 0459  Gross per 24 hour   Intake --   Output 575 ml   Net -575 ml      Physical Exam  Vitals and nursing note reviewed.   Constitutional:       General: He is not in acute distress.     Appearance: He is well-developed. He is not ill-appearing or diaphoretic.   HENT:      Head: Normocephalic and atraumatic.      Nose: No congestion.      Mouth/Throat:      Pharynx: No oropharyngeal exudate.   Eyes:      Conjunctiva/sclera: Conjunctivae normal.      Pupils: Pupils are equal, round, and reactive to light.   Cardiovascular:      Rate and Rhythm: Normal rate and regular rhythm.      Heart sounds: Normal heart sounds.   Pulmonary:      Effort: Pulmonary effort is normal. No respiratory distress.      Breath sounds: Normal breath sounds. No wheezing.    Abdominal:      General: Bowel sounds are normal. There is no distension.      Palpations: Abdomen is soft.      Tenderness: There is no abdominal tenderness.   Genitourinary:     Comments:  Bright red blood on rectal exam.  External: no hemorrhoid or fissure noted   Musculoskeletal:         General: No tenderness. Normal range of motion.      Cervical back: Normal range of motion and neck supple.      Right lower leg: No edema.      Left lower leg: No edema.   Lymphadenopathy:      Cervical: No cervical adenopathy.   Skin:     General: Skin is warm and dry.      Capillary Refill: Capillary refill takes less than 2 seconds.      Findings: No rash.   Neurological:      Mental Status: He is alert and oriented to person, place, and time.      Cranial Nerves: No cranial nerve deficit.      Sensory: No sensory deficit.      Coordination: Coordination normal.   Psychiatric:         Behavior: Behavior normal.         Thought Content: Thought content normal.         Judgment: Judgment normal.       Significant Labs: All pertinent labs within the past 24 hours have been reviewed.  CBC:   Recent Labs   Lab 11/25/22  1627 11/26/22  0811 11/26/22  1303   WBC 8.04 6.13 6.96   HGB 8.1* 8.5* 7.8*   HCT 25.6* 27.0* 24.6*    232 214       Significant Imaging: I have reviewed all pertinent imaging results/findings within the past 24 hours.      Assessment/Plan:      * GI bleed  Internal hemorrhoids  Diverticulosis of colon  86 y.o. with previous history of GI bleed (June 2022 with no intervention) who reports BRBBR x 3 episodes since 4:00 AM. Denies dizziness, lightheadedeness or fatigue.  - GI Bleed pathway initiated  - orthostatics positive   - Hgb 9.5 (~BL 10)  - Last colonoscopy 2019   - Serial H/H's q8  - transfuse Hgb <7  - Maintain IV access with 2 large bore IVs  - Intravascular resuscitation/support with IVFs   - Protonix 80mg IV bolus x 1 then Protonix 40mg IV BID  - Discontinue all NSAIDs and Heparin products  -  will correct any coagulopathy with platelets and FFP to a goal of platelets >50K and INR <2.0  - GI consulted, appreciate recs   - Clear liquid diet today    - Patient may pass some old blood today.    - Monitor Hgb q 8 hrs. Transfuse to keep Hgb >7, plts >50. If Hb is stable by tomorrow, can discharge.    - Hold anticoagulants if safe to do so per primary team   - If becomes hemodynamically unstable with associated large volume hematochezia, recommend STAT   CTA and IR embolization if positive    - We will follow    CARYL (acute kidney injury)  Improving  Patient with acute kidney injury likely due to IVVD/dehydration CARYL is currently stable. Labs reviewed- Renal function/electrolytes with Estimated Creatinine Clearance: 37.2 mL/min (A) (based on SCr of 1.7 mg/dL (H)). according to latest data. Monitor urine output and serial BMP and adjust therapy as needed. Avoid nephrotoxins and renally dose meds for GFR listed above.   - Cr 2.1 (~BL 1.5-1.8) on admission>> 1.7, resolved  - give 1L LR bolus    Stage 3a chronic kidney disease  Creatine stable for now. BMP reviewed- noted Estimated Creatinine Clearance: 37.2 mL/min (A) (based on SCr of 1.7 mg/dL (H)). according to latest data. Monitor UOP and serial BMP and adjust therapy as needed. Renally dose meds.  - avoid nephrotoxic medications    GERD (gastroesophageal reflux disease)  - continue protonix 40mg BID    Hyperlipemia  - continue statin    Hypertension  Stable  - amlodipine and losartan held in setting of GIB      VTE Risk Mitigation (From admission, onward)         Ordered     IP VTE HIGH RISK PATIENT  Once         11/24/22 1118     Place sequential compression device  Until discontinued         11/24/22 1118     IP VTE HIGH RISK PATIENT  Once         11/24/22 1118     Reason for No Pharmacological VTE Prophylaxis  Once        Question:  Reasons:  Answer:  Active Bleeding    11/24/22 1118     Place sequential compression device  Until discontinued          11/24/22 1117                Discharge Planning   BAKARI: 11/26/2022     Code Status: Full Code   Is the patient medically ready for discharge?: No    Reason for patient still in hospital (select all that apply): Patient trending condition, Laboratory test, Treatment, Imaging and Consult recommendations  Discharge Plan A: Home with family                  Shaw Padilla PA-C  Department of Hospital Medicine   [facility] - Observation 11H

## 2022-11-26 NOTE — PLAN OF CARE
Problem: Adjustment to Illness (Gastrointestinal Bleeding)  Goal: Optimal Coping with Acute Illness  11/26/2022 0647 by Maria Del Rosario Doherty LPN  Outcome: Ongoing, Progressing  11/26/2022 0647 by Maria Del Rosario Doherty LPN  Outcome: Ongoing, Progressing     Problem: Bleeding (Gastrointestinal Bleeding)  Goal: Hemostasis  11/26/2022 0647 by Maria Del Rosario Doherty LPN  Outcome: Ongoing, Progressing  11/26/2022 0647 by Maria Del Rosario Doherty LPN  Outcome: Ongoing, Progressing     Problem: Adult Inpatient Plan of Care  Goal: Plan of Care Review  11/26/2022 0647 by Maria Del Rosario Doherty LPN  Outcome: Ongoing, Progressing  11/26/2022 0647 by Maria Del Rosario Doherty LPN  Outcome: Ongoing, Progressing  Goal: Patient-Specific Goal (Individualized)  11/26/2022 0647 by Maria Del Rosario Doherty LPN  Outcome: Ongoing, Progressing  11/26/2022 0647 by Maria Del Rosario Doherty LPN  Outcome: Ongoing, Progressing  Goal: Absence of Hospital-Acquired Illness or Injury  11/26/2022 0647 by Maria Del Rosario Doherty LPN  Outcome: Ongoing, Progressing  11/26/2022 0647 by Maria Del Rosario Doherty LPN  Outcome: Ongoing, Progressing  Goal: Optimal Comfort and Wellbeing  11/26/2022 0647 by Maria Del Rosario Doherty LPN  Outcome: Ongoing, Progressing  11/26/2022 0647 by Maria Del Rosario Doherty LPN  Outcome: Ongoing, Progressing  Goal: Readiness for Transition of Care  11/26/2022 0647 by Maria Del Rosario Doherty LPN  Outcome: Ongoing, Progressing  11/26/2022 0647 by Maria Del Rosario Doherty LPN  Outcome: Ongoing, Progressing     Problem: Infection  Goal: Absence of Infection Signs and Symptoms  11/26/2022 0647 by Maria Del Rosario Doherty LPN  Outcome: Ongoing, Progressing  11/26/2022 0647 by Maria Del Rosario Doherty LPN  Outcome: Ongoing, Progressing     Problem: Fluid and Electrolyte Imbalance (Acute Kidney Injury/Impairment)  Goal: Fluid and Electrolyte Balance  11/26/2022 0647 by Maria Del Rosario Doherty LPN  Outcome: Ongoing, Progressing  11/26/2022 0647 by Maria Del Rosario Doherty LPN  Outcome: Ongoing, Progressing     Problem: Oral Intake Inadequate (Acute Kidney Injury/Impairment)  Goal: Optimal  Nutrition Intake  11/26/2022 0647 by Maria Del Rosario Doherty LPN  Outcome: Ongoing, Progressing  11/26/2022 0647 by Maria Del Rosario Doherty LPN  Outcome: Ongoing, Progressing     Problem: Renal Function Impairment (Acute Kidney Injury/Impairment)  Goal: Effective Renal Function  11/26/2022 0647 by Maria Del Rosario Doherty LPN  Outcome: Ongoing, Progressing  11/26/2022 0647 by Maria Del Rosario Doherty LPN  Outcome: Ongoing, Progressing     Problem: Fall Injury Risk  Goal: Absence of Fall and Fall-Related Injury  11/26/2022 0647 by Maria Del Rosario Doherty LPN  Outcome: Ongoing, Progressing  11/26/2022 0647 by Maria Del Rosario Doherty LPN  Outcome: Ongoing, Progressing

## 2022-11-26 NOTE — ASSESSMENT & PLAN NOTE
Improving  Patient with acute kidney injury likely due to IVVD/dehydration CARYL is currently stable. Labs reviewed- Renal function/electrolytes with Estimated Creatinine Clearance: 37.2 mL/min (A) (based on SCr of 1.7 mg/dL (H)). according to latest data. Monitor urine output and serial BMP and adjust therapy as needed. Avoid nephrotoxins and renally dose meds for GFR listed above.   - Cr 2.1 (~BL 1.5-1.8) on admission>> 1.7, resolved  - give 1L LR bolus

## 2022-11-26 NOTE — TREATMENT PLAN
BRIEF GI TREATMENT PLAN     Significant findings on colonoscopy (11/25/2022):                         - Blood in the entire examined colon. Almost                          completely cleared with copious lavage.                          - Diverticulosis in the entire examined colon.                          - Two 5 to 15 mm polyps in the proximal ascending                          colon. Resection not attempted.                          - One 5 mm polyp in the descending colon.                          Resection not attempted.                          - The examined portion of the ileum was normal. No                          blood within the ileum.                          - No specimens collected.                          - Despite multiple advancements and withdrawals                          through the colon, no reaccumulation or blood or                          sources of bleeding found. Given that no blood was                          found in the ileum, the cause of bleeding is most                          certainly diverticular.                          - Increased procedural services (modifier .22) due                          to increased complexity and time greater than 2x                          standard deviation (as documented above).     Patient hemodynamically stable, no more BRBPR.     Vitals:    11/26/22 0843 11/26/22 0851 11/26/22 1120 11/26/22 1125   BP: (!) 147/70 (!) 147/70  122/70   BP Location: Right arm   Right arm   Patient Position: Lying   Lying   Pulse: 76  66 67   Resp: 18   18   Temp: 97.7 °F (36.5 °C)   97.5 °F (36.4 °C)   TempSrc: Oral   Oral   SpO2: 97%   99%   Weight:             PLAN:  - Advance diet as tolerated today   - .Since Hb is relatively stable, can discharge tomorrow AM per primary team.   - Daily CBC  - Hold anticoagulants if safe to do so per primary team  - If becomes hemodynamically unstable with associated large volume hematochezia, recommend STAT CTA and IR  embolization if positive   - We will sign off.      Slava Tomas MD  PGY-IV, Gastroenterology

## 2022-11-26 NOTE — ASSESSMENT & PLAN NOTE
Creatine stable for now. BMP reviewed- noted Estimated Creatinine Clearance: 37.2 mL/min (A) (based on SCr of 1.7 mg/dL (H)). according to latest data. Monitor UOP and serial BMP and adjust therapy as needed. Renally dose meds.  - avoid nephrotoxic medications

## 2022-11-27 LAB
ABO + RH BLD: NORMAL
ANION GAP SERPL CALC-SCNC: 8 MMOL/L (ref 8–16)
BASOPHILS # BLD AUTO: 0.02 K/UL (ref 0–0.2)
BASOPHILS # BLD AUTO: 0.03 K/UL (ref 0–0.2)
BASOPHILS NFR BLD: 0.3 % (ref 0–1.9)
BASOPHILS NFR BLD: 0.4 % (ref 0–1.9)
BLD GP AB SCN CELLS X3 SERPL QL: NORMAL
BLD PROD TYP BPU: NORMAL
BLOOD UNIT EXPIRATION DATE: NORMAL
BLOOD UNIT TYPE CODE: 7300
BLOOD UNIT TYPE: NORMAL
BUN SERPL-MCNC: 14 MG/DL (ref 8–23)
CALCIUM SERPL-MCNC: 8.3 MG/DL (ref 8.7–10.5)
CHLORIDE SERPL-SCNC: 108 MMOL/L (ref 95–110)
CO2 SERPL-SCNC: 20 MMOL/L (ref 23–29)
CODING SYSTEM: NORMAL
CREAT SERPL-MCNC: 1.6 MG/DL (ref 0.5–1.4)
DIFFERENTIAL METHOD: ABNORMAL
DIFFERENTIAL METHOD: ABNORMAL
DISPENSE STATUS: NORMAL
EOSINOPHIL # BLD AUTO: 0.4 K/UL (ref 0–0.5)
EOSINOPHIL # BLD AUTO: 0.5 K/UL (ref 0–0.5)
EOSINOPHIL NFR BLD: 5.6 % (ref 0–8)
EOSINOPHIL NFR BLD: 6.1 % (ref 0–8)
ERYTHROCYTE [DISTWIDTH] IN BLOOD BY AUTOMATED COUNT: 17.5 % (ref 11.5–14.5)
ERYTHROCYTE [DISTWIDTH] IN BLOOD BY AUTOMATED COUNT: 17.5 % (ref 11.5–14.5)
EST. GFR  (NO RACE VARIABLE): 42.2 ML/MIN/1.73 M^2
GLUCOSE SERPL-MCNC: 115 MG/DL (ref 70–110)
HCT VFR BLD AUTO: 20.1 % (ref 40–54)
HCT VFR BLD AUTO: 21.7 % (ref 40–54)
HGB BLD-MCNC: 6.4 G/DL (ref 14–18)
HGB BLD-MCNC: 6.7 G/DL (ref 14–18)
IMM GRANULOCYTES # BLD AUTO: 0.02 K/UL (ref 0–0.04)
IMM GRANULOCYTES # BLD AUTO: 0.02 K/UL (ref 0–0.04)
IMM GRANULOCYTES NFR BLD AUTO: 0.3 % (ref 0–0.5)
IMM GRANULOCYTES NFR BLD AUTO: 0.3 % (ref 0–0.5)
LYMPHOCYTES # BLD AUTO: 1.9 K/UL (ref 1–4.8)
LYMPHOCYTES # BLD AUTO: 2.2 K/UL (ref 1–4.8)
LYMPHOCYTES NFR BLD: 25 % (ref 18–48)
LYMPHOCYTES NFR BLD: 28.6 % (ref 18–48)
MCH RBC QN AUTO: 24.4 PG (ref 27–31)
MCH RBC QN AUTO: 24.6 PG (ref 27–31)
MCHC RBC AUTO-ENTMCNC: 30.9 G/DL (ref 32–36)
MCHC RBC AUTO-ENTMCNC: 31.8 G/DL (ref 32–36)
MCV RBC AUTO: 77 FL (ref 82–98)
MCV RBC AUTO: 79 FL (ref 82–98)
MONOCYTES # BLD AUTO: 0.9 K/UL (ref 0.3–1)
MONOCYTES # BLD AUTO: 1.1 K/UL (ref 0.3–1)
MONOCYTES NFR BLD: 12.2 % (ref 4–15)
MONOCYTES NFR BLD: 14.6 % (ref 4–15)
NEUTROPHILS # BLD AUTO: 3.9 K/UL (ref 1.8–7.7)
NEUTROPHILS # BLD AUTO: 4.2 K/UL (ref 1.8–7.7)
NEUTROPHILS NFR BLD: 50.1 % (ref 38–73)
NEUTROPHILS NFR BLD: 56.5 % (ref 38–73)
NRBC BLD-RTO: 0 /100 WBC
NRBC BLD-RTO: 0 /100 WBC
NUM UNITS TRANS PACKED RBC: NORMAL
PLATELET # BLD AUTO: 186 K/UL (ref 150–450)
PLATELET # BLD AUTO: 193 K/UL (ref 150–450)
PMV BLD AUTO: 10.3 FL (ref 9.2–12.9)
PMV BLD AUTO: 9.6 FL (ref 9.2–12.9)
POTASSIUM SERPL-SCNC: 4.1 MMOL/L (ref 3.5–5.1)
RBC # BLD AUTO: 2.6 M/UL (ref 4.6–6.2)
RBC # BLD AUTO: 2.75 M/UL (ref 4.6–6.2)
SODIUM SERPL-SCNC: 136 MMOL/L (ref 136–145)
WBC # BLD AUTO: 7.47 K/UL (ref 3.9–12.7)
WBC # BLD AUTO: 7.69 K/UL (ref 3.9–12.7)

## 2022-11-27 PROCEDURE — P9016 RBC LEUKOCYTES REDUCED: HCPCS | Performed by: NURSE PRACTITIONER

## 2022-11-27 PROCEDURE — 99226 PR SUBSEQUENT OBSERVATION CARE,LEVEL III: ICD-10-PCS | Mod: ,,, | Performed by: PHYSICIAN ASSISTANT

## 2022-11-27 PROCEDURE — 36415 COLL VENOUS BLD VENIPUNCTURE: CPT | Performed by: NURSE PRACTITIONER

## 2022-11-27 PROCEDURE — 86920 COMPATIBILITY TEST SPIN: CPT | Performed by: NURSE PRACTITIONER

## 2022-11-27 PROCEDURE — 36415 COLL VENOUS BLD VENIPUNCTURE: CPT | Performed by: PHYSICIAN ASSISTANT

## 2022-11-27 PROCEDURE — 99226 PR SUBSEQUENT OBSERVATION CARE,LEVEL III: CPT | Mod: ,,, | Performed by: PHYSICIAN ASSISTANT

## 2022-11-27 PROCEDURE — 86850 RBC ANTIBODY SCREEN: CPT | Performed by: NURSE PRACTITIONER

## 2022-11-27 PROCEDURE — 36430 TRANSFUSION BLD/BLD COMPNT: CPT

## 2022-11-27 PROCEDURE — 25000003 PHARM REV CODE 250

## 2022-11-27 PROCEDURE — 63600175 PHARM REV CODE 636 W HCPCS

## 2022-11-27 PROCEDURE — 85025 COMPLETE CBC W/AUTO DIFF WBC: CPT | Performed by: PHYSICIAN ASSISTANT

## 2022-11-27 PROCEDURE — 11000001 HC ACUTE MED/SURG PRIVATE ROOM

## 2022-11-27 PROCEDURE — C9113 INJ PANTOPRAZOLE SODIUM, VIA: HCPCS

## 2022-11-27 PROCEDURE — 85025 COMPLETE CBC W/AUTO DIFF WBC: CPT | Mod: 91 | Performed by: NURSE PRACTITIONER

## 2022-11-27 PROCEDURE — 80048 BASIC METABOLIC PNL TOTAL CA: CPT | Performed by: PHYSICIAN ASSISTANT

## 2022-11-27 RX ORDER — HYDROCODONE BITARTRATE AND ACETAMINOPHEN 500; 5 MG/1; MG/1
TABLET ORAL
Status: DISCONTINUED | OUTPATIENT
Start: 2022-11-27 | End: 2022-11-28 | Stop reason: HOSPADM

## 2022-11-27 RX ORDER — HYDROCODONE BITARTRATE AND ACETAMINOPHEN 500; 5 MG/1; MG/1
TABLET ORAL
Status: CANCELLED | OUTPATIENT
Start: 2022-11-27

## 2022-11-27 RX ADMIN — PANTOPRAZOLE SODIUM 40 MG: 40 INJECTION, POWDER, FOR SOLUTION INTRAVENOUS at 09:11

## 2022-11-27 RX ADMIN — SODIUM BICARBONATE 650 MG TABLET 650 MG: at 09:11

## 2022-11-27 RX ADMIN — ATORVASTATIN CALCIUM 10 MG: 10 TABLET, FILM COATED ORAL at 09:11

## 2022-11-27 NOTE — PROGRESS NOTES
[facility] - [facility]ation [address] [place] [address] Medicine  Progress Note    Patient Name: Zion Vasquez  MRN: [id number]  Patient Class: OP- Observation   Admission Date: 11/24/2022  Length of Stay: 0 days  Attending Physician: Cali Dotson MD  Primary Care Provider: Brigida Wall MD        Subjective:     Principal Problem:GI bleed        HPI:  Zion Vasquez is an 87 y/o male with a history of known diverticulosis, Martinez's esophagus, hiatal hernia, HTN, and prior GI bleed (for which he was hospitalized 06/22) presenting to ED after 3 episodes of BRBPR this morning. Patient noticed bright red blood in toilet bowl around 4:00 AM. Took an imodium but had 2 more BM with bright red blood/ water. Patient reports having one previous episode in June but did not have any intervention at that time. Otherwise has no further complaints. Denies extensive NSAID use. Denies melena. Denies fatigue, headache, dizziness, lightheadedness, chest pain, shortness of breath, abdominal pain, n/v, constipation and diarrhea. Has been taking his Protonix regularly. Last colonoscopy was 2019.     ED: AF, hypotensive to 99/59 ( improved to 137/63) Hgb 10.4 (~ at baseline). Cr 2.1 (~ BL 1.5-1.8). Given pantoprazole 80 mg IV. Admitted to observation      Overview/Hospital Course:  Zion Vasquez is a 86 y.o. male admitted to observation for GI bleed. Coloscopy with GI performed with large amounts of bright red blood noted without clear lesions. Continued to monitor serial CBCs overnight w/ stable Hgb 8.5 11/26, however Pt then had a large bloody BM w/ following Hgb 7.8. CTA abdomen ordered.       Interval History: NAEON. Pt seen and evaluated at bedside this am. Pt is doing well this am. No further reports of hematochezia. CBC and BMP order pending. Continue to monitor for drop in hgb requiring transfusion. Pt will likely be able to discharge in the am.    Review of Systems   Constitutional:  Negative for appetite change, chills, fatigue and  fever.   HENT:  Negative for congestion, sinus pressure, sore throat and trouble swallowing.    Respiratory:  Negative for cough, chest tightness, shortness of breath and wheezing.    Cardiovascular:  Negative for chest pain, palpitations and leg swelling.   Gastrointestinal:  Positive for blood in stool. Negative for abdominal distention, abdominal pain, anal bleeding, constipation, diarrhea, nausea and vomiting.   Genitourinary:  Negative for dysuria, frequency, hematuria and urgency.   Musculoskeletal:  Negative for arthralgias, gait problem and myalgias.   Neurological:  Negative for dizziness, tremors, syncope, weakness, light-headedness, numbness and headaches.   Objective:     Vital Signs (Most Recent):  Temp: 98.8 °F (37.1 °C) (11/27/22 1148)  Pulse: 64 (11/27/22 1148)  Resp: 16 (11/27/22 1148)  BP: (!) 151/67 (11/27/22 1148)  SpO2: 97 % (11/27/22 1148)   Vital Signs (24h Range):  Temp:  [96.8 °F (36 °C)-98.8 °F (37.1 °C)] 98.8 °F (37.1 °C)  Pulse:  [63-76] 64  Resp:  [13-18] 16  SpO2:  [97 %-99 %] 97 %  BP: (100-151)/(58-67) 151/67     Weight: 95.7 kg (211 lb)  Body mass index is 30.71 kg/m².    Intake/Output Summary (Last 24 hours) at 11/27/2022 1222  Last data filed at 11/26/2022 1550  Gross per 24 hour   Intake --   Output 200 ml   Net -200 ml      Physical Exam  Vitals and nursing note reviewed.   Constitutional:       General: He is not in acute distress.     Appearance: He is well-developed. He is not ill-appearing or diaphoretic.   HENT:      Head: Normocephalic and atraumatic.      Nose: No congestion.      Mouth/Throat:      Pharynx: No oropharyngeal exudate.   Eyes:      Conjunctiva/sclera: Conjunctivae normal.      Pupils: Pupils are equal, round, and reactive to light.   Cardiovascular:      Rate and Rhythm: Normal rate and regular rhythm.      Heart sounds: Normal heart sounds.   Pulmonary:      Effort: Pulmonary effort is normal. No respiratory distress.      Breath sounds: Normal breath  sounds. No wheezing.   Abdominal:      General: Bowel sounds are normal. There is no distension.      Palpations: Abdomen is soft.      Tenderness: There is no abdominal tenderness.   Genitourinary:     Comments:  Bright red blood on rectal exam.  External: no hemorrhoid or fissure noted   Musculoskeletal:         General: No tenderness. Normal range of motion.      Cervical back: Normal range of motion and neck supple.      Right lower leg: No edema.      Left lower leg: No edema.   Lymphadenopathy:      Cervical: No cervical adenopathy.   Skin:     General: Skin is warm and dry.      Capillary Refill: Capillary refill takes less than 2 seconds.      Findings: No rash.   Neurological:      Mental Status: He is alert and oriented to person, place, and time.      Cranial Nerves: No cranial nerve deficit.      Sensory: No sensory deficit.      Coordination: Coordination normal.   Psychiatric:         Behavior: Behavior normal.         Thought Content: Thought content normal.         Judgment: Judgment normal.       Significant Labs: All pertinent labs within the past 24 hours have been reviewed.    Significant Imaging: I have reviewed all pertinent imaging results/findings within the past 24 hours.      Assessment/Plan:      * GI bleed  Internal hemorrhoids  Diverticulosis of colon  86 y.o. with previous history of GI bleed (June 2022 with no intervention) who reports BRBBR x 3 episodes since 4:00 AM. Denies dizziness, lightheadedeness or fatigue.  - GI Bleed pathway initiated  - orthostatics positive   - Hgb 9.5 (~BL 10)  - Last colonoscopy 2019   - Serial H/H's q8  - transfuse Hgb <7  - Maintain IV access with 2 large bore IVs  - Intravascular resuscitation/support with IVFs   - Protonix 80mg IV bolus x 1 then Protonix 40mg IV BID  - Discontinue all NSAIDs and Heparin products  - will correct any coagulopathy with platelets and FFP to a goal of platelets >50K and INR <2.0  - GI consulted, appreciate recs   - Clear  liquid diet today    - Patient may pass some old blood today.    - Monitor Hgb q 8 hrs. Transfuse to keep Hgb >7, plts >50. If Hb is stable by tomorrow, can discharge.    - Hold anticoagulants if safe to do so per primary team   - If becomes hemodynamically unstable with associated large volume hematochezia, recommend STAT   CTA and IR embolization if positive    - We will follow  -CTA abdomen showed no evidence of GI bleed. Diverticulosis coli without evidence of diverticulitis. Cholelithiasis and bilateral renal hypodensities, likely cysts.     CARYL (acute kidney injury)  Improving  Patient with acute kidney injury likely due to IVVD/dehydration CARYL is currently stable. Labs reviewed- Renal function/electrolytes with Estimated Creatinine Clearance: 37.2 mL/min (A) (based on SCr of 1.7 mg/dL (H)). according to latest data. Monitor urine output and serial BMP and adjust therapy as needed. Avoid nephrotoxins and renally dose meds for GFR listed above.   - Cr 2.1 (~BL 1.5-1.8) on admission>> 1.7, resolved  - give 1L LR bolus    Stage 3a chronic kidney disease  Creatine stable for now. BMP reviewed- noted Estimated Creatinine Clearance: 37.2 mL/min (A) (based on SCr of 1.7 mg/dL (H)). according to latest data. Monitor UOP and serial BMP and adjust therapy as needed. Renally dose meds.  - avoid nephrotoxic medications    GERD (gastroesophageal reflux disease)  - continue protonix 40mg BID    Hyperlipemia  - continue statin    Hypertension  Stable  - amlodipine and losartan held in setting of GIB      VTE Risk Mitigation (From admission, onward)         Ordered     IP VTE HIGH RISK PATIENT  Once         11/24/22 1118     Place sequential compression device  Until discontinued         11/24/22 1118     IP VTE HIGH RISK PATIENT  Once         11/24/22 1118     Reason for No Pharmacological VTE Prophylaxis  Once        Question:  Reasons:  Answer:  Active Bleeding    11/24/22 1118     Place sequential compression device   Until discontinued         11/24/22 1117                Discharge Planning   BAKARI: 11/28/2022     Code Status: Full Code   Is the patient medically ready for discharge?: No    Reason for patient still in hospital (select all that apply): Patient trending condition, Laboratory test, Treatment and Consult recommendations  Discharge Plan A: Home with family                  Shaw Padilla PA-C  Department of Hospital Medicine   [facility] - Observation 11H

## 2022-11-27 NOTE — ASSESSMENT & PLAN NOTE
Internal hemorrhoids  Diverticulosis of colon  86 y.o. with previous history of GI bleed (June 2022 with no intervention) who reports BRBBR x 3 episodes since 4:00 AM. Denies dizziness, lightheadedeness or fatigue.  - GI Bleed pathway initiated  - orthostatics positive   - Hgb 9.5 (~BL 10)  - Last colonoscopy 2019   - Serial H/H's q8  - transfuse Hgb <7  - Maintain IV access with 2 large bore IVs  - Intravascular resuscitation/support with IVFs   - Protonix 80mg IV bolus x 1 then Protonix 40mg IV BID  - Discontinue all NSAIDs and Heparin products  - will correct any coagulopathy with platelets and FFP to a goal of platelets >50K and INR <2.0  - GI consulted, appreciate recs   - Clear liquid diet today    - Patient may pass some old blood today.    - Monitor Hgb q 8 hrs. Transfuse to keep Hgb >7, plts >50. If Hb is stable by tomorrow, can discharge.    - Hold anticoagulants if safe to do so per primary team   - If becomes hemodynamically unstable with associated large volume hematochezia, recommend STAT   CTA and IR embolization if positive    - We will follow  -CTA abdomen showed no evidence of GI bleed. Diverticulosis coli without evidence of diverticulitis. Cholelithiasis and bilateral renal hypodensities, likely cysts.

## 2022-11-27 NOTE — SUBJECTIVE & OBJECTIVE
Interval History: NAEON. Pt seen and evaluated at bedside this am. Pt is doing well this am. No further reports of hematochezia. CBC and BMP order pending. Continue to monitor for drop in hgb requiring transfusion. Pt will likely be able to discharge in the am.    Review of Systems   Constitutional:  Negative for appetite change, chills, fatigue and fever.   HENT:  Negative for congestion, sinus pressure, sore throat and trouble swallowing.    Respiratory:  Negative for cough, chest tightness, shortness of breath and wheezing.    Cardiovascular:  Negative for chest pain, palpitations and leg swelling.   Gastrointestinal:  Positive for blood in stool. Negative for abdominal distention, abdominal pain, anal bleeding, constipation, diarrhea, nausea and vomiting.   Genitourinary:  Negative for dysuria, frequency, hematuria and urgency.   Musculoskeletal:  Negative for arthralgias, gait problem and myalgias.   Neurological:  Negative for dizziness, tremors, syncope, weakness, light-headedness, numbness and headaches.   Objective:     Vital Signs (Most Recent):  Temp: 98.8 °F (37.1 °C) (11/27/22 1148)  Pulse: 64 (11/27/22 1148)  Resp: 16 (11/27/22 1148)  BP: (!) 151/67 (11/27/22 1148)  SpO2: 97 % (11/27/22 1148)   Vital Signs (24h Range):  Temp:  [96.8 °F (36 °C)-98.8 °F (37.1 °C)] 98.8 °F (37.1 °C)  Pulse:  [63-76] 64  Resp:  [13-18] 16  SpO2:  [97 %-99 %] 97 %  BP: (100-151)/(58-67) 151/67     Weight: 95.7 kg (211 lb)  Body mass index is 30.71 kg/m².    Intake/Output Summary (Last 24 hours) at 11/27/2022 1222  Last data filed at 11/26/2022 1550  Gross per 24 hour   Intake --   Output 200 ml   Net -200 ml      Physical Exam  Vitals and nursing note reviewed.   Constitutional:       General: He is not in acute distress.     Appearance: He is well-developed. He is not ill-appearing or diaphoretic.   HENT:      Head: Normocephalic and atraumatic.      Nose: No congestion.      Mouth/Throat:      Pharynx: No oropharyngeal  exudate.   Eyes:      Conjunctiva/sclera: Conjunctivae normal.      Pupils: Pupils are equal, round, and reactive to light.   Cardiovascular:      Rate and Rhythm: Normal rate and regular rhythm.      Heart sounds: Normal heart sounds.   Pulmonary:      Effort: Pulmonary effort is normal. No respiratory distress.      Breath sounds: Normal breath sounds. No wheezing.   Abdominal:      General: Bowel sounds are normal. There is no distension.      Palpations: Abdomen is soft.      Tenderness: There is no abdominal tenderness.   Genitourinary:     Comments:  Bright red blood on rectal exam.  External: no hemorrhoid or fissure noted   Musculoskeletal:         General: No tenderness. Normal range of motion.      Cervical back: Normal range of motion and neck supple.      Right lower leg: No edema.      Left lower leg: No edema.   Lymphadenopathy:      Cervical: No cervical adenopathy.   Skin:     General: Skin is warm and dry.      Capillary Refill: Capillary refill takes less than 2 seconds.      Findings: No rash.   Neurological:      Mental Status: He is alert and oriented to person, place, and time.      Cranial Nerves: No cranial nerve deficit.      Sensory: No sensory deficit.      Coordination: Coordination normal.   Psychiatric:         Behavior: Behavior normal.         Thought Content: Thought content normal.         Judgment: Judgment normal.       Significant Labs: All pertinent labs within the past 24 hours have been reviewed.    Significant Imaging: I have reviewed all pertinent imaging results/findings within the past 24 hours.

## 2022-11-27 NOTE — PLAN OF CARE
Pt AAOx4 resting this shift. Pt had no episodes of Bleed. Plan of care followed. Pt had no complaints.     Problem: Adjustment to Illness (Gastrointestinal Bleeding)  Goal: Optimal Coping with Acute Illness  Outcome: Unable to Meet, Plan Revised     Problem: Bleeding (Gastrointestinal Bleeding)  Goal: Hemostasis  Outcome: Unable to Meet, Plan Revised     Problem: Adult Inpatient Plan of Care  Goal: Plan of Care Review  Outcome: Unable to Meet, Plan Revised  Goal: Patient-Specific Goal (Individualized)  Outcome: Unable to Meet, Plan Revised  Goal: Absence of Hospital-Acquired Illness or Injury  Outcome: Unable to Meet, Plan Revised  Goal: Optimal Comfort and Wellbeing  Outcome: Unable to Meet, Plan Revised  Goal: Readiness for Transition of Care  Outcome: Unable to Meet, Plan Revised     Problem: Infection  Goal: Absence of Infection Signs and Symptoms  Outcome: Unable to Meet, Plan Revised     Problem: Fluid and Electrolyte Imbalance (Acute Kidney Injury/Impairment)  Goal: Fluid and Electrolyte Balance  Outcome: Unable to Meet, Plan Revised     Problem: Oral Intake Inadequate (Acute Kidney Injury/Impairment)  Goal: Optimal Nutrition Intake  Outcome: Unable to Meet, Plan Revised     Problem: Renal Function Impairment (Acute Kidney Injury/Impairment)  Goal: Effective Renal Function  Outcome: Unable to Meet, Plan Revised     Problem: Fall Injury Risk  Goal: Absence of Fall and Fall-Related Injury  Outcome: Unable to Meet, Plan Revised

## 2022-11-28 VITALS
RESPIRATION RATE: 18 BRPM | DIASTOLIC BLOOD PRESSURE: 77 MMHG | TEMPERATURE: 98 F | OXYGEN SATURATION: 98 % | WEIGHT: 211 LBS | SYSTOLIC BLOOD PRESSURE: 160 MMHG | BODY MASS INDEX: 30.71 KG/M2 | HEART RATE: 70 BPM

## 2022-11-28 LAB
ANION GAP SERPL CALC-SCNC: 7 MMOL/L (ref 8–16)
BASOPHILS # BLD AUTO: 0.03 K/UL (ref 0–0.2)
BASOPHILS NFR BLD: 0.4 % (ref 0–1.9)
BUN SERPL-MCNC: 12 MG/DL (ref 8–23)
CALCIUM SERPL-MCNC: 8.6 MG/DL (ref 8.7–10.5)
CHLORIDE SERPL-SCNC: 110 MMOL/L (ref 95–110)
CO2 SERPL-SCNC: 21 MMOL/L (ref 23–29)
CREAT SERPL-MCNC: 1.5 MG/DL (ref 0.5–1.4)
DIFFERENTIAL METHOD: ABNORMAL
EOSINOPHIL # BLD AUTO: 0.5 K/UL (ref 0–0.5)
EOSINOPHIL NFR BLD: 6.5 % (ref 0–8)
ERYTHROCYTE [DISTWIDTH] IN BLOOD BY AUTOMATED COUNT: 17.5 % (ref 11.5–14.5)
EST. GFR  (NO RACE VARIABLE): 45.6 ML/MIN/1.73 M^2
GLUCOSE SERPL-MCNC: 78 MG/DL (ref 70–110)
HCT VFR BLD AUTO: 24.5 % (ref 40–54)
HGB BLD-MCNC: 7.7 G/DL (ref 14–18)
IMM GRANULOCYTES # BLD AUTO: 0.03 K/UL (ref 0–0.04)
IMM GRANULOCYTES NFR BLD AUTO: 0.4 % (ref 0–0.5)
LYMPHOCYTES # BLD AUTO: 1.9 K/UL (ref 1–4.8)
LYMPHOCYTES NFR BLD: 25.8 % (ref 18–48)
MCH RBC QN AUTO: 24.6 PG (ref 27–31)
MCHC RBC AUTO-ENTMCNC: 31.4 G/DL (ref 32–36)
MCV RBC AUTO: 78 FL (ref 82–98)
MONOCYTES # BLD AUTO: 1.1 K/UL (ref 0.3–1)
MONOCYTES NFR BLD: 14.7 % (ref 4–15)
NEUTROPHILS # BLD AUTO: 3.9 K/UL (ref 1.8–7.7)
NEUTROPHILS NFR BLD: 52.2 % (ref 38–73)
NRBC BLD-RTO: 0 /100 WBC
PLATELET # BLD AUTO: 195 K/UL (ref 150–450)
PMV BLD AUTO: 9.9 FL (ref 9.2–12.9)
POTASSIUM SERPL-SCNC: 4.3 MMOL/L (ref 3.5–5.1)
RBC # BLD AUTO: 3.13 M/UL (ref 4.6–6.2)
SODIUM SERPL-SCNC: 138 MMOL/L (ref 136–145)
WBC # BLD AUTO: 7.44 K/UL (ref 3.9–12.7)

## 2022-11-28 PROCEDURE — 63600175 PHARM REV CODE 636 W HCPCS

## 2022-11-28 PROCEDURE — 36415 COLL VENOUS BLD VENIPUNCTURE: CPT | Performed by: PHYSICIAN ASSISTANT

## 2022-11-28 PROCEDURE — C9113 INJ PANTOPRAZOLE SODIUM, VIA: HCPCS

## 2022-11-28 PROCEDURE — 99217 PR OBSERVATION CARE DISCHARGE: ICD-10-PCS | Mod: ,,, | Performed by: PHYSICIAN ASSISTANT

## 2022-11-28 PROCEDURE — 80048 BASIC METABOLIC PNL TOTAL CA: CPT | Performed by: PHYSICIAN ASSISTANT

## 2022-11-28 PROCEDURE — 85025 COMPLETE CBC W/AUTO DIFF WBC: CPT | Performed by: PHYSICIAN ASSISTANT

## 2022-11-28 PROCEDURE — 99217 PR OBSERVATION CARE DISCHARGE: CPT | Mod: ,,, | Performed by: PHYSICIAN ASSISTANT

## 2022-11-28 PROCEDURE — 25000003 PHARM REV CODE 250

## 2022-11-28 RX ORDER — POLYETHYLENE GLYCOL 3350 17 G/17G
17 POWDER, FOR SOLUTION ORAL DAILY
Status: DISCONTINUED | OUTPATIENT
Start: 2022-11-28 | End: 2022-11-28 | Stop reason: HOSPADM

## 2022-11-28 RX ORDER — POLYETHYLENE GLYCOL 3350 17 G/17G
17 POWDER, FOR SOLUTION ORAL DAILY PRN
Qty: 1020 G | Refills: 0 | Status: ON HOLD | OUTPATIENT
Start: 2022-11-28 | End: 2022-12-08 | Stop reason: SDUPTHER

## 2022-11-28 RX ADMIN — SODIUM BICARBONATE 650 MG TABLET 650 MG: at 11:11

## 2022-11-28 RX ADMIN — PANTOPRAZOLE SODIUM 40 MG: 40 INJECTION, POWDER, FOR SOLUTION INTRAVENOUS at 11:11

## 2022-11-28 RX ADMIN — POLYETHYLENE GLYCOL 3350 17 G: 17 POWDER, FOR SOLUTION ORAL at 11:11

## 2022-11-28 NOTE — CARE UPDATE
CHW unable to schedule a Gastroenterology hospital follow up visit. A message was sent to the provider requesting an appointment on the patients behalf. I added this info to the AVS for the patient as a reminder

## 2022-11-28 NOTE — PLAN OF CARE
Problem: Adjustment to Illness (Gastrointestinal Bleeding)  Goal: Optimal Coping with Acute Illness  Outcome: Ongoing, Progressing     Problem: Bleeding (Gastrointestinal Bleeding)  Goal: Hemostasis  Outcome: Ongoing, Progressing     Problem: Adult Inpatient Plan of Care  Goal: Plan of Care Review  Outcome: Ongoing, Progressing  Goal: Patient-Specific Goal (Individualized)  Outcome: Ongoing, Progressing  Goal: Absence of Hospital-Acquired Illness or Injury  Outcome: Ongoing, Progressing  Goal: Optimal Comfort and Wellbeing  Outcome: Ongoing, Progressing  Goal: Readiness for Transition of Care  Outcome: Ongoing, Progressing     Problem: Infection  Goal: Absence of Infection Signs and Symptoms  Outcome: Ongoing, Progressing     Problem: Fluid and Electrolyte Imbalance (Acute Kidney Injury/Impairment)  Goal: Fluid and Electrolyte Balance  Outcome: Ongoing, Progressing     Problem: Oral Intake Inadequate (Acute Kidney Injury/Impairment)  Goal: Optimal Nutrition Intake  Outcome: Ongoing, Progressing     Problem: Renal Function Impairment (Acute Kidney Injury/Impairment)  Goal: Effective Renal Function  Outcome: Ongoing, Progressing     Problem: Fall Injury Risk  Goal: Absence of Fall and Fall-Related Injury  Outcome: Ongoing, Progressing

## 2022-11-29 ENCOUNTER — ANESTHESIA EVENT (OUTPATIENT)
Dept: ENDOSCOPY | Facility: HOSPITAL | Age: 87
DRG: 378 | End: 2022-11-29
Payer: MEDICARE

## 2022-11-29 ENCOUNTER — HOSPITAL ENCOUNTER (INPATIENT)
Facility: HOSPITAL | Age: 87
LOS: 6 days | Discharge: HOME | DRG: 378 | End: 2022-12-08
Attending: EMERGENCY MEDICINE | Admitting: INTERNAL MEDICINE
Payer: COMMERCIAL

## 2022-11-29 DIAGNOSIS — K57.30 DIVERTICULOSIS OF COLON: ICD-10-CM

## 2022-11-29 DIAGNOSIS — R07.9 CHEST PAIN: ICD-10-CM

## 2022-11-29 DIAGNOSIS — N17.9 AKI (ACUTE KIDNEY INJURY): ICD-10-CM

## 2022-11-29 DIAGNOSIS — K92.2 ACUTE LOWER GI BLEEDING: Primary | ICD-10-CM

## 2022-11-29 DIAGNOSIS — K62.5 RECTAL BLEEDING: ICD-10-CM

## 2022-11-29 PROBLEM — Z86.2 HISTORY OF ANEMIA: Status: RESOLVED | Noted: 2021-03-29 | Resolved: 2022-11-29

## 2022-11-29 PROBLEM — D72.829 LEUKOCYTOSIS: Status: ACTIVE | Noted: 2022-11-29

## 2022-11-29 PROBLEM — K57.90 DIVERTICULOSIS: Status: ACTIVE | Noted: 2022-11-29

## 2022-11-29 LAB
ABO + RH BLD: NORMAL
ALBUMIN SERPL BCP-MCNC: 3 G/DL (ref 3.5–5.2)
ALP SERPL-CCNC: 65 U/L (ref 55–135)
ALT SERPL W/O P-5'-P-CCNC: 14 U/L (ref 10–44)
ANION GAP SERPL CALC-SCNC: 10 MMOL/L (ref 8–16)
AST SERPL-CCNC: 27 U/L (ref 10–40)
BASOPHILS # BLD AUTO: 0.01 K/UL (ref 0–0.2)
BASOPHILS # BLD AUTO: 0.03 K/UL (ref 0–0.2)
BASOPHILS NFR BLD: 0.1 % (ref 0–1.9)
BASOPHILS NFR BLD: 0.2 % (ref 0–1.9)
BILIRUB SERPL-MCNC: 1.2 MG/DL (ref 0.1–1)
BLD GP AB SCN CELLS X3 SERPL QL: NORMAL
BUN SERPL-MCNC: 17 MG/DL (ref 8–23)
CALCIUM SERPL-MCNC: 8.6 MG/DL (ref 8.7–10.5)
CHLORIDE SERPL-SCNC: 109 MMOL/L (ref 95–110)
CO2 SERPL-SCNC: 19 MMOL/L (ref 23–29)
CREAT SERPL-MCNC: 2 MG/DL (ref 0.5–1.4)
DIFFERENTIAL METHOD: ABNORMAL
DIFFERENTIAL METHOD: ABNORMAL
EOSINOPHIL # BLD AUTO: 0 K/UL (ref 0–0.5)
EOSINOPHIL # BLD AUTO: 0.1 K/UL (ref 0–0.5)
EOSINOPHIL NFR BLD: 0.1 % (ref 0–8)
EOSINOPHIL NFR BLD: 0.5 % (ref 0–8)
ERYTHROCYTE [DISTWIDTH] IN BLOOD BY AUTOMATED COUNT: 17.7 % (ref 11.5–14.5)
ERYTHROCYTE [DISTWIDTH] IN BLOOD BY AUTOMATED COUNT: 18 % (ref 11.5–14.5)
EST. GFR  (NO RACE VARIABLE): 32.3 ML/MIN/1.73 M^2
GLUCOSE SERPL-MCNC: 135 MG/DL (ref 70–110)
HCT VFR BLD AUTO: 22.2 % (ref 40–54)
HCT VFR BLD AUTO: 25.3 % (ref 40–54)
HGB BLD-MCNC: 7 G/DL (ref 14–18)
HGB BLD-MCNC: 8 G/DL (ref 14–18)
IMM GRANULOCYTES # BLD AUTO: 0.05 K/UL (ref 0–0.04)
IMM GRANULOCYTES # BLD AUTO: 0.07 K/UL (ref 0–0.04)
IMM GRANULOCYTES NFR BLD AUTO: 0.5 % (ref 0–0.5)
IMM GRANULOCYTES NFR BLD AUTO: 0.5 % (ref 0–0.5)
LYMPHOCYTES # BLD AUTO: 0.7 K/UL (ref 1–4.8)
LYMPHOCYTES # BLD AUTO: 1.1 K/UL (ref 1–4.8)
LYMPHOCYTES NFR BLD: 7.6 % (ref 18–48)
LYMPHOCYTES NFR BLD: 8.4 % (ref 18–48)
MCH RBC QN AUTO: 24.6 PG (ref 27–31)
MCH RBC QN AUTO: 25.4 PG (ref 27–31)
MCHC RBC AUTO-ENTMCNC: 31.5 G/DL (ref 32–36)
MCHC RBC AUTO-ENTMCNC: 31.6 G/DL (ref 32–36)
MCV RBC AUTO: 78 FL (ref 82–98)
MCV RBC AUTO: 80 FL (ref 82–98)
MONOCYTES # BLD AUTO: 0.8 K/UL (ref 0.3–1)
MONOCYTES # BLD AUTO: 1.2 K/UL (ref 0.3–1)
MONOCYTES NFR BLD: 9 % (ref 4–15)
MONOCYTES NFR BLD: 9 % (ref 4–15)
NEUTROPHILS # BLD AUTO: 10.5 K/UL (ref 1.8–7.7)
NEUTROPHILS # BLD AUTO: 7.6 K/UL (ref 1.8–7.7)
NEUTROPHILS NFR BLD: 81.4 % (ref 38–73)
NEUTROPHILS NFR BLD: 82.7 % (ref 38–73)
NRBC BLD-RTO: 0 /100 WBC
NRBC BLD-RTO: 0 /100 WBC
PLATELET # BLD AUTO: 203 K/UL (ref 150–450)
PLATELET # BLD AUTO: 218 K/UL (ref 150–450)
PMV BLD AUTO: 10.5 FL (ref 9.2–12.9)
PMV BLD AUTO: 9.8 FL (ref 9.2–12.9)
POTASSIUM SERPL-SCNC: 4.1 MMOL/L (ref 3.5–5.1)
PROT SERPL-MCNC: 6.1 G/DL (ref 6–8.4)
RBC # BLD AUTO: 2.84 M/UL (ref 4.6–6.2)
RBC # BLD AUTO: 3.15 M/UL (ref 4.6–6.2)
SODIUM SERPL-SCNC: 138 MMOL/L (ref 136–145)
WBC # BLD AUTO: 12.88 K/UL (ref 3.9–12.7)
WBC # BLD AUTO: 9.24 K/UL (ref 3.9–12.7)

## 2022-11-29 PROCEDURE — 93010 ELECTROCARDIOGRAM REPORT: CPT | Mod: ,,, | Performed by: INTERNAL MEDICINE

## 2022-11-29 PROCEDURE — 99214 PR OFFICE/OUTPT VISIT, EST, LEVL IV, 30-39 MIN: ICD-10-PCS | Mod: ,,, | Performed by: INTERNAL MEDICINE

## 2022-11-29 PROCEDURE — G0378 HOSPITAL OBSERVATION PER HR: HCPCS

## 2022-11-29 PROCEDURE — 63600175 PHARM REV CODE 636 W HCPCS: Performed by: PHYSICIAN ASSISTANT

## 2022-11-29 PROCEDURE — 80053 COMPREHEN METABOLIC PANEL: CPT | Performed by: EMERGENCY MEDICINE

## 2022-11-29 PROCEDURE — 99285 EMERGENCY DEPT VISIT HI MDM: CPT | Mod: ,,, | Performed by: EMERGENCY MEDICINE

## 2022-11-29 PROCEDURE — 99220 PR INITIAL OBSERVATION CARE,LEVL III: CPT | Mod: ,,, | Performed by: PHYSICIAN ASSISTANT

## 2022-11-29 PROCEDURE — 86920 COMPATIBILITY TEST SPIN: CPT

## 2022-11-29 PROCEDURE — 86850 RBC ANTIBODY SCREEN: CPT | Performed by: EMERGENCY MEDICINE

## 2022-11-29 PROCEDURE — 99285 EMERGENCY DEPT VISIT HI MDM: CPT | Mod: 25

## 2022-11-29 PROCEDURE — 93010 EKG 12-LEAD: ICD-10-PCS | Mod: ,,, | Performed by: INTERNAL MEDICINE

## 2022-11-29 PROCEDURE — C9113 INJ PANTOPRAZOLE SODIUM, VIA: HCPCS | Performed by: PHYSICIAN ASSISTANT

## 2022-11-29 PROCEDURE — 99285 PR EMERGENCY DEPT VISIT,LEVEL V: ICD-10-PCS | Mod: ,,, | Performed by: EMERGENCY MEDICINE

## 2022-11-29 PROCEDURE — 93005 ELECTROCARDIOGRAM TRACING: CPT

## 2022-11-29 PROCEDURE — 99220 PR INITIAL OBSERVATION CARE,LEVL III: ICD-10-PCS | Mod: ,,, | Performed by: PHYSICIAN ASSISTANT

## 2022-11-29 PROCEDURE — 25000003 PHARM REV CODE 250: Performed by: PHYSICIAN ASSISTANT

## 2022-11-29 PROCEDURE — 99214 OFFICE O/P EST MOD 30 MIN: CPT | Mod: ,,, | Performed by: INTERNAL MEDICINE

## 2022-11-29 PROCEDURE — 85025 COMPLETE CBC W/AUTO DIFF WBC: CPT | Performed by: EMERGENCY MEDICINE

## 2022-11-29 PROCEDURE — 85025 COMPLETE CBC W/AUTO DIFF WBC: CPT | Mod: 91 | Performed by: PHYSICIAN ASSISTANT

## 2022-11-29 RX ORDER — IBUPROFEN 200 MG
16 TABLET ORAL
Status: DISCONTINUED | OUTPATIENT
Start: 2022-11-29 | End: 2022-12-08 | Stop reason: HOSPADM

## 2022-11-29 RX ORDER — ONDANSETRON 8 MG/1
8 TABLET, ORALLY DISINTEGRATING ORAL EVERY 8 HOURS PRN
Status: DISCONTINUED | OUTPATIENT
Start: 2022-11-29 | End: 2022-12-08 | Stop reason: HOSPADM

## 2022-11-29 RX ORDER — NALOXONE HCL 0.4 MG/ML
0.02 VIAL (ML) INJECTION
Status: DISCONTINUED | OUTPATIENT
Start: 2022-11-29 | End: 2022-12-08 | Stop reason: HOSPADM

## 2022-11-29 RX ORDER — ACETAMINOPHEN 325 MG/1
650 TABLET ORAL EVERY 4 HOURS PRN
Status: DISCONTINUED | OUTPATIENT
Start: 2022-11-29 | End: 2022-12-08 | Stop reason: HOSPADM

## 2022-11-29 RX ORDER — IBUPROFEN 200 MG
24 TABLET ORAL
Status: DISCONTINUED | OUTPATIENT
Start: 2022-11-29 | End: 2022-12-08 | Stop reason: HOSPADM

## 2022-11-29 RX ORDER — POLYETHYLENE GLYCOL 3350, SODIUM SULFATE ANHYDROUS, SODIUM BICARBONATE, SODIUM CHLORIDE, POTASSIUM CHLORIDE 236; 22.74; 6.74; 5.86; 2.97 G/4L; G/4L; G/4L; G/4L; G/4L
4000 POWDER, FOR SOLUTION ORAL ONCE
Status: COMPLETED | OUTPATIENT
Start: 2022-11-29 | End: 2022-11-29

## 2022-11-29 RX ORDER — MAG HYDROX/ALUMINUM HYD/SIMETH 200-200-20
30 SUSPENSION, ORAL (FINAL DOSE FORM) ORAL 4 TIMES DAILY PRN
Status: DISCONTINUED | OUTPATIENT
Start: 2022-11-29 | End: 2022-12-08 | Stop reason: HOSPADM

## 2022-11-29 RX ORDER — SIMETHICONE 80 MG
1 TABLET,CHEWABLE ORAL 4 TIMES DAILY PRN
Status: DISCONTINUED | OUTPATIENT
Start: 2022-11-29 | End: 2022-12-08 | Stop reason: HOSPADM

## 2022-11-29 RX ORDER — GLUCAGON 1 MG
1 KIT INJECTION
Status: DISCONTINUED | OUTPATIENT
Start: 2022-11-29 | End: 2022-12-08 | Stop reason: HOSPADM

## 2022-11-29 RX ORDER — PANTOPRAZOLE SODIUM 40 MG/10ML
40 INJECTION, POWDER, LYOPHILIZED, FOR SOLUTION INTRAVENOUS 2 TIMES DAILY
Status: DISCONTINUED | OUTPATIENT
Start: 2022-11-29 | End: 2022-12-01

## 2022-11-29 RX ORDER — PROCHLORPERAZINE EDISYLATE 5 MG/ML
5 INJECTION INTRAMUSCULAR; INTRAVENOUS EVERY 6 HOURS PRN
Status: DISCONTINUED | OUTPATIENT
Start: 2022-11-29 | End: 2022-12-08 | Stop reason: HOSPADM

## 2022-11-29 RX ORDER — ATORVASTATIN CALCIUM 10 MG/1
10 TABLET, FILM COATED ORAL NIGHTLY
Status: DISCONTINUED | OUTPATIENT
Start: 2022-11-29 | End: 2022-12-08 | Stop reason: HOSPADM

## 2022-11-29 RX ORDER — TALC
6 POWDER (GRAM) TOPICAL NIGHTLY PRN
Status: DISCONTINUED | OUTPATIENT
Start: 2022-11-29 | End: 2022-12-08 | Stop reason: HOSPADM

## 2022-11-29 RX ORDER — POLYETHYLENE GLYCOL 3350 17 G/17G
17 POWDER, FOR SOLUTION ORAL DAILY
Status: DISCONTINUED | OUTPATIENT
Start: 2022-11-29 | End: 2022-12-01

## 2022-11-29 RX ORDER — SODIUM BICARBONATE 650 MG/1
650 TABLET ORAL 2 TIMES DAILY
Status: DISCONTINUED | OUTPATIENT
Start: 2022-11-29 | End: 2022-12-08 | Stop reason: HOSPADM

## 2022-11-29 RX ORDER — IPRATROPIUM BROMIDE AND ALBUTEROL SULFATE 2.5; .5 MG/3ML; MG/3ML
3 SOLUTION RESPIRATORY (INHALATION) EVERY 6 HOURS PRN
Status: DISCONTINUED | OUTPATIENT
Start: 2022-11-29 | End: 2022-12-08 | Stop reason: HOSPADM

## 2022-11-29 RX ADMIN — ATORVASTATIN CALCIUM 10 MG: 10 TABLET, FILM COATED ORAL at 09:11

## 2022-11-29 RX ADMIN — SODIUM BICARBONATE 650 MG TABLET 650 MG: at 09:11

## 2022-11-29 RX ADMIN — POLYETHYLENE GLYCOL 3350, SODIUM SULFATE ANHYDROUS, SODIUM BICARBONATE, SODIUM CHLORIDE, POTASSIUM CHLORIDE 4000 ML: 236; 22.74; 6.74; 5.86; 2.97 POWDER, FOR SOLUTION ORAL at 09:11

## 2022-11-29 RX ADMIN — SODIUM CHLORIDE, SODIUM LACTATE, POTASSIUM CHLORIDE, AND CALCIUM CHLORIDE 1000 ML: .6; .31; .03; .02 INJECTION, SOLUTION INTRAVENOUS at 02:11

## 2022-11-29 RX ADMIN — PANTOPRAZOLE SODIUM 40 MG: 40 INJECTION, POWDER, FOR SOLUTION INTRAVENOUS at 12:11

## 2022-11-29 RX ADMIN — PANTOPRAZOLE SODIUM 40 MG: 40 INJECTION, POWDER, FOR SOLUTION INTRAVENOUS at 09:11

## 2022-11-29 NOTE — NURSING
Pt arrived to floor head to toe complete condom catheter placed POC reviewed with pt and son (Zion Spears) both verbalized understanding

## 2022-11-29 NOTE — SUBJECTIVE & OBJECTIVE
Past Medical History:   Diagnosis Date    GERD (gastroesophageal reflux disease)     Hyperlipemia     Hypertension     Obesity        Past Surgical History:   Procedure Laterality Date    COLONOSCOPY N/A 11/25/2022    Procedure: COLONOSCOPY;  Surgeon: Smith Alvarez MD;  Location: [address]);  Service: Endoscopy;  Laterality: N/A;       Review of patient's allergies indicates:  No Known Allergies    Current Facility-Administered Medications on File Prior to Encounter   Medication    [DISCONTINUED] 0.9%  NaCl infusion (for blood administration)    [DISCONTINUED] acetaminophen tablet 650 mg    [DISCONTINUED] albuterol-ipratropium 2.5 mg-0.5 mg/3 mL nebulizer solution 3 mL    [DISCONTINUED] atorvastatin tablet 10 mg    [DISCONTINUED] bisacodyL suppository 10 mg    [DISCONTINUED] dextrose 10% bolus 125 mL    [DISCONTINUED] dextrose 10% bolus 250 mL    [DISCONTINUED] glucagon (human recombinant) injection 1 mg    [DISCONTINUED] glucose chewable tablet 16 g    [DISCONTINUED] glucose chewable tablet 24 g    [DISCONTINUED] melatonin tablet 6 mg    [DISCONTINUED] ondansetron disintegrating tablet 8 mg    [DISCONTINUED] pantoprazole injection 40 mg    [DISCONTINUED] polyethylene glycol packet 17 g    [DISCONTINUED] promethazine tablet 25 mg    [DISCONTINUED] sodium bicarbonate tablet 650 mg    [DISCONTINUED] sodium chloride 0.9% flush 10 mL    [DISCONTINUED] sodium chloride 0.9% flush 10 mL     Current Outpatient Medications on File Prior to Encounter   Medication Sig    acetaminophen (TYLENOL) 500 MG tablet Take 1,000 mg by mouth every 6 (six) hours as needed for Pain.    amLODIPine (NORVASC) 10 MG tablet Take 1 tablet (10 mg total) by mouth every morning.    atorvastatin (LIPITOR) 10 MG tablet Take 1 tablet (10 mg total) by mouth every evening.    FLUAD QUAD 2020-21,65Y UP,,PF, 60 mcg (15 mcg x 4)/0.5 mL Syrg ADM 0.5ML IM UTD    losartan (COZAAR) 50 MG tablet Take 1 tablet (50 mg total) by mouth once daily.     pantoprazole (PROTONIX) 40 MG tablet Take 1 tablet (40 mg total) by mouth 2 (two) times daily.    pneumoc 20-fabio conj-dip cr,PF, (PREVNAR-20, PF,) 0.5 mL Syrg injection Inject 0.5 mLs into the muscle.    polyethylene glycol (GLYCOLAX) 17 gram/dose powder Dissolve one capful (17 g) in liquid and take by mouth daily as needed.    sodium bicarbonate 650 MG tablet TAKE 1 TABLET(650 MG) BY MOUTH TWICE DAILY  Strength: 650 mg     Family History       Problem Relation (Age of Onset)    Cancer Mother    No Known Problems Father, Sister, Brother, Sister          Tobacco Use    Smoking status: Never    Smokeless tobacco: Never   Substance and Sexual Activity    Alcohol use: Yes     Comment: occasionally    Drug use: Never    Sexual activity: Not on file     Review of Systems   Constitutional:  Negative for appetite change, chills and fever.   HENT:  Negative for congestion, sinus pressure, sore throat and trouble swallowing.    Respiratory:  Negative for cough, chest tightness and wheezing.    Cardiovascular:  Negative for chest pain, palpitations and leg swelling.   Gastrointestinal:  Positive for blood in stool. Negative for abdominal distention, abdominal pain, anal bleeding, constipation, diarrhea, nausea and vomiting.   Genitourinary:  Negative for dysuria, frequency, hematuria and urgency.   Musculoskeletal:  Negative for arthralgias, gait problem and myalgias.   Neurological:  Negative for dizziness, tremors, syncope, light-headedness, numbness and headaches.   Objective:     Vital Signs (Most Recent):  Temp: 98 °F (36.7 °C) (11/29/22 1059)  Pulse: 82 (11/29/22 1224)  Resp: 16 (11/29/22 1224)  BP: (!) 88/53 (11/29/22 1224)  SpO2: 98 % (11/29/22 1224)   Vital Signs (24h Range):  Temp:  [98 °F (36.7 °C)-98.3 °F (36.8 °C)] 98 °F (36.7 °C)  Pulse:  [] 82  Resp:  [14-18] 16  SpO2:  [95 %-98 %] 98 %  BP: ()/(53-85) 88/53     Weight: 95.7 kg (211 lb)  Body mass index is 30.28 kg/m².    Physical Exam  Vitals and  nursing note reviewed.   Constitutional:       General: He is not in acute distress.     Appearance: He is well-developed. He is not ill-appearing or diaphoretic.   HENT:      Head: Normocephalic and atraumatic.      Nose: No congestion.      Mouth/Throat:      Pharynx: No oropharyngeal exudate.   Eyes:      Conjunctiva/sclera: Conjunctivae normal.      Pupils: Pupils are equal, round, and reactive to light.   Cardiovascular:      Rate and Rhythm: Normal rate and regular rhythm.      Heart sounds: Normal heart sounds.   Pulmonary:      Effort: Pulmonary effort is normal. No respiratory distress.      Breath sounds: Normal breath sounds. No wheezing.   Abdominal:      General: Bowel sounds are normal. There is no distension.      Palpations: Abdomen is soft.      Tenderness: There is no abdominal tenderness.   Genitourinary:     Comments:  Bright red blood on rectal exam.  External: no hemorrhoid or fissure noted   Musculoskeletal:         General: No tenderness. Normal range of motion.      Cervical back: Normal range of motion and neck supple.      Right lower leg: No edema.      Left lower leg: No edema.   Lymphadenopathy:      Cervical: No cervical adenopathy.   Skin:     General: Skin is warm and dry.      Capillary Refill: Capillary refill takes less than 2 seconds.      Findings: No rash.   Neurological:      Mental Status: He is alert and oriented to person, place, and time.      Cranial Nerves: No cranial nerve deficit.      Sensory: No sensory deficit.      Coordination: Coordination normal.   Psychiatric:         Behavior: Behavior normal.         Thought Content: Thought content normal.         Judgment: Judgment normal.         CRANIAL NERVES     CN III, IV, VI   Pupils are equal, round, and reactive to light.     Significant Labs: All pertinent labs within the past 24 hours have been reviewed.  CBC:   Recent Labs   Lab 11/27/22 2006 11/28/22  0825 11/29/22  1020   WBC 7.69 7.44 12.88*   HGB 6.4* 7.7*  8.0*   HCT 20.1* 24.5* 25.3*    195 218     CMP:   Recent Labs   Lab 11/27/22  1326 11/28/22  0825 11/29/22  1020    138 138   K 4.1 4.3 4.1    110 109   CO2 20* 21* 19*   * 78 135*   BUN 14 12 17   CREATININE 1.6* 1.5* 2.0*   CALCIUM 8.3* 8.6* 8.6*   PROT  --   --  6.1   ALBUMIN  --   --  3.0*   BILITOT  --   --  1.2*   ALKPHOS  --   --  65   AST  --   --  27   ALT  --   --  14   ANIONGAP 8 7* 10       Significant Imaging: I have reviewed all pertinent imaging results/findings within the past 24 hours.  Imaging Results    None

## 2022-11-29 NOTE — ED NOTES
Telemetry Verification   Patient placed on Telemetry Box  Verified with War Room  Box # [id number]   Monitor Tech Taisha   Rate 73   Rhythm NS

## 2022-11-29 NOTE — ED NOTES
Assumed care of pt at this time. VSS, RR even and unlabored. Resting in bed comfortably. No voiced compaints of pain or discomfort at this time. Safety protocols remain, will continue to monitor.

## 2022-11-29 NOTE — H&P
[facility] - Emergency Dept  [facility]  History & Physical    Patient Name: Zion Vasquez  MRN: [id number]  Patient Class: OP- Observation  Admission Date: 11/29/2022  Attending Physician: Cali Dotson MD   Primary Care Provider: Brigida Wall MD         Patient information was obtained from patient, past medical records and ER records.     Subjective:     Principal Problem:GI bleed    Chief Complaint:   Chief Complaint   Patient presents with    Rectal Bleeding     Discharge from hospital yesterday for same complaint. Reports bright red bleeding this AM with BM.         HPI: Zion Vasquez is an 86-year-old male with a history of GERD, hyperlipidemia, hypertension obesity including known history of diverticulosis, Martinez's esophagus, hiatal hernia and prior GI bleed (hospitalized on 06/22 and 11/28/22) presenting w/ c/o bright red bleeding per rectum and fatigue. Of note he was hospitalized 11/24-11/28 for 3 episodes of bright red bleeding per rectum without associated fatigue, shortness of breath, chest pain. He had a colonoscopy with GI performed with large amounts of bright red blood noted without clear lesions.  He had serial CBC is overnight that were stable.  He then reportedly had another large bloody bowel movement during his hospital stay with a drop in hemoglobin to 6.8, requiring 1 unit of PRBC. He had a CT abdomen w/o evidence of GIB. He now returns for rectal bleeding w/ associated generalized weakness, fatigue and mild SOB. He is currently taking Protonix and miralax daily. Denies headaches, lightheadedness, dizziness, syncope, n/v/d, abdominal pain, dysuria.    In ED, Pt afebrile, orthostatics positive. CBC w/ H/H 8/25.3, WBC 12.88. Cr 2.0 (~ BL 1.5-1.8). Initiated on GIB pathway. Given pantoprazole 40 mg IV.      Past Medical History:   Diagnosis Date    GERD (gastroesophageal reflux disease)     Hyperlipemia     Hypertension     Obesity        Past Surgical History:   Procedure  Laterality Date    COLONOSCOPY N/A 11/25/2022    Procedure: COLONOSCOPY;  Surgeon: Smith Alvarez MD;  Location: [facility] ([address] [place] [address]);  Service: Endoscopy;  Laterality: N/A;       Review of patient's allergies indicates:  No Known Allergies    Current Facility-Administered Medications on File Prior to Encounter   Medication    [DISCONTINUED] 0.9%  NaCl infusion (for blood administration)    [DISCONTINUED] acetaminophen tablet 650 mg    [DISCONTINUED] albuterol-ipratropium 2.5 mg-0.5 mg/3 mL nebulizer solution 3 mL    [DISCONTINUED] atorvastatin tablet 10 mg    [DISCONTINUED] bisacodyL suppository 10 mg    [DISCONTINUED] dextrose 10% bolus 125 mL    [DISCONTINUED] dextrose 10% bolus 250 mL    [DISCONTINUED] glucagon (human recombinant) injection 1 mg    [DISCONTINUED] glucose chewable tablet 16 g    [DISCONTINUED] glucose chewable tablet 24 g    [DISCONTINUED] melatonin tablet 6 mg    [DISCONTINUED] ondansetron disintegrating tablet 8 mg    [DISCONTINUED] pantoprazole injection 40 mg    [DISCONTINUED] polyethylene glycol packet 17 g    [DISCONTINUED] promethazine tablet 25 mg    [DISCONTINUED] sodium bicarbonate tablet 650 mg    [DISCONTINUED] sodium chloride 0.9% flush 10 mL    [DISCONTINUED] sodium chloride 0.9% flush 10 mL     Current Outpatient Medications on File Prior to Encounter   Medication Sig    acetaminophen (TYLENOL) 500 MG tablet Take 1,000 mg by mouth every 6 (six) hours as needed for Pain.    amLODIPine (NORVASC) 10 MG tablet Take 1 tablet (10 mg total) by mouth every morning.    atorvastatin (LIPITOR) 10 MG tablet Take 1 tablet (10 mg total) by mouth every evening.    FLUAD QUAD 2020-21,65Y UP,,PF, 60 mcg (15 mcg x 4)/0.5 mL Syrg ADM 0.5ML IM UTD    losartan (COZAAR) 50 MG tablet Take 1 tablet (50 mg total) by mouth once daily.    pantoprazole (PROTONIX) 40 MG tablet Take 1 tablet (40 mg total) by mouth 2 (two) times daily.    pneumoc 20-fabio conj-dip cr,PF, (PREVNAR-20,  PF,) 0.5 mL Syrg injection Inject 0.5 mLs into the muscle.    polyethylene glycol (GLYCOLAX) 17 gram/dose powder Dissolve one capful (17 g) in liquid and take by mouth daily as needed.    sodium bicarbonate 650 MG tablet TAKE 1 TABLET(650 MG) BY MOUTH TWICE DAILY  Strength: 650 mg     Family History       Problem Relation (Age of Onset)    Cancer Mother    No Known Problems Father, Sister, Brother, Sister          Tobacco Use    Smoking status: Never    Smokeless tobacco: Never   Substance and Sexual Activity    Alcohol use: Yes     Comment: occasionally    Drug use: Never    Sexual activity: Not on file     Review of Systems   Constitutional:  Negative for appetite change, chills and fever.   HENT:  Negative for congestion, sinus pressure, sore throat and trouble swallowing.    Respiratory:  Negative for cough, chest tightness and wheezing.    Cardiovascular:  Negative for chest pain, palpitations and leg swelling.   Gastrointestinal:  Positive for blood in stool. Negative for abdominal distention, abdominal pain, anal bleeding, constipation, diarrhea, nausea and vomiting.   Genitourinary:  Negative for dysuria, frequency, hematuria and urgency.   Musculoskeletal:  Negative for arthralgias, gait problem and myalgias.   Neurological:  Negative for dizziness, tremors, syncope, light-headedness, numbness and headaches.   Objective:     Vital Signs (Most Recent):  Temp: 98 °F (36.7 °C) (11/29/22 1059)  Pulse: 82 (11/29/22 1224)  Resp: 16 (11/29/22 1224)  BP: (!) 88/53 (11/29/22 1224)  SpO2: 98 % (11/29/22 1224)   Vital Signs (24h Range):  Temp:  [98 °F (36.7 °C)-98.3 °F (36.8 °C)] 98 °F (36.7 °C)  Pulse:  [] 82  Resp:  [14-18] 16  SpO2:  [95 %-98 %] 98 %  BP: ()/(53-85) 88/53     Weight: 95.7 kg (211 lb)  Body mass index is 30.28 kg/m².    Physical Exam  Vitals and nursing note reviewed.   Constitutional:       General: He is not in acute distress.     Appearance: He is well-developed. He is not  ill-appearing or diaphoretic.   HENT:      Head: Normocephalic and atraumatic.      Nose: No congestion.      Mouth/Throat:      Pharynx: No oropharyngeal exudate.   Eyes:      Conjunctiva/sclera: Conjunctivae normal.      Pupils: Pupils are equal, round, and reactive to light.   Cardiovascular:      Rate and Rhythm: Normal rate and regular rhythm.      Heart sounds: Normal heart sounds.   Pulmonary:      Effort: Pulmonary effort is normal. No respiratory distress.      Breath sounds: Normal breath sounds. No wheezing.   Abdominal:      General: Bowel sounds are normal. There is no distension.      Palpations: Abdomen is soft.      Tenderness: There is no abdominal tenderness.   Genitourinary:     Comments:  Bright red blood on rectal exam.  External: no hemorrhoid or fissure noted   Musculoskeletal:         General: No tenderness. Normal range of motion.      Cervical back: Normal range of motion and neck supple.      Right lower leg: No edema.      Left lower leg: No edema.   Lymphadenopathy:      Cervical: No cervical adenopathy.   Skin:     General: Skin is warm and dry.      Capillary Refill: Capillary refill takes less than 2 seconds.      Findings: No rash.   Neurological:      Mental Status: He is alert and oriented to person, place, and time.      Cranial Nerves: No cranial nerve deficit.      Sensory: No sensory deficit.      Coordination: Coordination normal.   Psychiatric:         Behavior: Behavior normal.         Thought Content: Thought content normal.         Judgment: Judgment normal.         CRANIAL NERVES     CN III, IV, VI   Pupils are equal, round, and reactive to light.     Significant Labs: All pertinent labs within the past 24 hours have been reviewed.  CBC:   Recent Labs   Lab 11/27/22 2006 11/28/22  0825 11/29/22  1020   WBC 7.69 7.44 12.88*   HGB 6.4* 7.7* 8.0*   HCT 20.1* 24.5* 25.3*    195 218     CMP:   Recent Labs   Lab 11/27/22  1326 11/28/22  0825 11/29/22  1020    138  138   K 4.1 4.3 4.1    110 109   CO2 20* 21* 19*   * 78 135*   BUN 14 12 17   CREATININE 1.6* 1.5* 2.0*   CALCIUM 8.3* 8.6* 8.6*   PROT  --   --  6.1   ALBUMIN  --   --  3.0*   BILITOT  --   --  1.2*   ALKPHOS  --   --  65   AST  --   --  27   ALT  --   --  14   ANIONGAP 8 7* 10       Significant Imaging: I have reviewed all pertinent imaging results/findings within the past 24 hours.  Imaging Results    None          Assessment/Plan:     * GI bleed  Internal hemorrhoids  Diverticulosis of colon    - GI Bleed pathway initiated  - orthostatics positive   - Hgb 9.5 (~BL 10)  - Last colonoscopy 2019   - Serial H/H's q8  - transfuse Hgb <7  - Maintain IV access with 2 large bore IVs  - Intravascular resuscitation/support with IVFs   - Protonix 40mg IV BID  - Discontinue all NSAIDs and Heparin products  - will correct any coagulopathy with platelets and FFP to a goal of platelets >50K and INR <2.0  - GI consulted, appreciate recs  - 11/27 CTA abdomen showed no evidence of GI bleed. Diverticulosis coli without evidence of diverticulitis. Cholelithiasis and bilateral renal hypodensities, likely cysts.   - CLD    CARYL (acute kidney injury)  CKD3  Patient with acute kidney injury likely due to IVVD/dehydration CARYL is currently stable. Labs reviewed- Renal function/electrolytes with Estimated Creatinine Clearance: 31.9 mL/min (A) (based on SCr of 2 mg/dL (H)). according to latest data. Monitor urine output and serial BMP and adjust therapy as needed. Avoid nephrotoxins and renally dose meds for GFR listed above.   - Cr 2.0 (~BL 1.5-1.8) on admission  - give 1L LR bolus    GERD (gastroesophageal reflux disease)  - protonix 40 mg bid    Hyperlipemia  - continue statin    Hypertension  - amlodipine and losartan held in setting of GIB    Obesity  Body mass index is 30.28 kg/m². Morbid obesity complicates all aspects of disease management from diagnostic modalities to treatment. Weight loss encouraged and health  benefits explained to patient.       VTE Risk Mitigation (From admission, onward)         Ordered     IP VTE HIGH RISK PATIENT  Once         11/29/22 1154     Place sequential compression device  Until discontinued         11/29/22 1154                   Shaw Padilla PA-C  Department of Hospital Medicine   [facility]

## 2022-11-29 NOTE — HPI
86 y.o. M w/ history of CKD III, HTN, obesity, diverticulosis, recurrent diverticular bleeds presenting to ED after recurrent episodes of BRBPR this morning. He was recently hospitalized from 11/26-11/28 for hematochezia with symptomatic anemia and underwent colonoscopy which showed significant amount of blood in the colon but no active bleeding sites. He was then discharged home last night after Hgb stabilized without any recurrent hematochezia until it returned today. Endorses fatigue and generalized weakness. He denies melena, hematemesis, nausea, vomiting or significant straining.  In the ER, labs were significant for a hemoglobin of 8.0 which is the same as his Hgb yesterday ~7.9, again with elevated BUN/Cr showing new CARYL similar to his previous presentation a few days ago. He is not on any blood thinners.

## 2022-11-29 NOTE — ASSESSMENT & PLAN NOTE
86 y.o. M w/ history of CKD III, diverticulosis, recurrent diverticular bleeds presenting to ED after recurrent episodes of BRBPR +fatigue and generalized weakness this morning; d/paul yesterday after admission for hematochezia with symptomatic anemia + CARYL on CKD.  Colonoscopy during that admission showed significant amount of blood in the colon with diffuse diverticulosis but no active bleeding sites.    Hgb yesterday ~7.7->8.0 again with elevated BUN/Cr showing new CARYL similar to his previous presentation a few days ago. He is not on any blood thinners.     - due to CARYL, would defer CTA at this time unless risks outweighs benefits (CARYL previously improved with IVFs last admission, was able to tolerate CTA)  - tentatively plan for colonoscopy tomorrow on 11/30  - Clear liquid diet now and NPO at midnight   - Golytely prep to start at 6pm, to be completed within 4 hours if possible  - Monitor Hgb q8hrs  - Transfuse to keep Hgb >7, plts >50  - Hold anticoagulants if safe to do so per primary team  - If becomes hemodynamically unstable with associated large volume hematochezia, recommend STAT CTA and IR embolization if positive

## 2022-11-29 NOTE — ED NOTES
Patient identifiers for Zion Vasquez 86 y.o. male checked and correct.  Chief Complaint   Patient presents with    Rectal Bleeding     Discharge from hospital yesterday for same complaint. Reports bright red bleeding this AM with BM.      Past Medical History:   Diagnosis Date    GERD (gastroesophageal reflux disease)     Hyperlipemia     Hypertension     Obesity      Allergies reported: Review of patient's allergies indicates:  No Known Allergies      LOC: Patient is awake, alert, and aware of environment with an appropriate affect. Patient is oriented x 3 and speaking appropriately.  APPEARANCE: Patient resting comfortably and in no acute distress. Patient is clean and well groomed, patient's clothing is properly fastened.  HEENT: wnl  SKIN: The skin is warm and dry. Patient has normal skin turgor and moist mucus membranes. Skin is intact; no bruising or breakdown noted.  MUSKULOSKELETAL: Patient is moving all extremities well, no obvious deformities noted. Pulses intact.   RESPIRATORY: Airway is open and patent. Respirations are spontaneous and non-labored with normal effort and rate, BBS=clear  CARDIAC: Patient has a normal rate and rhythm. 98 bpm  on cardiac monitor,No peripheral edema noted.   ABDOMEN: No distention noted. Bowel sounds active in all 4 quadrants. Soft and non-tender upon palpation. Bright red blood from the rectum. Pt also reports bright red blood during bms   NEUROLOGICAL: pupils **mm, PERRL. Facial expression is symmetrical. Hand grasps are equal bilaterally. Normal sensation in all extremities when touched with finger.

## 2022-11-29 NOTE — ANESTHESIA POSTPROCEDURE EVALUATION
Anesthesia Post Evaluation    Patient: Zion Vasquez    Procedure(s) Performed: Procedure(s) (LRB):  COLONOSCOPY (N/A)    Final Anesthesia Type: general      Patient location during evaluation: PACU  Patient participation: Yes- Able to Participate  Level of consciousness: awake and alert and oriented  Post-procedure vital signs: reviewed and stable  Pain management: adequate  Airway patency: patent    PONV status at discharge: No PONV  Anesthetic complications: no      Cardiovascular status: hemodynamically stable  Respiratory status: unassisted, spontaneous ventilation and room air  Hydration status: euvolemic  Follow-up not needed.          Vitals Value Taken Time   BP 88/53 11/29/22 1224   Temp 36.7 °C (98 °F) 11/29/22 1059   Pulse 82 11/29/22 1224   Resp 16 11/29/22 1224   SpO2 98 % 11/29/22 1224         Event Time   Out of Recovery 10:33:54         Pain/Shaji Score: No data recorded

## 2022-11-29 NOTE — ASSESSMENT & PLAN NOTE
Body mass index is 30.28 kg/m². Morbid obesity complicates all aspects of disease management from diagnostic modalities to treatment. Weight loss encouraged and health benefits explained to patient.

## 2022-11-29 NOTE — HPI
Zion Vasquez is an 86-year-old male with a history of GERD, hyperlipidemia, hypertension obesity including known history of diverticulosis, Martinez's esophagus, hiatal hernia and prior GI bleed (hospitalized on 06/22 and 11/28/22) presenting w/ c/o bright red bleeding per rectum and fatigue. Of note he was hospitalized 11/24-11/28 for 3 episodes of bright red bleeding per rectum without associated fatigue, shortness of breath, chest pain. He had a colonoscopy with GI performed with large amounts of bright red blood noted without clear lesions.  He had serial CBC is overnight that were stable.  He then reportedly had another large bloody bowel movement during his hospital stay with a drop in hemoglobin to 6.8, requiring 1 unit of PRBC. He had a CT abdomen w/o evidence of GIB. He now returns for rectal bleeding w/ associated generalized weakness, fatigue and mild SOB. He is currently taking Protonix and miralax daily. Denies headaches, lightheadedness, dizziness, syncope, n/v/d, abdominal pain, dysuria.    In ED, Pt afebrile, orthostatics positive. CBC w/ H/H 8/25.3, WBC 12.88. Cr 2.0 (~ BL 1.5-1.8). Initiated on GIB pathway. Given pantoprazole 40 mg IV.

## 2022-11-29 NOTE — Clinical Note
Diagnosis: Rectal bleeding [339447]   Future Attending Provider: MISHA KENT [[id number]]   Is the patient being sent to ED Observation?: No   Admitting Provider:: MISHA KENT [[id number]]   Special Needs:: No Special Needs [1]

## 2022-11-29 NOTE — ANESTHESIA PREPROCEDURE EVALUATION
[facility]  Anesthesia Pre-Operative Evaluation         Patient Name: Zion Vasquez  YOB: 1935  MRN: [id number]    SUBJECTIVE:     Pre-operative evaluation for Procedure(s) (LRB):  COLONOSCOPY (N/A)     11/29/2022    Zion Vasquez is a 86 y.o. male w/ a significant PMHx of GERD, hyperlipidemia, hypertension obesity including known history of diverticulosis, Martinez's esophagus, hiatal hernia and prior GI bleed (hospitalized on 06/22 and 11/28/22) admitted with rectal bleeding.    Patient now presents for the above procedure(s).      LDA:   Peripheral IV 18G R AC    Prev airway: None documented.    Drips: None documented.    Patient Active Problem List   Diagnosis    Obesity    Hypertension    Hyperlipemia    GERD (gastroesophageal reflux disease)    Stage 3a chronic kidney disease    History of prostate cancer    Diverticulosis of colon    Internal hemorrhoids    Hiatal hernia    History of Mratinez's esophagus    Combined arterial insufficiency and corporo-venous occlusive erectile dysfunction    Iron deficiency anemia secondary to inadequate dietary iron intake    Acute lower GI bleeding    CARYL (acute kidney injury)    Leukocytosis    Diverticulosis       Review of patient's allergies indicates:  No Known Allergies    Current Outpatient Medications:    Current Facility-Administered Medications:     acetaminophen tablet 650 mg, 650 mg, Oral, Q4H PRN, Shaw Padilla PA-C    albuterol-ipratropium 2.5 mg-0.5 mg/3 mL nebulizer solution 3 mL, 3 mL, Nebulization, Q6H PRN, Shaw Padilla PA-C    aluminum-magnesium hydroxide-simethicone 200-200-20 mg/5 mL suspension 30 mL, 30 mL, Oral, QID PRN, Shaw Padilla PA-C    atorvastatin tablet 10 mg, 10 mg, Oral, QHS, Shaw Padilla PA-C    dextrose 10% bolus 125 mL, 12.5 g, Intravenous, PRN, Shaw Padilla PA-C    dextrose 10% bolus 250 mL, 25 g, Intravenous, PRN, Shaw  RASHMI Padilla PA-C    glucagon (human recombinant) injection 1 mg, 1 mg, Intramuscular, PRN, Shaw Padilla PA-C    glucose chewable tablet 16 g, 16 g, Oral, PRN, Shaw Padilla PA-C    glucose chewable tablet 24 g, 24 g, Oral, PRN, Shaw Padilla PA-C    melatonin tablet 6 mg, 6 mg, Oral, Nightly PRN, Shaw Padilla PA-C    naloxone 0.4 mg/mL injection 0.02 mg, 0.02 mg, Intravenous, PRN, Shaw Padilla PA-C    ondansetron disintegrating tablet 8 mg, 8 mg, Oral, Q8H PRN, Shaw Padilla PA-C    pantoprazole injection 40 mg, 40 mg, Intravenous, BID, Shaw Padilla PA-C, 40 mg at 11/29/22 1220    polyethylene glycol packet 17 g, 17 g, Oral, Daily, Shaw Padilla PA-C    prochlorperazine injection Soln 5 mg, 5 mg, Intravenous, Q6H PRN, Shaw Padilla PA-C    simethicone chewable tablet 80 mg, 1 tablet, Oral, QID PRN, Shaw Padilla PA-C    sodium bicarbonate tablet 650 mg, 650 mg, Oral, BID, Shaw Padilla PA-C    Past Surgical History:   Procedure Laterality Date    COLONOSCOPY N/A 11/25/2022    Procedure: COLONOSCOPY;  Surgeon: Smith Alvarez MD;  Location: [address];  Service: Endoscopy;  Laterality: N/A;       Social History     Socioeconomic History    Marital status:    Tobacco Use    Smoking status: Never    Smokeless tobacco: Never   Substance and Sexual Activity    Alcohol use: Yes     Comment: occasionally    Drug use: Never       OBJECTIVE:     Vital Signs Range (Last 24H):  Temp:  [36.7 °C (98 °F)-36.8 °C (98.3 °F)]   Pulse:  []   Resp:  [14-20]   BP: ()/(53-85)   SpO2:  [95 %-98 %]       Significant Labs:  Lab Results   Component Value Date    WBC 9.24 11/29/2022    HGB 7.0 (L) 11/29/2022    HCT 22.2 (L) 11/29/2022     11/29/2022    CHOL 153 10/06/2022    TRIG 63 10/06/2022    HDL 59 10/06/2022    ALT 14 11/29/2022    AST 27 11/29/2022     11/29/2022    K 4.1 11/29/2022      11/29/2022    CREATININE 2.0 (H) 11/29/2022    BUN 17 11/29/2022    CO2 19 (L) 11/29/2022    PSA 0.01 09/29/2021    INR 1.0 11/24/2022    HGBA1C 5.5 09/29/2021       Diagnostic Studies: No relevant studies.    EKG:   Results for orders placed or performed during the hospital encounter of 11/29/22   EKG 12-lead    Collection Time: 11/29/22 10:25 AM    Narrative    Test Reason : K62.5,    Vent. Rate : 069 BPM     Atrial Rate : 069 BPM     P-R Int : 144 ms          QRS Dur : 106 ms      QT Int : 394 ms       P-R-T Axes : 061 -43 052 degrees     QTc Int : 422 ms    Normal sinus rhythm  Left axis deviation  Minimal voltage criteria for LVH, may be normal variant  Abnormal ECG  When compared with ECG of 24-NOV-2022 09:49,  No significant change was found  Confirmed by Jose MARROQUIN MD ([id number]) on 11/29/2022 12:09:26 PM    Referred By: DIANE   SELF           Confirmed By:Jose MARROQUIN MD       2D ECHO:  TTE:  No results found for this or any previous visit.    EDMUND:  No results found for this or any previous visit.    ASSESSMENT/PLAN:           Pre-op Assessment    I have reviewed the Patient Summary Reports.     I have reviewed the Nursing Notes. I have reviewed the NPO Status.   I have reviewed the Medications.     Review of Systems  Anesthesia Hx:  No problems with previous Anesthesia  History of prior surgery of interest to airway management or planning: Previous anesthesia: General  Denies Personal Hx of Anesthesia complications.   Social:  Non-Smoker    Cardiovascular:   Hypertension    Pulmonary:  Pulmonary Normal    Renal/:   Chronic Renal Disease    Hepatic/GI:   Hiatal Hernia, GERD    Neurological:  Neurology Normal    Endocrine:  Endocrine Normal        Physical Exam  General: Well nourished, Cooperative, Alert and Oriented    Airway:  Mallampati: II / I  Mouth Opening: Normal  TM Distance: Normal  Tongue: Normal  Neck ROM: Normal ROM    Dental:        Anesthesia Plan  Type of Anesthesia, risks & benefits  discussed:    Anesthesia Type: Gen ETT, Gen Natural Airway, MAC  Intra-op Monitoring Plan: Standard ASA Monitors  Post Op Pain Control Plan: multimodal analgesia and IV/PO Opioids PRN  Induction:  IV  Airway Plan: Direct, Post-Induction  Informed Consent: Informed consent signed with the Patient and all parties understand the risks and agree with anesthesia plan.  All questions answered.   ASA Score: 2  Day of Surgery Review of History & Physical: H&P Update referred to the surgeon/provider.    Ready For Surgery From Anesthesia Perspective.     .

## 2022-11-29 NOTE — ASSESSMENT & PLAN NOTE
Elevated BUN/Cr from baseline in setting of active bleed.    - recommend IVF resuscitation   - rest of care per primary

## 2022-11-29 NOTE — PHARMACY MED REC
"  Admission Medication History     The home medication history was taken by Mariela Mercado/Harjit Araujo.    You may go to "Admission" then "Reconcile Home Medications" tabs to review and/or act upon these items.     The home medication list has been updated by the Pharmacy department.   Please read ALL comments highlighted in yellow.   Please address this information as you see fit.    Feel free to contact us if you have any questions or require assistance.      Medications listed below were obtained from: Patient/family  PTA Medications   Medication Sig    acetaminophen (TYLENOL) 500 MG tablet   Take 1,000 mg by mouth every 6 (six) hours as needed for Pain.    amLODIPine (NORVASC) 10 MG tablet   Take 1 tablet (10 mg total) by mouth every morning.    atorvastatin (LIPITOR) 10 MG tablet   Take 1 tablet (10 mg total) by mouth every evening.    losartan (COZAAR) 50 MG tablet   Take 1 tablet (50 mg total) by mouth once daily.    pantoprazole (PROTONIX) 40 MG tablet   Take 1 tablet (40 mg total) by mouth 2 (two) times daily.    sodium bicarbonate 650 MG tablet TAKE 1 TABLET(650 MG) BY MOUTH TWICE DAILY  Strength: 650 mg      FLUAD QUAD 2020-21,65Y UP,,PF, 60 mcg (15 mcg x 4)/0.5 mL Syrg   ADM 0.5ML IM UTD    pneumoc 20-fabio conj-dip cr,PF, (PREVNAR-20, PF,) 0.5 mL Syrg injection   Inject 0.5 mLs into the muscle.    polyethylene glycol (GLYCOLAX) 17 gram/dose powder Dissolve one capful (17 g) in liquid and take by mouth daily as needed.           Harjit Araujo  EXT [id number]                .        "

## 2022-11-29 NOTE — ASSESSMENT & PLAN NOTE
Mild leukocytosis to 12.8k, likely reactive to acute GI bleed. No hemodynamic instability.     - continue to monitor CBC

## 2022-11-29 NOTE — ED PROVIDER NOTES
Encounter Date: 11/29/2022       History     Chief Complaint   Patient presents with    Rectal Bleeding     Discharge from hospital yesterday for same complaint. Reports bright red bleeding this AM with BM.      The history is provided by the patient and medical records.   Zion Vasquez is an 86-year-old male with a history of GERD, hyperlipidemia, hypertension obesity including known history of diverticulosis, Martinez's esophagus, hiatal hernia and prior GI bleed (hospitalized on 06/22 and 11/28/22) presenting to the ED for bright red bleeding per rectum.  He was seen yesterday for 3 episodes of bright red bleeding per rectum without associated fatigue, shortness of breath, chest pain.  Of note, he had a polyp removed in 2019.  He now returns for rectal bleeding.  He is currently taking Protonix daily.  He was admitted to observation for GI bleed.  He had a colonoscopy with GI performed with large amounts of bright red blood noted without clear lesions.  He had serial CBC is overnight that were stable.  He then reportedly had another large bloody bowel movement during his hospital stay with a drop in hemoglobin to 7.8.  He had a CT abdomen ordered.  He also required blood transfusion.  Today, his symptoms are associated with generalized weakness, with no chest pain but mild shortness of breath.  He is symptomatic from his GI bleed.    Review of patient's allergies indicates:  No Known Allergies  Past Medical History:   Diagnosis Date    Martinez esophagus     GERD (gastroesophageal reflux disease)     Hyperlipemia     Hypertension     Obesity      Past Surgical History:   Procedure Laterality Date    COLONOSCOPY N/A 11/25/2022    Procedure: COLONOSCOPY;  Surgeon: Smith Alvarez MD;  Location: [address];  Service: Endoscopy;  Laterality: N/A;     Family History   Problem Relation Age of Onset    Cancer Mother         ? ovarian    No Known Problems Father     No Known Problems Sister     No Known  Problems Brother     No Known Problems Sister      Social History     Tobacco Use    Smoking status: Never    Smokeless tobacco: Never   Substance Use Topics    Alcohol use: Yes     Comment: occasionally    Drug use: Never     Review of Systems   Constitutional:  Positive for fatigue. Negative for chills and fever.   HENT:  Negative for congestion and sore throat.    Eyes:  Negative for photophobia and visual disturbance.   Respiratory:  Negative for chest tightness and shortness of breath.    Cardiovascular:  Negative for chest pain and palpitations.   Gastrointestinal:  Positive for anal bleeding and blood in stool. Negative for abdominal distention, abdominal pain, nausea and vomiting.   Endocrine: Negative for polyphagia and polyuria.   Genitourinary:  Negative for flank pain and hematuria.   Musculoskeletal:  Negative for back pain, neck pain and neck stiffness.   Skin:  Negative for color change and wound.   Neurological:  Positive for weakness and light-headedness. Negative for dizziness, facial asymmetry and headaches.   Psychiatric/Behavioral:  Negative for confusion. The patient is not nervous/anxious.      Physical Exam     Initial Vitals [11/29/22 0917]   BP Pulse Resp Temp SpO2   110/85 102 18 98.3 °F (36.8 °C) 97 %      MAP       --         Physical Exam    Nursing note and vitals reviewed.    Gen/Constitutional: Interactive. No acute distress  Head: Normocephalic, Atraumatic  Neck: supple, no masses or LAD, no JVD  Eyes: PERRLA, conjunctiva clear; conjunctival pallor  Ears, Nose and Throat: No rhinorrhea or stridor.  Cardiac:  Tachycardic rate, Reg Rhythm, No murmur  Pulmonary: CTA Bilat, no wheezes, rhonchi, rales.  No increased work of breathing.  GI: Abdomen soft, non-tender, non-distended; no rebound or guarding  : No CVA tenderness.  Rectal:  Gross bright red bleeding per rectum  Musculoskeletal: Extremities warm, well perfused, no erythema, no edema  Skin: No rashes, cyanosis or  jaundice.  Neuro: Alert and Oriented x 3; No focal motor or sensory deficits.    Psych: Normal affect      ED Course   Procedures  Labs Reviewed   CBC W/ AUTO DIFFERENTIAL - Abnormal; Notable for the following components:       Result Value    WBC 12.88 (*)     RBC 3.15 (*)     Hemoglobin 8.0 (*)     Hematocrit 25.3 (*)     MCV 80 (*)     MCH 25.4 (*)     MCHC 31.6 (*)     RDW 18.0 (*)     Gran # (ANC) 10.5 (*)     Immature Grans (Abs) 0.07 (*)     Mono # 1.2 (*)     Gran % 81.4 (*)     Lymph % 8.4 (*)     All other components within normal limits   COMPREHENSIVE METABOLIC PANEL - Abnormal; Notable for the following components:    CO2 19 (*)     Glucose 135 (*)     Creatinine 2.0 (*)     Calcium 8.6 (*)     Albumin 3.0 (*)     Total Bilirubin 1.2 (*)     eGFR 32.3 (*)     All other components within normal limits   TYPE & SCREEN     EKG Readings: (Independently Interpreted)   Rate of 69, normal sinus rhythm, left axis deviation, , no ST elevations or depressions   ECG Results              EKG 12-lead (Final result)  Result time 11/29/22 12:09:37      Final result by Interface, Lab In [facility] (11/29/22 12:09:37)                   Narrative:    Test Reason : K62.5,    Vent. Rate : 069 BPM     Atrial Rate : 069 BPM     P-R Int : 144 ms          QRS Dur : 106 ms      QT Int : 394 ms       P-R-T Axes : 061 -43 052 degrees     QTc Int : 422 ms    Normal sinus rhythm  Left axis deviation  Minimal voltage criteria for LVH, may be normal variant  Abnormal ECG  When compared with ECG of 24-NOV-2022 09:49,  No significant change was found  Confirmed by Jose MARROQUIN MD ([id number]) on 11/29/2022 12:09:26 PM    Referred By: [email or web address]   SELF           Confirmed By:Jose MARROQUIN MD                                  Imaging Results    None          Medications   pantoprazole injection 40 mg (40 mg Intravenous Given 11/29/22 2134)   albuterol-ipratropium 2.5 mg-0.5 mg/3 mL nebulizer solution 3 mL (has no administration in time range)    melatonin tablet 6 mg (has no administration in time range)   ondansetron disintegrating tablet 8 mg (has no administration in time range)   prochlorperazine injection Soln 5 mg (has no administration in time range)   simethicone chewable tablet 80 mg (has no administration in time range)   aluminum-magnesium hydroxide-simethicone 200-200-20 mg/5 mL suspension 30 mL (has no administration in time range)   naloxone 0.4 mg/mL injection 0.02 mg (has no administration in time range)   glucose chewable tablet 16 g (has no administration in time range)   glucose chewable tablet 24 g (has no administration in time range)   glucagon (human recombinant) injection 1 mg (has no administration in time range)   dextrose 10% bolus 125 mL (has no administration in time range)   dextrose 10% bolus 250 mL (has no administration in time range)   acetaminophen tablet 650 mg (has no administration in time range)   polyethylene glycol packet 17 g (17 g Oral Not Given 11/29/22 1400)   sodium bicarbonate tablet 650 mg (650 mg Oral Given 11/29/22 2134)   atorvastatin tablet 10 mg (10 mg Oral Given 11/29/22 2134)   lactated ringers bolus 1,000 mL (0 mLs Intravenous Stopped 11/29/22 1555)   polyethylene glycol (GoLYTELY) solution (4,000 mLs Oral Given 11/29/22 2134)     Medical Decision Making:   History:   Old Medical Records: I decided to obtain old medical records.  Old Records Summarized: records from previous admission(s).       <> Summary of Records: He was admitted to observation for GI bleed.  He had a colonoscopy with GI performed with large amounts of bright red blood noted without clear lesions.  He had serial CBC is overnight that were stable.  He then reportedly had another large bloody bowel movement during his hospital stay with a drop in hemoglobin to 7.8.  He had a CT abdomen ordered.  He also required blood transfusion.    Initial Assessment:   Zion Vasquez is an 86-year-old male with a history of GERD,  hyperlipidemia, hypertension obesity including known history of diverticulosis, Martinez's esophagus, hiatal hernia and prior GI bleed (hospitalized on 06/22 and 11/28/22) presenting to the ED for bright red bleeding per rectum.  Differential Diagnosis:   Lower GI bleed, upper GI bleed, diverticulitis bleed, AVM, angiodysplasia, malignancy, acute blood loss anemia  Independently Interpreted Test(s):   I have ordered and independently interpreted EKG Reading(s) - see prior notes  Clinical Tests:   Lab Tests: Ordered and Reviewed  Medical Tests: Ordered and Reviewed  ED Management:  GI bleed workup started.  CBC with stable hemoglobin and new white count.  CMP demonstrating mild acidosis with worsening CARYL.  Patient hemodynamically stable while in the emergency department. Patient admitted to Hospital Medicine for further workup of his GI bleed  Other:   I have discussed this case with another health care provider.       <> Summary of the Discussion: Hospital medicine          Attending Attestation:   Physician Attestation Statement for Resident:  As the supervising MD   Physician Attestation Statement: I have personally seen and examined this patient.   I agree with the above history.  -:   As the supervising MD I agree with the above PE.     As the supervising MD I agree with the above treatment, course, plan, and disposition.                        I have reviewed and concur with the resident's history, physical, assessment, and plan.  I have personally interviewed and examined the patient at bedside.   I did supervise any and all procedures and was present for any critical portion, and was always immediately available for help and as a resource.     The above history physical, review of symptoms, HPI and physical exam reflect my independent interpretation and evaluation.    Complexity: High - level 5    Final diagnoses:  [K62.5] Rectal bleeding  [N17.9] CARYL (acute kidney injury) (Primary)     Cruz Arellano DO,  [facility]  Emergency Staff Physician   Dept of Emergency Medicine   [facility]  Spectralink: [phone]        Disclaimer: This note has been generated using voice-recognition software. There may be typographical errors that have been missed during proof-reading.                 Clinical Impression:   Final diagnoses:  [K62.5] Rectal bleeding  [N17.9] CARYL (acute kidney injury) (Primary)        ED Disposition Condition    Observation Stable               Cruz Arellano DO, [facility]  Emergency Staff Physician   Dept of Emergency Medicine   [facility]  Spectralink: [phone]        Disclaimer: This note has been generated using voice-recognition software. There may be typographical errors that have been missed during proof-reading.       Hernesto Gibbs MD  Resident  11/29/22 1443       Cruz Arellano DO  11/30/22 1124

## 2022-11-29 NOTE — ASSESSMENT & PLAN NOTE
CKD3  Patient with acute kidney injury likely due to IVVD/dehydration CARYL is currently stable. Labs reviewed- Renal function/electrolytes with Estimated Creatinine Clearance: 31.9 mL/min (A) (based on SCr of 2 mg/dL (H)). according to latest data. Monitor urine output and serial BMP and adjust therapy as needed. Avoid nephrotoxins and renally dose meds for GFR listed above.   - Cr 2.0 (~BL 1.5-1.8) on admission  - give 1L LR bolus

## 2022-11-29 NOTE — PLAN OF CARE
[facility] - Observation [facility]  Discharge Final Note    Primary Care Provider: Brigida Wall MD    Expected Discharge Date: 11/28/2022    Patient discharged to home via personal transportation.     Patient's bedside nurse and patient notified of the above.      Final Discharge Note (most recent)       Final Note - 11/29/22 0955          Final Note    Assessment Type Final Discharge Note (P)      Anticipated Discharge Disposition Home or Self Care (P)         Post-Acute Status    Post-Acute Authorization Other (P)      Other Status No Post-Acute Service Needs (P)                      Important Message from Medicare             Contact Info       [facility]    [address]   [facility] [id number]  [phone]       Next Steps: Follow up    Instructions: The nurse with the Gastroenterology office will contact you to schedule a hospital follow up visit. However, if you do not hear from them within 48 hours of discharge please contact the office to schedule your visit.            Future Appointments   Date Time Provider Department [place]   4/6/2023 11:00 AM LAB, [place] [facility]   4/13/2023 10:00 AM Brigida Wall MD [facility] scheduled post-discharge follow-up appointment and information added to AVS.     Sammi Valero LMSW  [facility]  Ext. [phone]

## 2022-11-29 NOTE — H&P (VIEW-ONLY)
[facility] - Emergency Dept  Gastroenterology  Consult Note    Patient Name: Zion Vasquez  MRN: [id number]  Admission Date: 11/29/2022  Hospital Length of Stay: 0 days  Code Status: Full Code   Attending Provider: Cali Dotson MD   Consulting Provider: Christine Rodríguez MD  Primary Care Physician: Brigida Wall MD  Principal Problem:Acute lower GI bleeding    Inpatient consult to Gastroenterology  Consult performed by: Christine Rodríguez MD  Consult ordered by: Shaw Padilla PA-C  Reason for consult: BRBPR, symptomatic anemia      Subjective:     HPI:  86 y.o. M w/ history of CKD III, HTN, obesity, diverticulosis, recurrent diverticular bleeds presenting to ED after recurrent episodes of BRBPR this morning. He was recently hospitalized from 11/26-11/28 for hematochezia with symptomatic anemia and underwent colonoscopy which showed significant amount of blood in the colon but no active bleeding sites. He was then discharged home last night after Hgb stabilized without any recurrent hematochezia until it returned today. Endorses fatigue and generalized weakness. He denies melena, hematemesis, nausea, vomiting or significant straining.  In the ER, labs were significant for a hemoglobin of 8.0 which is the same as his Hgb yesterday ~7.9, again with elevated BUN/Cr showing new CARYL similar to his previous presentation a few days ago. He is not on any blood thinners.         Past Medical History:   Diagnosis Date    GERD (gastroesophageal reflux disease)     Hyperlipemia     Hypertension     Obesity        Past Surgical History:   Procedure Laterality Date    COLONOSCOPY N/A 11/25/2022    Procedure: COLONOSCOPY;  Surgeon: Smith Alvarez MD;  Location: [address];  Service: Endoscopy;  Laterality: N/A;       Review of patient's allergies indicates:  No Known Allergies  Family History       Problem Relation (Age of Onset)    Cancer Mother    No Known Problems Father, Sister, Brother, Sister          Tobacco Use     Smoking status: Never    Smokeless tobacco: Never   Substance and Sexual Activity    Alcohol use: Yes     Comment: occasionally    Drug use: Never    Sexual activity: Not on file     Review of Systems   Constitutional:  Positive for fatigue. Negative for chills and fever.   HENT:  Negative for nosebleeds and sore throat.    Eyes:  Negative for pain and visual disturbance.   Respiratory:  Negative for cough and shortness of breath.    Cardiovascular:  Negative for chest pain and palpitations.   Gastrointestinal:  Positive for blood in stool. Negative for abdominal pain, constipation, diarrhea, nausea, rectal pain and vomiting.   Genitourinary:  Negative for difficulty urinating and dysuria.   Musculoskeletal:  Negative for myalgias and neck stiffness.   Skin:  Negative for rash and wound.   Neurological:  Positive for weakness and light-headedness. Negative for dizziness and syncope.   Objective:     Vital Signs (Most Recent):  Temp: 98 °F (36.7 °C) (11/29/22 1059)  Pulse: 82 (11/29/22 1224)  Resp: 16 (11/29/22 1224)  BP: (!) 88/53 (11/29/22 1224)  SpO2: 98 % (11/29/22 1224)   Vital Signs (24h Range):  Temp:  [98 °F (36.7 °C)-98.3 °F (36.8 °C)] 98 °F (36.7 °C)  Pulse:  [] 82  Resp:  [14-18] 16  SpO2:  [95 %-98 %] 98 %  BP: ()/(53-85) 88/53     Weight: 95.7 kg (211 lb) (11/29/22 0917)  Body mass index is 30.28 kg/m².    No intake or output data in the 24 hours ending 11/29/22 1437    Lines/Drains/Airways       Peripheral Intravenous Line  Duration                  Peripheral IV - Single Lumen 11/29/22 1031 18 G Right Antecubital <1 day                    Physical Exam  Vitals and nursing note reviewed.   Constitutional:       General: He is not in acute distress.     Appearance: He is well-developed. He is not ill-appearing or diaphoretic.   HENT:      Head: Normocephalic and atraumatic.      Nose: No congestion.      Mouth/Throat:      Pharynx: No oropharyngeal exudate.   Eyes:      General: No scleral  icterus.        Right eye: No discharge.         Left eye: No discharge.      Pupils: Pupils are equal, round, and reactive to light.      Comments: Conjunctival pallor.    Cardiovascular:      Rate and Rhythm: Normal rate and regular rhythm.   Pulmonary:      Effort: Pulmonary effort is normal. No respiratory distress.      Breath sounds: No wheezing.   Abdominal:      General: There is distension (mildly distended).      Palpations: Abdomen is soft.      Tenderness: There is no abdominal tenderness. There is no guarding or rebound.   Genitourinary:     Comments:  Bright red blood on rectal exam.  External: no hemorrhoid or fissure noted   Musculoskeletal:         General: No tenderness. Normal range of motion.      Cervical back: Normal range of motion and neck supple.      Right lower leg: No edema.      Left lower leg: No edema.   Lymphadenopathy:      Cervical: No cervical adenopathy.   Skin:     General: Skin is warm and dry.      Capillary Refill: Capillary refill takes less than 2 seconds.      Coloration: Skin is pale.      Findings: No rash.   Neurological:      Mental Status: He is alert and oriented to person, place, and time. Mental status is at baseline.   Psychiatric:         Behavior: Behavior normal.         Thought Content: Thought content normal.       Significant Labs:  CBC:   Recent Labs   Lab 11/27/22 2006 11/28/22  0825 11/29/22  1020   WBC 7.69 7.44 12.88*   HGB 6.4* 7.7* 8.0*   HCT 20.1* 24.5* 25.3*    195 218     CMP:   Recent Labs   Lab 11/29/22  1020   *   CALCIUM 8.6*   ALBUMIN 3.0*   PROT 6.1      K 4.1   CO2 19*      BUN 17   CREATININE 2.0*   ALKPHOS 65   ALT 14   AST 27   BILITOT 1.2*     All pertinent lab results from the last 24 hours have been reviewed.    Significant Imaging:  Imaging results within the past 24 hours have been reviewed.    Assessment/Plan:     * Acute lower GI bleeding  86 y.o. M w/ history of CKD III, diverticulosis, recurrent  diverticular bleeds presenting to ED after recurrent episodes of BRBPR +fatigue and generalized weakness this morning; d/paul yesterday after admission for hematochezia with symptomatic anemia + CARYL on CKD.  Colonoscopy during that admission showed significant amount of blood in the colon with diffuse diverticulosis but no active bleeding sites.    Hgb yesterday ~7.7->8.0 again with elevated BUN/Cr showing new CARYL similar to his previous presentation a few days ago. He is not on any blood thinners.     - due to CARYL, would defer CTA at this time unless risks outweighs benefits (CARYL previously improved with IVFs last admission, was able to tolerate CTA)  - tentatively plan for colonoscopy tomorrow on 11/30  - Clear liquid diet now and NPO at midnight   - Golytely prep to start at 6pm, to be completed within 4 hours if possible  - Monitor Hgb q8hrs  - Transfuse to keep Hgb >7, plts >50  - Hold anticoagulants if safe to do so per primary team  - If becomes hemodynamically unstable with associated large volume hematochezia, recommend STAT CTA and IR embolization if positive      Diverticulosis  See acute GI bleed.     Leukocytosis  Mild leukocytosis to 12.8k, likely reactive to acute GI bleed. No hemodynamic instability.     - continue to monitor CBC    CARYL (acute kidney injury)  Elevated BUN/Cr from baseline in setting of active bleed.    - recommend IVF resuscitation   - rest of care per primary      Thank you for your consult. I will follow-up with patient. Please contact us if you have any additional questions.    Christine Rodríguez MD  Gastroenterology  [facility] - Emergency Dept

## 2022-11-29 NOTE — ASSESSMENT & PLAN NOTE
Internal hemorrhoids  Diverticulosis of colon    - GI Bleed pathway initiated  - orthostatics positive   - Hgb 9.5 (~BL 10)  - Last colonoscopy 2019   - Serial H/H's q8  - transfuse Hgb <7  - Maintain IV access with 2 large bore IVs  - Intravascular resuscitation/support with IVFs   - Protonix 40mg IV BID  - Discontinue all NSAIDs and Heparin products  - will correct any coagulopathy with platelets and FFP to a goal of platelets >50K and INR <2.0  - GI consulted, appreciate recs  - 11/27 CTA abdomen showed no evidence of GI bleed. Diverticulosis coli without evidence of diverticulitis. Cholelithiasis and bilateral renal hypodensities, likely cysts.   - CLD

## 2022-11-29 NOTE — CONSULTS
[facility] Emergency Dept  Gastroenterology  Consult Note    Patient Name: Zion Vasquez  MRN: [id number]  Admission Date: 11/29/2022  Hospital Length of Stay: 0 days  Code Status: Full Code   Attending Provider: Cali Dotson MD   Consulting Provider: Christine Rodríguez MD  Primary Care Physician: Brigida Wall MD  Principal Problem:Acute lower GI bleeding    Inpatient consult to Gastroenterology  Consult performed by: Christine Rodríguez MD  Consult ordered by: Shaw Padilla PA-C  Reason for consult: BRBPR, symptomatic anemia      Subjective:     HPI:  86 y.o. M w/ history of CKD III, HTN, obesity, diverticulosis, recurrent diverticular bleeds presenting to ED after recurrent episodes of BRBPR this morning. He was recently hospitalized from 11/26-11/28 for hematochezia with symptomatic anemia and underwent colonoscopy which showed significant amount of blood in the colon but no active bleeding sites. He was then discharged home last night after Hgb stabilized without any recurrent hematochezia until it returned today. Endorses fatigue and generalized weakness. He denies melena, hematemesis, nausea, vomiting or significant straining.  In the ER, labs were significant for a hemoglobin of 8.0 which is the same as his Hgb yesterday ~7.9, again with elevated BUN/Cr showing new CARYL similar to his previous presentation a few days ago. He is not on any blood thinners.         Past Medical History:   Diagnosis Date    GERD (gastroesophageal reflux disease)     Hyperlipemia     Hypertension     Obesity        Past Surgical History:   Procedure Laterality Date    COLONOSCOPY N/A 11/25/2022    Procedure: COLONOSCOPY;  Surgeon: Smith Alvarez MD;  Location: [address];  Service: Endoscopy;  Laterality: N/A;       Review of patient's allergies indicates:  No Known Allergies  Family History       Problem Relation (Age of Onset)    Cancer Mother    No Known Problems Father, Sister, Brother, Sister          Tobacco Use     Smoking status: Never    Smokeless tobacco: Never   Substance and Sexual Activity    Alcohol use: Yes     Comment: occasionally    Drug use: Never    Sexual activity: Not on file     Review of Systems   Constitutional:  Positive for fatigue. Negative for chills and fever.   HENT:  Negative for nosebleeds and sore throat.    Eyes:  Negative for pain and visual disturbance.   Respiratory:  Negative for cough and shortness of breath.    Cardiovascular:  Negative for chest pain and palpitations.   Gastrointestinal:  Positive for blood in stool. Negative for abdominal pain, constipation, diarrhea, nausea, rectal pain and vomiting.   Genitourinary:  Negative for difficulty urinating and dysuria.   Musculoskeletal:  Negative for myalgias and neck stiffness.   Skin:  Negative for rash and wound.   Neurological:  Positive for weakness and light-headedness. Negative for dizziness and syncope.   Objective:     Vital Signs (Most Recent):  Temp: 98 °F (36.7 °C) (11/29/22 1059)  Pulse: 82 (11/29/22 1224)  Resp: 16 (11/29/22 1224)  BP: (!) 88/53 (11/29/22 1224)  SpO2: 98 % (11/29/22 1224)   Vital Signs (24h Range):  Temp:  [98 °F (36.7 °C)-98.3 °F (36.8 °C)] 98 °F (36.7 °C)  Pulse:  [] 82  Resp:  [14-18] 16  SpO2:  [95 %-98 %] 98 %  BP: ()/(53-85) 88/53     Weight: 95.7 kg (211 lb) (11/29/22 0917)  Body mass index is 30.28 kg/m².    No intake or output data in the 24 hours ending 11/29/22 1437    Lines/Drains/Airways       Peripheral Intravenous Line  Duration                  Peripheral IV - Single Lumen 11/29/22 1031 18 G Right Antecubital <1 day                    Physical Exam  Vitals and nursing note reviewed.   Constitutional:       General: He is not in acute distress.     Appearance: He is well-developed. He is not ill-appearing or diaphoretic.   HENT:      Head: Normocephalic and atraumatic.      Nose: No congestion.      Mouth/Throat:      Pharynx: No oropharyngeal exudate.   Eyes:      General: No scleral  icterus.        Right eye: No discharge.         Left eye: No discharge.      Pupils: Pupils are equal, round, and reactive to light.      Comments: Conjunctival pallor.    Cardiovascular:      Rate and Rhythm: Normal rate and regular rhythm.   Pulmonary:      Effort: Pulmonary effort is normal. No respiratory distress.      Breath sounds: No wheezing.   Abdominal:      General: There is distension (mildly distended).      Palpations: Abdomen is soft.      Tenderness: There is no abdominal tenderness. There is no guarding or rebound.   Genitourinary:     Comments:  Bright red blood on rectal exam.  External: no hemorrhoid or fissure noted   Musculoskeletal:         General: No tenderness. Normal range of motion.      Cervical back: Normal range of motion and neck supple.      Right lower leg: No edema.      Left lower leg: No edema.   Lymphadenopathy:      Cervical: No cervical adenopathy.   Skin:     General: Skin is warm and dry.      Capillary Refill: Capillary refill takes less than 2 seconds.      Coloration: Skin is pale.      Findings: No rash.   Neurological:      Mental Status: He is alert and oriented to person, place, and time. Mental status is at baseline.   Psychiatric:         Behavior: Behavior normal.         Thought Content: Thought content normal.       Significant Labs:  CBC:   Recent Labs   Lab 11/27/22 2006 11/28/22  0825 11/29/22  1020   WBC 7.69 7.44 12.88*   HGB 6.4* 7.7* 8.0*   HCT 20.1* 24.5* 25.3*    195 218     CMP:   Recent Labs   Lab 11/29/22  1020   *   CALCIUM 8.6*   ALBUMIN 3.0*   PROT 6.1      K 4.1   CO2 19*      BUN 17   CREATININE 2.0*   ALKPHOS 65   ALT 14   AST 27   BILITOT 1.2*     All pertinent lab results from the last 24 hours have been reviewed.    Significant Imaging:  Imaging results within the past 24 hours have been reviewed.    Assessment/Plan:     * Acute lower GI bleeding  86 y.o. M w/ history of CKD III, diverticulosis, recurrent  diverticular bleeds presenting to ED after recurrent episodes of BRBPR +fatigue and generalized weakness this morning; d/paul yesterday after admission for hematochezia with symptomatic anemia + CARYL on CKD.  Colonoscopy during that admission showed significant amount of blood in the colon with diffuse diverticulosis but no active bleeding sites.    Hgb yesterday ~7.7->8.0 again with elevated BUN/Cr showing new CARYL similar to his previous presentation a few days ago. He is not on any blood thinners.     - due to CARYL, would defer CTA at this time unless risks outweighs benefits (CARYL previously improved with IVFs last admission, was able to tolerate CTA)  - tentatively plan for colonoscopy tomorrow on 11/30  - Clear liquid diet now and NPO at midnight   - Golytely prep to start at 6pm, to be completed within 4 hours if possible  - Monitor Hgb q8hrs  - Transfuse to keep Hgb >7, plts >50  - Hold anticoagulants if safe to do so per primary team  - If becomes hemodynamically unstable with associated large volume hematochezia, recommend STAT CTA and IR embolization if positive      Diverticulosis  See acute GI bleed.     Leukocytosis  Mild leukocytosis to 12.8k, likely reactive to acute GI bleed. No hemodynamic instability.     - continue to monitor CBC    CARYL (acute kidney injury)  Elevated BUN/Cr from baseline in setting of active bleed.    - recommend IVF resuscitation   - rest of care per primary      Thank you for your consult. I will follow-up with patient. Please contact us if you have any additional questions.    Christine Rodríguez MD  Gastroenterology  [facility] Dept

## 2022-11-29 NOTE — SUBJECTIVE & OBJECTIVE
Past Medical History:   Diagnosis Date    GERD (gastroesophageal reflux disease)     Hyperlipemia     Hypertension     Obesity        Past Surgical History:   Procedure Laterality Date    COLONOSCOPY N/A 11/25/2022    Procedure: COLONOSCOPY;  Surgeon: Smith Alvarez MD;  Location: [address];  Service: Endoscopy;  Laterality: N/A;       Review of patient's allergies indicates:  No Known Allergies  Family History       Problem Relation (Age of Onset)    Cancer Mother    No Known Problems Father, Sister, Brother, Sister          Tobacco Use    Smoking status: Never    Smokeless tobacco: Never   Substance and Sexual Activity    Alcohol use: Yes     Comment: occasionally    Drug use: Never    Sexual activity: Not on file     Review of Systems   Constitutional:  Positive for fatigue. Negative for chills and fever.   HENT:  Negative for nosebleeds and sore throat.    Eyes:  Negative for pain and visual disturbance.   Respiratory:  Negative for cough and shortness of breath.    Cardiovascular:  Negative for chest pain and palpitations.   Gastrointestinal:  Positive for blood in stool. Negative for abdominal pain, constipation, diarrhea, nausea, rectal pain and vomiting.   Genitourinary:  Negative for difficulty urinating and dysuria.   Musculoskeletal:  Negative for myalgias and neck stiffness.   Skin:  Negative for rash and wound.   Neurological:  Positive for weakness and light-headedness. Negative for dizziness and syncope.   Objective:     Vital Signs (Most Recent):  Temp: 98 °F (36.7 °C) (11/29/22 1059)  Pulse: 82 (11/29/22 1224)  Resp: 16 (11/29/22 1224)  BP: (!) 88/53 (11/29/22 1224)  SpO2: 98 % (11/29/22 1224)   Vital Signs (24h Range):  Temp:  [98 °F (36.7 °C)-98.3 °F (36.8 °C)] 98 °F (36.7 °C)  Pulse:  [] 82  Resp:  [14-18] 16  SpO2:  [95 %-98 %] 98 %  BP: ()/(53-85) 88/53     Weight: 95.7 kg (211 lb) (11/29/22 0917)  Body mass index is 30.28 kg/m².    No intake or output data in the 24  hours ending 11/29/22 1437    Lines/Drains/Airways       Peripheral Intravenous Line  Duration                  Peripheral IV - Single Lumen 11/29/22 1031 18 G Right Antecubital <1 day                    Physical Exam  Vitals and nursing note reviewed.   Constitutional:       General: He is not in acute distress.     Appearance: He is well-developed. He is not ill-appearing or diaphoretic.   HENT:      Head: Normocephalic and atraumatic.      Nose: No congestion.      Mouth/Throat:      Pharynx: No oropharyngeal exudate.   Eyes:      General: No scleral icterus.        Right eye: No discharge.         Left eye: No discharge.      Pupils: Pupils are equal, round, and reactive to light.      Comments: Conjunctival pallor.    Cardiovascular:      Rate and Rhythm: Normal rate and regular rhythm.   Pulmonary:      Effort: Pulmonary effort is normal. No respiratory distress.      Breath sounds: No wheezing.   Abdominal:      General: There is distension (mildly distended).      Palpations: Abdomen is soft.      Tenderness: There is no abdominal tenderness. There is no guarding or rebound.   Genitourinary:     Comments:  Bright red blood on rectal exam.  External: no hemorrhoid or fissure noted   Musculoskeletal:         General: No tenderness. Normal range of motion.      Cervical back: Normal range of motion and neck supple.      Right lower leg: No edema.      Left lower leg: No edema.   Lymphadenopathy:      Cervical: No cervical adenopathy.   Skin:     General: Skin is warm and dry.      Capillary Refill: Capillary refill takes less than 2 seconds.      Coloration: Skin is pale.      Findings: No rash.   Neurological:      Mental Status: He is alert and oriented to person, place, and time. Mental status is at baseline.   Psychiatric:         Behavior: Behavior normal.         Thought Content: Thought content normal.       Significant Labs:  CBC:   Recent Labs   Lab 11/27/22 2006 11/28/22  0825 11/29/22  1020   WBC  7.69 7.44 12.88*   HGB 6.4* 7.7* 8.0*   HCT 20.1* 24.5* 25.3*    195 218     CMP:   Recent Labs   Lab 11/29/22  1020   *   CALCIUM 8.6*   ALBUMIN 3.0*   PROT 6.1      K 4.1   CO2 19*      BUN 17   CREATININE 2.0*   ALKPHOS 65   ALT 14   AST 27   BILITOT 1.2*     All pertinent lab results from the last 24 hours have been reviewed.    Significant Imaging:  Imaging results within the past 24 hours have been reviewed.

## 2022-11-30 ENCOUNTER — ANESTHESIA (OUTPATIENT)
Dept: ENDOSCOPY | Facility: HOSPITAL | Age: 87
DRG: 378 | End: 2022-11-30
Payer: COMMERCIAL

## 2022-11-30 LAB
ANION GAP SERPL CALC-SCNC: 13 MMOL/L (ref 8–16)
BASOPHILS # BLD AUTO: 0.01 K/UL (ref 0–0.2)
BASOPHILS # BLD AUTO: 0.02 K/UL (ref 0–0.2)
BASOPHILS NFR BLD: 0.1 % (ref 0–1.9)
BASOPHILS NFR BLD: 0.2 % (ref 0–1.9)
BUN SERPL-MCNC: 19 MG/DL (ref 8–23)
CALCIUM SERPL-MCNC: 8.5 MG/DL (ref 8.7–10.5)
CHLORIDE SERPL-SCNC: 108 MMOL/L (ref 95–110)
CO2 SERPL-SCNC: 20 MMOL/L (ref 23–29)
CREAT SERPL-MCNC: 1.8 MG/DL (ref 0.5–1.4)
DIFFERENTIAL METHOD: ABNORMAL
DIFFERENTIAL METHOD: ABNORMAL
EOSINOPHIL # BLD AUTO: 0 K/UL (ref 0–0.5)
EOSINOPHIL # BLD AUTO: 0 K/UL (ref 0–0.5)
EOSINOPHIL NFR BLD: 0 % (ref 0–8)
EOSINOPHIL NFR BLD: 0.1 % (ref 0–8)
ERYTHROCYTE [DISTWIDTH] IN BLOOD BY AUTOMATED COUNT: 17.9 % (ref 11.5–14.5)
ERYTHROCYTE [DISTWIDTH] IN BLOOD BY AUTOMATED COUNT: 18.3 % (ref 11.5–14.5)
EST. GFR  (NO RACE VARIABLE): 36.7 ML/MIN/1.73 M^2
GLUCOSE SERPL-MCNC: 100 MG/DL (ref 70–110)
HCT VFR BLD AUTO: 21.5 % (ref 40–54)
HCT VFR BLD AUTO: 22.3 % (ref 40–54)
HGB BLD-MCNC: 6.7 G/DL (ref 14–18)
HGB BLD-MCNC: 7.1 G/DL (ref 14–18)
IMM GRANULOCYTES # BLD AUTO: 0.04 K/UL (ref 0–0.04)
IMM GRANULOCYTES # BLD AUTO: 0.04 K/UL (ref 0–0.04)
IMM GRANULOCYTES NFR BLD AUTO: 0.3 % (ref 0–0.5)
IMM GRANULOCYTES NFR BLD AUTO: 0.3 % (ref 0–0.5)
LYMPHOCYTES # BLD AUTO: 0.5 K/UL (ref 1–4.8)
LYMPHOCYTES # BLD AUTO: 1.5 K/UL (ref 1–4.8)
LYMPHOCYTES NFR BLD: 12.3 % (ref 18–48)
LYMPHOCYTES NFR BLD: 3.8 % (ref 18–48)
MCH RBC QN AUTO: 24.9 PG (ref 27–31)
MCH RBC QN AUTO: 24.9 PG (ref 27–31)
MCHC RBC AUTO-ENTMCNC: 31.2 G/DL (ref 32–36)
MCHC RBC AUTO-ENTMCNC: 31.8 G/DL (ref 32–36)
MCV RBC AUTO: 78 FL (ref 82–98)
MCV RBC AUTO: 80 FL (ref 82–98)
MONOCYTES # BLD AUTO: 0.9 K/UL (ref 0.3–1)
MONOCYTES # BLD AUTO: 1.8 K/UL (ref 0.3–1)
MONOCYTES NFR BLD: 15.3 % (ref 4–15)
MONOCYTES NFR BLD: 7 % (ref 4–15)
NEUTROPHILS # BLD AUTO: 11 K/UL (ref 1.8–7.7)
NEUTROPHILS # BLD AUTO: 8.5 K/UL (ref 1.8–7.7)
NEUTROPHILS NFR BLD: 71.8 % (ref 38–73)
NEUTROPHILS NFR BLD: 88.8 % (ref 38–73)
NRBC BLD-RTO: 0 /100 WBC
NRBC BLD-RTO: 0 /100 WBC
PLATELET # BLD AUTO: 178 K/UL (ref 150–450)
PLATELET # BLD AUTO: 209 K/UL (ref 150–450)
PMV BLD AUTO: 10.2 FL (ref 9.2–12.9)
PMV BLD AUTO: 9.9 FL (ref 9.2–12.9)
POTASSIUM SERPL-SCNC: 3.8 MMOL/L (ref 3.5–5.1)
RBC # BLD AUTO: 2.69 M/UL (ref 4.6–6.2)
RBC # BLD AUTO: 2.85 M/UL (ref 4.6–6.2)
SODIUM SERPL-SCNC: 141 MMOL/L (ref 136–145)
WBC # BLD AUTO: 11.78 K/UL (ref 3.9–12.7)
WBC # BLD AUTO: 12.4 K/UL (ref 3.9–12.7)

## 2022-11-30 PROCEDURE — 45378 DIAGNOSTIC COLONOSCOPY: CPT | Mod: GC | Performed by: INTERNAL MEDICINE

## 2022-11-30 PROCEDURE — 37000009 HC ANESTHESIA EA ADD 15 MINS: Performed by: INTERNAL MEDICINE

## 2022-11-30 PROCEDURE — 25000003 PHARM REV CODE 250: Performed by: PHYSICIAN ASSISTANT

## 2022-11-30 PROCEDURE — G0378 HOSPITAL OBSERVATION PER HR: HCPCS

## 2022-11-30 PROCEDURE — 99226 PR SUBSEQUENT OBSERVATION CARE,LEVEL III: ICD-10-PCS | Mod: ,,, | Performed by: PHYSICIAN ASSISTANT

## 2022-11-30 PROCEDURE — 25000003 PHARM REV CODE 250: Performed by: NURSE ANESTHETIST, CERTIFIED REGISTERED

## 2022-11-30 PROCEDURE — P9016 RBC LEUKOCYTES REDUCED: HCPCS

## 2022-11-30 PROCEDURE — 63600175 PHARM REV CODE 636 W HCPCS: Performed by: NURSE ANESTHETIST, CERTIFIED REGISTERED

## 2022-11-30 PROCEDURE — 36430 TRANSFUSION BLD/BLD COMPNT: CPT

## 2022-11-30 PROCEDURE — D9220A PRA ANESTHESIA: Mod: CRNA,,, | Performed by: NURSE ANESTHETIST, CERTIFIED REGISTERED

## 2022-11-30 PROCEDURE — 99226 PR SUBSEQUENT OBSERVATION CARE,LEVEL III: CPT | Mod: ,,, | Performed by: PHYSICIAN ASSISTANT

## 2022-11-30 PROCEDURE — 36415 COLL VENOUS BLD VENIPUNCTURE: CPT | Performed by: PHYSICIAN ASSISTANT

## 2022-11-30 PROCEDURE — D9220A PRA ANESTHESIA: ICD-10-PCS | Mod: ANES,,, | Performed by: ANESTHESIOLOGY

## 2022-11-30 PROCEDURE — D9220A PRA ANESTHESIA: ICD-10-PCS | Mod: CRNA,,, | Performed by: NURSE ANESTHETIST, CERTIFIED REGISTERED

## 2022-11-30 PROCEDURE — 94761 N-INVAS EAR/PLS OXIMETRY MLT: CPT

## 2022-11-30 PROCEDURE — 80048 BASIC METABOLIC PNL TOTAL CA: CPT | Performed by: PHYSICIAN ASSISTANT

## 2022-11-30 PROCEDURE — C9113 INJ PANTOPRAZOLE SODIUM, VIA: HCPCS | Performed by: PHYSICIAN ASSISTANT

## 2022-11-30 PROCEDURE — D9220A PRA ANESTHESIA: Mod: ANES,,, | Performed by: ANESTHESIOLOGY

## 2022-11-30 PROCEDURE — 37000008 HC ANESTHESIA 1ST 15 MINUTES: Performed by: INTERNAL MEDICINE

## 2022-11-30 PROCEDURE — 45378 PR COLONOSCOPY,DIAGNOSTIC: ICD-10-PCS | Mod: ,,, | Performed by: INTERNAL MEDICINE

## 2022-11-30 PROCEDURE — 63600175 PHARM REV CODE 636 W HCPCS: Performed by: PHYSICIAN ASSISTANT

## 2022-11-30 PROCEDURE — 85025 COMPLETE CBC W/AUTO DIFF WBC: CPT | Mod: 91 | Performed by: PHYSICIAN ASSISTANT

## 2022-11-30 PROCEDURE — 45378 DIAGNOSTIC COLONOSCOPY: CPT | Mod: ,,, | Performed by: INTERNAL MEDICINE

## 2022-11-30 RX ORDER — SODIUM CHLORIDE 0.9 % (FLUSH) 0.9 %
3 SYRINGE (ML) INJECTION
Status: DISCONTINUED | OUTPATIENT
Start: 2022-11-30 | End: 2022-11-30 | Stop reason: HOSPADM

## 2022-11-30 RX ORDER — HYDROCODONE BITARTRATE AND ACETAMINOPHEN 500; 5 MG/1; MG/1
TABLET ORAL
Status: DISCONTINUED | OUTPATIENT
Start: 2022-11-30 | End: 2022-12-08 | Stop reason: HOSPADM

## 2022-11-30 RX ORDER — ONDANSETRON 2 MG/ML
4 INJECTION INTRAMUSCULAR; INTRAVENOUS ONCE AS NEEDED
Status: DISCONTINUED | OUTPATIENT
Start: 2022-11-30 | End: 2022-11-30 | Stop reason: HOSPADM

## 2022-11-30 RX ORDER — HYDROMORPHONE HYDROCHLORIDE 1 MG/ML
0.2 INJECTION, SOLUTION INTRAMUSCULAR; INTRAVENOUS; SUBCUTANEOUS EVERY 5 MIN PRN
Status: DISCONTINUED | OUTPATIENT
Start: 2022-11-30 | End: 2022-11-30 | Stop reason: HOSPADM

## 2022-11-30 RX ORDER — PROPOFOL 10 MG/ML
VIAL (ML) INTRAVENOUS CONTINUOUS PRN
Status: DISCONTINUED | OUTPATIENT
Start: 2022-11-30 | End: 2022-11-30

## 2022-11-30 RX ORDER — HALOPERIDOL 5 MG/ML
0.5 INJECTION INTRAMUSCULAR EVERY 10 MIN PRN
Status: DISCONTINUED | OUTPATIENT
Start: 2022-11-30 | End: 2022-11-30 | Stop reason: HOSPADM

## 2022-11-30 RX ORDER — PROPOFOL 10 MG/ML
VIAL (ML) INTRAVENOUS
Status: DISCONTINUED | OUTPATIENT
Start: 2022-11-30 | End: 2022-11-30

## 2022-11-30 RX ORDER — LIDOCAINE HYDROCHLORIDE 20 MG/ML
INJECTION INTRAVENOUS
Status: DISCONTINUED | OUTPATIENT
Start: 2022-11-30 | End: 2022-11-30

## 2022-11-30 RX ORDER — SODIUM CHLORIDE 9 MG/ML
INJECTION, SOLUTION INTRAVENOUS CONTINUOUS
Status: DISCONTINUED | OUTPATIENT
Start: 2022-11-30 | End: 2022-12-08 | Stop reason: HOSPADM

## 2022-11-30 RX ADMIN — Medication 125 MCG/KG/MIN: at 11:11

## 2022-11-30 RX ADMIN — SODIUM BICARBONATE 650 MG TABLET 650 MG: at 09:11

## 2022-11-30 RX ADMIN — LIDOCAINE HYDROCHLORIDE 50 MG: 20 INJECTION INTRAVENOUS at 11:11

## 2022-11-30 RX ADMIN — SODIUM BICARBONATE 650 MG TABLET 650 MG: at 03:11

## 2022-11-30 RX ADMIN — PROPOFOL 50 MG: 10 INJECTION, EMULSION INTRAVENOUS at 11:11

## 2022-11-30 RX ADMIN — SODIUM CHLORIDE: 9 INJECTION, SOLUTION INTRAVENOUS at 11:11

## 2022-11-30 RX ADMIN — PANTOPRAZOLE SODIUM 40 MG: 40 INJECTION, POWDER, FOR SOLUTION INTRAVENOUS at 09:11

## 2022-11-30 RX ADMIN — ATORVASTATIN CALCIUM 10 MG: 10 TABLET, FILM COATED ORAL at 09:11

## 2022-11-30 RX ADMIN — PANTOPRAZOLE SODIUM 40 MG: 40 INJECTION, POWDER, FOR SOLUTION INTRAVENOUS at 03:11

## 2022-11-30 NOTE — PROGRESS NOTES
[facility] Observation [address] [place] [address] Medicine  Progress Note    Patient Name: Zion Vasquez  MRN: [id number]  Patient Class: OP- Observation   Admission Date: 11/29/2022  Length of Stay: 0 days  Attending Physician: Cali Dotson MD  Primary Care Provider: Brigida Wall MD        Subjective:     Principal Problem:Acute lower GI bleeding        HPI:  Zion Vasquez is an 86-year-old male with a history of GERD, hyperlipidemia, hypertension obesity including known history of diverticulosis, Martinez's esophagus, hiatal hernia and prior GI bleed (hospitalized on 06/22 and 11/28/22) presenting w/ c/o bright red bleeding per rectum and fatigue. Of note he was hospitalized 11/24-11/28 for 3 episodes of bright red bleeding per rectum without associated fatigue, shortness of breath, chest pain. He had a colonoscopy with GI performed with large amounts of bright red blood noted without clear lesions.  He had serial CBC is overnight that were stable.  He then reportedly had another large bloody bowel movement during his hospital stay with a drop in hemoglobin to 6.8, requiring 1 unit of PRBC. He had a CT abdomen w/o evidence of GIB. He now returns for rectal bleeding w/ associated generalized weakness, fatigue and mild SOB. He is currently taking Protonix and miralax daily. Denies headaches, lightheadedness, dizziness, syncope, n/v/d, abdominal pain, dysuria.    In ED, Pt afebrile, orthostatics positive. CBC w/ H/H 8/25.3, WBC 12.88. Cr 2.0 (~ BL 1.5-1.8). Initiated on GIB pathway. Given pantoprazole 40 mg IV.      Overview/Hospital Course:  Zion Vasquez is a 86 y.o. male admitted to observation for GI bleed and symptomatic anemia. Pt hemodynamically stable on presentation w/ H/H 8/25.3. Also found to have CARYL likely d/t volume depletion from acute blood loss w/ Cr 2. Pt given 1L bolus NS for fluid resuscitation w/ improvement of Cr to 1.8. Pt was initiated on the GIB pathway and given IV PPI. GI consulted and  performed colonoscopy 11/30.       Interval History: NAEON. Pt seen and evaluated at bedside this am. Pt hemodynamically stable, however Hgb at 7.1 this am. Will likely require transfusion following colonoscopy today. Will recheck CBC and transfuse if necessary. Cr improved to 1.8     Review of Systems   Constitutional:  Negative for appetite change, chills and fever.   HENT:  Negative for congestion, sinus pressure, sore throat and trouble swallowing.    Respiratory:  Negative for cough, chest tightness and wheezing.    Cardiovascular:  Negative for chest pain, palpitations and leg swelling.   Gastrointestinal:  Positive for blood in stool. Negative for abdominal distention, abdominal pain, anal bleeding, constipation, diarrhea, nausea and vomiting.   Genitourinary:  Negative for dysuria, frequency, hematuria and urgency.   Musculoskeletal:  Negative for arthralgias, gait problem and myalgias.   Neurological:  Negative for dizziness, tremors, syncope, light-headedness, numbness and headaches.   Objective:     Vital Signs (Most Recent):  Temp: 97.5 °F (36.4 °C) (11/30/22 1252)  Pulse: 82 (11/30/22 1252)  Resp: 18 (11/30/22 1252)  BP: (!) 163/72 (11/30/22 1252)  SpO2: 96 % (11/30/22 1252)   Vital Signs (24h Range):  Temp:  [97.3 °F (36.3 °C)-99 °F (37.2 °C)] 97.5 °F (36.4 °C)  Pulse:  [69-93] 82  Resp:  [16-20] 18  SpO2:  [95 %-100 %] 96 %  BP: (117-169)/(59-78) 163/72     Weight: 95.7 kg (211 lb)  Body mass index is 30.28 kg/m².    Intake/Output Summary (Last 24 hours) at 11/30/2022 1401  Last data filed at 11/30/2022 1148  Gross per 24 hour   Intake 1200 ml   Output 0 ml   Net 1200 ml      Physical Exam  Vitals and nursing note reviewed.   Constitutional:       General: He is not in acute distress.     Appearance: He is well-developed. He is not ill-appearing or diaphoretic.   HENT:      Head: Normocephalic and atraumatic.      Nose: No congestion.      Mouth/Throat:      Pharynx: No oropharyngeal exudate.   Eyes:       Conjunctiva/sclera: Conjunctivae normal.      Pupils: Pupils are equal, round, and reactive to light.   Cardiovascular:      Rate and Rhythm: Normal rate and regular rhythm.      Heart sounds: Normal heart sounds.   Pulmonary:      Effort: Pulmonary effort is normal. No respiratory distress.      Breath sounds: Normal breath sounds. No wheezing.   Abdominal:      General: Bowel sounds are normal. There is no distension.      Palpations: Abdomen is soft.      Tenderness: There is no abdominal tenderness.   Musculoskeletal:         General: No tenderness. Normal range of motion.      Cervical back: Normal range of motion and neck supple.      Right lower leg: No edema.      Left lower leg: No edema.   Lymphadenopathy:      Cervical: No cervical adenopathy.   Skin:     General: Skin is warm and dry.      Capillary Refill: Capillary refill takes less than 2 seconds.      Findings: No rash.   Neurological:      Mental Status: He is alert and oriented to person, place, and time.      Cranial Nerves: No cranial nerve deficit.      Sensory: No sensory deficit.      Coordination: Coordination normal.   Psychiatric:         Behavior: Behavior normal.         Thought Content: Thought content normal.         Judgment: Judgment normal.       Significant Labs: All pertinent labs within the past 24 hours have been reviewed.    Significant Imaging: I have reviewed all pertinent imaging results/findings within the past 24 hours.      Assessment/Plan:      * Acute lower GI bleeding  Internal hemorrhoids  Diverticulosis of colon    - GI Bleed pathway initiated  - orthostatics positive   - Hgb 9.5 (~BL 10)  - Last colonoscopy 2019   - Serial H/H's q8  - transfuse Hgb <7  - Maintain IV access with 2 large bore IVs  - Intravascular resuscitation/support with IVFs   - Protonix 40mg IV BID  - Discontinue all NSAIDs and Heparin products  - will correct any coagulopathy with platelets and FFP to a goal of platelets >50K and INR <2.0  -  GI consulted, appreciate recs   - due to CARYL, would defer CTA at this time unless risks outweighs benefits (CARYL previously improved with  IVFs last admission, was able to tolerate CTA)   - colonoscopy 11/30 pending results   - Monitor Hgb q8hrs   - Transfuse to keep Hgb >7, plts >50   - Hold anticoagulants if safe to do so per primary team   - If becomes hemodynamically unstable with associated large volume hematochezia, recommend STAT  CTA and IR embolization if positive  - 11/27 CTA abdomen showed no evidence of GI bleed. Diverticulosis coli without evidence of diverticulitis. Cholelithiasis and bilateral renal hypodensities, likely cysts.   - CLD    CARYL (acute kidney injury)  CKD3  Patient with acute kidney injury likely due to IVVD/dehydration CARYL is currently stable. Labs reviewed- Renal function/electrolytes with Estimated Creatinine Clearance: 35.5 mL/min (A) (based on SCr of 1.8 mg/dL (H)). according to latest data. Monitor urine output and serial BMP and adjust therapy as needed. Avoid nephrotoxins and renally dose meds for GFR listed above.   - Cr 2.0 (~BL 1.5-1.8) on admission  - give 1L LR bolus    GERD (gastroesophageal reflux disease)  - protonix 40 mg bid    Hyperlipemia  - continue statin    Hypertension  - amlodipine and losartan held in setting of GIB    Obesity  Body mass index is 30.28 kg/m². Morbid obesity complicates all aspects of disease management from diagnostic modalities to treatment. Weight loss encouraged and health benefits explained to patient.       VTE Risk Mitigation (From admission, onward)         Ordered     IP VTE HIGH RISK PATIENT  Once         11/29/22 1154     Place sequential compression device  Until discontinued         11/29/22 1154                Discharge Planning   BAKARI: 12/1/2022     Code Status: Full Code   Is the patient medically ready for discharge?: No    Reason for patient still in hospital (select all that apply): Patient trending condition, Laboratory test,  Treatment and Consult recommendations  Discharge Plan A: Home with family                  Shaw Padilla PA-C  Department of Hospital Medicine   [facility]

## 2022-11-30 NOTE — TREATMENT PLAN
GI Post-Procedure Treatment Plan    Colonoscopy complete    Impression:            - Diverticulosis in the entire examined colon.                          - Three polyps in the entire colon. Risks of                          removal outweigh benefits.                          - No specimens collected.     Recommendation:        - Return patient to hospital [facility] for observation.                          - Resume previous diet.                          - Continue present medications.                          - Repeat colonoscopy PRN for retreatment.     Navarro Dee  Gastroenterology Fellow, PGY-IV

## 2022-11-30 NOTE — NURSING TRANSFER
Nursing Transfer Note      11/30/2022     Reason patient is being transferred: meets criteria    Transfer To: [facility]    Transfer via bed    Transfer with cardiac monitoring    [facility]    Medicines sent: none    Any special needs or follow-up needed: none    Chart send with patient: Yes      Patient reassessed at: 121

## 2022-11-30 NOTE — PLAN OF CARE
[facility]  Discharge Assessment    Primary Care Provider: Brigida Wall MD     Discharge Assessment (most recent)       BRIEF DISCHARGE ASSESSMENT - 11/30/22 2207          Discharge Planning    Assessment Type Discharge Planning Reassessment     Resource/Environmental Concerns none     Support Systems Children     Equipment Currently Used at Home none     Current Living Arrangements home/apartment/condo     Patient/Family Anticipates Transition to home     Patient/Family Anticipated Services at Transition none     DME Needed Upon Discharge  none     Discharge Plan A Home with family     Discharge Plan B Home with family        Physical Activity    On average, how many days per week do you engage in moderate to strenuous exercise (like a brisk walk)? 1 day     On average, how many minutes do you engage in exercise at this level? 0 min        Financial Resource Strain    How hard is it for you to pay for the very basics like food, housing, medical care, and heating? Not very hard        Housing Stability    In the last 12 months, was there a time when you were not able to pay the mortgage or rent on time? No     In the last 12 months, how many places have you lived? 1     In the last 12 months, was there a time when you did not have a steady place to sleep or slept in a shelter (including now)? No        Transportation Needs    In the past 12 months, has lack of transportation kept you from medical appointments or from getting medications? No     In the past 12 months, has lack of transportation kept you from meetings, work, or from getting things needed for daily living? No        Food Insecurity    Within the past 12 months, you worried that your food would run out before you got the money to buy more. Never true     Within the past 12 months, the food you bought just didn't last and you didn't have money to get more. Never true        Stress    Do you feel stress - tense, restless, nervous, or  anxious, or unable to sleep at night because your mind is troubled all the time - these days? To some extent        Social Connections    Are you , , , , never , or living with a partner?                    Pt is a 86 y.o. male admitted with acute lower GI bleed. He has a PMH of diverticulosis. He lives with his son in a single story house with no steps. He has not required DME in the past and is independent with his ADLs and IADLs. [facility] Discharge Packet given to patient and/or family with understanding verbalized.   name and number and estimated discharge date written on white board in patient's room with request to call for any questions or concerns.  Will continue to follow for needs.  .[email or web address]

## 2022-11-30 NOTE — INTERVAL H&P NOTE
The patient has been examined and the H&P has been reviewed:    Pre-Procedure H and P Addendum    Patient seen and examined.  History and exam unchanged from prior history and physical.      Procedure: Colonoscopy   Indication: Hematochezia  ASA Class: per anesthesiology  Airway: normal  Neck Mobility: full range of motion  Mallampatti score: per anesthesia  History of anesthesia problems: no  Family history of anesthesia problems: no  Anesthesia Plan: MAC        Active Hospital Problems    Diagnosis  POA    *Acute lower GI bleeding [K92.2]  Yes    Leukocytosis [D72.829]  Unknown    Diverticulosis [K57.90]  Unknown    CARYL (acute kidney injury) [N17.9]  Yes    Internal hemorrhoids [K64.8]  Yes    Obesity [E66.9]  Yes    Hypertension [I10]  Yes    Hyperlipemia [E78.5]  Yes    GERD (gastroesophageal reflux disease) [K21.9]  Yes      Resolved Hospital Problems    Diagnosis Date Resolved POA    History of anemia [Z86.2] 11/29/2022 Not Applicable

## 2022-11-30 NOTE — TRANSFER OF CARE
"Anesthesia Transfer of Care Note    Patient: Zion Vasquez    Procedure(s) Performed: Procedure(s) (LRB):  COLONOSCOPY (N/A)    Patient location: PACU    Anesthesia Type: general    Transport from OR: Transported from OR on room air with adequate spontaneous ventilation    Post pain: adequate analgesia    Post assessment: no apparent anesthetic complications and tolerated procedure well    Post vital signs: stable    Level of consciousness: awake, alert and oriented    Nausea/Vomiting: no nausea/vomiting    Complications: none    Transfer of care protocol was followed      Last vitals:   Visit Vitals  /60 (BP Location: Right arm)   Pulse 74   Temp 36.6 °C (97.9 °F) (Temporal)   Resp 18   Ht 5' 10" (1.778 m)   Wt 95.7 kg (211 lb)   SpO2 97%   BMI 30.28 kg/m²     "

## 2022-11-30 NOTE — PROVATION PATIENT INSTRUCTIONS
Discharge Summary/Instructions after an Endoscopic Procedure  Patient Name: Zion Vasquez  Patient MRN: [id number]  Patient YOB: 1935 Wednesday, November 30, 2022  Smith Alvarez MD  Dear patient,  As a result of recent federal legislation (The Federal Cures Act), you may   receive lab or pathology results from your procedure in your [facility]   account before your physician is able to contact you. Your physician or   their representative will relay the results to you with their   recommendations at their soonest availability.  Thank you,  RESTRICTIONS:  During your procedure today, you received medications for sedation.  These   medications may affect your judgment, balance and coordination.  Therefore,   for 24 hours, you have the following restrictions:   - DO NOT drive a car, operate machinery, make legal/financial decisions,   sign important papers or drink alcohol.    ACTIVITY:  Today: no heavy lifting, straining or running due to procedural   sedation/anesthesia.  The following day: return to full activity including work.  DIET:  Eat and drink normally unless instructed otherwise.     TREATMENT FOR COMMON SIDE EFFECTS:  - Mild abdominal pain, nausea, belching, bloating or excessive gas:  rest,   eat lightly and use a heating pad.  - Sore Throat: treat with throat lozenges and/or gargle with warm salt   water.  - Because air was used during the procedure, expelling large amounts of air   from your rectum or belching is normal.  - If a bowel prep was taken, you may not have a bowel movement for 1-3 days.    This is normal.  SYMPTOMS TO WATCH FOR AND REPORT TO YOUR PHYSICIAN:  1. Abdominal pain or bloating, other than gas cramps.  2. Chest pain.  3. Back pain.  4. Signs of infection such as: chills or fever occurring within 24 hours   after the procedure.  5. Rectal bleeding, which would show as bright red, maroon, or black stools.   (A tablespoon of blood from the rectum is not serious,  especially if   hemorrhoids are present.)  6. Vomiting.  7. Weakness or dizziness.  GO DIRECTLY TO THE NEAREST EMERGENCY ROOM IF YOU HAVE ANY OF THE FOLLOWING:      Difficulty breathing              Chills and/or fever over 101 F   Persistent vomiting and/or vomiting blood   Severe abdominal pain   Severe chest pain   Black, tarry stools   Bleeding- more than one tablespoon   Any other symptom or condition that you feel may need urgent attention  Your doctor recommends these additional instructions:  If any biopsies were taken, your doctors clinic will contact you in 1 to 2   weeks with any results.  - Return patient to hospital [facility] for observation.   - Resume previous diet.   - Continue present medications.   - Repeat colonoscopy PRN for retreatment.  For questions, problems or results please call your physician - Smith Alvarez MD at Work:  [phone].  [facility], EMERGENCY ROOM PHONE NUMBER: [phone]  IF A COMPLICATION OR EMERGENCY SITUATION ARISES AND YOU ARE UNABLE TO REACH   YOUR PHYSICIAN - GO DIRECTLY TO THE EMERGENCY ROOM.  Smith Alvarez MD  11/30/2022 12:03:55 PM  This report has been verified and signed electronically.  Dear patient,  As a result of recent federal legislation (The Federal Cures Act), you may   receive lab or pathology results from your procedure in your [facility]   account before your physician is able to contact you. Your physician or   their representative will relay the results to you with their   recommendations at their soonest availability.  Thank you,  PROVATION

## 2022-11-30 NOTE — DISCHARGE SUMMARY
[facility] [address] [place] [address] Medicine  Discharge Summary      Patient Name: Zion Vasquez  MRN: [id number]  DENY: [id number]  Patient Class: IP- Inpatient  Admission Date: 11/24/2022  Hospital Length of Stay: 1 days  Discharge Date and Time: 11/28/2022  4:55 PM  Attending Physician: Cali Dotson MD  Discharging Provider: Shaw Padilla PA-C  Primary Care Provider: Brigida Wall MD  [facility]ospital Medicine Team[place] [facility]  Shaw Padilla PA-C  Primary Care Team: [facility] MED     HPI:   Zion Vasquez is an 87 y/o male with a history of known diverticulosis, Martinez's esophagus, hiatal hernia, HTN, and prior GI bleed (for which he was hospitalized 06/22) presenting to ED after 3 episodes of BRBPR this morning. Patient noticed bright red blood in toilet bowl around 4:00 AM. Took an imodium but had 2 more BM with bright red blood/ water. Patient reports having one previous episode in June but did not have any intervention at that time. Otherwise has no further complaints. Denies extensive NSAID use. Denies melena. Denies fatigue, headache, dizziness, lightheadedness, chest pain, shortness of breath, abdominal pain, n/v, constipation and diarrhea. Has been taking his Protonix regularly. Last colonoscopy was 2019.     ED: AF, hypotensive to 99/59 ( improved to 137/63) Hgb 10.4 (~ at baseline). Cr 2.1 (~ BL 1.5-1.8). Given pantoprazole 80 mg IV. Admitted to observation      Procedure(s) (LRB):  COLONOSCOPY (N/A)      Hospital Course:   Zion Vasquez is a 86 y.o. male admitted to observation for GI bleed. Coloscopy with GI performed with large amounts of bright red blood noted without clear lesions. Continued to monitor serial CBCs overnight w/ stable Hgb 8.5 11/26, however Pt then had a large bloody BM w/ following Hgb 7.8. CTA abdomen negative active bleed. Hgb stable. Lower GIB likely diverticular. Pt informed of return precautions for further bleeding and symptoms of anemia. Given ambulatory  referral to GI. Pt medically ready for discharge home. Pt educated on hospital course and plan, verbally agrees and understands. All questions answered.        Goals of Care Treatment Preferences:  Code Status: Full Code      Consults:   Consults (From admission, onward)        Status Ordering Provider     Inpatient consult to Gastroenterology  Once        Provider:  (Not yet assigned)    Completed PENNIE LEW          * Acute lower GI bleeding  Internal hemorrhoids  Diverticulosis of colon  86 y.o. with previous history of GI bleed (June 2022 with no intervention) who reports BRBBR x 3 episodes since 4:00 AM. Denies dizziness, lightheadedeness or fatigue.  - GI Bleed pathway initiated  - orthostatics positive   - Hgb 9.5 (~BL 10)  - Last colonoscopy 2019   - Serial H/H's q8  - transfuse Hgb <7  - Maintain IV access with 2 large bore IVs  - Intravascular resuscitation/support with IVFs   - Protonix 80mg IV bolus x 1 then Protonix 40mg IV BID  - Discontinue all NSAIDs and Heparin products  - will correct any coagulopathy with platelets and FFP to a goal of platelets >50K and INR <2.0  - GI consulted, appreciate recs   - Clear liquid diet today    - Patient may pass some old blood today.    - Monitor Hgb q 8 hrs. Transfuse to keep Hgb >7, plts >50. If Hb is stable by tomorrow, can discharge.    - Hold anticoagulants if safe to do so per primary team   - If becomes hemodynamically unstable with associated large volume hematochezia, recommend STAT   CTA and IR embolization if positive    - We will follow  -CTA abdomen showed no evidence of GI bleed. Diverticulosis coli without evidence of diverticulitis. Cholelithiasis and bilateral renal hypodensities, likely cysts.       Final Active Diagnoses:    Diagnosis Date Noted POA    PRINCIPAL PROBLEM:  Acute lower GI bleeding [K92.2] 06/12/2022 Yes    CARYL (acute kidney injury) [N17.9] 11/24/2022 Yes    Stage 3a chronic kidney disease [N18.31] 03/29/2021 Yes     Internal hemorrhoids [K64.8] 03/29/2021 Yes    Diverticulosis of colon [K57.30] 03/29/2021 Yes    GERD (gastroesophageal reflux disease) [K21.9]  Yes    Hyperlipemia [E78.5]  Yes    Hypertension [I10]  Yes      Problems Resolved During this Admission:       Discharged Condition: good    Disposition: Home or Self Care    Follow Up:   Follow-up Information     [facility] up.    Why: The nurse with the Gastroenterology office will contact you to schedule a hospital follow up visit. However, if you do not hear from them within 48 hours of discharge please contact the office to schedule your visit.  Contact information:  [facility]   [facility] [address]  [phone]                     Patient Instructions:      Ambulatory referral/consult to Gastroenterology   Standing Status: Future   Referral Priority: Urgent Referral Type: Consultation   Referral Reason: Specialty Services Required   Requested Specialty: Gastroenterology   Number of Visits Requested: 1     Diet Cardiac     Notify your health care provider if you experience any of the following:  temperature >100.4     Notify your health care provider if you experience any of the following:  severe uncontrolled pain     Notify your health care provider if you experience any of the following:  redness, tenderness, or signs of infection (pain, swelling, redness, odor or green/yellow discharge around incision site)     Notify your health care provider if you experience any of the following:  persistent dizziness, light-headedness, or visual disturbances     Notify your health care provider if you experience any of the following:  increased confusion or weakness     Activity as tolerated       Significant Diagnostic Studies: Labs: All labs within the past 24 hours have been reviewed    Pending Diagnostic Studies:     None         Medications:  Reconciled Home Medications:      Medication List      CONTINUE taking these medications     acetaminophen 500 MG tablet  Commonly known as: TYLENOL  Take 1,000 mg by mouth every 6 (six) hours as needed for Pain.     amLODIPine 10 MG tablet  Commonly known as: NORVASC  Take 1 tablet (10 mg total) by mouth every morning.     atorvastatin 10 MG tablet  Commonly known as: LIPITOR  Take 1 tablet (10 mg total) by mouth every evening.     FLUAD QUAD 2020-21(65Y UP)(PF) 60 mcg (15 mcg x 4)/0.5 mL Syrg  Generic drug: flu vac 2020 [id number]-[id number](PF)  ADM 0.5ML IM UTD     GAVILAX 17 gram/dose powder  Generic drug: polyethylene glycol  Dissolve one capful (17 g) in liquid and take by mouth daily as needed.     losartan 50 MG tablet  Commonly known as: COZAAR  Take 1 tablet (50 mg total) by mouth once daily.     pantoprazole 40 MG tablet  Commonly known as: PROTONIX  Take 1 tablet (40 mg total) by mouth 2 (two) times daily.     PREVNAR 20 (PF) 0.5 mL Syrg injection  Generic drug: pneumoc 20-fabio conj-dip cr(PF)  Inject 0.5 mLs into the muscle.     sodium bicarbonate 650 MG tablet  TAKE 1 TABLET(650 MG) BY MOUTH TWICE DAILY  Strength: 650 mg            Indwelling Lines/Drains at time of discharge:   Lines/Drains/Airways     None                 Time spent on the discharge of patient: 36 minutes         Shaw Padilla PA-C  Department of Hospital Medicine  [facility]

## 2022-11-30 NOTE — SUBJECTIVE & OBJECTIVE
Interval History: NAEON. Pt seen and evaluated at bedside this am. Pt hemodynamically stable, however Hgb at 7.1 this am. Will likely require transfusion following colonoscopy today. Will recheck CBC and transfuse if necessary. Cr improved to 1.8     Review of Systems   Constitutional:  Negative for appetite change, chills and fever.   HENT:  Negative for congestion, sinus pressure, sore throat and trouble swallowing.    Respiratory:  Negative for cough, chest tightness and wheezing.    Cardiovascular:  Negative for chest pain, palpitations and leg swelling.   Gastrointestinal:  Positive for blood in stool. Negative for abdominal distention, abdominal pain, anal bleeding, constipation, diarrhea, nausea and vomiting.   Genitourinary:  Negative for dysuria, frequency, hematuria and urgency.   Musculoskeletal:  Negative for arthralgias, gait problem and myalgias.   Neurological:  Negative for dizziness, tremors, syncope, light-headedness, numbness and headaches.   Objective:     Vital Signs (Most Recent):  Temp: 97.5 °F (36.4 °C) (11/30/22 1252)  Pulse: 82 (11/30/22 1252)  Resp: 18 (11/30/22 1252)  BP: (!) 163/72 (11/30/22 1252)  SpO2: 96 % (11/30/22 1252)   Vital Signs (24h Range):  Temp:  [97.3 °F (36.3 °C)-99 °F (37.2 °C)] 97.5 °F (36.4 °C)  Pulse:  [69-93] 82  Resp:  [16-20] 18  SpO2:  [95 %-100 %] 96 %  BP: (117-169)/(59-78) 163/72     Weight: 95.7 kg (211 lb)  Body mass index is 30.28 kg/m².    Intake/Output Summary (Last 24 hours) at 11/30/2022 1401  Last data filed at 11/30/2022 1148  Gross per 24 hour   Intake 1200 ml   Output 0 ml   Net 1200 ml      Physical Exam  Vitals and nursing note reviewed.   Constitutional:       General: He is not in acute distress.     Appearance: He is well-developed. He is not ill-appearing or diaphoretic.   HENT:      Head: Normocephalic and atraumatic.      Nose: No congestion.      Mouth/Throat:      Pharynx: No oropharyngeal exudate.   Eyes:      Conjunctiva/sclera:  Conjunctivae normal.      Pupils: Pupils are equal, round, and reactive to light.   Cardiovascular:      Rate and Rhythm: Normal rate and regular rhythm.      Heart sounds: Normal heart sounds.   Pulmonary:      Effort: Pulmonary effort is normal. No respiratory distress.      Breath sounds: Normal breath sounds. No wheezing.   Abdominal:      General: Bowel sounds are normal. There is no distension.      Palpations: Abdomen is soft.      Tenderness: There is no abdominal tenderness.   Musculoskeletal:         General: No tenderness. Normal range of motion.      Cervical back: Normal range of motion and neck supple.      Right lower leg: No edema.      Left lower leg: No edema.   Lymphadenopathy:      Cervical: No cervical adenopathy.   Skin:     General: Skin is warm and dry.      Capillary Refill: Capillary refill takes less than 2 seconds.      Findings: No rash.   Neurological:      Mental Status: He is alert and oriented to person, place, and time.      Cranial Nerves: No cranial nerve deficit.      Sensory: No sensory deficit.      Coordination: Coordination normal.   Psychiatric:         Behavior: Behavior normal.         Thought Content: Thought content normal.         Judgment: Judgment normal.       Significant Labs: All pertinent labs within the past 24 hours have been reviewed.    Significant Imaging: I have reviewed all pertinent imaging results/findings within the past 24 hours.

## 2022-11-30 NOTE — ASSESSMENT & PLAN NOTE
Internal hemorrhoids  Diverticulosis of colon    - GI Bleed pathway initiated  - orthostatics positive   - Hgb 9.5 (~BL 10)  - Last colonoscopy 2019   - Serial H/H's q8  - transfuse Hgb <7  - Maintain IV access with 2 large bore IVs  - Intravascular resuscitation/support with IVFs   - Protonix 40mg IV BID  - Discontinue all NSAIDs and Heparin products  - will correct any coagulopathy with platelets and FFP to a goal of platelets >50K and INR <2.0  - GI consulted, appreciate recs   - due to CARYL, would defer CTA at this time unless risks outweighs benefits (CARYL previously improved with  IVFs last admission, was able to tolerate CTA)   - colonoscopy 11/30 pending results   - Monitor Hgb q8hrs   - Transfuse to keep Hgb >7, plts >50   - Hold anticoagulants if safe to do so per primary team   - If becomes hemodynamically unstable with associated large volume hematochezia, recommend STAT  CTA and IR embolization if positive  - 11/27 CTA abdomen showed no evidence of GI bleed. Diverticulosis coli without evidence of diverticulitis. Cholelithiasis and bilateral renal hypodensities, likely cysts.   - CLD

## 2022-11-30 NOTE — ASSESSMENT & PLAN NOTE
CKD3  Patient with acute kidney injury likely due to IVVD/dehydration CARYL is currently stable. Labs reviewed- Renal function/electrolytes with Estimated Creatinine Clearance: 35.5 mL/min (A) (based on SCr of 1.8 mg/dL (H)). according to latest data. Monitor urine output and serial BMP and adjust therapy as needed. Avoid nephrotoxins and renally dose meds for GFR listed above.   - Cr 2.0 (~BL 1.5-1.8) on admission  - give 1L LR bolus

## 2022-11-30 NOTE — HOSPITAL COURSE
Zion Vasquez is a 86 y.o. male admitted to observation for GI bleed and symptomatic anemia. Pt hemodynamically stable on presentation w/ H/H 8/25.3. Also found to have CARYL likely d/t volume depletion from acute blood loss w/ Cr 2. Pt given 1L bolus NS for fluid resuscitation w/ improvement of Cr to 1.8. Pt was initiated on the GIB pathway and given IV PPI. GI consulted and performed colonoscopy 11/30. Patient with repeat Hgb 6.7. Transfused 1 unit. Repeat CBC 12/1 with Hgb 7.1. Transfused 1 unit. Hgb 9.1. Patient had large BM with bright blood and clots. GI re consulted. Patient completed bowel prep the night prior with no signs of hematochezia the next morning. Maintained clear liquid diet q24 with recheck of CBC. No signs of hematochezia overnight. Tolerating PO diet. PT to assess for weakness. Pt recommending bedside commode. Patient very hesitant to leave due to bloody bowel movement after leaving previously.     Mr. Vasquez has been medically ready for discharge since Sunday, 12/4.  He has continued to refuse all medications to treat constipation until 12/6.  He has worked with PT/OT, but he does not want home health or outpatient therapy.  He wants to be able to ambulate on his own.  Explained that he has been medically stable to be discharged since 12/4, and it has been a courtesy keeping him here - but every day he says he needs 1 more day.  Explained that other providers would have had him escorted out by security on Sunday if he had refused to leave.  We reached an agreement to trial a suppository and Senna on 12/7, as he is still refusing Miralax as it caused his bleeding before.  He will be discharged tomorrow 12/8 morning, and if he refuses to leave he will be escorted out by security.  He was in agreement with this plan.  His son was called - who was told the above plan, with plans to pick him up for discharge before 12 noon on 12/8.    On 12/8; pt was seen and examined. Denies any complaint.   Denies having the urge to defecate.  Denies any abdominal pain, nausea or vomiting.  He has not had a bowel movement yet.  He will be discharged home with daily MiraLax.  His hemoglobin is stable.      Physical exam;  Vitals; reviewed  General; no acute distress  HENT; NC/AT. PERRLA  CV; RRR  Resp; CTA bilateral lung fields.  Abdomen; soft, NT, ND, +ve BS  Extremities; no edema.

## 2022-12-01 LAB
ANION GAP SERPL CALC-SCNC: 7 MMOL/L (ref 8–16)
BASOPHILS # BLD AUTO: 0.03 K/UL (ref 0–0.2)
BASOPHILS # BLD AUTO: 0.04 K/UL (ref 0–0.2)
BASOPHILS NFR BLD: 0.3 % (ref 0–1.9)
BASOPHILS NFR BLD: 0.4 % (ref 0–1.9)
BLD PROD TYP BPU: NORMAL
BLD PROD TYP BPU: NORMAL
BLOOD UNIT EXPIRATION DATE: NORMAL
BLOOD UNIT EXPIRATION DATE: NORMAL
BLOOD UNIT TYPE CODE: 1700
BLOOD UNIT TYPE CODE: 7300
BLOOD UNIT TYPE: NORMAL
BLOOD UNIT TYPE: NORMAL
BUN SERPL-MCNC: 17 MG/DL (ref 8–23)
CALCIUM SERPL-MCNC: 8.3 MG/DL (ref 8.7–10.5)
CHLORIDE SERPL-SCNC: 110 MMOL/L (ref 95–110)
CO2 SERPL-SCNC: 22 MMOL/L (ref 23–29)
CODING SYSTEM: NORMAL
CODING SYSTEM: NORMAL
CREAT SERPL-MCNC: 1.5 MG/DL (ref 0.5–1.4)
DIFFERENTIAL METHOD: ABNORMAL
DIFFERENTIAL METHOD: ABNORMAL
DISPENSE STATUS: NORMAL
DISPENSE STATUS: NORMAL
EOSINOPHIL # BLD AUTO: 0.5 K/UL (ref 0–0.5)
EOSINOPHIL # BLD AUTO: 0.6 K/UL (ref 0–0.5)
EOSINOPHIL NFR BLD: 4.9 % (ref 0–8)
EOSINOPHIL NFR BLD: 5.4 % (ref 0–8)
ERYTHROCYTE [DISTWIDTH] IN BLOOD BY AUTOMATED COUNT: 17.2 % (ref 11.5–14.5)
ERYTHROCYTE [DISTWIDTH] IN BLOOD BY AUTOMATED COUNT: 17.5 % (ref 11.5–14.5)
EST. GFR  (NO RACE VARIABLE): 45.6 ML/MIN/1.73 M^2
GLUCOSE SERPL-MCNC: 103 MG/DL (ref 70–110)
HCT VFR BLD AUTO: 22.1 % (ref 40–54)
HCT VFR BLD AUTO: 26 % (ref 40–54)
HGB BLD-MCNC: 7.1 G/DL (ref 14–18)
HGB BLD-MCNC: 8.6 G/DL (ref 14–18)
IMM GRANULOCYTES # BLD AUTO: 0.03 K/UL (ref 0–0.04)
IMM GRANULOCYTES # BLD AUTO: 0.09 K/UL (ref 0–0.04)
IMM GRANULOCYTES NFR BLD AUTO: 0.3 % (ref 0–0.5)
IMM GRANULOCYTES NFR BLD AUTO: 0.9 % (ref 0–0.5)
LYMPHOCYTES # BLD AUTO: 1.4 K/UL (ref 1–4.8)
LYMPHOCYTES # BLD AUTO: 1.5 K/UL (ref 1–4.8)
LYMPHOCYTES NFR BLD: 14.6 % (ref 18–48)
LYMPHOCYTES NFR BLD: 14.6 % (ref 18–48)
MCH RBC QN AUTO: 25.4 PG (ref 27–31)
MCH RBC QN AUTO: 26.5 PG (ref 27–31)
MCHC RBC AUTO-ENTMCNC: 32.1 G/DL (ref 32–36)
MCHC RBC AUTO-ENTMCNC: 33.1 G/DL (ref 32–36)
MCV RBC AUTO: 79 FL (ref 82–98)
MCV RBC AUTO: 80 FL (ref 82–98)
MONOCYTES # BLD AUTO: 1.3 K/UL (ref 0.3–1)
MONOCYTES # BLD AUTO: 1.6 K/UL (ref 0.3–1)
MONOCYTES NFR BLD: 13.8 % (ref 4–15)
MONOCYTES NFR BLD: 15.2 % (ref 4–15)
NEUTROPHILS # BLD AUTO: 6.2 K/UL (ref 1.8–7.7)
NEUTROPHILS # BLD AUTO: 6.5 K/UL (ref 1.8–7.7)
NEUTROPHILS NFR BLD: 63.6 % (ref 38–73)
NEUTROPHILS NFR BLD: 66 % (ref 38–73)
NRBC BLD-RTO: 0 /100 WBC
NRBC BLD-RTO: 0 /100 WBC
NUM UNITS TRANS PACKED RBC: NORMAL
NUM UNITS TRANS PACKED RBC: NORMAL
PLATELET # BLD AUTO: 180 K/UL (ref 150–450)
PLATELET # BLD AUTO: 180 K/UL (ref 150–450)
PMV BLD AUTO: 10.3 FL (ref 9.2–12.9)
PMV BLD AUTO: 9.6 FL (ref 9.2–12.9)
POTASSIUM SERPL-SCNC: 3.5 MMOL/L (ref 3.5–5.1)
RBC # BLD AUTO: 2.79 M/UL (ref 4.6–6.2)
RBC # BLD AUTO: 3.25 M/UL (ref 4.6–6.2)
SODIUM SERPL-SCNC: 139 MMOL/L (ref 136–145)
WBC # BLD AUTO: 10.25 K/UL (ref 3.9–12.7)
WBC # BLD AUTO: 9.43 K/UL (ref 3.9–12.7)

## 2022-12-01 PROCEDURE — P9016 RBC LEUKOCYTES REDUCED: HCPCS

## 2022-12-01 PROCEDURE — 99226 PR SUBSEQUENT OBSERVATION CARE,LEVEL III: CPT | Mod: ,,,

## 2022-12-01 PROCEDURE — 63600175 PHARM REV CODE 636 W HCPCS: Performed by: PHYSICIAN ASSISTANT

## 2022-12-01 PROCEDURE — 36430 TRANSFUSION BLD/BLD COMPNT: CPT

## 2022-12-01 PROCEDURE — G0378 HOSPITAL OBSERVATION PER HR: HCPCS

## 2022-12-01 PROCEDURE — 36415 COLL VENOUS BLD VENIPUNCTURE: CPT | Performed by: PHYSICIAN ASSISTANT

## 2022-12-01 PROCEDURE — 36415 COLL VENOUS BLD VENIPUNCTURE: CPT

## 2022-12-01 PROCEDURE — 25000003 PHARM REV CODE 250: Performed by: PHYSICIAN ASSISTANT

## 2022-12-01 PROCEDURE — 99226 PR SUBSEQUENT OBSERVATION CARE,LEVEL III: ICD-10-PCS | Mod: ,,,

## 2022-12-01 PROCEDURE — 85025 COMPLETE CBC W/AUTO DIFF WBC: CPT | Performed by: PHYSICIAN ASSISTANT

## 2022-12-01 PROCEDURE — 80048 BASIC METABOLIC PNL TOTAL CA: CPT | Performed by: PHYSICIAN ASSISTANT

## 2022-12-01 PROCEDURE — 94761 N-INVAS EAR/PLS OXIMETRY MLT: CPT

## 2022-12-01 PROCEDURE — 25000003 PHARM REV CODE 250

## 2022-12-01 PROCEDURE — C9113 INJ PANTOPRAZOLE SODIUM, VIA: HCPCS | Performed by: PHYSICIAN ASSISTANT

## 2022-12-01 PROCEDURE — 85025 COMPLETE CBC W/AUTO DIFF WBC: CPT | Mod: 91

## 2022-12-01 RX ORDER — HYDROCODONE BITARTRATE AND ACETAMINOPHEN 500; 5 MG/1; MG/1
TABLET ORAL
Status: DISCONTINUED | OUTPATIENT
Start: 2022-12-01 | End: 2022-12-08 | Stop reason: HOSPADM

## 2022-12-01 RX ORDER — PANTOPRAZOLE SODIUM 40 MG/1
40 TABLET, DELAYED RELEASE ORAL
Status: DISCONTINUED | OUTPATIENT
Start: 2022-12-01 | End: 2022-12-08 | Stop reason: HOSPADM

## 2022-12-01 RX ADMIN — SODIUM BICARBONATE 650 MG TABLET 650 MG: at 08:12

## 2022-12-01 RX ADMIN — PANTOPRAZOLE SODIUM 40 MG: 40 INJECTION, POWDER, FOR SOLUTION INTRAVENOUS at 09:12

## 2022-12-01 RX ADMIN — PANTOPRAZOLE SODIUM 40 MG: 40 TABLET, DELAYED RELEASE ORAL at 05:12

## 2022-12-01 RX ADMIN — ATORVASTATIN CALCIUM 10 MG: 10 TABLET, FILM COATED ORAL at 08:12

## 2022-12-01 RX ADMIN — POLYETHYLENE GLYCOL 3350 17 G: 17 POWDER, FOR SOLUTION ORAL at 09:12

## 2022-12-01 RX ADMIN — SODIUM BICARBONATE 650 MG TABLET 650 MG: at 09:12

## 2022-12-01 NOTE — SUBJECTIVE & OBJECTIVE
Interval History: NAEON. AF, VSS. Patient states he feels good this AM. Some fatigue from volume loss but otherwise has not had a BM since colonoscopy yesterday. Tolerated breakfast this AM. Given clinical history and course, will transfuse 1 unit this AM with repeat CBC this afternoon. Will monitor patient overnight and recheck labs in the AM. Had discussion with patient that there will be residual bleeding from colonoscopy and bleeding may not completely go away before discharge. Will have patient monitor BM throughout the day.    Review of Systems   Constitutional:  Negative for appetite change, chills and fever.   HENT:  Negative for congestion, sinus pressure, sore throat and trouble swallowing.    Respiratory:  Negative for cough, chest tightness and wheezing.    Cardiovascular:  Negative for chest pain, palpitations and leg swelling.   Gastrointestinal:  Positive for blood in stool. Negative for abdominal distention, abdominal pain, anal bleeding, constipation, diarrhea, nausea and vomiting.   Genitourinary:  Negative for dysuria, frequency, hematuria and urgency.   Musculoskeletal:  Negative for arthralgias, gait problem and myalgias.   Neurological:  Negative for dizziness, tremors, syncope, light-headedness, numbness and headaches.   Objective:     Vital Signs (Most Recent):  Temp: 98.7 °F (37.1 °C) (12/01/22 1244)  Pulse: 68 (12/01/22 1244)  Resp: 18 (12/01/22 1244)  BP: (!) 147/68 (12/01/22 1244)  SpO2: 95 % (12/01/22 1244)   Vital Signs (24h Range):  Temp:  [97.4 °F (36.3 °C)-99.9 °F (37.7 °C)] 98.7 °F (37.1 °C)  Pulse:  [65-84] 68  Resp:  [16-19] 18  SpO2:  [95 %-99 %] 95 %  BP: (132-173)/(62-84) 147/68     Weight: 95.7 kg (211 lb)  Body mass index is 30.28 kg/m².    Intake/Output Summary (Last 24 hours) at 12/1/2022 1252  Last data filed at 12/1/2022 0842  Gross per 24 hour   Intake 475 ml   Output --   Net 475 ml      Physical Exam  Vitals and nursing note reviewed.   Constitutional:       General:  He is not in acute distress.     Appearance: He is well-developed. He is not ill-appearing or diaphoretic.   HENT:      Head: Normocephalic and atraumatic.      Nose: No congestion.      Mouth/Throat:      Pharynx: No oropharyngeal exudate.   Eyes:      Conjunctiva/sclera: Conjunctivae normal.      Pupils: Pupils are equal, round, and reactive to light.   Cardiovascular:      Rate and Rhythm: Normal rate and regular rhythm.      Heart sounds: Normal heart sounds.   Pulmonary:      Effort: Pulmonary effort is normal. No respiratory distress.      Breath sounds: Normal breath sounds. No wheezing.   Abdominal:      General: Bowel sounds are normal. There is no distension.      Palpations: Abdomen is soft.      Tenderness: There is no abdominal tenderness.   Musculoskeletal:         General: No tenderness. Normal range of motion.      Cervical back: Normal range of motion and neck supple.      Right lower leg: No edema.      Left lower leg: No edema.   Lymphadenopathy:      Cervical: No cervical adenopathy.   Skin:     General: Skin is warm and dry.      Capillary Refill: Capillary refill takes less than 2 seconds.      Findings: No rash.   Neurological:      Mental Status: He is alert and oriented to person, place, and time.      Cranial Nerves: No cranial nerve deficit.      Sensory: No sensory deficit.      Coordination: Coordination normal.   Psychiatric:         Behavior: Behavior normal.         Thought Content: Thought content normal.         Judgment: Judgment normal.       Significant Labs: All pertinent labs within the past 24 hours have been reviewed.  BMP:   Recent Labs   Lab 12/01/22  0409         K 3.5      CO2 22*   BUN 17   CREATININE 1.5*   CALCIUM 8.3*     CBC:   Recent Labs   Lab 11/30/22  0550 11/30/22  1436 12/01/22  0409   WBC 12.40 11.78 9.43   HGB 7.1* 6.7* 7.1*   HCT 22.3* 21.5* 22.1*    178 180       Significant Imaging: I have reviewed all pertinent imaging  results/findings within the past 24 hours.

## 2022-12-01 NOTE — PROGRESS NOTES
[facility] - Observation [address] [place] [address] Medicine  Progress Note    Patient Name: Zion Vasquez  MRN: [id number]  Patient Class: OP- Observation   Admission Date: 11/29/2022  Length of Stay: 0 days  Attending Physician: Scarlet Dumont MD  Primary Care Provider: Brigida Wall MD        Subjective:     Principal Problem:Acute lower GI bleeding        HPI:  Zion Vasquez is an 86-year-old male with a history of GERD, hyperlipidemia, hypertension obesity including known history of diverticulosis, Martinez's esophagus, hiatal hernia and prior GI bleed (hospitalized on 06/22 and 11/28/22) presenting w/ c/o bright red bleeding per rectum and fatigue. Of note he was hospitalized 11/24-11/28 for 3 episodes of bright red bleeding per rectum without associated fatigue, shortness of breath, chest pain. He had a colonoscopy with GI performed with large amounts of bright red blood noted without clear lesions.  He had serial CBC is overnight that were stable.  He then reportedly had another large bloody bowel movement during his hospital stay with a drop in hemoglobin to 6.8, requiring 1 unit of PRBC. He had a CT abdomen w/o evidence of GIB. He now returns for rectal bleeding w/ associated generalized weakness, fatigue and mild SOB. He is currently taking Protonix and miralax daily. Denies headaches, lightheadedness, dizziness, syncope, n/v/d, abdominal pain, dysuria.    In ED, Pt afebrile, orthostatics positive. CBC w/ H/H 8/25.3, WBC 12.88. Cr 2.0 (~ BL 1.5-1.8). Initiated on GIB pathway. Given pantoprazole 40 mg IV.      Overview/Hospital Course:  Zion Vasquez is a 86 y.o. male admitted to observation for GI bleed and symptomatic anemia. Pt hemodynamically stable on presentation w/ H/H 8/25.3. Also found to have CARYL likely d/t volume depletion from acute blood loss w/ Cr 2. Pt given 1L bolus NS for fluid resuscitation w/ improvement of Cr to 1.8. Pt was initiated on the GIB pathway and given IV PPI. GI consulted and  performed colonoscopy 11/30. Patient with repeat Hgb 6.7. Transfused 1 unit. Repeat CBC 12/1 with Hgb 7.1. Transfused 1 unit.      Interval History: NAEON. AF, VSS. Patient states he feels good this AM. Some fatigue from volume loss but otherwise has not had a BM since colonoscopy yesterday. Tolerated breakfast this AM. Given clinical history and course, will transfuse 1 unit this AM with repeat CBC this afternoon. Will monitor patient overnight and recheck labs in the AM. Had discussion with patient that there will be residual bleeding from colonoscopy and bleeding may not completely go away before discharge. Will have patient monitor BM throughout the day.    Review of Systems   Constitutional:  Negative for appetite change, chills and fever.   HENT:  Negative for congestion, sinus pressure, sore throat and trouble swallowing.    Respiratory:  Negative for cough, chest tightness and wheezing.    Cardiovascular:  Negative for chest pain, palpitations and leg swelling.   Gastrointestinal:  Positive for blood in stool. Negative for abdominal distention, abdominal pain, anal bleeding, constipation, diarrhea, nausea and vomiting.   Genitourinary:  Negative for dysuria, frequency, hematuria and urgency.   Musculoskeletal:  Negative for arthralgias, gait problem and myalgias.   Neurological:  Negative for dizziness, tremors, syncope, light-headedness, numbness and headaches.   Objective:     Vital Signs (Most Recent):  Temp: 98.7 °F (37.1 °C) (12/01/22 1244)  Pulse: 68 (12/01/22 1244)  Resp: 18 (12/01/22 1244)  BP: (!) 147/68 (12/01/22 1244)  SpO2: 95 % (12/01/22 1244)   Vital Signs (24h Range):  Temp:  [97.4 °F (36.3 °C)-99.9 °F (37.7 °C)] 98.7 °F (37.1 °C)  Pulse:  [65-84] 68  Resp:  [16-19] 18  SpO2:  [95 %-99 %] 95 %  BP: (132-173)/(62-84) 147/68     Weight: 95.7 kg (211 lb)  Body mass index is 30.28 kg/m².    Intake/Output Summary (Last 24 hours) at 12/1/2022 1252  Last data filed at 12/1/2022 0842  Gross per 24  hour   Intake 475 ml   Output --   Net 475 ml      Physical Exam  Vitals and nursing note reviewed.   Constitutional:       General: He is not in acute distress.     Appearance: He is well-developed. He is not ill-appearing or diaphoretic.   HENT:      Head: Normocephalic and atraumatic.      Nose: No congestion.      Mouth/Throat:      Pharynx: No oropharyngeal exudate.   Eyes:      Conjunctiva/sclera: Conjunctivae normal.      Pupils: Pupils are equal, round, and reactive to light.   Cardiovascular:      Rate and Rhythm: Normal rate and regular rhythm.      Heart sounds: Normal heart sounds.   Pulmonary:      Effort: Pulmonary effort is normal. No respiratory distress.      Breath sounds: Normal breath sounds. No wheezing.   Abdominal:      General: Bowel sounds are normal. There is no distension.      Palpations: Abdomen is soft.      Tenderness: There is no abdominal tenderness.   Musculoskeletal:         General: No tenderness. Normal range of motion.      Cervical back: Normal range of motion and neck supple.      Right lower leg: No edema.      Left lower leg: No edema.   Lymphadenopathy:      Cervical: No cervical adenopathy.   Skin:     General: Skin is warm and dry.      Capillary Refill: Capillary refill takes less than 2 seconds.      Findings: No rash.   Neurological:      Mental Status: He is alert and oriented to person, place, and time.      Cranial Nerves: No cranial nerve deficit.      Sensory: No sensory deficit.      Coordination: Coordination normal.   Psychiatric:         Behavior: Behavior normal.         Thought Content: Thought content normal.         Judgment: Judgment normal.       Significant Labs: All pertinent labs within the past 24 hours have been reviewed.  BMP:   Recent Labs   Lab 12/01/22  0409         K 3.5      CO2 22*   BUN 17   CREATININE 1.5*   CALCIUM 8.3*     CBC:   Recent Labs   Lab 11/30/22  0550 11/30/22  1436 12/01/22  0409   WBC 12.40 11.78 9.43   HGB  7.1* 6.7* 7.1*   HCT 22.3* 21.5* 22.1*    178 180       Significant Imaging: I have reviewed all pertinent imaging results/findings within the past 24 hours.      Assessment/Plan:      * Acute lower GI bleeding  Internal hemorrhoids  Diverticulosis of colon  - GI Bleed pathway initiated  - orthostatics positive   - Hgb 9.5 (~BL 10)  - Last colonoscopy 2019   - Serial H/H's q8  - transfuse Hgb <7  - Maintain IV access with 2 large bore IVs  - Intravascular resuscitation/support with IVFs   - Protonix 40mg IV BID  - Discontinue all NSAIDs and Heparin products  - will correct any coagulopathy with platelets and FFP to a goal of platelets >50K and INR <2.0  - GI consulted, appreciate recs   - three polyps in the colon (risks of removal outweighed benefits)   - no blood BM overnight or this AM   - Hgb stable at 7.1   - transfusion PRN  - 11/27 CTA abdomen showed no evidence of GI bleed. Diverticulosis coli without evidence of diverticulitis. Cholelithiasis and bilateral renal hypodensities, likely cysts.   - Hgb 7.1 12/1, transfused 1 unit  - monitor CBCs      CARYL (acute kidney injury)  CKD3  Patient with acute kidney injury likely due to IVVD/dehydration CARYL is currently stable. Labs reviewed- Renal function/electrolytes with Estimated Creatinine Clearance: 42.6 mL/min (A) (based on SCr of 1.5 mg/dL (H)). according to latest data. Monitor urine output and serial BMP and adjust therapy as needed. Avoid nephrotoxins and renally dose meds for GFR listed above.   - Cr 2.0 (~BL 1.5-1.8) on admission  - give 1L LR bolus  - Cr stable at 1.5    GERD (gastroesophageal reflux disease)  - protonix 40 mg bid    Hyperlipemia  - continue statin    Hypertension  - amlodipine and losartan held in setting of GIB    Obesity  Body mass index is 30.28 kg/m². Morbid obesity complicates all aspects of disease management from diagnostic modalities to treatment. Weight loss encouraged and health benefits explained to patient.       VTE  Risk Mitigation (From admission, onward)         Ordered     IP VTE HIGH RISK PATIENT  Once         11/29/22 1154     Place sequential compression device  Until discontinued         11/29/22 1154                Discharge Planning   BAKARI: 12/2/2022     Code Status: Full Code   Is the patient medically ready for discharge?: No    Reason for patient still in hospital (select all that apply): Patient trending condition, Laboratory test and Treatment  Discharge Plan A: Home with family                  Gloria Sheridan PA-C  Department of Hospital Medicine   [facility] - Observation 11H

## 2022-12-01 NOTE — ANESTHESIA POSTPROCEDURE EVALUATION
Anesthesia Post Evaluation    Patient: Zion Vasquez    Procedure(s) Performed: Procedure(s) (LRB):  COLONOSCOPY (N/A)    Final Anesthesia Type: general      Patient location during evaluation: PACU  Patient participation: Yes- Able to Participate  Level of consciousness: awake and alert  Post-procedure vital signs: reviewed and stable  Pain management: adequate  Airway patency: patent    PONV status at discharge: No PONV  Anesthetic complications: no      Cardiovascular status: blood pressure returned to baseline  Respiratory status: unassisted  Hydration status: euvolemic  Follow-up not needed.          Vitals Value Taken Time   /66 12/01/22 0747   Temp 37.7 °C (99.9 °F) 12/01/22 0747   Pulse 76 12/01/22 0747   Resp 18 12/01/22 0747   SpO2 98 % 12/01/22 0747         Event Time   Out of Recovery 11/30/2022 12:37:00         Pain/Shaji Score: Shaji Score: 9 (11/30/2022 12:15 PM)

## 2022-12-01 NOTE — PLAN OF CARE
POC reviewed w/ pt. Denies pain/discomfort. Safety precautions remain in place.   Problem: Adjustment to Illness (Gastrointestinal Bleeding)  Goal: Optimal Coping with Acute Illness  Outcome: Ongoing, Progressing     Problem: Bleeding (Gastrointestinal Bleeding)  Goal: Hemostasis  Outcome: Ongoing, Progressing     Problem: Adult Inpatient Plan of Care  Goal: Plan of Care Review  Outcome: Ongoing, Progressing  Goal: Patient-Specific Goal (Individualized)  Outcome: Ongoing, Progressing  Goal: Absence of Hospital-Acquired Illness or Injury  Outcome: Ongoing, Progressing  Goal: Optimal Comfort and Wellbeing  Outcome: Ongoing, Progressing  Goal: Readiness for Transition of Care  Outcome: Ongoing, Progressing     Problem: Infection  Goal: Absence of Infection Signs and Symptoms  Outcome: Ongoing, Progressing     Problem: Fluid and Electrolyte Imbalance (Acute Kidney Injury/Impairment)  Goal: Fluid and Electrolyte Balance  Outcome: Ongoing, Progressing     Problem: Oral Intake Inadequate (Acute Kidney Injury/Impairment)  Goal: Optimal Nutrition Intake  Outcome: Ongoing, Progressing     Problem: Renal Function Impairment (Acute Kidney Injury/Impairment)  Goal: Effective Renal Function  Outcome: Ongoing, Progressing

## 2022-12-01 NOTE — ASSESSMENT & PLAN NOTE
Internal hemorrhoids  Diverticulosis of colon  - GI Bleed pathway initiated  - orthostatics positive   - Hgb 9.5 (~BL 10)  - Last colonoscopy 2019   - Serial H/H's q8  - transfuse Hgb <7  - Maintain IV access with 2 large bore IVs  - Intravascular resuscitation/support with IVFs   - Protonix 40mg IV BID  - Discontinue all NSAIDs and Heparin products  - will correct any coagulopathy with platelets and FFP to a goal of platelets >50K and INR <2.0  - GI consulted, appreciate recs   - three polyps in the colon (risks of removal outweighed benefits)   - no blood BM overnight or this AM   - Hgb stable at 7.1   - transfusion PRN  - 11/27 CTA abdomen showed no evidence of GI bleed. Diverticulosis coli without evidence of diverticulitis. Cholelithiasis and bilateral renal hypodensities, likely cysts.   - Hgb 7.1 12/1, transfused 1 unit  - monitor CBCs

## 2022-12-01 NOTE — NURSING
CIPRIANO AYALA secure-text paged the following @ 1300;    The patient had a large mucus bowel movement - clearish, thick- egg yolk consistency, foul smelling BM.     The patient is noted; extremely weak and unsteady - recommended PT.     HH @ the conclusion of the transfusion: 8.6/26.    Maria Del Rosario Etienne RN

## 2022-12-01 NOTE — PLAN OF CARE
Problem: Adjustment to Illness (Gastrointestinal Bleeding)  Goal: Optimal Coping with Acute Illness  Outcome: Ongoing, Progressing     Problem: Adult Inpatient Plan of Care  Goal: Plan of Care Review  Outcome: Ongoing, Progressing     Problem: Adult Inpatient Plan of Care  Goal: Patient-Specific Goal (Individualized)  Outcome: Ongoing, Progressing     Problem: Adult Inpatient Plan of Care  Goal: Absence of Hospital-Acquired Illness or Injury  Outcome: Ongoing, Progressing     Problem: Adult Inpatient Plan of Care  Goal: Optimal Comfort and Wellbeing  Outcome: Ongoing, Progressing     Problem: Adult Inpatient Plan of Care  Goal: Readiness for Transition of Care  Outcome: Ongoing, Progressing

## 2022-12-01 NOTE — ASSESSMENT & PLAN NOTE
CKD3  Patient with acute kidney injury likely due to IVVD/dehydration CARYL is currently stable. Labs reviewed- Renal function/electrolytes with Estimated Creatinine Clearance: 42.6 mL/min (A) (based on SCr of 1.5 mg/dL (H)). according to latest data. Monitor urine output and serial BMP and adjust therapy as needed. Avoid nephrotoxins and renally dose meds for GFR listed above.   - Cr 2.0 (~BL 1.5-1.8) on admission  - give 1L LR bolus  - Cr stable at 1.5

## 2022-12-02 ENCOUNTER — TELEPHONE (OUTPATIENT)
Dept: ENDOSCOPY | Facility: HOSPITAL | Age: 87
End: 2022-12-02
Payer: COMMERCIAL

## 2022-12-02 LAB
ANION GAP SERPL CALC-SCNC: 7 MMOL/L (ref 8–16)
BASOPHILS # BLD AUTO: 0.02 K/UL (ref 0–0.2)
BASOPHILS # BLD AUTO: 0.03 K/UL (ref 0–0.2)
BASOPHILS NFR BLD: 0.2 % (ref 0–1.9)
BASOPHILS NFR BLD: 0.3 % (ref 0–1.9)
BUN SERPL-MCNC: 16 MG/DL (ref 8–23)
CALCIUM SERPL-MCNC: 8.3 MG/DL (ref 8.7–10.5)
CHLORIDE SERPL-SCNC: 107 MMOL/L (ref 95–110)
CO2 SERPL-SCNC: 22 MMOL/L (ref 23–29)
CREAT SERPL-MCNC: 1.2 MG/DL (ref 0.5–1.4)
DIFFERENTIAL METHOD: ABNORMAL
DIFFERENTIAL METHOD: ABNORMAL
EOSINOPHIL # BLD AUTO: 0.4 K/UL (ref 0–0.5)
EOSINOPHIL # BLD AUTO: 0.5 K/UL (ref 0–0.5)
EOSINOPHIL NFR BLD: 4.4 % (ref 0–8)
EOSINOPHIL NFR BLD: 4.8 % (ref 0–8)
ERYTHROCYTE [DISTWIDTH] IN BLOOD BY AUTOMATED COUNT: 16.9 % (ref 11.5–14.5)
ERYTHROCYTE [DISTWIDTH] IN BLOOD BY AUTOMATED COUNT: 17 % (ref 11.5–14.5)
EST. GFR  (NO RACE VARIABLE): 59.6 ML/MIN/1.73 M^2
GLUCOSE SERPL-MCNC: 97 MG/DL (ref 70–110)
HCT VFR BLD AUTO: 27.3 % (ref 40–54)
HCT VFR BLD AUTO: 28.4 % (ref 40–54)
HGB BLD-MCNC: 8.5 G/DL (ref 14–18)
HGB BLD-MCNC: 9.1 G/DL (ref 14–18)
IMM GRANULOCYTES # BLD AUTO: 0.03 K/UL (ref 0–0.04)
IMM GRANULOCYTES # BLD AUTO: 0.05 K/UL (ref 0–0.04)
IMM GRANULOCYTES NFR BLD AUTO: 0.3 % (ref 0–0.5)
IMM GRANULOCYTES NFR BLD AUTO: 0.5 % (ref 0–0.5)
LYMPHOCYTES # BLD AUTO: 1.7 K/UL (ref 1–4.8)
LYMPHOCYTES # BLD AUTO: 2 K/UL (ref 1–4.8)
LYMPHOCYTES NFR BLD: 16.6 % (ref 18–48)
LYMPHOCYTES NFR BLD: 19.4 % (ref 18–48)
MCH RBC QN AUTO: 25.8 PG (ref 27–31)
MCH RBC QN AUTO: 26.4 PG (ref 27–31)
MCHC RBC AUTO-ENTMCNC: 31.1 G/DL (ref 32–36)
MCHC RBC AUTO-ENTMCNC: 32 G/DL (ref 32–36)
MCV RBC AUTO: 82 FL (ref 82–98)
MCV RBC AUTO: 83 FL (ref 82–98)
MONOCYTES # BLD AUTO: 1.5 K/UL (ref 0.3–1)
MONOCYTES # BLD AUTO: 1.5 K/UL (ref 0.3–1)
MONOCYTES NFR BLD: 14.9 % (ref 4–15)
MONOCYTES NFR BLD: 15.2 % (ref 4–15)
NEUTROPHILS # BLD AUTO: 6.1 K/UL (ref 1.8–7.7)
NEUTROPHILS # BLD AUTO: 6.3 K/UL (ref 1.8–7.7)
NEUTROPHILS NFR BLD: 60.3 % (ref 38–73)
NEUTROPHILS NFR BLD: 63.1 % (ref 38–73)
NRBC BLD-RTO: 0 /100 WBC
NRBC BLD-RTO: 0 /100 WBC
PLATELET # BLD AUTO: 190 K/UL (ref 150–450)
PLATELET # BLD AUTO: 211 K/UL (ref 150–450)
PMV BLD AUTO: 10.2 FL (ref 9.2–12.9)
PMV BLD AUTO: 10.3 FL (ref 9.2–12.9)
POTASSIUM SERPL-SCNC: 3.5 MMOL/L (ref 3.5–5.1)
RBC # BLD AUTO: 3.29 M/UL (ref 4.6–6.2)
RBC # BLD AUTO: 3.45 M/UL (ref 4.6–6.2)
SODIUM SERPL-SCNC: 136 MMOL/L (ref 136–145)
WBC # BLD AUTO: 10.11 K/UL (ref 3.9–12.7)
WBC # BLD AUTO: 9.95 K/UL (ref 3.9–12.7)

## 2022-12-02 PROCEDURE — 25000003 PHARM REV CODE 250: Performed by: PHYSICIAN ASSISTANT

## 2022-12-02 PROCEDURE — 25000003 PHARM REV CODE 250

## 2022-12-02 PROCEDURE — 36415 COLL VENOUS BLD VENIPUNCTURE: CPT

## 2022-12-02 PROCEDURE — 80048 BASIC METABOLIC PNL TOTAL CA: CPT | Performed by: PHYSICIAN ASSISTANT

## 2022-12-02 PROCEDURE — 85025 COMPLETE CBC W/AUTO DIFF WBC: CPT | Mod: 91

## 2022-12-02 PROCEDURE — 25000003 PHARM REV CODE 250: Performed by: STUDENT IN AN ORGANIZED HEALTH CARE EDUCATION/TRAINING PROGRAM

## 2022-12-02 PROCEDURE — 99233 PR SUBSEQUENT HOSPITAL CARE,LEVL III: ICD-10-PCS | Mod: ,,,

## 2022-12-02 PROCEDURE — 25500020 PHARM REV CODE 255: Performed by: HOSPITALIST

## 2022-12-02 PROCEDURE — 11000001 HC ACUTE MED/SURG PRIVATE ROOM

## 2022-12-02 PROCEDURE — 99233 SBSQ HOSP IP/OBS HIGH 50: CPT | Mod: ,,,

## 2022-12-02 PROCEDURE — 36415 COLL VENOUS BLD VENIPUNCTURE: CPT | Performed by: PHYSICIAN ASSISTANT

## 2022-12-02 RX ORDER — POLYETHYLENE GLYCOL 3350, SODIUM SULFATE ANHYDROUS, SODIUM BICARBONATE, SODIUM CHLORIDE, POTASSIUM CHLORIDE 236; 22.74; 6.74; 5.86; 2.97 G/4L; G/4L; G/4L; G/4L; G/4L
4000 POWDER, FOR SOLUTION ORAL ONCE
Status: COMPLETED | OUTPATIENT
Start: 2022-12-02 | End: 2022-12-02

## 2022-12-02 RX ADMIN — PANTOPRAZOLE SODIUM 40 MG: 40 TABLET, DELAYED RELEASE ORAL at 04:12

## 2022-12-02 RX ADMIN — ATORVASTATIN CALCIUM 10 MG: 10 TABLET, FILM COATED ORAL at 08:12

## 2022-12-02 RX ADMIN — PANTOPRAZOLE SODIUM 40 MG: 40 TABLET, DELAYED RELEASE ORAL at 06:12

## 2022-12-02 RX ADMIN — SODIUM BICARBONATE 650 MG TABLET 650 MG: at 08:12

## 2022-12-02 RX ADMIN — SODIUM BICARBONATE 650 MG TABLET 650 MG: at 09:12

## 2022-12-02 RX ADMIN — POLYETHYLENE GLYCOL 3350, SODIUM SULFATE ANHYDROUS, SODIUM BICARBONATE, SODIUM CHLORIDE, POTASSIUM CHLORIDE 4000 ML: 236; 22.74; 6.74; 5.86; 2.97 POWDER, FOR SOLUTION ORAL at 06:12

## 2022-12-02 RX ADMIN — IOHEXOL 100 ML: 350 INJECTION, SOLUTION INTRAVENOUS at 11:12

## 2022-12-02 NOTE — PROGRESS NOTES
[facility] [address] [place] [address] Medicine  Progress Note    Patient Name: Zion Vasquez  MRN: [id number]  Patient Class: OP- Observation   Admission Date: 11/29/2022  Length of Stay: 0 days  Attending Physician: Scarlet Dumont MD  Primary Care Provider: Brigida Wall MD        Subjective:     Principal Problem:Acute lower GI bleeding        HPI:  Zion Vasquez is an 86-year-old male with a history of GERD, hyperlipidemia, hypertension obesity including known history of diverticulosis, Martinez's esophagus, hiatal hernia and prior GI bleed (hospitalized on 06/22 and 11/28/22) presenting w/ c/o bright red bleeding per rectum and fatigue. Of note he was hospitalized 11/24-11/28 for 3 episodes of bright red bleeding per rectum without associated fatigue, shortness of breath, chest pain. He had a colonoscopy with GI performed with large amounts of bright red blood noted without clear lesions.  He had serial CBC is overnight that were stable.  He then reportedly had another large bloody bowel movement during his hospital stay with a drop in hemoglobin to 6.8, requiring 1 unit of PRBC. He had a CT abdomen w/o evidence of GIB. He now returns for rectal bleeding w/ associated generalized weakness, fatigue and mild SOB. He is currently taking Protonix and miralax daily. Denies headaches, lightheadedness, dizziness, syncope, n/v/d, abdominal pain, dysuria.    In ED, Pt afebrile, orthostatics positive. CBC w/ H/H 8/25.3, WBC 12.88. Cr 2.0 (~ BL 1.5-1.8). Initiated on GIB pathway. Given pantoprazole 40 mg IV.      Overview/Hospital Course:  Zion Vasquez is a 86 y.o. male admitted to observation for GI bleed and symptomatic anemia. Pt hemodynamically stable on presentation w/ H/H 8/25.3. Also found to have CARYL likely d/t volume depletion from acute blood loss w/ Cr 2. Pt given 1L bolus NS for fluid resuscitation w/ improvement of Cr to 1.8. Pt was initiated on the GIB pathway and given IV PPI. GI consulted and  performed colonoscopy 11/30. Patient with repeat Hgb 6.7. Transfused 1 unit. Repeat CBC 12/1 with Hgb 7.1. Transfused 1 unit. Hgb 9.1. Patient had large BM with bright blood and clots. GI re consulted.       Interval History: NAEON, AF, VSS. Hgb 9.1. Patient endorsed having a large BM this Am with bright red blood/clots. No formed stool noted. Spoke with GI. Obtained CTA with no signs of active bleed. Will have patient do a bowel prep overnight per GI. If patient bleeds with bowel prep, will scope in the AM. NPO at midnight. Repeat Hgb pending.    Review of Systems   Constitutional:  Negative for appetite change, chills and fever.   HENT:  Negative for congestion, sinus pressure, sore throat and trouble swallowing.    Respiratory:  Negative for cough, chest tightness and wheezing.    Cardiovascular:  Negative for chest pain, palpitations and leg swelling.   Gastrointestinal:  Positive for blood in stool. Negative for abdominal distention, abdominal pain, anal bleeding, constipation, diarrhea, nausea and vomiting.   Genitourinary:  Negative for dysuria, frequency, hematuria and urgency.   Musculoskeletal:  Negative for arthralgias, gait problem and myalgias.   Neurological:  Negative for dizziness, tremors, syncope, light-headedness, numbness and headaches.   Objective:     Vital Signs (Most Recent):  Temp: 97.4 °F (36.3 °C) (12/02/22 1210)  Pulse: 70 (12/02/22 1210)  Resp: 19 (12/02/22 1210)  BP: (!) 141/75 (12/02/22 1210)  SpO2: 99 % (12/02/22 1210)   Vital Signs (24h Range):  Temp:  [97.2 °F (36.2 °C)-98.1 °F (36.7 °C)] 97.4 °F (36.3 °C)  Pulse:  [63-79] 70  Resp:  [18-19] 19  SpO2:  [93 %-99 %] 99 %  BP: (141-162)/(65-80) 141/75     Weight: 95.7 kg (211 lb)  Body mass index is 30.28 kg/m².    Intake/Output Summary (Last 24 hours) at 12/2/2022 1330  Last data filed at 12/2/2022 0904  Gross per 24 hour   Intake 480 ml   Output --   Net 480 ml      Physical Exam  Vitals and nursing note reviewed.   Constitutional:        General: He is not in acute distress.     Appearance: He is well-developed. He is not ill-appearing or diaphoretic.   HENT:      Head: Normocephalic and atraumatic.      Nose: No congestion.      Mouth/Throat:      Pharynx: No oropharyngeal exudate.   Eyes:      Conjunctiva/sclera: Conjunctivae normal.      Pupils: Pupils are equal, round, and reactive to light.   Cardiovascular:      Rate and Rhythm: Normal rate and regular rhythm.      Heart sounds: Normal heart sounds.   Pulmonary:      Effort: Pulmonary effort is normal. No respiratory distress.      Breath sounds: Normal breath sounds. No wheezing.   Abdominal:      General: Bowel sounds are normal. There is no distension.      Palpations: Abdomen is soft.      Tenderness: There is no abdominal tenderness.   Musculoskeletal:         General: No tenderness. Normal range of motion.      Cervical back: Normal range of motion and neck supple.      Right lower leg: No edema.      Left lower leg: No edema.   Lymphadenopathy:      Cervical: No cervical adenopathy.   Skin:     General: Skin is warm and dry.      Capillary Refill: Capillary refill takes less than 2 seconds.      Findings: No rash.   Neurological:      Mental Status: He is alert and oriented to person, place, and time.      Cranial Nerves: No cranial nerve deficit.      Sensory: No sensory deficit.      Coordination: Coordination normal.   Psychiatric:         Behavior: Behavior normal.         Thought Content: Thought content normal.         Judgment: Judgment normal.       Significant Labs: All pertinent labs within the past 24 hours have been reviewed.  BMP:   Recent Labs   Lab 12/02/22  0439   GLU 97      K 3.5      CO2 22*   BUN 16   CREATININE 1.2   CALCIUM 8.3*     CBC:   Recent Labs   Lab 12/01/22  0409 12/01/22  1343 12/02/22  0618   WBC 9.43 10.25 9.95   HGB 7.1* 8.6* 9.1*   HCT 22.1* 26.0* 28.4*    180 190       Significant Imaging: I have reviewed all pertinent imaging  results/findings within the past 24 hours.      Assessment/Plan:      * Acute lower GI bleeding  Internal hemorrhoids  Diverticulosis of colon  - GI Bleed pathway initiated  - orthostatics positive   - Hgb 9.5 (~BL 10)  - Last colonoscopy 2019   - Serial H/H's q8  - transfuse Hgb <7  - Maintain IV access with 2 large bore IVs  - Intravascular resuscitation/support with IVFs   - Protonix 40mg IV BID  - Discontinue all NSAIDs and Heparin products  - will correct any coagulopathy with platelets and FFP to a goal of platelets >50K and INR <2.0  - GI consulted, appreciate recs   - bowel pre overnight, npo at midnight, possible scope in the AM  - 11/27 CTA abdomen showed no evidence of GI bleed. Diverticulosis coli without evidence of diverticulitis. Cholelithiasis and bilateral renal hypodensities, likely cysts.   - 12/2 CTA A/P showed no evidence of active gastrointestinal hemorrhage. Colonic diverticulosis without evidence of diverticulitis.Cholelithiasis  - Hgb 7.1 12/1, transfused 1 unit  - Hgb 9.1 12/2, patient had large bowel movement with bright red blood noted, re-consulted GI, recs as above  - monitor CBCs    CARYL (acute kidney injury)  CKD3  Patient with acute kidney injury likely due to IVVD/dehydration CARYL is currently stable. Labs reviewed- Renal function/electrolytes with Estimated Creatinine Clearance: 53.2 mL/min (based on SCr of 1.2 mg/dL). according to latest data. Monitor urine output and serial BMP and adjust therapy as needed. Avoid nephrotoxins and renally dose meds for GFR listed above.   - Cr 2.0 (~BL 1.5-1.8) on admission  - give 1L LR bolus  - Cr stable at 1.2    GERD (gastroesophageal reflux disease)  - protonix 40 mg bid    Hyperlipemia  - continue statin    Hypertension  - amlodipine and losartan held in setting of GIB    Obesity  Body mass index is 30.28 kg/m². Morbid obesity complicates all aspects of disease management from diagnostic modalities to treatment. Weight loss encouraged and  health benefits explained to patient.       VTE Risk Mitigation (From admission, onward)         Ordered     IP VTE HIGH RISK PATIENT  Once         11/29/22 1154     Place sequential compression device  Until discontinued         11/29/22 1154                Discharge Planning   BAKARI: 12/3/2022     Code Status: Full Code   Is the patient medically ready for discharge?: No    Reason for patient still in hospital (select all that apply): Patient trending condition, Laboratory test, Treatment and Consult recommendations  Discharge Plan A: Home with family                  Gloria Sheridan PA-C  Department of Hospital Medicine   [facility] - Observation [facility]

## 2022-12-02 NOTE — ASSESSMENT & PLAN NOTE
CKD3  Patient with acute kidney injury likely due to IVVD/dehydration CARYL is currently stable. Labs reviewed- Renal function/electrolytes with Estimated Creatinine Clearance: 53.2 mL/min (based on SCr of 1.2 mg/dL). according to latest data. Monitor urine output and serial BMP and adjust therapy as needed. Avoid nephrotoxins and renally dose meds for GFR listed above.   - Cr 2.0 (~BL 1.5-1.8) on admission  - give 1L LR bolus  - Cr stable at 1.2

## 2022-12-02 NOTE — PLAN OF CARE
POC reviewed w/ pt. Denies pain/discomfort @ this time.  Problem: Adjustment to Illness (Gastrointestinal Bleeding)  Goal: Optimal Coping with Acute Illness  Outcome: Ongoing, Progressing     Problem: Bleeding (Gastrointestinal Bleeding)  Goal: Hemostasis  Outcome: Ongoing, Progressing     Problem: Adult Inpatient Plan of Care  Goal: Plan of Care Review  Outcome: Ongoing, Progressing  Goal: Patient-Specific Goal (Individualized)  Outcome: Ongoing, Progressing  Goal: Absence of Hospital-Acquired Illness or Injury  Outcome: Ongoing, Progressing  Goal: Optimal Comfort and Wellbeing  Outcome: Ongoing, Progressing  Goal: Readiness for Transition of Care  Outcome: Ongoing, Progressing     Problem: Infection  Goal: Absence of Infection Signs and Symptoms  Outcome: Ongoing, Progressing     Problem: Fluid and Electrolyte Imbalance (Acute Kidney Injury/Impairment)  Goal: Fluid and Electrolyte Balance  Outcome: Ongoing, Progressing     Problem: Oral Intake Inadequate (Acute Kidney Injury/Impairment)  Goal: Optimal Nutrition Intake  Outcome: Ongoing, Progressing     Problem: Renal Function Impairment (Acute Kidney Injury/Impairment)  Goal: Effective Renal Function  Outcome: Ongoing, Progressing

## 2022-12-02 NOTE — ASSESSMENT & PLAN NOTE
Internal hemorrhoids  Diverticulosis of colon  - GI Bleed pathway initiated  - orthostatics positive   - Hgb 9.5 (~BL 10)  - Last colonoscopy 2019   - Serial H/H's q8  - transfuse Hgb <7  - Maintain IV access with 2 large bore IVs  - Intravascular resuscitation/support with IVFs   - Protonix 40mg IV BID  - Discontinue all NSAIDs and Heparin products  - will correct any coagulopathy with platelets and FFP to a goal of platelets >50K and INR <2.0  - GI consulted, appreciate recs   - bowel pre overnight, npo at midnight, possible scope in the AM  - 11/27 CTA abdomen showed no evidence of GI bleed. Diverticulosis coli without evidence of diverticulitis. Cholelithiasis and bilateral renal hypodensities, likely cysts.   - 12/2 CTA A/P showed no evidence of active gastrointestinal hemorrhage. Colonic diverticulosis without evidence of diverticulitis.Cholelithiasis  - Hgb 7.1 12/1, transfused 1 unit  - Hgb 9.1 12/2, patient had large bowel movement with bright red blood noted, re-consulted GI, recs as above  - monitor CBCs

## 2022-12-02 NOTE — NURSING
On call provider notified of passage of moderate amount of bloody mucus from rectum. Pt denies pain/discomfort.

## 2022-12-02 NOTE — SUBJECTIVE & OBJECTIVE
Interval History: NAEON, AF, VSS. Hgb 9.1. Patient endorsed having a large BM this Am with bright red blood/clots. No formed stool noted. Spoke with GI. Obtained CTA with no signs of active bleed. Will have patient do a bowel prep overnight per GI. If patient bleeds with bowel prep, will scope in the AM. NPO at midnight. Repeat Hgb pending.    Review of Systems   Constitutional:  Negative for appetite change, chills and fever.   HENT:  Negative for congestion, sinus pressure, sore throat and trouble swallowing.    Respiratory:  Negative for cough, chest tightness and wheezing.    Cardiovascular:  Negative for chest pain, palpitations and leg swelling.   Gastrointestinal:  Positive for blood in stool. Negative for abdominal distention, abdominal pain, anal bleeding, constipation, diarrhea, nausea and vomiting.   Genitourinary:  Negative for dysuria, frequency, hematuria and urgency.   Musculoskeletal:  Negative for arthralgias, gait problem and myalgias.   Neurological:  Negative for dizziness, tremors, syncope, light-headedness, numbness and headaches.   Objective:     Vital Signs (Most Recent):  Temp: 97.4 °F (36.3 °C) (12/02/22 1210)  Pulse: 70 (12/02/22 1210)  Resp: 19 (12/02/22 1210)  BP: (!) 141/75 (12/02/22 1210)  SpO2: 99 % (12/02/22 1210)   Vital Signs (24h Range):  Temp:  [97.2 °F (36.2 °C)-98.1 °F (36.7 °C)] 97.4 °F (36.3 °C)  Pulse:  [63-79] 70  Resp:  [18-19] 19  SpO2:  [93 %-99 %] 99 %  BP: (141-162)/(65-80) 141/75     Weight: 95.7 kg (211 lb)  Body mass index is 30.28 kg/m².    Intake/Output Summary (Last 24 hours) at 12/2/2022 1335  Last data filed at 12/2/2022 0904  Gross per 24 hour   Intake 480 ml   Output --   Net 480 ml      Physical Exam  Vitals and nursing note reviewed.   Constitutional:       General: He is not in acute distress.     Appearance: He is well-developed. He is not ill-appearing or diaphoretic.   HENT:      Head: Normocephalic and atraumatic.      Nose: No congestion.       Mouth/Throat:      Pharynx: No oropharyngeal exudate.   Eyes:      Conjunctiva/sclera: Conjunctivae normal.      Pupils: Pupils are equal, round, and reactive to light.   Cardiovascular:      Rate and Rhythm: Normal rate and regular rhythm.      Heart sounds: Normal heart sounds.   Pulmonary:      Effort: Pulmonary effort is normal. No respiratory distress.      Breath sounds: Normal breath sounds. No wheezing.   Abdominal:      General: Bowel sounds are normal. There is no distension.      Palpations: Abdomen is soft.      Tenderness: There is no abdominal tenderness.   Musculoskeletal:         General: No tenderness. Normal range of motion.      Cervical back: Normal range of motion and neck supple.      Right lower leg: No edema.      Left lower leg: No edema.   Lymphadenopathy:      Cervical: No cervical adenopathy.   Skin:     General: Skin is warm and dry.      Capillary Refill: Capillary refill takes less than 2 seconds.      Findings: No rash.   Neurological:      Mental Status: He is alert and oriented to person, place, and time.      Cranial Nerves: No cranial nerve deficit.      Sensory: No sensory deficit.      Coordination: Coordination normal.   Psychiatric:         Behavior: Behavior normal.         Thought Content: Thought content normal.         Judgment: Judgment normal.       Significant Labs: All pertinent labs within the past 24 hours have been reviewed.  BMP:   Recent Labs   Lab 12/02/22  0439   GLU 97      K 3.5      CO2 22*   BUN 16   CREATININE 1.2   CALCIUM 8.3*     CBC:   Recent Labs   Lab 12/01/22  0409 12/01/22  1343 12/02/22  0618   WBC 9.43 10.25 9.95   HGB 7.1* 8.6* 9.1*   HCT 22.1* 26.0* 28.4*    180 190       Significant Imaging: I have reviewed all pertinent imaging results/findings within the past 24 hours.

## 2022-12-02 NOTE — NURSING
Pt had moderate-sized bloody, mucus stool with visualized clots @ 0945, assessed in tandem with LUCY Smiley.     Maria Del Rosario Etienne RN

## 2022-12-03 LAB
BASOPHILS # BLD AUTO: 0.02 K/UL (ref 0–0.2)
BASOPHILS NFR BLD: 0.2 % (ref 0–1.9)
DIFFERENTIAL METHOD: ABNORMAL
EOSINOPHIL # BLD AUTO: 0.3 K/UL (ref 0–0.5)
EOSINOPHIL NFR BLD: 3.5 % (ref 0–8)
ERYTHROCYTE [DISTWIDTH] IN BLOOD BY AUTOMATED COUNT: 16.9 % (ref 11.5–14.5)
HCT VFR BLD AUTO: 26.1 % (ref 40–54)
HGB BLD-MCNC: 8.4 G/DL (ref 14–18)
IMM GRANULOCYTES # BLD AUTO: 0.03 K/UL (ref 0–0.04)
IMM GRANULOCYTES NFR BLD AUTO: 0.3 % (ref 0–0.5)
LYMPHOCYTES # BLD AUTO: 1.9 K/UL (ref 1–4.8)
LYMPHOCYTES NFR BLD: 19.7 % (ref 18–48)
MCH RBC QN AUTO: 26.4 PG (ref 27–31)
MCHC RBC AUTO-ENTMCNC: 32.2 G/DL (ref 32–36)
MCV RBC AUTO: 82 FL (ref 82–98)
MONOCYTES # BLD AUTO: 1.4 K/UL (ref 0.3–1)
MONOCYTES NFR BLD: 14.3 % (ref 4–15)
NEUTROPHILS # BLD AUTO: 5.9 K/UL (ref 1.8–7.7)
NEUTROPHILS NFR BLD: 62 % (ref 38–73)
NRBC BLD-RTO: 0 /100 WBC
PLATELET # BLD AUTO: 200 K/UL (ref 150–450)
PMV BLD AUTO: 10.1 FL (ref 9.2–12.9)
RBC # BLD AUTO: 3.18 M/UL (ref 4.6–6.2)
WBC # BLD AUTO: 9.51 K/UL (ref 3.9–12.7)

## 2022-12-03 PROCEDURE — 94761 N-INVAS EAR/PLS OXIMETRY MLT: CPT

## 2022-12-03 PROCEDURE — 99233 SBSQ HOSP IP/OBS HIGH 50: CPT | Mod: ,,,

## 2022-12-03 PROCEDURE — 36415 COLL VENOUS BLD VENIPUNCTURE: CPT

## 2022-12-03 PROCEDURE — 25000003 PHARM REV CODE 250: Performed by: PHYSICIAN ASSISTANT

## 2022-12-03 PROCEDURE — 11000001 HC ACUTE MED/SURG PRIVATE ROOM

## 2022-12-03 PROCEDURE — 25000003 PHARM REV CODE 250

## 2022-12-03 PROCEDURE — 99233 PR SUBSEQUENT HOSPITAL CARE,LEVL III: ICD-10-PCS | Mod: ,,,

## 2022-12-03 PROCEDURE — 85025 COMPLETE CBC W/AUTO DIFF WBC: CPT

## 2022-12-03 RX ADMIN — SODIUM BICARBONATE 650 MG TABLET 650 MG: at 09:12

## 2022-12-03 RX ADMIN — PANTOPRAZOLE SODIUM 40 MG: 40 TABLET, DELAYED RELEASE ORAL at 06:12

## 2022-12-03 RX ADMIN — PANTOPRAZOLE SODIUM 40 MG: 40 TABLET, DELAYED RELEASE ORAL at 05:12

## 2022-12-03 RX ADMIN — ATORVASTATIN CALCIUM 10 MG: 10 TABLET, FILM COATED ORAL at 08:12

## 2022-12-03 RX ADMIN — SODIUM BICARBONATE 650 MG TABLET 650 MG: at 08:12

## 2022-12-03 NOTE — NURSING
NPO status maintained after MN due to scheduled colonoscopy this day. Pt denies pain/discomfort. No signs of acute distress.

## 2022-12-03 NOTE — PLAN OF CARE
POC reviewed w/pt. All questions answered. @ present consuming scheduled bowel prep. Denies pain/discomfort. No signs of acute distress.  Problem: Adjustment to Illness (Gastrointestinal Bleeding)  Goal: Optimal Coping with Acute Illness  Outcome: Ongoing, Progressing     Problem: Bleeding (Gastrointestinal Bleeding)  Goal: Hemostasis  Outcome: Ongoing, Progressing     Problem: Adult Inpatient Plan of Care  Goal: Plan of Care Review  Outcome: Ongoing, Progressing  Goal: Patient-Specific Goal (Individualized)  Outcome: Ongoing, Progressing  Goal: Absence of Hospital-Acquired Illness or Injury  Outcome: Ongoing, Progressing  Goal: Optimal Comfort and Wellbeing  Outcome: Ongoing, Progressing  Goal: Readiness for Transition of Care  Outcome: Ongoing, Progressing     Problem: Infection  Goal: Absence of Infection Signs and Symptoms  Outcome: Ongoing, Progressing     Problem: Fluid and Electrolyte Imbalance (Acute Kidney Injury/Impairment)  Goal: Fluid and Electrolyte Balance  Outcome: Ongoing, Progressing     Problem: Oral Intake Inadequate (Acute Kidney Injury/Impairment)  Goal: Optimal Nutrition Intake  Outcome: Ongoing, Progressing     Problem: Renal Function Impairment (Acute Kidney Injury/Impairment)  Goal: Effective Renal Function  Outcome: Ongoing, Progressing

## 2022-12-03 NOTE — TREATMENT PLAN
Brief GI Treatment Plan    Pt completed full bowel prep, reported seeing less blood overnight and confirmed via diaper today that stool output is totally clear this morning, no blood. Hgb stable. Holding off on colonoscopy without evidence of going bleeding. Patient does request to remain inpatient for observation to ensure no recurrent bleeding, which is reasonable.    Plan  - Clear liquid diet  - H/H q24h  - Notify GI if any recurrent hematochezia  - If large volume hematochezia with hemodynamic instability, would obtain repeat stat CTA A/P and consult IR for embolization if positive    Navarro Dee  Gastroenterology Fellow, PGY-IV

## 2022-12-03 NOTE — TELEPHONE ENCOUNTER
----- Message from Smith Alvarez MD sent at 12/2/2022  5:31 PM CST -----  Yes, with Stewart.      ----- Message -----  From: Melody Ramirez MA  Sent: 11/28/2022   6:00 PM CST  To: Smith Alvarez MD    Does he follow up?

## 2022-12-03 NOTE — ASSESSMENT & PLAN NOTE
Internal hemorrhoids  Diverticulosis of colon  - GI Bleed pathway initiated  - orthostatics positive   - Hgb 9.5 (~BL 10)  - Last colonoscopy 2019   - Serial H/H's q8  - transfuse Hgb <7  - Maintain IV access with 2 large bore IVs  - Intravascular resuscitation/support with IVFs   - Protonix 40mg IV BID  - Discontinue all NSAIDs and Heparin products  - will correct any coagulopathy with platelets and FFP to a goal of platelets >50K and INR <2.0  - GI consulted, appreciate recs   - Clear liquid diet   - H/H q24h   - Notify GI if any recurrent hematochezia   - If large volume hematochezia with hemodynamic instability, would obtain repeat stat CTA A/P and consult   IR for embolization if positive  - 11/27 CTA abdomen showed no evidence of GI bleed. Diverticulosis coli without evidence of diverticulitis. Cholelithiasis and bilateral renal hypodensities, likely cysts.   - 12/2 CTA A/P showed no evidence of active gastrointestinal hemorrhage. Colonic diverticulosis without evidence of diverticulitis.Cholelithiasis  - Hgb 7.1 12/1, transfused 1 unit  - Hgb 9.1 12/2, patient had large bowel movement with bright red blood noted, re-consulted GI, recs as above  - monitor CBCs

## 2022-12-03 NOTE — TREATMENT PLAN
Brief GI Treatment Plan    Pt completed full bowel prep, reported seeing less blood overnight and confirmed via diaper today that stool output is totally clear this morning, no blood. Hgb stable. Holding off on colonoscopy without evidence of going bleeding. Discussed extensively with patient. Patient does request to remain inpatient for observation to ensure no recurrent bleeding, which is reasonable.    Plan  - Clear liquid diet  - H/H q24h  - Notify GI if any recurrent hematochezia  - If large volume hematochezia with hemodynamic instability, would obtain repeat stat CTA A/P and consult IR for embolization if positive    Navarro Dee  Gastroenterology Fellow, PGY-IV

## 2022-12-03 NOTE — PROGRESS NOTES
[facility] [address] [place] [address] Medicine  Progress Note    Patient Name: Zion Vasquez  MRN: [id number]  Patient Class: IP- Inpatient   Admission Date: 11/29/2022  Length of Stay: 1 days  Attending Physician: Scarlet Dumont MD  Primary Care Provider: Brigida Wall MD        Subjective:     Principal Problem:Acute lower GI bleeding        HPI:  Zion Vasquez is an 86-year-old male with a history of GERD, hyperlipidemia, hypertension obesity including known history of diverticulosis, Martinez's esophagus, hiatal hernia and prior GI bleed (hospitalized on 06/22 and 11/28/22) presenting w/ c/o bright red bleeding per rectum and fatigue. Of note he was hospitalized 11/24-11/28 for 3 episodes of bright red bleeding per rectum without associated fatigue, shortness of breath, chest pain. He had a colonoscopy with GI performed with large amounts of bright red blood noted without clear lesions.  He had serial CBC is overnight that were stable.  He then reportedly had another large bloody bowel movement during his hospital stay with a drop in hemoglobin to 6.8, requiring 1 unit of PRBC. He had a CT abdomen w/o evidence of GIB. He now returns for rectal bleeding w/ associated generalized weakness, fatigue and mild SOB. He is currently taking Protonix and miralax daily. Denies headaches, lightheadedness, dizziness, syncope, n/v/d, abdominal pain, dysuria.    In ED, Pt afebrile, orthostatics positive. CBC w/ H/H 8/25.3, WBC 12.88. Cr 2.0 (~ BL 1.5-1.8). Initiated on GIB pathway. Given pantoprazole 40 mg IV.      Overview/Hospital Course:  Zion Vasquez is a 86 y.o. male admitted to observation for GI bleed and symptomatic anemia. Pt hemodynamically stable on presentation w/ H/H 8/25.3. Also found to have CARYL likely d/t volume depletion from acute blood loss w/ Cr 2. Pt given 1L bolus NS for fluid resuscitation w/ improvement of Cr to 1.8. Pt was initiated on the GIB pathway and given IV PPI. GI consulted and  performed colonoscopy 11/30. Patient with repeat Hgb 6.7. Transfused 1 unit. Repeat CBC 12/1 with Hgb 7.1. Transfused 1 unit. Hgb 9.1. Patient had large BM with bright blood and clots. GI re consulted.       Interval History: NAEON. AF, VSS. Patient reports completing bowel prep last night with minimal bleeding. Reports diaper this Am had no blood. Spoke with GI who recommend monitoring patient on a clear liquid diet overnight and H/H check in the AM. Spoke with patient extensively on course of treatment, diagnosis and consult recommendations. Patient voiced understanding. Patient requesting to work with PT/OT but states he does not want HH or needing a SNF. Explained that we can set him up with PT outpatient once discharged.     Review of Systems   Constitutional:  Negative for appetite change, chills and fever.   HENT:  Negative for congestion, sinus pressure, sore throat and trouble swallowing.    Respiratory:  Negative for cough, chest tightness and wheezing.    Cardiovascular:  Negative for chest pain, palpitations and leg swelling.   Gastrointestinal:  Positive for blood in stool (resolved). Negative for abdominal distention, abdominal pain, anal bleeding, constipation, diarrhea, nausea and vomiting.   Genitourinary:  Negative for dysuria, frequency, hematuria and urgency.   Musculoskeletal:  Negative for arthralgias, gait problem and myalgias.   Neurological:  Negative for dizziness, tremors, syncope, light-headedness, numbness and headaches.   Objective:     Vital Signs (Most Recent):  Temp: 98.5 °F (36.9 °C) (12/03/22 1204)  Pulse: 64 (12/03/22 1226)  Resp: 18 (12/03/22 1204)  BP: 133/64 (12/03/22 1204)  SpO2: 97 % (12/03/22 1204) Vital Signs (24h Range):  Temp:  [97 °F (36.1 °C)-98.5 °F (36.9 °C)] 98.5 °F (36.9 °C)  Pulse:  [64-84] 64  Resp:  [16-20] 18  SpO2:  [95 %-99 %] 97 %  BP: (120-142)/(55-75) 133/64     Weight: 95.7 kg (211 lb)  Body mass index is 30.28 kg/m².    Intake/Output Summary (Last 24  hours) at 12/3/2022 1345  Last data filed at 12/2/2022 1841  Gross per 24 hour   Intake 500 ml   Output 200 ml   Net 300 ml      Physical Exam  Vitals and nursing note reviewed.   Constitutional:       General: He is not in acute distress.     Appearance: He is well-developed. He is not ill-appearing or diaphoretic.   HENT:      Head: Normocephalic and atraumatic.      Nose: No congestion.      Mouth/Throat:      Pharynx: No oropharyngeal exudate.   Eyes:      Conjunctiva/sclera: Conjunctivae normal.      Pupils: Pupils are equal, round, and reactive to light.   Cardiovascular:      Rate and Rhythm: Normal rate and regular rhythm.      Heart sounds: Normal heart sounds.   Pulmonary:      Effort: Pulmonary effort is normal. No respiratory distress.      Breath sounds: Normal breath sounds. No wheezing.   Abdominal:      General: Bowel sounds are normal. There is no distension.      Palpations: Abdomen is soft.      Tenderness: There is no abdominal tenderness.   Musculoskeletal:         General: No tenderness. Normal range of motion.      Cervical back: Normal range of motion and neck supple.      Right lower leg: No edema.      Left lower leg: No edema.   Lymphadenopathy:      Cervical: No cervical adenopathy.   Skin:     General: Skin is warm and dry.      Capillary Refill: Capillary refill takes less than 2 seconds.      Findings: No rash.   Neurological:      Mental Status: He is alert and oriented to person, place, and time.      Cranial Nerves: No cranial nerve deficit.      Sensory: No sensory deficit.      Coordination: Coordination normal.   Psychiatric:         Behavior: Behavior normal.         Thought Content: Thought content normal.         Judgment: Judgment normal.       Significant Labs: All pertinent labs within the past 24 hours have been reviewed.  BMP:   Recent Labs   Lab 12/02/22  0439   GLU 97      K 3.5      CO2 22*   BUN 16   CREATININE 1.2   CALCIUM 8.3*     CBC:   Recent Labs    Lab 12/02/22  0618 12/02/22  1454 12/03/22  0626   WBC 9.95 10.11 9.51   HGB 9.1* 8.5* 8.4*   HCT 28.4* 27.3* 26.1*    211 200       Significant Imaging: I have reviewed all pertinent imaging results/findings within the past 24 hours.      Assessment/Plan:      * Acute lower GI bleeding  Internal hemorrhoids  Diverticulosis of colon  - GI Bleed pathway initiated  - orthostatics positive   - Hgb 9.5 (~BL 10)  - Last colonoscopy 2019   - Serial H/H's q8  - transfuse Hgb <7  - Maintain IV access with 2 large bore IVs  - Intravascular resuscitation/support with IVFs   - Protonix 40mg IV BID  - Discontinue all NSAIDs and Heparin products  - will correct any coagulopathy with platelets and FFP to a goal of platelets >50K and INR <2.0  - GI consulted, appreciate recs   - Clear liquid diet   - H/H q24h   - Notify GI if any recurrent hematochezia   - If large volume hematochezia with hemodynamic instability, would  obtain repeat stat CTA A/P and consult IR for embolization if positive  - 11/27 CTA abdomen showed no evidence of GI bleed. Diverticulosis coli without evidence of diverticulitis. Cholelithiasis and bilateral renal hypodensities, likely cysts.   - 12/2 CTA A/P showed no evidence of active gastrointestinal hemorrhage. Colonic diverticulosis without evidence of diverticulitis.Cholelithiasis  - Hgb 7.1 12/1, transfused 1 unit  - Hgb 9.1 12/2, patient had large bowel movement with bright red blood noted, re-consulted GI, recs as above  - monitor CBCs    CARYL (acute kidney injury)  CKD3  Patient with acute kidney injury likely due to IVVD/dehydration CARYL is currently stable. Labs reviewed- Renal function/electrolytes with Estimated Creatinine Clearance: 53.2 mL/min (based on SCr of 1.2 mg/dL). according to latest data. Monitor urine output and serial BMP and adjust therapy as needed. Avoid nephrotoxins and renally dose meds for GFR listed above.   - Cr 2.0 (~BL 1.5-1.8) on admission  - give 1L LR bolus  - Cr  stable at 1.2    GERD (gastroesophageal reflux disease)  - protonix 40 mg bid    Hyperlipemia  - continue statin    Hypertension  - amlodipine and losartan held in setting of GIB    Obesity  Body mass index is 30.28 kg/m². Morbid obesity complicates all aspects of disease management from diagnostic modalities to treatment. Weight loss encouraged and health benefits explained to patient.       VTE Risk Mitigation (From admission, onward)         Ordered     IP VTE HIGH RISK PATIENT  Once         11/29/22 1154     Place sequential compression device  Until discontinued         11/29/22 1154                Discharge Planning   BAKARI: 12/4/2022     Code Status: Full Code   Is the patient medically ready for discharge?: No    Reason for patient still in hospital (select all that apply): Laboratory test and Treatment  Discharge Plan A: Home with family                  Gloria Sheridan PA-C  Department of Hospital Medicine   [facility]

## 2022-12-03 NOTE — SUBJECTIVE & OBJECTIVE
Interval History: NAEON. AF, VSS. Patient reports completing bowel prep last night with minimal bleeding. Reports diaper this Am had no blood. Spoke with GI who recommend monitoring patient on a clear liquid diet overnight and H/H check in the AM. Spoke with patient extensively on course of treatment, diagnosis and consult recommendations. Patient voiced understanding. Patient requesting to work with PT/OT but states he does not want HH or needing a SNF. Explained that we can set him up with PT outpatient once discharged.     Review of Systems   Constitutional:  Negative for appetite change, chills and fever.   HENT:  Negative for congestion, sinus pressure, sore throat and trouble swallowing.    Respiratory:  Negative for cough, chest tightness and wheezing.    Cardiovascular:  Negative for chest pain, palpitations and leg swelling.   Gastrointestinal:  Positive for blood in stool (resolved). Negative for abdominal distention, abdominal pain, anal bleeding, constipation, diarrhea, nausea and vomiting.   Genitourinary:  Negative for dysuria, frequency, hematuria and urgency.   Musculoskeletal:  Negative for arthralgias, gait problem and myalgias.   Neurological:  Negative for dizziness, tremors, syncope, light-headedness, numbness and headaches.   Objective:     Vital Signs (Most Recent):  Temp: 98.5 °F (36.9 °C) (12/03/22 1204)  Pulse: 64 (12/03/22 1226)  Resp: 18 (12/03/22 1204)  BP: 133/64 (12/03/22 1204)  SpO2: 97 % (12/03/22 1204) Vital Signs (24h Range):  Temp:  [97 °F (36.1 °C)-98.5 °F (36.9 °C)] 98.5 °F (36.9 °C)  Pulse:  [64-84] 64  Resp:  [16-20] 18  SpO2:  [95 %-99 %] 97 %  BP: (120-142)/(55-75) 133/64     Weight: 95.7 kg (211 lb)  Body mass index is 30.28 kg/m².    Intake/Output Summary (Last 24 hours) at 12/3/2022 1345  Last data filed at 12/2/2022 1841  Gross per 24 hour   Intake 500 ml   Output 200 ml   Net 300 ml      Physical Exam  Vitals and nursing note reviewed.   Constitutional:       General:  He is not in acute distress.     Appearance: He is well-developed. He is not ill-appearing or diaphoretic.   HENT:      Head: Normocephalic and atraumatic.      Nose: No congestion.      Mouth/Throat:      Pharynx: No oropharyngeal exudate.   Eyes:      Conjunctiva/sclera: Conjunctivae normal.      Pupils: Pupils are equal, round, and reactive to light.   Cardiovascular:      Rate and Rhythm: Normal rate and regular rhythm.      Heart sounds: Normal heart sounds.   Pulmonary:      Effort: Pulmonary effort is normal. No respiratory distress.      Breath sounds: Normal breath sounds. No wheezing.   Abdominal:      General: Bowel sounds are normal. There is no distension.      Palpations: Abdomen is soft.      Tenderness: There is no abdominal tenderness.   Musculoskeletal:         General: No tenderness. Normal range of motion.      Cervical back: Normal range of motion and neck supple.      Right lower leg: No edema.      Left lower leg: No edema.   Lymphadenopathy:      Cervical: No cervical adenopathy.   Skin:     General: Skin is warm and dry.      Capillary Refill: Capillary refill takes less than 2 seconds.      Findings: No rash.   Neurological:      Mental Status: He is alert and oriented to person, place, and time.      Cranial Nerves: No cranial nerve deficit.      Sensory: No sensory deficit.      Coordination: Coordination normal.   Psychiatric:         Behavior: Behavior normal.         Thought Content: Thought content normal.         Judgment: Judgment normal.       Significant Labs: All pertinent labs within the past 24 hours have been reviewed.  BMP:   Recent Labs   Lab 12/02/22  0439   GLU 97      K 3.5      CO2 22*   BUN 16   CREATININE 1.2   CALCIUM 8.3*     CBC:   Recent Labs   Lab 12/02/22  0618 12/02/22  1454 12/03/22  0626   WBC 9.95 10.11 9.51   HGB 9.1* 8.5* 8.4*   HCT 28.4* 27.3* 26.1*    211 200       Significant Imaging: I have reviewed all pertinent imaging  results/findings within the past 24 hours.

## 2022-12-04 LAB
BASOPHILS # BLD AUTO: 0.03 K/UL (ref 0–0.2)
BASOPHILS NFR BLD: 0.4 % (ref 0–1.9)
DIFFERENTIAL METHOD: ABNORMAL
EOSINOPHIL # BLD AUTO: 0.5 K/UL (ref 0–0.5)
EOSINOPHIL NFR BLD: 7 % (ref 0–8)
ERYTHROCYTE [DISTWIDTH] IN BLOOD BY AUTOMATED COUNT: 17.1 % (ref 11.5–14.5)
HCT VFR BLD AUTO: 25 % (ref 40–54)
HGB BLD-MCNC: 8.1 G/DL (ref 14–18)
IMM GRANULOCYTES # BLD AUTO: 0.02 K/UL (ref 0–0.04)
IMM GRANULOCYTES NFR BLD AUTO: 0.3 % (ref 0–0.5)
LYMPHOCYTES # BLD AUTO: 1.1 K/UL (ref 1–4.8)
LYMPHOCYTES NFR BLD: 14.7 % (ref 18–48)
MCH RBC QN AUTO: 26.6 PG (ref 27–31)
MCHC RBC AUTO-ENTMCNC: 32.4 G/DL (ref 32–36)
MCV RBC AUTO: 82 FL (ref 82–98)
MONOCYTES # BLD AUTO: 1.1 K/UL (ref 0.3–1)
MONOCYTES NFR BLD: 14.7 % (ref 4–15)
NEUTROPHILS # BLD AUTO: 4.7 K/UL (ref 1.8–7.7)
NEUTROPHILS NFR BLD: 62.9 % (ref 38–73)
NRBC BLD-RTO: 0 /100 WBC
PLATELET # BLD AUTO: 222 K/UL (ref 150–450)
PMV BLD AUTO: 9.9 FL (ref 9.2–12.9)
RBC # BLD AUTO: 3.05 M/UL (ref 4.6–6.2)
WBC # BLD AUTO: 7.4 K/UL (ref 3.9–12.7)

## 2022-12-04 PROCEDURE — 36415 COLL VENOUS BLD VENIPUNCTURE: CPT

## 2022-12-04 PROCEDURE — 25000003 PHARM REV CODE 250: Performed by: PHYSICIAN ASSISTANT

## 2022-12-04 PROCEDURE — 99233 SBSQ HOSP IP/OBS HIGH 50: CPT | Mod: ,,,

## 2022-12-04 PROCEDURE — 25000003 PHARM REV CODE 250

## 2022-12-04 PROCEDURE — 99233 PR SUBSEQUENT HOSPITAL CARE,LEVL III: ICD-10-PCS | Mod: ,,,

## 2022-12-04 PROCEDURE — 85025 COMPLETE CBC W/AUTO DIFF WBC: CPT

## 2022-12-04 PROCEDURE — 11000001 HC ACUTE MED/SURG PRIVATE ROOM

## 2022-12-04 RX ORDER — METHOCARBAMOL 500 MG/1
500 TABLET, FILM COATED ORAL ONCE
Status: COMPLETED | OUTPATIENT
Start: 2022-12-04 | End: 2022-12-04

## 2022-12-04 RX ADMIN — PANTOPRAZOLE SODIUM 40 MG: 40 TABLET, DELAYED RELEASE ORAL at 05:12

## 2022-12-04 RX ADMIN — SODIUM BICARBONATE 650 MG TABLET 650 MG: at 08:12

## 2022-12-04 RX ADMIN — ATORVASTATIN CALCIUM 10 MG: 10 TABLET, FILM COATED ORAL at 09:12

## 2022-12-04 RX ADMIN — METHOCARBAMOL 500 MG: 500 TABLET ORAL at 11:12

## 2022-12-04 RX ADMIN — PANTOPRAZOLE SODIUM 40 MG: 40 TABLET, DELAYED RELEASE ORAL at 06:12

## 2022-12-04 RX ADMIN — SODIUM BICARBONATE 650 MG TABLET 650 MG: at 09:12

## 2022-12-04 NOTE — SUBJECTIVE & OBJECTIVE
Interval History: NAEON. AF, VSS. Patient with no complaints this morning. Has not had an episode of hematochezia. Gi will not complete any intervention at this time. Tolerated normal diet without issue. Patient requesting that he be seen by PT before being discharged due to fear of being too weak at home. Endorsing that he does not feal like he needs HH or placement but is admit about being seen before being discharged. Referral placed.     Review of Systems   Constitutional:  Negative for appetite change, chills and fever.   HENT:  Negative for congestion, sinus pressure, sore throat and trouble swallowing.    Respiratory:  Negative for cough, chest tightness and wheezing.    Cardiovascular:  Negative for chest pain, palpitations and leg swelling.   Gastrointestinal:  Positive for blood in stool (resolved). Negative for abdominal distention, abdominal pain, anal bleeding, constipation, diarrhea, nausea and vomiting.   Genitourinary:  Negative for dysuria, frequency, hematuria and urgency.   Musculoskeletal:  Negative for arthralgias, gait problem and myalgias.   Neurological:  Negative for dizziness, tremors, syncope, light-headedness, numbness and headaches.   Objective:     Vital Signs (Most Recent):  Temp: 98.2 °F (36.8 °C) (12/04/22 1012)  Pulse: 75 (12/04/22 1012)  Resp: 18 (12/04/22 1012)  BP: (!) 164/68 (12/04/22 1012)  SpO2: 96 % (12/04/22 1012)   Vital Signs (24h Range):  Temp:  [97.9 °F (36.6 °C)-98.5 °F (36.9 °C)] 98.2 °F (36.8 °C)  Pulse:  [60-75] 75  Resp:  [18] 18  SpO2:  [95 %-97 %] 96 %  BP: (142-164)/(63-75) 164/68     Weight: 95.7 kg (211 lb)  Body mass index is 30.28 kg/m².    Intake/Output Summary (Last 24 hours) at 12/4/2022 1412  Last data filed at 12/3/2022 1800  Gross per 24 hour   Intake 480 ml   Output --   Net 480 ml      Physical Exam  Vitals and nursing note reviewed.   Constitutional:       General: He is not in acute distress.     Appearance: He is well-developed. He is not  ill-appearing or diaphoretic.   HENT:      Head: Normocephalic and atraumatic.      Nose: No congestion.      Mouth/Throat:      Pharynx: No oropharyngeal exudate.   Eyes:      Conjunctiva/sclera: Conjunctivae normal.      Pupils: Pupils are equal, round, and reactive to light.   Cardiovascular:      Rate and Rhythm: Normal rate and regular rhythm.      Heart sounds: Normal heart sounds.   Pulmonary:      Effort: Pulmonary effort is normal. No respiratory distress.      Breath sounds: Normal breath sounds. No wheezing.   Abdominal:      General: Bowel sounds are normal. There is no distension.      Palpations: Abdomen is soft.      Tenderness: There is no abdominal tenderness.   Musculoskeletal:         General: No tenderness. Normal range of motion.      Cervical back: Normal range of motion and neck supple.      Right lower leg: No edema.      Left lower leg: No edema.   Lymphadenopathy:      Cervical: No cervical adenopathy.   Skin:     General: Skin is warm and dry.      Capillary Refill: Capillary refill takes less than 2 seconds.      Findings: No rash.   Neurological:      Mental Status: He is alert and oriented to person, place, and time.      Cranial Nerves: No cranial nerve deficit.      Sensory: No sensory deficit.      Coordination: Coordination normal.   Psychiatric:         Behavior: Behavior normal.         Thought Content: Thought content normal.         Judgment: Judgment normal.       Significant Labs: All pertinent labs within the past 24 hours have been reviewed.  BMP: No results for input(s): GLU, NA, K, CL, CO2, BUN, CREATININE, CALCIUM, MG in the last 48 hours.  CBC:   Recent Labs   Lab 12/02/22  1454 12/03/22  0626 12/04/22  0628   WBC 10.11 9.51 7.40   HGB 8.5* 8.4* 8.1*   HCT 27.3* 26.1* 25.0*    200 222       Significant Imaging: I have reviewed all pertinent imaging results/findings within the past 24 hours.

## 2022-12-04 NOTE — ASSESSMENT & PLAN NOTE
Internal hemorrhoids  Diverticulosis of colon  - GI Bleed pathway initiated  - orthostatics positive   - Hgb 9.5 (~BL 10)  - Last colonoscopy 2019   - Serial H/H's q8  - transfuse Hgb <7  - Maintain IV access with 2 large bore IVs  - Intravascular resuscitation/support with IVFs   - Protonix 40mg IV BID  - Discontinue all NSAIDs and Heparin products  - will correct any coagulopathy with platelets and FFP to a goal of platelets >50K and INR <2.0  - GI consulted, appreciate recs   - Clear liquid diet   - H/H q24h   - Notify GI if any recurrent hematochezia   - If large volume hematochezia with hemodynamic instability, would obtain repeat stat CTA A/P and consult   IR for embolization if positive  - 11/27 CTA abdomen showed no evidence of GI bleed. Diverticulosis coli without evidence of diverticulitis. Cholelithiasis and bilateral renal hypodensities, likely cysts.   - 12/2 CTA A/P showed no evidence of active gastrointestinal hemorrhage. Colonic diverticulosis without evidence of diverticulitis.Cholelithiasis  - Hgb 7.1 12/1, transfused 1 unit  - Hgb 9.1 12/2, patient had large bowel movement with bright red blood noted, re-consulted GI, recs as above  - monitor CBCs  - PT/OT consulted, appreciate recs

## 2022-12-04 NOTE — TREATMENT PLAN
Brief GI Treatment Plan    No bowel movement since completing prep last night. Advancing to regular diet this morning. He does wish to be observed for any recurrent bleeding while on regular diet. Hgb stable.     Plan  - Can observe for recurrent bleeding through this afternoon, no current plans for repeat endoscopy  - We will sign off at this time. Notify GI for any signs of recurrent bleeding    Navarro Dee  Gastroenterology Fellow, PGY-IV

## 2022-12-04 NOTE — PROGRESS NOTES
[facility] [address] [place] [address] Medicine  Progress Note    Patient Name: Zion Vasquez  MRN: [id number]  Patient Class: IP- Inpatient   Admission Date: 11/29/2022  Length of Stay: 2 days  Attending Physician: Scarlet Dumont MD  Primary Care Provider: Brigida Wall MD        Subjective:     Principal Problem:Acute lower GI bleeding        HPI:  Zion Vasquez is an 86-year-old male with a history of GERD, hyperlipidemia, hypertension obesity including known history of diverticulosis, Martinez's esophagus, hiatal hernia and prior GI bleed (hospitalized on 06/22 and 11/28/22) presenting w/ c/o bright red bleeding per rectum and fatigue. Of note he was hospitalized 11/24-11/28 for 3 episodes of bright red bleeding per rectum without associated fatigue, shortness of breath, chest pain. He had a colonoscopy with GI performed with large amounts of bright red blood noted without clear lesions.  He had serial CBC is overnight that were stable.  He then reportedly had another large bloody bowel movement during his hospital stay with a drop in hemoglobin to 6.8, requiring 1 unit of PRBC. He had a CT abdomen w/o evidence of GIB. He now returns for rectal bleeding w/ associated generalized weakness, fatigue and mild SOB. He is currently taking Protonix and miralax daily. Denies headaches, lightheadedness, dizziness, syncope, n/v/d, abdominal pain, dysuria.    In ED, Pt afebrile, orthostatics positive. CBC w/ H/H 8/25.3, WBC 12.88. Cr 2.0 (~ BL 1.5-1.8). Initiated on GIB pathway. Given pantoprazole 40 mg IV.      Overview/Hospital Course:  Zion Vasquez is a 86 y.o. male admitted to observation for GI bleed and symptomatic anemia. Pt hemodynamically stable on presentation w/ H/H 8/25.3. Also found to have CARYL likely d/t volume depletion from acute blood loss w/ Cr 2. Pt given 1L bolus NS for fluid resuscitation w/ improvement of Cr to 1.8. Pt was initiated on the GIB pathway and given IV PPI. GI consulted and  performed colonoscopy 11/30. Patient with repeat Hgb 6.7. Transfused 1 unit. Repeat CBC 12/1 with Hgb 7.1. Transfused 1 unit. Hgb 9.1. Patient had large BM with bright blood and clots. GI re consulted. Patient completed bowel prep the night prior with no signs of hematochezia the next morning. Maintained clear liquid diet q24 with recheck of CBC. No signs of hematochezia overnight. Tolerating PO diet. PT to assess for weakness.       Interval History: NAEON. AF, VSS. Patient with no complaints this morning. Has not had an episode of hematochezia. Gi will not complete any intervention at this time. Tolerated normal diet without issue. Patient requesting that he be seen by PT before being discharged due to fear of being too weak at home. Endorsing that he does not feal like he needs HH or placement but is admit about being seen before being discharged. Referral placed.     Review of Systems   Constitutional:  Negative for appetite change, chills and fever.   HENT:  Negative for congestion, sinus pressure, sore throat and trouble swallowing.    Respiratory:  Negative for cough, chest tightness and wheezing.    Cardiovascular:  Negative for chest pain, palpitations and leg swelling.   Gastrointestinal:  Positive for blood in stool (resolved). Negative for abdominal distention, abdominal pain, anal bleeding, constipation, diarrhea, nausea and vomiting.   Genitourinary:  Negative for dysuria, frequency, hematuria and urgency.   Musculoskeletal:  Negative for arthralgias, gait problem and myalgias.   Neurological:  Negative for dizziness, tremors, syncope, light-headedness, numbness and headaches.   Objective:     Vital Signs (Most Recent):  Temp: 98.2 °F (36.8 °C) (12/04/22 1012)  Pulse: 75 (12/04/22 1012)  Resp: 18 (12/04/22 1012)  BP: (!) 164/68 (12/04/22 1012)  SpO2: 96 % (12/04/22 1012)   Vital Signs (24h Range):  Temp:  [97.9 °F (36.6 °C)-98.5 °F (36.9 °C)] 98.2 °F (36.8 °C)  Pulse:  [60-75] 75  Resp:  [18] 18  SpO2:   [95 %-97 %] 96 %  BP: (142-164)/(63-75) 164/68     Weight: 95.7 kg (211 lb)  Body mass index is 30.28 kg/m².    Intake/Output Summary (Last 24 hours) at 12/4/2022 1412  Last data filed at 12/3/2022 1800  Gross per 24 hour   Intake 480 ml   Output --   Net 480 ml      Physical Exam  Vitals and nursing note reviewed.   Constitutional:       General: He is not in acute distress.     Appearance: He is well-developed. He is not ill-appearing or diaphoretic.   HENT:      Head: Normocephalic and atraumatic.      Nose: No congestion.      Mouth/Throat:      Pharynx: No oropharyngeal exudate.   Eyes:      Conjunctiva/sclera: Conjunctivae normal.      Pupils: Pupils are equal, round, and reactive to light.   Cardiovascular:      Rate and Rhythm: Normal rate and regular rhythm.      Heart sounds: Normal heart sounds.   Pulmonary:      Effort: Pulmonary effort is normal. No respiratory distress.      Breath sounds: Normal breath sounds. No wheezing.   Abdominal:      General: Bowel sounds are normal. There is no distension.      Palpations: Abdomen is soft.      Tenderness: There is no abdominal tenderness.   Musculoskeletal:         General: No tenderness. Normal range of motion.      Cervical back: Normal range of motion and neck supple.      Right lower leg: No edema.      Left lower leg: No edema.   Lymphadenopathy:      Cervical: No cervical adenopathy.   Skin:     General: Skin is warm and dry.      Capillary Refill: Capillary refill takes less than 2 seconds.      Findings: No rash.   Neurological:      Mental Status: He is alert and oriented to person, place, and time.      Cranial Nerves: No cranial nerve deficit.      Sensory: No sensory deficit.      Coordination: Coordination normal.   Psychiatric:         Behavior: Behavior normal.         Thought Content: Thought content normal.         Judgment: Judgment normal.       Significant Labs: All pertinent labs within the past 24 hours have been reviewed.  BMP: No  results for input(s): GLU, NA, K, CL, CO2, BUN, CREATININE, CALCIUM, MG in the last 48 hours.  CBC:   Recent Labs   Lab 12/02/22  1454 12/03/22  0626 12/04/22  0628   WBC 10.11 9.51 7.40   HGB 8.5* 8.4* 8.1*   HCT 27.3* 26.1* 25.0*    200 222       Significant Imaging: I have reviewed all pertinent imaging results/findings within the past 24 hours.      Assessment/Plan:      * Acute lower GI bleeding  Internal hemorrhoids  Diverticulosis of colon  - GI Bleed pathway initiated  - orthostatics positive   - Hgb 9.5 (~BL 10)  - Last colonoscopy 2019   - Serial H/H's q8  - transfuse Hgb <7  - Maintain IV access with 2 large bore IVs  - Intravascular resuscitation/support with IVFs   - Protonix 40mg IV BID  - Discontinue all NSAIDs and Heparin products  - will correct any coagulopathy with platelets and FFP to a goal of platelets >50K and INR <2.0  - GI consulted, appreciate recs   - Clear liquid diet   - H/H q24h   - Notify GI if any recurrent hematochezia   - If large volume hematochezia with hemodynamic instability, would obtain repeat stat CTA A/P and consult   IR for embolization if positive  - 11/27 CTA abdomen showed no evidence of GI bleed. Diverticulosis coli without evidence of diverticulitis. Cholelithiasis and bilateral renal hypodensities, likely cysts.   - 12/2 CTA A/P showed no evidence of active gastrointestinal hemorrhage. Colonic diverticulosis without evidence of diverticulitis.Cholelithiasis  - Hgb 7.1 12/1, transfused 1 unit  - Hgb 9.1 12/2, patient had large bowel movement with bright red blood noted, re-consulted GI, recs as above  - monitor CBCs  - PT/OT consulted, appreciate recs      CARYL (acute kidney injury)  CKD3  Patient with acute kidney injury likely due to IVVD/dehydration CARYL is currently stable. Labs reviewed- Renal function/electrolytes with Estimated Creatinine Clearance: 53.2 mL/min (based on SCr of 1.2 mg/dL). according to latest data. Monitor urine output and serial BMP and  adjust therapy as needed. Avoid nephrotoxins and renally dose meds for GFR listed above.   - Cr 2.0 (~BL 1.5-1.8) on admission  - give 1L LR bolus  - Cr stable at 1.2    GERD (gastroesophageal reflux disease)  - protonix 40 mg bid    Hyperlipemia  - continue statin    Hypertension  - amlodipine and losartan held in setting of GIB    Obesity  Body mass index is 30.28 kg/m². Morbid obesity complicates all aspects of disease management from diagnostic modalities to treatment. Weight loss encouraged and health benefits explained to patient.       VTE Risk Mitigation (From admission, onward)         Ordered     IP VTE HIGH RISK PATIENT  Once         11/29/22 1154     Place sequential compression device  Until discontinued         11/29/22 1154                Discharge Planning   BAKARI: 12/4/2022     Code Status: Full Code   Is the patient medically ready for discharge?: No    Reason for patient still in hospital (select all that apply): Laboratory test and PT / OT recommendations  Discharge Plan A: Home with family            Gloria Sheridan PA-C  Department of Hospital Medicine   [facility]

## 2022-12-05 LAB
BASOPHILS # BLD AUTO: 0.01 K/UL (ref 0–0.2)
BASOPHILS NFR BLD: 0.1 % (ref 0–1.9)
DIFFERENTIAL METHOD: ABNORMAL
EOSINOPHIL # BLD AUTO: 0.4 K/UL (ref 0–0.5)
EOSINOPHIL NFR BLD: 5.1 % (ref 0–8)
ERYTHROCYTE [DISTWIDTH] IN BLOOD BY AUTOMATED COUNT: 17.2 % (ref 11.5–14.5)
HCT VFR BLD AUTO: 27.1 % (ref 40–54)
HGB BLD-MCNC: 8.7 G/DL (ref 14–18)
IMM GRANULOCYTES # BLD AUTO: 0.04 K/UL (ref 0–0.04)
IMM GRANULOCYTES NFR BLD AUTO: 0.5 % (ref 0–0.5)
LYMPHOCYTES # BLD AUTO: 1.4 K/UL (ref 1–4.8)
LYMPHOCYTES NFR BLD: 16.3 % (ref 18–48)
MCH RBC QN AUTO: 26.4 PG (ref 27–31)
MCHC RBC AUTO-ENTMCNC: 32.1 G/DL (ref 32–36)
MCV RBC AUTO: 82 FL (ref 82–98)
MONOCYTES # BLD AUTO: 1.4 K/UL (ref 0.3–1)
MONOCYTES NFR BLD: 15.9 % (ref 4–15)
NEUTROPHILS # BLD AUTO: 5.4 K/UL (ref 1.8–7.7)
NEUTROPHILS NFR BLD: 62.1 % (ref 38–73)
NRBC BLD-RTO: 0 /100 WBC
PLATELET # BLD AUTO: 245 K/UL (ref 150–450)
PMV BLD AUTO: 9.6 FL (ref 9.2–12.9)
RBC # BLD AUTO: 3.3 M/UL (ref 4.6–6.2)
WBC # BLD AUTO: 8.66 K/UL (ref 3.9–12.7)

## 2022-12-05 PROCEDURE — 97161 PT EVAL LOW COMPLEX 20 MIN: CPT

## 2022-12-05 PROCEDURE — 25000003 PHARM REV CODE 250: Performed by: PHYSICIAN ASSISTANT

## 2022-12-05 PROCEDURE — 99900035 HC TECH TIME PER 15 MIN (STAT)

## 2022-12-05 PROCEDURE — 99233 SBSQ HOSP IP/OBS HIGH 50: CPT | Mod: ,,,

## 2022-12-05 PROCEDURE — 94761 N-INVAS EAR/PLS OXIMETRY MLT: CPT

## 2022-12-05 PROCEDURE — 36415 COLL VENOUS BLD VENIPUNCTURE: CPT

## 2022-12-05 PROCEDURE — 97116 GAIT TRAINING THERAPY: CPT

## 2022-12-05 PROCEDURE — 25000003 PHARM REV CODE 250

## 2022-12-05 PROCEDURE — 85025 COMPLETE CBC W/AUTO DIFF WBC: CPT

## 2022-12-05 PROCEDURE — 11000001 HC ACUTE MED/SURG PRIVATE ROOM

## 2022-12-05 PROCEDURE — 99233 PR SUBSEQUENT HOSPITAL CARE,LEVL III: ICD-10-PCS | Mod: ,,,

## 2022-12-05 RX ORDER — AMLODIPINE BESYLATE 10 MG/1
10 TABLET ORAL EVERY MORNING
Status: DISCONTINUED | OUTPATIENT
Start: 2022-12-06 | End: 2022-12-08 | Stop reason: HOSPADM

## 2022-12-05 RX ORDER — DICLOFENAC SODIUM 10 MG/G
2 GEL TOPICAL DAILY
Status: DISCONTINUED | OUTPATIENT
Start: 2022-12-05 | End: 2022-12-08 | Stop reason: HOSPADM

## 2022-12-05 RX ADMIN — DICLOFENAC SODIUM 2 G: 10 GEL TOPICAL at 03:12

## 2022-12-05 RX ADMIN — PANTOPRAZOLE SODIUM 40 MG: 40 TABLET, DELAYED RELEASE ORAL at 03:12

## 2022-12-05 RX ADMIN — ATORVASTATIN CALCIUM 10 MG: 10 TABLET, FILM COATED ORAL at 08:12

## 2022-12-05 RX ADMIN — SODIUM BICARBONATE 650 MG TABLET 650 MG: at 08:12

## 2022-12-05 RX ADMIN — ACETAMINOPHEN 650 MG: 325 TABLET ORAL at 11:12

## 2022-12-05 RX ADMIN — PANTOPRAZOLE SODIUM 40 MG: 40 TABLET, DELAYED RELEASE ORAL at 05:12

## 2022-12-05 NOTE — PLAN OF CARE
Pt AAOx4 with complaints of hip pain this shift. Pt given pain medication and repositioned back in the bed and on his side. Pt tolerating better as shift went on. Pt is concerned to leave without having a bowel movement after being discharged last time and having a large bloody bowel movement. Pt call bell within reach vital signs stable and telemetry intact.     Problem: Adjustment to Illness (Gastrointestinal Bleeding)  Goal: Optimal Coping with Acute Illness  Outcome: Ongoing, Progressing     Problem: Bleeding (Gastrointestinal Bleeding)  Goal: Hemostasis  Outcome: Ongoing, Progressing     Problem: Adult Inpatient Plan of Care  Goal: Plan of Care Review  Outcome: Ongoing, Progressing  Goal: Patient-Specific Goal (Individualized)  Outcome: Ongoing, Progressing  Goal: Absence of Hospital-Acquired Illness or Injury  Outcome: Ongoing, Progressing  Goal: Optimal Comfort and Wellbeing  Outcome: Ongoing, Progressing  Goal: Readiness for Transition of Care  Outcome: Ongoing, Progressing     Problem: Infection  Goal: Absence of Infection Signs and Symptoms  Outcome: Ongoing, Progressing     Problem: Fluid and Electrolyte Imbalance (Acute Kidney Injury/Impairment)  Goal: Fluid and Electrolyte Balance  Outcome: Ongoing, Progressing     Problem: Oral Intake Inadequate (Acute Kidney Injury/Impairment)  Goal: Optimal Nutrition Intake  Outcome: Ongoing, Progressing     Problem: Renal Function Impairment (Acute Kidney Injury/Impairment)  Goal: Effective Renal Function  Outcome: Ongoing, Progressing

## 2022-12-05 NOTE — ASSESSMENT & PLAN NOTE
Internal hemorrhoids  Diverticulosis of colon  - GI Bleed pathway initiated  - orthostatics positive   - Hgb 9.5 (~BL 10)  - Last colonoscopy 2019   - Serial H/H's q8  - transfuse Hgb <7  - Maintain IV access with 2 large bore IVs  - Intravascular resuscitation/support with IVFs   - Protonix 40mg IV BID  - Discontinue all NSAIDs and Heparin products  - will correct any coagulopathy with platelets and FFP to a goal of platelets >50K and INR <2.0  - GI consulted, appreciate recs   - Clear liquid diet   - H/H q24h   - Notify GI if any recurrent hematochezia   - If large volume hematochezia with hemodynamic instability, would obtain repeat stat CTA A/P and consult   IR for embolization if positive  - 11/27 CTA abdomen showed no evidence of GI bleed. Diverticulosis coli without evidence of diverticulitis. Cholelithiasis and bilateral renal hypodensities, likely cysts.   - 12/2 CTA A/P showed no evidence of active gastrointestinal hemorrhage. Colonic diverticulosis without evidence of diverticulitis.Cholelithiasis  - Hgb 7.1 12/1, transfused 1 unit  - Hgb 9.1 12/2, patient had large bowel movement with bright red blood noted, re-consulted GI, recs as above  - Hgb 8.7, stable   - monitor CBCs  - [place] diet  - PT/OT consulted, will order bedside commode at discharge

## 2022-12-05 NOTE — PT/OT/SLP EVAL
Physical Therapy Evaluation and Treatment    Patient Name:  Zion Vasquez   MRN:  [id number]    Recommendations:     Discharge Recommendations: other (see comments)   Discharge Equipment Recommendations: bedside commode   Barriers to discharge: None    Assessment:     Zion Vasquez is a 86 y.o. male admitted with a medical diagnosis of Acute lower GI bleeding.  He presents with the following impairments/functional limitations: weakness, impaired endurance, impaired self care skills, impaired functional mobility, gait instability, impaired balance, pain. Pt participated in OOB mobility with stand by assistance, demo's weakness and deconditioning compared to reported functional baseline. PT discussed discharge options. Pt not interested in outpatient or home therapy options. Significant education provided regarding benefits of skilled intervention to continue to assess mobility and progress as able. Given mobility demonstrated, pt would benefit from continued skilled intervention following discharge to improve strength and endurance and progress toward more independent level of mobility. Pt would continue to benefit from acute skilled therapy intervention to address deficits and progress toward prior level of function.       Rehab Prognosis: Good; patient would benefit from acute skilled PT services to address these deficits and reach maximum level of function.    Recent Surgery: Procedure(s) (LRB):  COLONOSCOPY (N/A) 5 Days Post-Op    Plan:     During this hospitalization, patient to be seen 3 x/week to address the identified rehab impairments via gait training, therapeutic activities, therapeutic exercises, neuromuscular re-education and progress toward the following goals:    Plan of Care Expires:  01/05/23    Subjective     Chief Complaint: pt c/o weakness   Patient/Family Comments/goals: to get better   Pain/Comfort:  Pain Rating 1:  (pt reporting R hip pain with movement, did not quantify)  Pain Addressed  1: Reposition, Distraction, Cessation of Activity  Pain Rating Post-Intervention 1: 0/10    Patients cultural, spiritual, Advent conflicts given the current situation: no    Living Environment:  Pt lives with his son in a H with no CHLOÉ through most commonly used entrance.   Prior to admission, patients level of function was independent with mobility and ADLs.  Equipment used at home: none.  DME owned (not currently used): rolling walker.  Upon discharge, patient will have assistance from his son.    Objective:     Communicated with RN prior to session.  Patient found HOB elevated with telemetry  upon PT entry to room.    General Precautions: Standard, fall  Orthopedic Precautions:N/A   Braces: N/A  Respiratory Status: Room air    Exams:  Cognitive Exam:  Patient is AAOx4, followed all commands, communicates clearly and fluently  Gross Motor Coordination:  WFL  RUE ROM: WFL  RUE Strength: grossly 4/5  LUE ROM: WFL  LUE Strength: grossly 4/5  RLE ROM: WFL  RLE Strength: grossly 4/5 except R hip flexors 3-/5  LLE ROM: WFL  LLE Strength: grossly 4/5    Functional Mobility:  Bed Mobility:     Supine to Sit: supervision with increased time to perform, use of bed rails   Transfers:     Sit to Stand:  stand by assistance with rolling walker  Gait: Pt ambulated 100 ft with RW and stand by assistance. Pt then ambulated 15 ft with no AD and contact guard assistance. Pt demo'd small step size, decreased foot clearance, narrow ANTONI, flexed posture with anterior lean. Cuing provided for forward gaze and upright posture.         AM-PAC 6 CLICK MOBILITY  Total Score:20       Treatment & Education:  Pt educated on role of PT/POC. Pt verbalized understanding.   Pt encouraged to only perform OOB mobility with assistance from nursing/therapy. Pt agreeable.   Pt encouraged to ambulate daily with assistance/supervision from nursing/therapy. Pt agreeable.  Pt educated on seated exercises to perform 20x, 3x/day. Exercises included  bilateral LAQ, marching, ankle DF/PF      Patient left up in chair with all lines intact, call button in reach, and RN notified.    GOALS:   Multidisciplinary Problems       Physical Therapy Goals          Problem: Physical Therapy    Goal Priority Disciplines Outcome Goal Variances Interventions   Physical Therapy Goal     PT, PT/OT Ongoing, Progressing     Description: Goals to be met by: 2022     Patient will increase functional independence with mobility by performin. Supine to sit with Modified [place]  2. Sit to stand transfer with Modified [place]  3. Gait  x 150 feet with Modified [place] using Rolling Walker.   4. Lower extremity exercise program x10 reps per handout, with supervision                         History:     Past Medical History:   Diagnosis Date    Martinez esophagus     GERD (gastroesophageal reflux disease)     Hyperlipemia     Hypertension     Obesity        Past Surgical History:   Procedure Laterality Date    COLONOSCOPY N/A 2022    Procedure: COLONOSCOPY;  Surgeon: Smith Alvarez MD;  Location: [address]);  Service: Endoscopy;  Laterality: N/A;    COLONOSCOPY N/A 2022    Procedure: COLONOSCOPY;  Surgeon: Smith Alvarez MD;  Location: [address]);  Service: Endoscopy;  Laterality: N/A;       Time Tracking:     PT Received On: 22  PT Start Time: 922     PT Stop Time: 947  PT Total Time (min): 25 min     Billable Minutes: Evaluation 10 mins and Gait Training 15 mins      2022

## 2022-12-05 NOTE — SUBJECTIVE & OBJECTIVE
Interval History: NAEON. AF, VSS. Patient endorses doing well this morning. Has been tolerating PO diet with no complaints. Has not had a bowel movement since Saturday. Patient refusing to take any stool softeners to have a bowel movement for fear of bleeding again. Denies hematochezia since Saturday. Patient very frustrated when speaking about being discharged and feels he is not ready to go yet and will need a few more days. PT evaluated and recommend bedside commode.     Review of Systems   Constitutional:  Negative for appetite change, chills and fever.   HENT:  Negative for congestion, sinus pressure, sore throat and trouble swallowing.    Respiratory:  Negative for cough, chest tightness and wheezing.    Cardiovascular:  Negative for chest pain, palpitations and leg swelling.   Gastrointestinal:  Positive for blood in stool (resolved). Negative for abdominal distention, abdominal pain, anal bleeding, constipation, diarrhea, nausea and vomiting.   Genitourinary:  Negative for dysuria, frequency, hematuria and urgency.   Musculoskeletal:  Negative for arthralgias, gait problem and myalgias.   Neurological:  Negative for dizziness, tremors, syncope, light-headedness, numbness and headaches.   Objective:     Vital Signs (Most Recent):  Temp: 98.1 °F (36.7 °C) (12/05/22 1146)  Pulse: 67 (12/05/22 1527)  Resp: 18 (12/05/22 1146)  BP: (!) 149/67 (12/05/22 1146)  SpO2: 98 % (12/05/22 1146)   Vital Signs (24h Range):  Temp:  [97.6 °F (36.4 °C)-98.6 °F (37 °C)] 98.1 °F (36.7 °C)  Pulse:  [60-80] 67  Resp:  [16-18] 18  SpO2:  [94 %-100 %] 98 %  BP: (149-173)/(67-79) 149/67     Weight: 95.7 kg (211 lb)  Body mass index is 30.28 kg/m².  No intake or output data in the 24 hours ending 12/05/22 1558   Physical Exam  Vitals and nursing note reviewed.   Constitutional:       General: He is not in acute distress.     Appearance: He is well-developed. He is not ill-appearing or diaphoretic.   HENT:      Head: Normocephalic and  atraumatic.      Nose: No congestion.      Mouth/Throat:      Pharynx: No oropharyngeal exudate.   Eyes:      Conjunctiva/sclera: Conjunctivae normal.      Pupils: Pupils are equal, round, and reactive to light.   Cardiovascular:      Rate and Rhythm: Normal rate and regular rhythm.      Heart sounds: Normal heart sounds.   Pulmonary:      Effort: Pulmonary effort is normal. No respiratory distress.      Breath sounds: Normal breath sounds. No wheezing.   Abdominal:      General: Bowel sounds are normal. There is no distension.      Palpations: Abdomen is soft.      Tenderness: There is no abdominal tenderness.   Musculoskeletal:         General: No tenderness. Normal range of motion.      Cervical back: Normal range of motion and neck supple.      Right lower leg: No edema.      Left lower leg: No edema.   Lymphadenopathy:      Cervical: No cervical adenopathy.   Skin:     General: Skin is warm and dry.      Capillary Refill: Capillary refill takes less than 2 seconds.      Findings: No rash.   Neurological:      Mental Status: He is alert and oriented to person, place, and time.      Cranial Nerves: No cranial nerve deficit.      Sensory: No sensory deficit.      Coordination: Coordination normal.   Psychiatric:         Behavior: Behavior normal.         Thought Content: Thought content normal.         Judgment: Judgment normal.       Significant Labs: All pertinent labs within the past 24 hours have been reviewed.  BMP: No results for input(s): GLU, NA, K, CL, CO2, BUN, CREATININE, CALCIUM, MG in the last 48 hours.  CBC:   Recent Labs   Lab 12/04/22  0628 12/05/22  0337   WBC 7.40 8.66   HGB 8.1* 8.7*   HCT 25.0* 27.1*    245       Significant Imaging: I have reviewed all pertinent imaging results/findings within the past 24 hours.

## 2022-12-05 NOTE — PROGRESS NOTES
[facility] [address] [place] [address] Medicine  Progress Note    Patient Name: Zion Vasquez  MRN: [id number]  Patient Class: IP- Inpatient   Admission Date: 11/29/2022  Length of Stay: 3 days  Attending Physician: Scarlet Dumont MD  Primary Care Provider: Brigida Wall MD        Subjective:     Principal Problem:Acute lower GI bleeding        HPI:  Zion Vasquez is an 86-year-old male with a history of GERD, hyperlipidemia, hypertension obesity including known history of diverticulosis, Martinez's esophagus, hiatal hernia and prior GI bleed (hospitalized on 06/22 and 11/28/22) presenting w/ c/o bright red bleeding per rectum and fatigue. Of note he was hospitalized 11/24-11/28 for 3 episodes of bright red bleeding per rectum without associated fatigue, shortness of breath, chest pain. He had a colonoscopy with GI performed with large amounts of bright red blood noted without clear lesions.  He had serial CBC is overnight that were stable.  He then reportedly had another large bloody bowel movement during his hospital stay with a drop in hemoglobin to 6.8, requiring 1 unit of PRBC. He had a CT abdomen w/o evidence of GIB. He now returns for rectal bleeding w/ associated generalized weakness, fatigue and mild SOB. He is currently taking Protonix and miralax daily. Denies headaches, lightheadedness, dizziness, syncope, n/v/d, abdominal pain, dysuria.    In ED, Pt afebrile, orthostatics positive. CBC w/ H/H 8/25.3, WBC 12.88. Cr 2.0 (~ BL 1.5-1.8). Initiated on GIB pathway. Given pantoprazole 40 mg IV.      Overview/Hospital Course:  Zion Vasquez is a 86 y.o. male admitted to observation for GI bleed and symptomatic anemia. Pt hemodynamically stable on presentation w/ H/H 8/25.3. Also found to have CARYL likely d/t volume depletion from acute blood loss w/ Cr 2. Pt given 1L bolus NS for fluid resuscitation w/ improvement of Cr to 1.8. Pt was initiated on the GIB pathway and given IV PPI. GI consulted and  performed colonoscopy 11/30. Patient with repeat Hgb 6.7. Transfused 1 unit. Repeat CBC 12/1 with Hgb 7.1. Transfused 1 unit. Hgb 9.1. Patient had large BM with bright blood and clots. GI re consulted. Patient completed bowel prep the night prior with no signs of hematochezia the next morning. Maintained clear liquid diet q24 with recheck of CBC. No signs of hematochezia overnight. Tolerating PO diet. PT to assess for weakness. Pt recommending bedside commode. Patient very hesitant to leave due to bloody bowel movement after leaving previously.       Interval History: NAEON. AF, VSS. Patient endorses doing well this morning. Has been tolerating PO diet with no complaints. Has not had a bowel movement since Saturday. Patient refusing to take any stool softeners to have a bowel movement for fear of bleeding again. Denies hematochezia since Saturday. Patient very frustrated when speaking about being discharged and feels he is not ready to go yet and will need a few more days. PT evaluated and recommend bedside commode.     Review of Systems   Constitutional:  Negative for appetite change, chills and fever.   HENT:  Negative for congestion, sinus pressure, sore throat and trouble swallowing.    Respiratory:  Negative for cough, chest tightness and wheezing.    Cardiovascular:  Negative for chest pain, palpitations and leg swelling.   Gastrointestinal:  Positive for blood in stool (resolved). Negative for abdominal distention, abdominal pain, anal bleeding, constipation, diarrhea, nausea and vomiting.   Genitourinary:  Negative for dysuria, frequency, hematuria and urgency.   Musculoskeletal:  Negative for arthralgias, gait problem and myalgias.   Neurological:  Negative for dizziness, tremors, syncope, light-headedness, numbness and headaches.   Objective:     Vital Signs (Most Recent):  Temp: 98.1 °F (36.7 °C) (12/05/22 1146)  Pulse: 67 (12/05/22 1527)  Resp: 18 (12/05/22 1146)  BP: (!) 149/67 (12/05/22 1146)  SpO2: 98  % (12/05/22 1146)   Vital Signs (24h Range):  Temp:  [97.6 °F (36.4 °C)-98.6 °F (37 °C)] 98.1 °F (36.7 °C)  Pulse:  [60-80] 67  Resp:  [16-18] 18  SpO2:  [94 %-100 %] 98 %  BP: (149-173)/(67-79) 149/67     Weight: 95.7 kg (211 lb)  Body mass index is 30.28 kg/m².  No intake or output data in the 24 hours ending 12/05/22 1558   Physical Exam  Vitals and nursing note reviewed.   Constitutional:       General: He is not in acute distress.     Appearance: He is well-developed. He is not ill-appearing or diaphoretic.   HENT:      Head: Normocephalic and atraumatic.      Nose: No congestion.      Mouth/Throat:      Pharynx: No oropharyngeal exudate.   Eyes:      Conjunctiva/sclera: Conjunctivae normal.      Pupils: Pupils are equal, round, and reactive to light.   Cardiovascular:      Rate and Rhythm: Normal rate and regular rhythm.      Heart sounds: Normal heart sounds.   Pulmonary:      Effort: Pulmonary effort is normal. No respiratory distress.      Breath sounds: Normal breath sounds. No wheezing.   Abdominal:      General: Bowel sounds are normal. There is no distension.      Palpations: Abdomen is soft.      Tenderness: There is no abdominal tenderness.   Musculoskeletal:         General: No tenderness. Normal range of motion.      Cervical back: Normal range of motion and neck supple.      Right lower leg: No edema.      Left lower leg: No edema.   Lymphadenopathy:      Cervical: No cervical adenopathy.   Skin:     General: Skin is warm and dry.      Capillary Refill: Capillary refill takes less than 2 seconds.      Findings: No rash.   Neurological:      Mental Status: He is alert and oriented to person, place, and time.      Cranial Nerves: No cranial nerve deficit.      Sensory: No sensory deficit.      Coordination: Coordination normal.   Psychiatric:         Behavior: Behavior normal.         Thought Content: Thought content normal.         Judgment: Judgment normal.       Significant Labs: All pertinent  labs within the past 24 hours have been reviewed.  BMP: No results for input(s): GLU, NA, K, CL, CO2, BUN, CREATININE, CALCIUM, MG in the last 48 hours.  CBC:   Recent Labs   Lab 12/04/22  0628 12/05/22  0337   WBC 7.40 8.66   HGB 8.1* 8.7*   HCT 25.0* 27.1*    245       Significant Imaging: I have reviewed all pertinent imaging results/findings within the past 24 hours.      Assessment/Plan:      * Acute lower GI bleeding  Internal hemorrhoids  Diverticulosis of colon  - GI Bleed pathway initiated  - orthostatics positive   - Hgb 9.5 (~BL 10)  - Last colonoscopy 2019   - Serial H/H's q8  - transfuse Hgb <7  - Maintain IV access with 2 large bore IVs  - Intravascular resuscitation/support with IVFs   - Protonix 40mg IV BID  - Discontinue all NSAIDs and Heparin products  - will correct any coagulopathy with platelets and FFP to a goal of platelets >50K and INR <2.0  - GI consulted, appreciate recs   - Clear liquid diet   - H/H q24h   - Notify GI if any recurrent hematochezia   - If large volume hematochezia with hemodynamic instability, would obtain repeat stat CTA A/P and consult   IR for embolization if positive  - 11/27 CTA abdomen showed no evidence of GI bleed. Diverticulosis coli without evidence of diverticulitis. Cholelithiasis and bilateral renal hypodensities, likely cysts.   - 12/2 CTA A/P showed no evidence of active gastrointestinal hemorrhage. Colonic diverticulosis without evidence of diverticulitis.Cholelithiasis  - Hgb 7.1 12/1, transfused 1 unit  - Hgb 9.1 12/2, patient had large bowel movement with bright red blood noted, re-consulted GI, recs as above  - Hgb 8.7, stable   - monitor CBCs  - [place] diet  - PT/OT consulted, will order bedside commode at discharge       CARYL (acute kidney injury)  CKD3  Patient with acute kidney injury likely due to IVVD/dehydration CARYL is currently stable. Labs reviewed- Renal function/electrolytes with Estimated Creatinine Clearance: 53.2 mL/min (based on SCr  of 1.2 mg/dL). according to latest data. Monitor urine output and serial BMP and adjust therapy as needed. Avoid nephrotoxins and renally dose meds for GFR listed above.   - Cr 2.0 (~BL 1.5-1.8) on admission  - give 1L LR bolus  - Cr stable at 1.2      GERD (gastroesophageal reflux disease)  - protonix 40 mg bid    Hyperlipemia  - continue statin    Hypertension  - amlodipine and losartan held in setting of GIB  - GIB resolved, will add back amlodipine     Obesity  Body mass index is 30.28 kg/m². Morbid obesity complicates all aspects of disease management from diagnostic modalities to treatment. Weight loss encouraged and health benefits explained to patient.       VTE Risk Mitigation (From admission, onward)         Ordered     IP VTE HIGH RISK PATIENT  Once         11/29/22 1154     Place sequential compression device  Until discontinued         11/29/22 1154                Discharge Planning   BAKARI: 12/5/2022     Code Status: Full Code   Is the patient medically ready for discharge?: Yes    Reason for patient still in hospital (select all that apply): Patient trending condition  Discharge Plan A: Home   Discharge Delays: None known at this time              Gloria Sheridan PA-C  Department of Hospital Medicine   [facility]

## 2022-12-05 NOTE — PLAN OF CARE
[facility]  Discharge Reassessment    Primary Care Provider: Brigida Wall MD    Expected Discharge Date: 12/5/2022    Reassessment (most recent)       Discharge Reassessment - 12/05/22 1345          Discharge Reassessment    Assessment Type Discharge Planning Reassessment     Did the patient's condition or plan change since previous assessment? No     Discharge Plan discussed with: Patient     Communicated BAKARI with patient/caregiver Yes     Discharge Plan A Home     Discharge Plan B Home     DME Needed Upon Discharge  none     Discharge Barriers Identified None     Why the patient remains in the hospital Requires continued medical care        Post-Acute Status    Other Status No Post-Acute Service Needs     Discharge Delays None known at this time                   AF, VSS. Patient with no complaints this morning. Has not had an episode of hematochezia. Gi will not complete any intervention at this time. Tolerated normal diet without issue. Patient requesting that he be seen by PT before being discharged due to fear of being too weak at home. Endorsing that he does not feal like he needs HH or placement but is admit about being seen before being discharged. Referral placed  Will continue to update plan as needed.  Óscar Salazar, RN,BSN

## 2022-12-05 NOTE — PLAN OF CARE
Problem: Physical Therapy  Goal: Physical Therapy Goal  Description: Goals to be met by: 2022     Patient will increase functional independence with mobility by performin. Supine to sit with Modified [place]  2. Sit to stand transfer with Modified [place]  3. Gait  x 150 feet with Modified [place] using Rolling Walker.   4. Lower extremity exercise program x10 reps per handout, with supervision    Outcome: Ongoing, Progressing     Pt evaluated and appropriate goals established.

## 2022-12-06 PROBLEM — D62 ACUTE BLOOD LOSS ANEMIA: Status: ACTIVE | Noted: 2022-12-06

## 2022-12-06 PROCEDURE — 11000001 HC ACUTE MED/SURG PRIVATE ROOM

## 2022-12-06 PROCEDURE — 99233 PR SUBSEQUENT HOSPITAL CARE,LEVL III: ICD-10-PCS | Mod: ,,,

## 2022-12-06 PROCEDURE — 99233 SBSQ HOSP IP/OBS HIGH 50: CPT | Mod: ,,,

## 2022-12-06 PROCEDURE — 25000003 PHARM REV CODE 250: Performed by: PHYSICIAN ASSISTANT

## 2022-12-06 PROCEDURE — 99900035 HC TECH TIME PER 15 MIN (STAT)

## 2022-12-06 PROCEDURE — 25000003 PHARM REV CODE 250

## 2022-12-06 RX ORDER — HYDRALAZINE HYDROCHLORIDE 25 MG/1
25 TABLET, FILM COATED ORAL EVERY 8 HOURS PRN
Status: DISCONTINUED | OUTPATIENT
Start: 2022-12-06 | End: 2022-12-08 | Stop reason: HOSPADM

## 2022-12-06 RX ORDER — LOSARTAN POTASSIUM 50 MG/1
50 TABLET ORAL DAILY
Status: DISCONTINUED | OUTPATIENT
Start: 2022-12-06 | End: 2022-12-08 | Stop reason: HOSPADM

## 2022-12-06 RX ADMIN — PANTOPRAZOLE SODIUM 40 MG: 40 TABLET, DELAYED RELEASE ORAL at 05:12

## 2022-12-06 RX ADMIN — LOSARTAN POTASSIUM 50 MG: 50 TABLET, FILM COATED ORAL at 08:12

## 2022-12-06 RX ADMIN — ENEMA 1 ENEMA: 19; 7 ENEMA RECTAL at 05:12

## 2022-12-06 RX ADMIN — AMLODIPINE BESYLATE 10 MG: 10 TABLET ORAL at 06:12

## 2022-12-06 RX ADMIN — SODIUM BICARBONATE 650 MG TABLET 650 MG: at 08:12

## 2022-12-06 RX ADMIN — PANTOPRAZOLE SODIUM 40 MG: 40 TABLET, DELAYED RELEASE ORAL at 06:12

## 2022-12-06 RX ADMIN — ATORVASTATIN CALCIUM 10 MG: 10 TABLET, FILM COATED ORAL at 08:12

## 2022-12-06 RX ADMIN — DICLOFENAC SODIUM 2 G: 10 GEL TOPICAL at 08:12

## 2022-12-06 NOTE — PHYSICIAN QUERY
PT Name: Zion Vasquez  MR #: [id number]    DOCUMENTATION CLARIFICATION      CDS/: Shruthi Schumacher               Contact information: [email or web address]    This form is a permanent document in the medical record.      Query Date: December 6, 2022    By submitting this query, we are merely seeking further clarification of documentation. Please utilize your independent clinical judgment when addressing the question(s) below.    The Medical Record contains the following:   Indicators  Supporting Clinical Findings Location in Medical Record   x Anemia documented Admitted to observation for GI bleed and symptomatic anemia   Hospital Medicine PN 11/30     x H&H  11/29 10:20 11/29 16:03 11/30 05:50 11/30 14:36 12/01 04:09 12/01 13:43 12/02 06:18 12/02 14:54 12/03 06:26 12/04 06:28 12/05 03:37   HGB 8.0  7.0  7.1  6.7  7.1  8.6  9.1  8.5  8.4  8.1  8.7    HCT 25.3  22.2  22.3  21.5  22.1  26.0  28.4  27.3  26.1  25.0  27.1     Labs, CBC 11/29 - 12/05    BP                    HR     x GI bleeding documented Presenting w/ c/o bright red bleeding per rectum and fatigue. Of note he was hospitalized 11/24-11/28 for 3 episodes of bright red bleeding per rectum without associated fatigue, shortness of breath, chest pain. He had a colonoscopy with GI performed with large amounts of bright red blood noted without clear lesions.   He now returns for rectal bleeding w/ associated generalized weakness, fatigue and mild SOB  GI bleed    Diverticulosis with recurrent hematochezia likely due to diverticular bleed s/p colonoscopy on 11/30.  Three polyps in the colon (risks of removal outweighed benefits)   Hospital Medicine H&P 11/29              GI Plan of Care 12/01      Acute bleeding (Non GI site)     x Transfusion(s) Transfused 1 unit.    Hospital Medicine PN 12/01, Blood Bank   x Acute/Chronic illness Also found to have CARYL likely d/t volume depletion from acute blood loss w/ Cr 2.  Acute lower GI bleeding  Internal  hemorrhoids  Diverticulosis of colon  CARYL (acute kidney injury)  CKD3   Hospital Medicine PN 11/30   x Treatments Initiated on GIB pathway. Given pantoprazole 40 mg IV  Serial H/H's q8  Transfuse Hgb <7  Intravascular resuscitation/support with IVFs   Protonix 40mg IV BID   Hospital Medicine H&P 11/29      Other           Provider, please specify diagnosis or diagnoses associated with above clinical findings. Please select all that apply:    [  x ] Acute blood loss anemia    [   ] Chronic blood loss anemia     [   ] Anemia of chronic kidney disease (please specify stage): _______________   [   ] Anemia due to chronic disease (please specify): _________________   [   ] Anemia, unspecified    [   ] Other Hematological Diagnosis (please specify): _________________   [   ] Clinically Undetermined              Please document in your progress notes daily for the duration of treatment, until resolved, and include in your discharge summary.    Form No. [id number]

## 2022-12-06 NOTE — ASSESSMENT & PLAN NOTE
Internal hemorrhoids  Diverticulosis of colon  - GI Bleed pathway initiated  - orthostatics positive   - Hgb 9.5 (~BL 10)  - Last colonoscopy 2019   - Serial H/H's q8  - transfuse Hgb <7  - Maintain IV access with 2 large bore IVs  - Intravascular resuscitation/support with IVFs   - Protonix 40mg IV BID  - Discontinue all NSAIDs and Heparin products  - will correct any coagulopathy with platelets and FFP to a goal of platelets >50K and INR <2.0  - GI consulted, appreciate recs   - Clear liquid diet   - H/H q24h   - Notify GI if any recurrent hematochezia   - If large volume hematochezia with hemodynamic instability, would obtain repeat stat CTA A/P and consult   IR for embolization if positive  - 11/27 CTA abdomen showed no evidence of GI bleed. Diverticulosis coli without evidence of diverticulitis. Cholelithiasis and bilateral renal hypodensities, likely cysts.   - 12/2 CTA A/P showed no evidence of active gastrointestinal hemorrhage. Colonic diverticulosis without evidence of diverticulitis.Cholelithiasis  - Hgb 7.1 12/1, transfused 1 unit  - Hgb 9.1 12/2, patient had large bowel movement with bright red blood noted, re-consulted GI, recs as above  - Hgb 8.7, stable   - monitor CBCs  - Cardiac diet  - PT/OT consulted, will order bedside commode at discharge   - patient agreeable to enema 12/5

## 2022-12-06 NOTE — PLAN OF CARE
Pt ambulated multiple times today. No BM yet.  Problem: Adjustment to Illness (Gastrointestinal Bleeding)  Goal: Optimal Coping with Acute Illness  Outcome: Ongoing, Progressing     Problem: Bleeding (Gastrointestinal Bleeding)  Goal: Hemostasis  Outcome: Ongoing, Progressing     Problem: Adult Inpatient Plan of Care  Goal: Plan of Care Review  Outcome: Ongoing, Progressing  Goal: Patient-Specific Goal (Individualized)  Outcome: Ongoing, Progressing  Goal: Absence of Hospital-Acquired Illness or Injury  Outcome: Ongoing, Progressing  Goal: Optimal Comfort and Wellbeing  Outcome: Ongoing, Progressing  Goal: Readiness for Transition of Care  Outcome: Ongoing, Progressing     Problem: Infection  Goal: Absence of Infection Signs and Symptoms  Outcome: Ongoing, Progressing     Problem: Fluid and Electrolyte Imbalance (Acute Kidney Injury/Impairment)  Goal: Fluid and Electrolyte Balance  Outcome: Ongoing, Progressing     Problem: Oral Intake Inadequate (Acute Kidney Injury/Impairment)  Goal: Optimal Nutrition Intake  Outcome: Ongoing, Progressing     Problem: Renal Function Impairment (Acute Kidney Injury/Impairment)  Goal: Effective Renal Function  Outcome: Ongoing, Progressing

## 2022-12-06 NOTE — ASSESSMENT & PLAN NOTE
Body mass index is 29.73 kg/m². Morbid obesity complicates all aspects of disease management from diagnostic modalities to treatment. Weight loss encouraged and health benefits explained to patient.

## 2022-12-06 NOTE — PROGRESS NOTES
[facility] [address] [place] [address] Medicine  Progress Note    Patient Name: Zion Vasquez  MRN: [id number]  Patient Class: IP- Inpatient   Admission Date: 11/29/2022  Length of Stay: 4 days  Attending Physician: Scarlet Dumont MD  Primary Care Provider: Brigida Wall MD        Subjective:     Principal Problem:Acute lower GI bleeding        HPI:  Zion Vasquez is an 86-year-old male with a history of GERD, hyperlipidemia, hypertension obesity including known history of diverticulosis, Martinez's esophagus, hiatal hernia and prior GI bleed (hospitalized on 06/22 and 11/28/22) presenting w/ c/o bright red bleeding per rectum and fatigue. Of note he was hospitalized 11/24-11/28 for 3 episodes of bright red bleeding per rectum without associated fatigue, shortness of breath, chest pain. He had a colonoscopy with GI performed with large amounts of bright red blood noted without clear lesions.  He had serial CBC is overnight that were stable.  He then reportedly had another large bloody bowel movement during his hospital stay with a drop in hemoglobin to 6.8, requiring 1 unit of PRBC. He had a CT abdomen w/o evidence of GIB. He now returns for rectal bleeding w/ associated generalized weakness, fatigue and mild SOB. He is currently taking Protonix and miralax daily. Denies headaches, lightheadedness, dizziness, syncope, n/v/d, abdominal pain, dysuria.    In ED, Pt afebrile, orthostatics positive. CBC w/ H/H 8/25.3, WBC 12.88. Cr 2.0 (~ BL 1.5-1.8). Initiated on GIB pathway. Given pantoprazole 40 mg IV.      Overview/Hospital Course:  Zion Vasquez is a 86 y.o. male admitted to observation for GI bleed and symptomatic anemia. Pt hemodynamically stable on presentation w/ H/H 8/25.3. Also found to have CARYL likely d/t volume depletion from acute blood loss w/ Cr 2. Pt given 1L bolus NS for fluid resuscitation w/ improvement of Cr to 1.8. Pt was initiated on the GIB pathway and given IV PPI. GI consulted and  "performed colonoscopy 11/30. Patient with repeat Hgb 6.7. Transfused 1 unit. Repeat CBC 12/1 with Hgb 7.1. Transfused 1 unit. Hgb 9.1. Patient had large BM with bright blood and clots. GI re consulted. Patient completed bowel prep the night prior with no signs of hematochezia the next morning. Maintained clear liquid diet q24 with recheck of CBC. No signs of hematochezia overnight. Tolerating PO diet. PT to assess for weakness. Pt recommending bedside commode. Patient very hesitant to leave due to bloody bowel movement after leaving previously.       Interval History: NAEON. AF, VSS. Patient seen and evaluated by me today. Had long discussion with patient and son about care. Patient has been refusing any stool softeners or enemas since admission. Has not had a bowel movement since Saturday but denies any feelings of constipation. Per nursing staff the past couple of days, patient has been able to ambulate well and has been walking the halls with his walker without issue. Patient continues to deny needing assistance at home. Son concerned about patient using a walk and wondering if he is able to put his own clothes on. Discussed that patient is stable when ambulating and we can not keep him only for ambulation with nursing staff. Patient has been stable the past 3 days of observation following his last colonoscopy. Patient feels like we are "rushing him out"  but continues to ask for more days to stay in the hospital after denying anything to have a bowel movement. Feels he needs more time to walk with the nurses. Patient continues to have good PO intake and denies any nausea or vomiting. Denies hematochezia or hematuria.     On afternoon rounds, patient is agreeable to try an enema in order to have a bowel movement while in the hospital. Will monitor once completed.     Review of Systems   Constitutional:  Negative for appetite change, chills and fever.   HENT:  Negative for congestion, sinus pressure, sore throat " and trouble swallowing.    Respiratory:  Negative for cough, chest tightness and wheezing.    Cardiovascular:  Negative for chest pain, palpitations and leg swelling.   Gastrointestinal:  Positive for blood in stool (resolved). Negative for abdominal distention, abdominal pain, anal bleeding, constipation, diarrhea, nausea and vomiting.   Genitourinary:  Negative for dysuria, frequency, hematuria and urgency.   Musculoskeletal:  Negative for arthralgias, gait problem and myalgias.   Neurological:  Negative for dizziness, tremors, syncope, light-headedness, numbness and headaches.   Objective:     Vital Signs (Most Recent):  Temp: 97.6 °F (36.4 °C) (12/06/22 1521)  Pulse: 64 (12/06/22 1521)  Resp: 18 (12/06/22 1521)  BP: (!) 178/77 (12/06/22 1521)  SpO2: 98 % (12/06/22 1521)   Vital Signs (24h Range):  Temp:  [97.2 °F (36.2 °C)-98.6 °F (37 °C)] 97.6 °F (36.4 °C)  Pulse:  [61-75] 64  Resp:  [18] 18  SpO2:  [97 %-100 %] 98 %  BP: (127-187)/(62-84) 178/77     Weight: 94 kg (207 lb 3.7 oz)  Body mass index is 29.73 kg/m².  No intake or output data in the 24 hours ending 12/06/22 1632   Physical Exam  Vitals and nursing note reviewed.   Constitutional:       General: He is not in acute distress.     Appearance: He is well-developed. He is not ill-appearing or diaphoretic.   HENT:      Head: Normocephalic and atraumatic.      Nose: No congestion.      Mouth/Throat:      Pharynx: No oropharyngeal exudate.   Eyes:      Conjunctiva/sclera: Conjunctivae normal.      Pupils: Pupils are equal, round, and reactive to light.   Cardiovascular:      Rate and Rhythm: Normal rate and regular rhythm.      Heart sounds: Normal heart sounds.   Pulmonary:      Effort: Pulmonary effort is normal. No respiratory distress.      Breath sounds: Normal breath sounds. No wheezing.   Abdominal:      General: Bowel sounds are normal. There is no distension.      Palpations: Abdomen is soft.      Tenderness: There is no abdominal tenderness.    Musculoskeletal:         General: No tenderness. Normal range of motion.      Cervical back: Normal range of motion and neck supple.      Right lower leg: No edema.      Left lower leg: No edema.   Lymphadenopathy:      Cervical: No cervical adenopathy.   Skin:     General: Skin is warm and dry.      Capillary Refill: Capillary refill takes less than 2 seconds.      Findings: No rash.   Neurological:      Mental Status: He is alert and oriented to person, place, and time.      Cranial Nerves: No cranial nerve deficit.      Sensory: No sensory deficit.      Coordination: Coordination normal.   Psychiatric:         Behavior: Behavior normal.         Thought Content: Thought content normal.         Judgment: Judgment normal.       Significant Labs: All pertinent labs within the past 24 hours have been reviewed.  BMP: No results for input(s): GLU, NA, K, CL, CO2, BUN, CREATININE, CALCIUM, MG in the last 48 hours.  CBC:   Recent Labs   Lab 12/05/22  0337   WBC 8.66   HGB 8.7*   HCT 27.1*          Significant Imaging: I have reviewed all pertinent imaging results/findings within the past 24 hours.      Assessment/Plan:      * Acute lower GI bleeding       Acute blood loss anemia   Internal hemorrhoids  Diverticulosis of colon  - GI Bleed pathway initiated  - orthostatics positive   - Hgb 9.5 (~BL 10)  - Last colonoscopy 2019   - Serial H/H's q8  - transfuse Hgb <7  - Maintain IV access with 2 large bore IVs  - Intravascular resuscitation/support with IVFs   - Protonix 40mg IV BID  - Discontinue all NSAIDs and Heparin products  - will correct any coagulopathy with platelets and FFP to a goal of platelets >50K and INR <2.0  - GI consulted, appreciate recs   - Clear liquid diet   - H/H q24h   - Notify GI if any recurrent hematochezia   - If large volume hematochezia with hemodynamic instability, would obtain repeat stat CTA A/P and consult   IR for embolization if positive  - 11/27 CTA abdomen showed no evidence of  GI bleed. Diverticulosis coli without evidence of diverticulitis. Cholelithiasis and bilateral renal hypodensities, likely cysts.   - 12/2 CTA A/P showed no evidence of active gastrointestinal hemorrhage. Colonic diverticulosis without evidence of diverticulitis.Cholelithiasis  - Hgb 7.1 12/1, transfused 1 unit  - Hgb 9.1 12/2, patient had large bowel movement with bright red blood noted, re-consulted GI, recs as above  - Hgb 8.7, stable   - monitor CBCs  - Cardiac diet  - PT/OT consulted, will order bedside commode at discharge   - patient agreeable to enema 12/5      CARYL (acute kidney injury)  CKD3  Patient with acute kidney injury likely due to IVVD/dehydration CARYL is currently stable. Labs reviewed- Renal function/electrolytes with Estimated Creatinine Clearance: 52.8 mL/min (based on SCr of 1.2 mg/dL). according to latest data. Monitor urine output and serial BMP and adjust therapy as needed. Avoid nephrotoxins and renally dose meds for GFR listed above.   - Cr 2.0 (~BL 1.5-1.8) on admission  - give 1L LR bolus  - Cr stable at 1.2    GERD (gastroesophageal reflux disease)  - protonix 40 mg bid    Hyperlipemia  - continue statin    Hypertension  - continue home meds    Obesity  Body mass index is 29.73 kg/m². Morbid obesity complicates all aspects of disease management from diagnostic modalities to treatment. Weight loss encouraged and health benefits explained to patient.       VTE Risk Mitigation (From admission, onward)           Ordered     IP VTE HIGH RISK PATIENT  Once         11/29/22 1154     Place sequential compression device  Until discontinued         11/29/22 1154                    Discharge Planning   BAKARI: 12/7/2022     Code Status: Full Code   Is the patient medically ready for discharge?: Yes    Reason for patient still in hospital (select all that apply): Patient trending condition  Discharge Plan A: Home   Discharge Delays: None known at this time              Gloria Sheridan,  ZHEN  Department of Hospital Medicine   [facility]

## 2022-12-06 NOTE — ASSESSMENT & PLAN NOTE
CKD3  Patient with acute kidney injury likely due to IVVD/dehydration CARYL is currently stable. Labs reviewed- Renal function/electrolytes with Estimated Creatinine Clearance: 52.8 mL/min (based on SCr of 1.2 mg/dL). according to latest data. Monitor urine output and serial BMP and adjust therapy as needed. Avoid nephrotoxins and renally dose meds for GFR listed above.   - Cr 2.0 (~BL 1.5-1.8) on admission  - give 1L LR bolus  - Cr stable at 1.2

## 2022-12-06 NOTE — ASSESSMENT & PLAN NOTE
Acute blood loss anemia  Internal hemorrhoids  Diverticulosis of colon  - GI Bleed pathway initiated  - orthostatics positive   - Hgb 9.5 (~BL 10)  - Last colonoscopy 2019   - Serial H/H's q8  - transfuse Hgb <7  - Maintain IV access with 2 large bore IVs  - Intravascular resuscitation/support with IVFs   - Protonix 40mg IV BID  - Discontinue all NSAIDs and Heparin products  - will correct any coagulopathy with platelets and FFP to a goal of platelets >50K and INR <2.0  - GI consulted, appreciate recs   - Clear liquid diet   - H/H q24h   - Notify GI if any recurrent hematochezia   - If large volume hematochezia with hemodynamic instability, would obtain repeat stat CTA A/P and consult   IR for embolization if positive  - 11/27 CTA abdomen showed no evidence of GI bleed. Diverticulosis coli without evidence of diverticulitis. Cholelithiasis and bilateral renal hypodensities, likely cysts.   - 12/2 CTA A/P showed no evidence of active gastrointestinal hemorrhage. Colonic diverticulosis without evidence of diverticulitis.Cholelithiasis  - Hgb 7.1 12/1, transfused 1 unit  - Hgb 9.1 12/2, patient had large bowel movement with bright red blood noted, re-consulted GI, recs as above  - Hgb 8.7, stable   - monitor CBCs  - Cardiac diet  - PT/OT consulted, will order bedside commode at discharge   - patient agreeable to enema 12/5 with no result.  Continues to refuse Miralax 2/2 previous issues.  Trial of Senna and suppository.    Mr. Vasquez has been medically ready for discharge since Sunday, 12/4.  He has continued to refuse all medications to treat constipation until 12/6.  He has worked with PT/OT, but he does not want home health or outpatient therapy.  He wants to be able to ambulate on his own.  Explained that he has been medically stable to be discharged since 12/4, and it has been a courtesy keeping him here - but every day he says he needs 1 more day.  Explained that other providers would have had him escorted out  by security on Sunday if he had refused to leave.  We reached an agreement to trial a suppository and Senna today, as he is still refusing Miralax as it caused his bleeding before.  He will walk the halls with nursing today.  He will be discharged tomorrow morning, and if he refuses to leave he will be escorted out by security.  He was in agreement with this plan.  His son was called - who was told the above plan, with plans to pick him up for discharge before 12 noon on 12/8.

## 2022-12-06 NOTE — SUBJECTIVE & OBJECTIVE
"Interval History: NAEON. AF, VSS. Patient seen and evaluated by me today. Had long discussion with patient and son about care. Patient has been refusing any stool softeners or enemas since admission. Has not had a bowel movement since Saturday but denies any feelings of constipation. Per nursing staff the past couple of days, patient has been able to ambulate well and has been walking the halls with his walker without issue. Patient continues to deny needing assistance at home. Son concerned about patient using a walk and wondering if he is able to put his own clothes on. Discussed that patient is stable when ambulating and we can not keep him only for ambulation with nursing staff. Patient has been stable the past 3 days of observation following his last colonoscopy. Patient feels like we are "rushing him out"  but continues to ask for more days to stay in the hospital after denying anything to have a bowel movement. Feels he needs more time to walk with the nurses. Patient continues to have good PO intake and denies any nausea or vomiting.     On afternoon rounds, patient is agreeable to try an enema in order to have a bowel movement while in the hospital. Will monitor once completed.     Review of Systems   Constitutional:  Negative for appetite change, chills and fever.   HENT:  Negative for congestion, sinus pressure, sore throat and trouble swallowing.    Respiratory:  Negative for cough, chest tightness and wheezing.    Cardiovascular:  Negative for chest pain, palpitations and leg swelling.   Gastrointestinal:  Positive for blood in stool (resolved). Negative for abdominal distention, abdominal pain, anal bleeding, constipation, diarrhea, nausea and vomiting.   Genitourinary:  Negative for dysuria, frequency, hematuria and urgency.   Musculoskeletal:  Negative for arthralgias, gait problem and myalgias.   Neurological:  Negative for dizziness, tremors, syncope, light-headedness, numbness and headaches. "   Objective:     Vital Signs (Most Recent):  Temp: 97.6 °F (36.4 °C) (12/06/22 1521)  Pulse: 64 (12/06/22 1521)  Resp: 18 (12/06/22 1521)  BP: (!) 178/77 (12/06/22 1521)  SpO2: 98 % (12/06/22 1521)   Vital Signs (24h Range):  Temp:  [97.2 °F (36.2 °C)-98.6 °F (37 °C)] 97.6 °F (36.4 °C)  Pulse:  [61-75] 64  Resp:  [18] 18  SpO2:  [97 %-100 %] 98 %  BP: (127-187)/(62-84) 178/77     Weight: 94 kg (207 lb 3.7 oz)  Body mass index is 29.73 kg/m².  No intake or output data in the 24 hours ending 12/06/22 1632   Physical Exam  Vitals and nursing note reviewed.   Constitutional:       General: He is not in acute distress.     Appearance: He is well-developed. He is not ill-appearing or diaphoretic.   HENT:      Head: Normocephalic and atraumatic.      Nose: No congestion.      Mouth/Throat:      Pharynx: No oropharyngeal exudate.   Eyes:      Conjunctiva/sclera: Conjunctivae normal.      Pupils: Pupils are equal, round, and reactive to light.   Cardiovascular:      Rate and Rhythm: Normal rate and regular rhythm.      Heart sounds: Normal heart sounds.   Pulmonary:      Effort: Pulmonary effort is normal. No respiratory distress.      Breath sounds: Normal breath sounds. No wheezing.   Abdominal:      General: Bowel sounds are normal. There is no distension.      Palpations: Abdomen is soft.      Tenderness: There is no abdominal tenderness.   Musculoskeletal:         General: No tenderness. Normal range of motion.      Cervical back: Normal range of motion and neck supple.      Right lower leg: No edema.      Left lower leg: No edema.   Lymphadenopathy:      Cervical: No cervical adenopathy.   Skin:     General: Skin is warm and dry.      Capillary Refill: Capillary refill takes less than 2 seconds.      Findings: No rash.   Neurological:      Mental Status: He is alert and oriented to person, place, and time.      Cranial Nerves: No cranial nerve deficit.      Sensory: No sensory deficit.      Coordination: Coordination  normal.   Psychiatric:         Behavior: Behavior normal.         Thought Content: Thought content normal.         Judgment: Judgment normal.       Significant Labs: All pertinent labs within the past 24 hours have been reviewed.  BMP: No results for input(s): GLU, NA, K, CL, CO2, BUN, CREATININE, CALCIUM, MG in the last 48 hours.  CBC:   Recent Labs   Lab 12/05/22  0337   WBC 8.66   HGB 8.7*   HCT 27.1*          Significant Imaging: I have reviewed all pertinent imaging results/findings within the past 24 hours.

## 2022-12-06 NOTE — PLAN OF CARE
Pt alert, stable; scheduled meds given without difficulty; no s/s of bleeding during shift; no distress noted during shift; safety maintained.    Problem:   Adjustment to Illness (Gastrointestinal Bleeding)  Goal: Optimal Coping with Acute Illness  Outcome: Ongoing, Progressing     Problem: Bleeding (Gastrointestinal Bleeding)  Goal: Hemostasis  Outcome: Ongoing, Progressing     Problem: Adult Inpatient Plan of Care  Goal: Plan of Care Review  Outcome: Ongoing, Progressing  Goal: Patient-Specific Goal (Individualized)  Outcome: Ongoing, Progressing  Goal: Absence of Hospital-Acquired Illness or Injury  Outcome: Ongoing, Progressing  Goal: Optimal Comfort and Wellbeing  Outcome: Ongoing, Progressing  Goal: Readiness for Transition of Care  Outcome: Ongoing, Progressing     Problem: Infection  Goal: Absence of Infection Signs and Symptoms  Outcome: Ongoing, Progressing     Problem: Fluid and Electrolyte Imbalance (Acute Kidney Injury/Impairment)  Goal: Fluid and Electrolyte Balance  Outcome: Ongoing, Progressing     Problem: Oral Intake Inadequate (Acute Kidney Injury/Impairment)  Goal: Optimal Nutrition Intake  Outcome: Ongoing, Progressing     Problem: Renal Function Impairment (Acute Kidney Injury/Impairment)  Goal: Effective Renal Function  Outcome: Ongoing, Progressing

## 2022-12-07 PROCEDURE — 99233 SBSQ HOSP IP/OBS HIGH 50: CPT | Mod: ,,, | Performed by: HOSPITALIST

## 2022-12-07 PROCEDURE — 99233 PR SUBSEQUENT HOSPITAL CARE,LEVL III: ICD-10-PCS | Mod: ,,, | Performed by: HOSPITALIST

## 2022-12-07 PROCEDURE — 25000003 PHARM REV CODE 250: Performed by: HOSPITALIST

## 2022-12-07 PROCEDURE — 25000003 PHARM REV CODE 250: Performed by: PHYSICIAN ASSISTANT

## 2022-12-07 PROCEDURE — 25000003 PHARM REV CODE 250

## 2022-12-07 PROCEDURE — 11000001 HC ACUTE MED/SURG PRIVATE ROOM

## 2022-12-07 RX ORDER — LACTULOSE 10 G/15ML
10 SOLUTION ORAL 2 TIMES DAILY
Status: DISCONTINUED | OUTPATIENT
Start: 2022-12-07 | End: 2022-12-08 | Stop reason: HOSPADM

## 2022-12-07 RX ORDER — GLYCERIN 1 G/1
1 SUPPOSITORY RECTAL ONCE
Status: COMPLETED | OUTPATIENT
Start: 2022-12-07 | End: 2022-12-07

## 2022-12-07 RX ORDER — SENNOSIDES 8.6 MG/1
8.6 TABLET ORAL DAILY
Status: DISCONTINUED | OUTPATIENT
Start: 2022-12-07 | End: 2022-12-08 | Stop reason: HOSPADM

## 2022-12-07 RX ORDER — POLYETHYLENE GLYCOL 3350 17 G/17G
17 POWDER, FOR SOLUTION ORAL DAILY
Status: DISCONTINUED | OUTPATIENT
Start: 2022-12-07 | End: 2022-12-07

## 2022-12-07 RX ADMIN — SODIUM BICARBONATE 650 MG TABLET 650 MG: at 09:12

## 2022-12-07 RX ADMIN — PANTOPRAZOLE SODIUM 40 MG: 40 TABLET, DELAYED RELEASE ORAL at 04:12

## 2022-12-07 RX ADMIN — GLYCERIN 1 SUPPOSITORY: 2 SUPPOSITORY RECTAL at 11:12

## 2022-12-07 RX ADMIN — PANTOPRAZOLE SODIUM 40 MG: 40 TABLET, DELAYED RELEASE ORAL at 06:12

## 2022-12-07 RX ADMIN — DICLOFENAC SODIUM 2 G: 10 GEL TOPICAL at 10:12

## 2022-12-07 RX ADMIN — LACTULOSE 10 G: 20 SOLUTION ORAL at 09:12

## 2022-12-07 RX ADMIN — SENNOSIDES 8.6 MG: 8.6 TABLET, FILM COATED ORAL at 09:12

## 2022-12-07 RX ADMIN — LOSARTAN POTASSIUM 50 MG: 50 TABLET, FILM COATED ORAL at 09:12

## 2022-12-07 RX ADMIN — ATORVASTATIN CALCIUM 10 MG: 10 TABLET, FILM COATED ORAL at 09:12

## 2022-12-07 RX ADMIN — AMLODIPINE BESYLATE 10 MG: 10 TABLET ORAL at 06:12

## 2022-12-07 NOTE — PLAN OF CARE
Problem: Adjustment to Illness (Gastrointestinal Bleeding)  Goal: Optimal Coping with Acute Illness  Outcome: Ongoing, Progressing     Problem: Bleeding (Gastrointestinal Bleeding)  Goal: Hemostasis  Outcome: Ongoing, Progressing     Problem: Adult Inpatient Plan of Care  Goal: Plan of Care Review  Outcome: Ongoing, Progressing  Goal: Patient-Specific Goal (Individualized)  Outcome: Ongoing, Progressing  Goal: Absence of Hospital-Acquired Illness or Injury  Outcome: Ongoing, Progressing  Goal: Optimal Comfort and Wellbeing  Outcome: Ongoing, Progressing  Goal: Readiness for Transition of Care  Outcome: Ongoing, Progressing     Problem: Infection  Goal: Absence of Infection Signs and Symptoms  Outcome: Ongoing, Progressing     Problem: Fluid and Electrolyte Imbalance (Acute Kidney Injury/Impairment)  Goal: Fluid and Electrolyte Balance  Outcome: Ongoing, Progressing     Problem: Oral Intake Inadequate (Acute Kidney Injury/Impairment)  Goal: Optimal Nutrition Intake  Outcome: Ongoing, Progressing     Problem: Renal Function Impairment (Acute Kidney Injury/Impairment)  Goal: Effective Renal Function  Outcome: Ongoing, Progressing

## 2022-12-07 NOTE — PLAN OF CARE
[facility]  [address]  [facility] [address]  Phone: [phone]    Home Health Orders  Face to Face Encounter    Patient Name: Zion Vasquez  YOB: 1935    PCP: Brigida Wall MD   PCP Address: [address] / [facility] [address]  PCP Phone Number: [phone]  PCP Fax: [phone]    Encounter Date: 12/07/2022    Admit To Home Health    Diagnoses:  Active Hospital Problems    Diagnosis  POA    *Acute lower GI bleeding [K92.2]  Yes    Acute blood loss anemia [D62]  Yes    Diverticulosis [K57.90]  Yes    CARYL (acute kidney injury) [N17.9]  Yes    Internal hemorrhoids [K64.8]  Yes    Obesity [E66.9]  Yes    Hypertension [I10]  Yes    Hyperlipemia [E78.5]  Yes    GERD (gastroesophageal reflux disease) [K21.9]  Yes      Resolved Hospital Problems    Diagnosis Date Resolved POA    History of anemia [Z86.2] 11/29/2022 Not Applicable       Future Appointments   Date Time Provider Department Center   12/13/2022 10:30 AM Brigida Wall MD [facility]   2/2/2023  1:40 PM Slava Tomas MD [facility]   4/6/2023 11:00 AM [facility]   4/13/2023 10:00 AM Brigida Wall MD [facility]           I have seen and examined this patient face to face today. My clinical findings that support the need for the home health skilled services and home bound status are the following:  Weakness/numbness causing balance and gait disturbance due to Anemia making it taxing to leave home.      Allergies:Review of patient's allergies indicates:  No Known Allergies      Code Status:    Code Status: Full Code      Diet: Regular      Referrals/ Consults:     Physical Therapy to evaluate and treat     Occupational Therapy to evaluate and treat      Activities:   activity as tolerated        Nursing:   Agency to admit patient within 24 hours of hospital discharge unless specified on physician order or at patient request    SN to  complete comprehensive assessment including routine vital signs. Instruct on disease process and s/s of complications to report to MD. Review/verify medication list sent home with the patient at time of discharge  and instruct patient/caregiver as needed. Frequency may be adjusted depending on start of care date.     Skilled nurse to perform up to 3 visits PRN for symptoms related to diagnosis    Notify MD if SBP > 160 or < 90; DBP > 90 or < 50; HR > 120 or < 50; Temp > 101; O2 < 88%    Ok to schedule additional visits based on staff availability and patient request on consecutive days within the home health episode.      When multiple disciplines ordered:    Start of Care occurs on Sunday - Wednesday schedule remaining discipline evaluations as ordered on separate consecutive days following the start of care.    Thursday SOC -schedule subsequent evaluations Friday and Monday the following week.     Friday - Saturday SOC - schedule subsequent discipline evaluations on consecutive days starting Monday of the following week.      For all post-discharge communication and subsequent orders please contact patient's primary care physician. If unable to reach primary care physician or do not receive response within 30 minutes, please contact [facility] on call for clinical staff order clarification      Medications: Review discharge medications with patient and family and provide education.      Current Discharge Medication List        CONTINUE these medications which have NOT CHANGED    Details   acetaminophen (TYLENOL) 500 MG tablet Take 1,000 mg by mouth every 6 (six) hours as needed for Pain.      amLODIPine (NORVASC) 10 MG tablet Take 1 tablet (10 mg total) by mouth every morning.  Qty: 90 tablet, Refills: 3    Associated Diagnoses: Primary hypertension      atorvastatin (LIPITOR) 10 MG tablet Take 1 tablet (10 mg total) by mouth every evening.  Qty: 90 tablet, Refills: 3    Associated Diagnoses: Mixed hyperlipidemia       losartan (COZAAR) 50 MG tablet Take 1 tablet (50 mg total) by mouth once daily.  Qty: 90 tablet, Refills: 3    Associated Diagnoses: Primary hypertension      pantoprazole (PROTONIX) 40 MG tablet Take 1 tablet (40 mg total) by mouth 2 (two) times daily.  Qty: 90 tablet, Refills: 3    Associated Diagnoses: Hiatal hernia      sodium bicarbonate 650 MG tablet TAKE 1 TABLET(650 MG) BY MOUTH TWICE DAILY  Strength: 650 mg  Qty: 180 tablet, Refills: 2    Comments: **Patient requests 90 days supply**      FLUAD QUAD 2020-21,65Y UP,,PF, 60 mcg (15 mcg x 4)/0.5 mL Syrg ADM 0.5ML IM UTD      pneumoc 20-fabio conj-dip cr,PF, (PREVNAR-20, PF,) 0.5 mL Syrg injection Inject 0.5 mLs into the muscle.  Qty: 0.5 mL, Refills: 0      polyethylene glycol (GLYCOLAX) 17 gram/dose powder Dissolve one capful (17 g) in liquid and take by mouth daily as needed.  Qty: 1020 g, Refills: 0             I certify that this patient is confined to his home and needs intermittent skilled nursing care, physical therapy and occupational therapy.    Scarlet Dumont MD  [facility] Medicine

## 2022-12-07 NOTE — SUBJECTIVE & OBJECTIVE
Interval History: Mr. Vasquez has been medically ready for discharge since Sunday, 12/4.  He has continued to refuse all medications to treat constipation until 12/6.  He has worked with PT/OT, but he does not want home health or outpatient therapy.  He wants to be able to ambulate on his own.  Explained that he has been medically stable to be discharged since 12/4, and it has been a courtesy keeping him here - but every day he says he needs 1 more day.  Explained that other providers would have had him escorted out by security on Sunday if he had refused to leave.  We reached an agreement to trial a suppository and Senna today, as he is still refusing Miralax as it caused his bleeding before.  He will walk the halls with nursing today.  He will be discharged tomorrow morning, and if he refuses to leave he will be escorted out by security.  He was in agreement with this plan.  His son was called - who was told the above plan, with plans to pick him up for discharge before 12 noon on 12/8.    Review of Systems   Constitutional:  Negative for appetite change, chills and fever.   HENT:  Negative for congestion, sinus pressure, sore throat and trouble swallowing.    Respiratory:  Negative for cough, chest tightness and wheezing.    Cardiovascular:  Negative for chest pain, palpitations and leg swelling.   Gastrointestinal:  Positive for blood in stool (resolved) and constipation. Negative for abdominal distention, abdominal pain, anal bleeding, diarrhea, nausea and vomiting.   Genitourinary:  Negative for dysuria, frequency, hematuria and urgency.   Musculoskeletal:  Negative for arthralgias, gait problem and myalgias.   Neurological:  Negative for dizziness, tremors, syncope, light-headedness, numbness and headaches.   Objective:     Vital Signs (Most Recent):  Temp: 98.4 °F (36.9 °C) (12/07/22 0845)  Pulse: 73 (12/07/22 0845)  Resp: 18 (12/07/22 0845)  BP: (!) 141/67 (12/07/22 0845)  SpO2: 100 % (12/07/22 0845)    Vital Signs (24h Range):  Temp:  [97.6 °F (36.4 °C)-98.4 °F (36.9 °C)] 98.4 °F (36.9 °C)  Pulse:  [64-79] 73  Resp:  [16-18] 18  SpO2:  [97 %-100 %] 100 %  BP: (127-178)/(62-78) 141/67     Weight: 94 kg (207 lb 3.7 oz)  Body mass index is 29.73 kg/m².  No intake or output data in the 24 hours ending 12/07/22 0956   Physical Exam  Vitals and nursing note reviewed.   Constitutional:       General: He is not in acute distress.     Appearance: He is well-developed. He is not ill-appearing or diaphoretic.   HENT:      Head: Normocephalic and atraumatic.      Nose: No congestion.      Mouth/Throat:      Pharynx: No oropharyngeal exudate.   Eyes:      Conjunctiva/sclera: Conjunctivae normal.      Pupils: Pupils are equal, round, and reactive to light.   Cardiovascular:      Rate and Rhythm: Normal rate and regular rhythm.      Heart sounds: Normal heart sounds.   Pulmonary:      Effort: Pulmonary effort is normal. No respiratory distress.      Breath sounds: Normal breath sounds. No wheezing.   Abdominal:      General: Bowel sounds are normal. There is no distension.      Palpations: Abdomen is soft.      Tenderness: There is no abdominal tenderness.   Musculoskeletal:         General: No tenderness. Normal range of motion.      Cervical back: Normal range of motion and neck supple.      Right lower leg: No edema.      Left lower leg: No edema.   Lymphadenopathy:      Cervical: No cervical adenopathy.   Skin:     General: Skin is warm and dry.      Capillary Refill: Capillary refill takes less than 2 seconds.      Findings: No rash.   Neurological:      Mental Status: He is alert and oriented to person, place, and time.      Cranial Nerves: No cranial nerve deficit.      Sensory: No sensory deficit.      Coordination: Coordination normal.   Psychiatric:         Behavior: Behavior normal.         Thought Content: Thought content normal.         Judgment: Judgment normal.       Significant Labs: All pertinent labs within the  past 24 hours have been reviewed.  BMP: No results for input(s): GLU, NA, K, CL, CO2, BUN, CREATININE, CALCIUM, MG in the last 48 hours.  CBC:   No results for input(s): WBC, HGB, HCT, PLT in the last 48 hours.      Significant Imaging: I have reviewed all pertinent imaging results/findings within the past 24 hours.

## 2022-12-07 NOTE — PROGRESS NOTES
[facility] [address] [place] [address] Medicine  Progress Note    Patient Name: Zion Vasquez  MRN: [id number]  Patient Class: IP- Inpatient   Admission Date: 11/29/2022  Length of Stay: 5 days  Attending Physician: Scarlet Dumont MD  Primary Care Provider: Brigida Wall MD        Subjective:     Principal Problem:Acute lower GI bleeding        HPI:  Zion Vasquez is an 86-year-old male with a history of GERD, hyperlipidemia, hypertension obesity including known history of diverticulosis, Martinez's esophagus, hiatal hernia and prior GI bleed (hospitalized on 06/22 and 11/28/22) presenting w/ c/o bright red bleeding per rectum and fatigue. Of note he was hospitalized 11/24-11/28 for 3 episodes of bright red bleeding per rectum without associated fatigue, shortness of breath, chest pain. He had a colonoscopy with GI performed with large amounts of bright red blood noted without clear lesions.  He had serial CBC is overnight that were stable.  He then reportedly had another large bloody bowel movement during his hospital stay with a drop in hemoglobin to 6.8, requiring 1 unit of PRBC. He had a CT abdomen w/o evidence of GIB. He now returns for rectal bleeding w/ associated generalized weakness, fatigue and mild SOB. He is currently taking Protonix and miralax daily. Denies headaches, lightheadedness, dizziness, syncope, n/v/d, abdominal pain, dysuria.    In ED, Pt afebrile, orthostatics positive. CBC w/ H/H 8/25.3, WBC 12.88. Cr 2.0 (~ BL 1.5-1.8). Initiated on GIB pathway. Given pantoprazole 40 mg IV.      Overview/Hospital Course:  Zion Vasquez is a 86 y.o. male admitted to observation for GI bleed and symptomatic anemia. Pt hemodynamically stable on presentation w/ H/H 8/25.3. Also found to have CARYL likely d/t volume depletion from acute blood loss w/ Cr 2. Pt given 1L bolus NS for fluid resuscitation w/ improvement of Cr to 1.8. Pt was initiated on the GIB pathway and given IV PPI. GI consulted and  performed colonoscopy 11/30. Patient with repeat Hgb 6.7. Transfused 1 unit. Repeat CBC 12/1 with Hgb 7.1. Transfused 1 unit. Hgb 9.1. Patient had large BM with bright blood and clots. GI re consulted. Patient completed bowel prep the night prior with no signs of hematochezia the next morning. Maintained clear liquid diet q24 with recheck of CBC. No signs of hematochezia overnight. Tolerating PO diet. PT to assess for weakness. Pt recommending bedside commode. Patient very hesitant to leave due to bloody bowel movement after leaving previously.     Mr. Vasquez has been medically ready for discharge since Sunday, 12/4.  He has continued to refuse all medications to treat constipation until 12/6.  He has worked with PT/OT, but he does not want home health or outpatient therapy.  He wants to be able to ambulate on his own.  Explained that he has been medically stable to be discharged since 12/4, and it has been a courtesy keeping him here - but every day he says he needs 1 more day.  Explained that other providers would have had him escorted out by security on Sunday if he had refused to leave.  We reached an agreement to trial a suppository and Senna today, as he is still refusing Miralax as it caused his bleeding before.  He will walk the halls with nursing today.  He will be discharged tomorrow morning, and if he refuses to leave he will be escorted out by security.  He was in agreement with this plan.  His son was called - who was told the above plan, with plans to pick him up for discharge before 12 noon on 12/8.      Interval History: Mr. Vasquez has been medically ready for discharge since Sunday, 12/4.  He has continued to refuse all medications to treat constipation until 12/6.  He has worked with PT/OT, but he does not want home health or outpatient therapy.  He wants to be able to ambulate on his own.  Explained that he has been medically stable to be discharged since 12/4, and it has been a courtesy keeping  him here - but every day he says he needs 1 more day.  Explained that other providers would have had him escorted out by security on Sunday if he had refused to leave.  We reached an agreement to trial a suppository and Senna today, as he is still refusing Miralax as it caused his bleeding before.  He will walk the halls with nursing today.  He will be discharged tomorrow morning, and if he refuses to leave he will be escorted out by security.  He was in agreement with this plan.  His son was called - who was told the above plan, with plans to pick him up for discharge before 12 noon on 12/8.    Review of Systems   Constitutional:  Negative for appetite change, chills and fever.   HENT:  Negative for congestion, sinus pressure, sore throat and trouble swallowing.    Respiratory:  Negative for cough, chest tightness and wheezing.    Cardiovascular:  Negative for chest pain, palpitations and leg swelling.   Gastrointestinal:  Positive for blood in stool (resolved) and constipation. Negative for abdominal distention, abdominal pain, anal bleeding, diarrhea, nausea and vomiting.   Genitourinary:  Negative for dysuria, frequency, hematuria and urgency.   Musculoskeletal:  Negative for arthralgias, gait problem and myalgias.   Neurological:  Negative for dizziness, tremors, syncope, light-headedness, numbness and headaches.   Objective:     Vital Signs (Most Recent):  Temp: 98.4 °F (36.9 °C) (12/07/22 0845)  Pulse: 73 (12/07/22 0845)  Resp: 18 (12/07/22 0845)  BP: (!) 141/67 (12/07/22 0845)  SpO2: 100 % (12/07/22 0845)   Vital Signs (24h Range):  Temp:  [97.6 °F (36.4 °C)-98.4 °F (36.9 °C)] 98.4 °F (36.9 °C)  Pulse:  [64-79] 73  Resp:  [16-18] 18  SpO2:  [97 %-100 %] 100 %  BP: (127-178)/(62-78) 141/67     Weight: 94 kg (207 lb 3.7 oz)  Body mass index is 29.73 kg/m².  No intake or output data in the 24 hours ending 12/07/22 0956   Physical Exam  Vitals and nursing note reviewed.   Constitutional:       General: He is not  in acute distress.     Appearance: He is well-developed. He is not ill-appearing or diaphoretic.   HENT:      Head: Normocephalic and atraumatic.      Nose: No congestion.      Mouth/Throat:      Pharynx: No oropharyngeal exudate.   Eyes:      Conjunctiva/sclera: Conjunctivae normal.      Pupils: Pupils are equal, round, and reactive to light.   Cardiovascular:      Rate and Rhythm: Normal rate and regular rhythm.      Heart sounds: Normal heart sounds.   Pulmonary:      Effort: Pulmonary effort is normal. No respiratory distress.      Breath sounds: Normal breath sounds. No wheezing.   Abdominal:      General: Bowel sounds are normal. There is no distension.      Palpations: Abdomen is soft.      Tenderness: There is no abdominal tenderness.   Musculoskeletal:         General: No tenderness. Normal range of motion.      Cervical back: Normal range of motion and neck supple.      Right lower leg: No edema.      Left lower leg: No edema.   Lymphadenopathy:      Cervical: No cervical adenopathy.   Skin:     General: Skin is warm and dry.      Capillary Refill: Capillary refill takes less than 2 seconds.      Findings: No rash.   Neurological:      Mental Status: He is alert and oriented to person, place, and time.      Cranial Nerves: No cranial nerve deficit.      Sensory: No sensory deficit.      Coordination: Coordination normal.   Psychiatric:         Behavior: Behavior normal.         Thought Content: Thought content normal.         Judgment: Judgment normal.       Significant Labs: All pertinent labs within the past 24 hours have been reviewed.  BMP: No results for input(s): GLU, NA, K, CL, CO2, BUN, CREATININE, CALCIUM, MG in the last 48 hours.  CBC:   No results for input(s): WBC, HGB, HCT, PLT in the last 48 hours.      Significant Imaging: I have reviewed all pertinent imaging results/findings within the past 24 hours.      Assessment/Plan:      * Acute lower GI bleeding  Acute blood loss anemia  Internal  hemorrhoids  Diverticulosis of colon  - GI Bleed pathway initiated  - orthostatics positive   - Hgb 9.5 (~BL 10)  - Last colonoscopy 2019   - Serial H/H's q8  - transfuse Hgb <7  - Maintain IV access with 2 large bore IVs  - Intravascular resuscitation/support with IVFs   - Protonix 40mg IV BID  - Discontinue all NSAIDs and Heparin products  - will correct any coagulopathy with platelets and FFP to a goal of platelets >50K and INR <2.0  - GI consulted, appreciate recs   - Clear liquid diet   - H/H q24h   - Notify GI if any recurrent hematochezia   - If large volume hematochezia with hemodynamic instability, would obtain repeat stat CTA A/P and consult   IR for embolization if positive  - 11/27 CTA abdomen showed no evidence of GI bleed. Diverticulosis coli without evidence of diverticulitis. Cholelithiasis and bilateral renal hypodensities, likely cysts.   - 12/2 CTA A/P showed no evidence of active gastrointestinal hemorrhage. Colonic diverticulosis without evidence of diverticulitis.Cholelithiasis  - Hgb 7.1 12/1, transfused 1 unit  - Hgb 9.1 12/2, patient had large bowel movement with bright red blood noted, re-consulted GI, recs as above  - Hgb 8.7, stable   - monitor CBCs  - Cardiac diet  - PT/OT consulted, will order bedside commode at discharge   - patient agreeable to enema 12/5 with no result.  Continues to refuse Miralax 2/2 previous issues.  Trial of Senna and suppository.    Mr. Vasquez has been medically ready for discharge since Sunday, 12/4.  He has continued to refuse all medications to treat constipation until 12/6.  He has worked with PT/OT, but he does not want home health or outpatient therapy.  He wants to be able to ambulate on his own.  Explained that he has been medically stable to be discharged since 12/4, and it has been a courtesy keeping him here - but every day he says he needs 1 more day.  Explained that other providers would have had him escorted out by security on Sunday if he had  refused to leave.  We reached an agreement to trial a suppository and Senna today, as he is still refusing Miralax as it caused his bleeding before.  He will walk the halls with nursing today.  He will be discharged tomorrow morning, and if he refuses to leave he will be escorted out by security.  He was in agreement with this plan.  His son was called - who was told the above plan, with plans to pick him up for discharge before 12 noon on 12/8.    Acute blood loss anemia        Diverticulosis        Leukocytosis        CARYL (acute kidney injury)  CKD3  Patient with acute kidney injury likely due to IVVD/dehydration CARYL is currently stable. Labs reviewed- Renal function/electrolytes with Estimated Creatinine Clearance: 52.8 mL/min (based on SCr of 1.2 mg/dL). according to latest data. Monitor urine output and serial BMP and adjust therapy as needed. Avoid nephrotoxins and renally dose meds for GFR listed above.   - Cr 2.0 (~BL 1.5-1.8) on admission  - give 1L LR bolus  - Cr stable at 1.2    Internal hemorrhoids        GERD (gastroesophageal reflux disease)  - protonix 40 mg bid    Hyperlipemia  - continue statin    Hypertension  - continue home meds    Obesity  Body mass index is 29.73 kg/m². Morbid obesity complicates all aspects of disease management from diagnostic modalities to treatment. Weight loss encouraged and health benefits explained to patient.     VTE Risk Mitigation (From admission, onward)         Ordered     IP VTE HIGH RISK PATIENT  Once         11/29/22 1154     Place sequential compression device  Until discontinued         11/29/22 1154                Discharge Planning   BAKARI: 12/8/2022     Code Status: Full Code   Is the patient medically ready for discharge?: Yes    Reason for patient still in hospital (select all that apply): Patient trending condition  Discharge Plan A: Home   Discharge Delays: None known at this time              Scarlet Dumont MD  Department of Hospital Medicine   [facility]  - [facility] 11[facility]

## 2022-12-07 NOTE — PLAN OF CARE
Pt alert, stable; scheduled meds given without difficulty; pt frustrated during shift because he says his enema did not work (no stool produced during shift); wants to try other meds to help him stool; pt also upset about discharge plan, says he does not feel ready for discharge because he feels he needs to get stronger with his walking; educated pt on possibility of having home health with PT arranged to help with this; pt will discuss with MD during rounds.    Problem: Adjustment to Illness (Gastrointestinal Bleeding)  Goal: Optimal Coping with Acute Illness  Outcome: Ongoing, Not Progressing     Problem: Bleeding (Gastrointestinal Bleeding)  Goal: Hemostasis  Outcome: Ongoing, Not Progressing     Problem: Adult Inpatient Plan of Care  Goal: Plan of Care Review  Outcome: Ongoing, Not Progressing  Goal: Patient-Specific Goal (Individualized)  Outcome: Ongoing, Not Progressing  Goal: Absence of Hospital-Acquired Illness or Injury  Outcome: Ongoing, Not Progressing  Goal: Optimal Comfort and Wellbeing  Outcome: Ongoing, Not Progressing  Goal: Readiness for Transition of Care  Outcome: Ongoing, Not Progressing     Problem: Infection  Goal: Absence of Infection Signs and Symptoms  Outcome: Ongoing, Not Progressing     Problem: Fluid and Electrolyte Imbalance (Acute Kidney Injury/Impairment)  Goal: Fluid and Electrolyte Balance  Outcome: Ongoing, Not Progressing     Problem: Oral Intake Inadequate (Acute Kidney Injury/Impairment)  Goal: Optimal Nutrition Intake  Outcome: Ongoing, Not Progressing     Problem: Renal Function Impairment (Acute Kidney Injury/Impairment)  Goal: Effective Renal Function  Outcome: Ongoing, Not Progressing

## 2022-12-08 VITALS
HEART RATE: 64 BPM | DIASTOLIC BLOOD PRESSURE: 63 MMHG | SYSTOLIC BLOOD PRESSURE: 136 MMHG | OXYGEN SATURATION: 99 % | HEIGHT: 70 IN | WEIGHT: 205.69 LBS | TEMPERATURE: 97 F | RESPIRATION RATE: 18 BRPM | BODY MASS INDEX: 29.45 KG/M2

## 2022-12-08 LAB
ANION GAP SERPL CALC-SCNC: 7 MMOL/L (ref 8–16)
BASOPHILS # BLD AUTO: 0.04 K/UL (ref 0–0.2)
BASOPHILS NFR BLD: 0.5 % (ref 0–1.9)
BUN SERPL-MCNC: 15 MG/DL (ref 8–23)
CALCIUM SERPL-MCNC: 8.7 MG/DL (ref 8.7–10.5)
CHLORIDE SERPL-SCNC: 108 MMOL/L (ref 95–110)
CO2 SERPL-SCNC: 22 MMOL/L (ref 23–29)
CREAT SERPL-MCNC: 1.5 MG/DL (ref 0.5–1.4)
DIFFERENTIAL METHOD: ABNORMAL
EOSINOPHIL # BLD AUTO: 0.6 K/UL (ref 0–0.5)
EOSINOPHIL NFR BLD: 7.5 % (ref 0–8)
ERYTHROCYTE [DISTWIDTH] IN BLOOD BY AUTOMATED COUNT: 17.2 % (ref 11.5–14.5)
EST. GFR  (NO RACE VARIABLE): 45.6 ML/MIN/1.73 M^2
GLUCOSE SERPL-MCNC: 92 MG/DL (ref 70–110)
HCT VFR BLD AUTO: 26.1 % (ref 40–54)
HGB BLD-MCNC: 8.2 G/DL (ref 14–18)
IMM GRANULOCYTES # BLD AUTO: 0.02 K/UL (ref 0–0.04)
IMM GRANULOCYTES NFR BLD AUTO: 0.3 % (ref 0–0.5)
LYMPHOCYTES # BLD AUTO: 1.6 K/UL (ref 1–4.8)
LYMPHOCYTES NFR BLD: 20.2 % (ref 18–48)
MCH RBC QN AUTO: 25.9 PG (ref 27–31)
MCHC RBC AUTO-ENTMCNC: 31.4 G/DL (ref 32–36)
MCV RBC AUTO: 83 FL (ref 82–98)
MONOCYTES # BLD AUTO: 1.1 K/UL (ref 0.3–1)
MONOCYTES NFR BLD: 13.5 % (ref 4–15)
NEUTROPHILS # BLD AUTO: 4.6 K/UL (ref 1.8–7.7)
NEUTROPHILS NFR BLD: 58 % (ref 38–73)
NRBC BLD-RTO: 0 /100 WBC
PLATELET # BLD AUTO: 305 K/UL (ref 150–450)
PMV BLD AUTO: 9.4 FL (ref 9.2–12.9)
POTASSIUM SERPL-SCNC: 3.7 MMOL/L (ref 3.5–5.1)
RBC # BLD AUTO: 3.16 M/UL (ref 4.6–6.2)
SODIUM SERPL-SCNC: 137 MMOL/L (ref 136–145)
WBC # BLD AUTO: 7.91 K/UL (ref 3.9–12.7)

## 2022-12-08 PROCEDURE — 25000003 PHARM REV CODE 250: Performed by: HOSPITALIST

## 2022-12-08 PROCEDURE — 80048 BASIC METABOLIC PNL TOTAL CA: CPT | Performed by: HOSPITALIST

## 2022-12-08 PROCEDURE — 36415 COLL VENOUS BLD VENIPUNCTURE: CPT | Performed by: HOSPITALIST

## 2022-12-08 PROCEDURE — 25000003 PHARM REV CODE 250: Performed by: PHYSICIAN ASSISTANT

## 2022-12-08 PROCEDURE — 85025 COMPLETE CBC W/AUTO DIFF WBC: CPT | Performed by: HOSPITALIST

## 2022-12-08 PROCEDURE — 25000003 PHARM REV CODE 250

## 2022-12-08 PROCEDURE — 99238 HOSP IP/OBS DSCHRG MGMT 30/<: CPT | Mod: ,,, | Performed by: HOSPITALIST

## 2022-12-08 PROCEDURE — 99238 PR HOSPITAL DISCHARGE DAY,<30 MIN: ICD-10-PCS | Mod: ,,, | Performed by: HOSPITALIST

## 2022-12-08 RX ORDER — POLYETHYLENE GLYCOL 3350 17 G/17G
17 POWDER, FOR SOLUTION ORAL DAILY
Qty: 1020 G | Refills: 0
Start: 2022-12-08 | End: 2022-12-13 | Stop reason: ALTCHOICE

## 2022-12-08 RX ADMIN — LACTULOSE 10 G: 20 SOLUTION ORAL at 08:12

## 2022-12-08 RX ADMIN — DICLOFENAC SODIUM 2 G: 10 GEL TOPICAL at 08:12

## 2022-12-08 RX ADMIN — SENNOSIDES 8.6 MG: 8.6 TABLET, FILM COATED ORAL at 08:12

## 2022-12-08 RX ADMIN — LOSARTAN POTASSIUM 50 MG: 50 TABLET, FILM COATED ORAL at 08:12

## 2022-12-08 RX ADMIN — AMLODIPINE BESYLATE 10 MG: 10 TABLET ORAL at 06:12

## 2022-12-08 RX ADMIN — SODIUM BICARBONATE 650 MG TABLET 650 MG: at 08:12

## 2022-12-08 RX ADMIN — PANTOPRAZOLE SODIUM 40 MG: 40 TABLET, DELAYED RELEASE ORAL at 06:12

## 2022-12-08 NOTE — PLAN OF CARE
SSC met with patient/family at bedside. Patient experience rounding completed and reviewed the following.     Do you know your discharge plan? Yes     If yes, what is the plan? (Home)    If you are discharging home, do you have help at home? Yes son lives with patient and HH set up.  Do you think you will need help at home at discharge? No     Have you discussed your needs and preferences with your SW/CM? Yes     Assigned SW/CM notified of any patient/family needs or concerns. No needs/concerns

## 2022-12-08 NOTE — PLAN OF CARE
Pt accepted to [facility].     12/08/22 1050   Post-Acute Status   Post-Acute Authorization Home [facility]   Home Health Status Set-up Complete/Auth obtained   Discharge Plan   Discharge Plan A Home Health   Discharge Plan B Home Health     Óscar Salazar RN,BSN

## 2022-12-08 NOTE — NURSING
Discharge instructions discussed with patient.   AVS printed and reviewed with patient.  He expressed understanding of instructions and upcoming appointment.  IV removed with catheter intact.  Gauze and coban wrapped over site.  Telemetry box #[id number] removed.  Spoke with patient's son who will be providing transport home.  Of note, patient reports already having a bedside commode at home and miralax as well.

## 2022-12-08 NOTE — PLAN OF CARE
Patient cleared for discharge.    Problem: Adjustment to Illness (Gastrointestinal Bleeding)  Goal: Optimal Coping with Acute Illness  Outcome: Adequate for Care Transition     Problem: Bleeding (Gastrointestinal Bleeding)  Goal: Hemostasis  Outcome: Adequate for Care Transition     Problem: Adult Inpatient Plan of Care  Goal: Plan of Care Review  Outcome: Adequate for Care Transition  Goal: Patient-Specific Goal (Individualized)  Outcome: Adequate for Care Transition  Goal: Absence of Hospital-Acquired Illness or Injury  Outcome: Adequate for Care Transition  Goal: Optimal Comfort and Wellbeing  Outcome: Adequate for Care Transition  Goal: Readiness for Transition of Care  Outcome: Adequate for Care Transition     Problem: Infection  Goal: Absence of Infection Signs and Symptoms  Outcome: Adequate for Care Transition     Problem: Fluid and Electrolyte Imbalance (Acute Kidney Injury/Impairment)  Goal: Fluid and Electrolyte Balance  Outcome: Adequate for Care Transition     Problem: Oral Intake Inadequate (Acute Kidney Injury/Impairment)  Goal: Optimal Nutrition Intake  Outcome: Adequate for Care Transition     Problem: Renal Function Impairment (Acute Kidney Injury/Impairment)  Goal: Effective Renal Function  Outcome: Adequate for Care Transition

## 2022-12-08 NOTE — PLAN OF CARE
[facility]  Discharge Final Note    Primary Care Provider: Brigida Wall MD    Expected Discharge Date: 12/8/2022    Future Appointments   Date Time Provider Department Center   12/13/2022 10:30 AM Brigida Wall MD [facility]   2/2/2023  1:40 PM Slava Tomas MD [facility] GASTR[facility]   4/6/2023 11:00 AM LAB, [facility] LAB [facility]   4/13/2023 10:00 AM Brigida Wall MD [facility]     Pt discharged home with Home Health.   Óscar Salazar, RN,BSN        Final Discharge Note (most recent)       Final Note - 12/08/22 1204          Final Note    Assessment Type Final Discharge Note     Anticipated Discharge Disposition Home or Self Care     Hospital Resources/Appts/Education Provided Provided patient/caregiver with written discharge plan information;Appointments scheduled and added to AVS        Post-Acute Status    Post-Acute Authorization Home Health     Home Health Status Set-up Complete/Auth obtained     Discharge Delays None known at this time                     Important Message from Medicare  Important Message from Medicare regarding Discharge Appeal Rights: Given to patient/caregiver, Explained to patient/caregiver, Signed/date by patient/caregiver     Date IMM was signed: 12/05/22  Time IMM was signed: [id number]

## 2022-12-08 NOTE — PLAN OF CARE
Pt alert, stable; scheduled meds given without difficulty; pt rested well overnight; no bowel movement produced during shift; possible discharge home today; no distress noted in pt; safety maintained.    Problem: Adjustment to Illness (Gastrointestinal Bleeding)  Goal: Optimal Coping with Acute Illness  Outcome: Ongoing, Progressing     Problem: Bleeding (Gastrointestinal Bleeding)  Goal: Hemostasis  Outcome: Ongoing, Progressing     Problem: Adult Inpatient Plan of Care  Goal: Plan of Care Review  Outcome: Ongoing, Progressing  Goal: Patient-Specific Goal (Individualized)  Outcome: Ongoing, Progressing  Goal: Absence of Hospital-Acquired Illness or Injury  Outcome: Ongoing, Progressing  Goal: Optimal Comfort and Wellbeing  Outcome: Ongoing, Progressing  Goal: Readiness for Transition of Care  Outcome: Ongoing, Progressing     Problem: Infection  Goal: Absence of Infection Signs and Symptoms  Outcome: Ongoing, Progressing     Problem: Fluid and Electrolyte Imbalance (Acute Kidney Injury/Impairment)  Goal: Fluid and Electrolyte Balance  Outcome: Ongoing, Progressing     Problem: Oral Intake Inadequate (Acute Kidney Injury/Impairment)  Goal: Optimal Nutrition Intake  Outcome: Ongoing, Progressing     Problem: Renal Function Impairment (Acute Kidney Injury/Impairment)  Goal: Effective Renal Function  Outcome: Ongoing, Progressing

## 2022-12-09 NOTE — DISCHARGE SUMMARY
[facility] [address] [place] [address] Medicine  Discharge Summary      Patient Name: Zion Vasquez  MRN: [id number]  DENY: [id number]  Patient Class: IP- Inpatient  Admission Date: 11/29/2022  Hospital Length of Stay: 6 days  Discharge Date and Time:  12/08/2022 6:49 PM  Attending Physician: Eboni att. providers found   Discharging Provider: Florence Coronado MD  Primary Care Provider: Brigida Wall MD  [facility] Medicine Team: [facility] Q Florence Coronado MD  Primary Care Team: [facility]    HPI:   Zion Vasquez is an 86-year-old male with a history of GERD, hyperlipidemia, hypertension obesity including known history of diverticulosis, Martinez's esophagus, hiatal hernia and prior GI bleed (hospitalized on 06/22 and 11/28/22) presenting w/ c/o bright red bleeding per rectum and fatigue. Of note he was hospitalized 11/24-11/28 for 3 episodes of bright red bleeding per rectum without associated fatigue, shortness of breath, chest pain. He had a colonoscopy with GI performed with large amounts of bright red blood noted without clear lesions.  He had serial CBC is overnight that were stable.  He then reportedly had another large bloody bowel movement during his hospital stay with a drop in hemoglobin to 6.8, requiring 1 unit of PRBC. He had a CT abdomen w/o evidence of GIB. He now returns for rectal bleeding w/ associated generalized weakness, fatigue and mild SOB. He is currently taking Protonix and miralax daily. Denies headaches, lightheadedness, dizziness, syncope, n/v/d, abdominal pain, dysuria.    In ED, Pt afebrile, orthostatics positive. CBC w/ H/H 8/25.3, WBC 12.88. Cr 2.0 (~ BL 1.5-1.8). Initiated on GIB pathway. Given pantoprazole 40 mg IV.      Procedure(s) (LRB):  COLONOSCOPY (N/A)      Hospital Course:   Zion Vasquez is a 86 y.o. male admitted to observation for GI bleed and symptomatic anemia. Pt hemodynamically stable on presentation w/ H/H 8/25.3. Also found to have CARYL likely d/t volume depletion  from acute blood loss w/ Cr 2. Pt given 1L bolus NS for fluid resuscitation w/ improvement of Cr to 1.8. Pt was initiated on the GIB pathway and given IV PPI. GI consulted and performed colonoscopy 11/30. Patient with repeat Hgb 6.7. Transfused 1 unit. Repeat CBC 12/1 with Hgb 7.1. Transfused 1 unit. Hgb 9.1. Patient had large BM with bright blood and clots. GI re consulted. Patient completed bowel prep the night prior with no signs of hematochezia the next morning. Maintained clear liquid diet q24 with recheck of CBC. No signs of hematochezia overnight. Tolerating PO diet. PT to assess for weakness. Pt recommending bedside commode. Patient very hesitant to leave due to bloody bowel movement after leaving previously.     Mr. Vasquez has been medically ready for discharge since Sunday, 12/4.  He has continued to refuse all medications to treat constipation until 12/6.  He has worked with PT/OT, but he does not want home health or outpatient therapy.  He wants to be able to ambulate on his own.  Explained that he has been medically stable to be discharged since 12/4, and it has been a courtesy keeping him here - but every day he says he needs 1 more day.  Explained that other providers would have had him escorted out by security on Sunday if he had refused to leave.  We reached an agreement to trial a suppository and Senna on 12/7, as he is still refusing Miralax as it caused his bleeding before.  He will be discharged tomorrow 12/8 morning, and if he refuses to leave he will be escorted out by security.  He was in agreement with this plan.  His son was called - who was told the above plan, with plans to pick him up for discharge before 12 noon on 12/8.    On 12/8; pt was seen and examined. Denies any complaint.  Denies having the urge to defecate.  Denies any abdominal pain, nausea or vomiting.  He has not had a bowel movement yet.  He will be discharged home with daily MiraLax.  His hemoglobin is  "stable.      Physical exam;  Vitals; reviewed  General; no acute distress  HENT; NC/AT. PERRLA  CV; RRR  Resp; CTA bilateral lung fields.  Abdomen; soft, NT, ND, +ve BS  Extremities; no edema.        Goals of Care Treatment Preferences:  Code Status: Full Code      Consults:   Consults (From admission, onward)        Status Ordering Provider     [facility]  Once        Provider:  (Not yet assigned)    Completed SUKHI TONEY     Inpatient consult to Gastroenterology  Once        Provider:  (Not yet assigned)    Completed RENAN EVERETT          No new Assessment & Plan notes have been filed under this hospital service since the last note was generated.  Service: Hospital Medicine    Final Active Diagnoses:    Diagnosis Date Noted POA    PRINCIPAL PROBLEM:  Acute lower GI bleeding [K92.2] 06/12/2022 Yes    Acute blood loss anemia [D62] 12/06/2022 Yes    Diverticulosis [K57.90] 11/29/2022 Yes    CARYL (acute kidney injury) [N17.9] 11/24/2022 Yes    Internal hemorrhoids [K64.8] 03/29/2021 Yes    Obesity [E66.9]  Yes    Hypertension [I10]  Yes    Hyperlipemia [E78.5]  Yes    GERD (gastroesophageal reflux disease) [K21.9]  Yes      Problems Resolved During this Admission:    Diagnosis Date Noted Date Resolved POA    History of anemia [Z86.2] 03/29/2021 11/29/2022 Not Applicable       Discharged Condition: stable    Disposition: Home-Health Care Svc    Follow Up:    Patient Instructions:      COMMODE FOR HOME USE     Order Specific Question Answer Comments   Type: Standard    Height: 5' 10" (1.778 m)    Weight: 94 kg (207 lb 3.7 oz)    Does patient have medical equipment at home? none    Length of need (1-99 months): 12      Diet Adult Regular     Notify your health care provider if you experience any of the following:  temperature >100.4     Notify your health care provider if you experience any of the following:  persistent nausea and vomiting or diarrhea     Notify your " health care provider if you experience any of the following:  persistent dizziness, light-headedness, or visual disturbances     Notify your health care provider if you experience any of the following:  increased confusion or weakness     Activity as tolerated       Significant Diagnostic Studies: Labs:   BMP:   Recent Labs   Lab 12/08/22 0514   GLU 92      K 3.7      CO2 22*   BUN 15   CREATININE 1.5*   CALCIUM 8.7   , CMP   Recent Labs   Lab 12/08/22 0514      K 3.7      CO2 22*   GLU 92   BUN 15   CREATININE 1.5*   CALCIUM 8.7   ANIONGAP 7*    and CBC   Recent Labs   Lab 12/08/22 0514   WBC 7.91   HGB 8.2*   HCT 26.1*          Pending Diagnostic Studies:     None         Medications:  Reconciled Home Medications:      Medication List      CHANGE how you take these medications    polyethylene glycol 17 gram/dose powder  Commonly known as: GLYCOLAX  Take 17 g by mouth once daily. for 30 doses  What changed:   · when to take this  · reasons to take this        CONTINUE taking these medications    acetaminophen 500 MG tablet  Commonly known as: TYLENOL  Take 1,000 mg by mouth every 6 (six) hours as needed for Pain.     amLODIPine 10 MG tablet  Commonly known as: NORVASC  Take 1 tablet (10 mg total) by mouth every morning.     atorvastatin 10 MG tablet  Commonly known as: LIPITOR  Take 1 tablet (10 mg total) by mouth every evening.     FLUAD QUAD 2020-21(65Y UP)(PF) 60 mcg (15 mcg x 4)/0.5 mL Syrg  Generic drug: flu vac 2020 [id number]-[id number](PF)  ADM 0.5ML IM UTD     losartan 50 MG tablet  Commonly known as: COZAAR  Take 1 tablet (50 mg total) by mouth once daily.     pantoprazole 40 MG tablet  Commonly known as: PROTONIX  Take 1 tablet (40 mg total) by mouth 2 (two) times daily.     PREVNAR 20 (PF) 0.5 mL Syrg injection  Generic drug: pneumoc 20-fabio conj-dip cr(PF)  Inject 0.5 mLs into the muscle.     sodium bicarbonate 650 MG tablet  TAKE 1 TABLET(650 MG) BY MOUTH TWICE  DAILY  Strength: 650 mg            Indwelling Lines/Drains at time of discharge:   Lines/Drains/Airways     None                 Time spent on the discharge of patient: < 30 minutes         Florence Coronado MD  Department of Hospital Medicine  [facility] - Observation 11H

## 2022-12-13 ENCOUNTER — LAB VISIT (OUTPATIENT)
Dept: LAB | Facility: HOSPITAL | Age: 87
End: 2022-12-13
Attending: INTERNAL MEDICINE
Payer: COMMERCIAL

## 2022-12-13 ENCOUNTER — OFFICE VISIT (OUTPATIENT)
Dept: PRIMARY CARE CLINIC | Facility: CLINIC | Age: 87
End: 2022-12-13
Payer: COMMERCIAL

## 2022-12-13 VITALS
DIASTOLIC BLOOD PRESSURE: 74 MMHG | WEIGHT: 201.75 LBS | BODY MASS INDEX: 28.88 KG/M2 | OXYGEN SATURATION: 97 % | HEIGHT: 70 IN | RESPIRATION RATE: 18 BRPM | TEMPERATURE: 98 F | HEART RATE: 82 BPM | SYSTOLIC BLOOD PRESSURE: 127 MMHG

## 2022-12-13 DIAGNOSIS — Z09 HOSPITAL DISCHARGE FOLLOW-UP: Primary | ICD-10-CM

## 2022-12-13 DIAGNOSIS — Z09 HOSPITAL DISCHARGE FOLLOW-UP: ICD-10-CM

## 2022-12-13 DIAGNOSIS — K57.30 DIVERTICULOSIS OF COLON: ICD-10-CM

## 2022-12-13 DIAGNOSIS — D62 ACUTE BLOOD LOSS ANEMIA: ICD-10-CM

## 2022-12-13 DIAGNOSIS — N17.9 AKI (ACUTE KIDNEY INJURY): ICD-10-CM

## 2022-12-13 DIAGNOSIS — N18.31 STAGE 3A CHRONIC KIDNEY DISEASE: ICD-10-CM

## 2022-12-13 DIAGNOSIS — K92.2 ACUTE GI BLEEDING: ICD-10-CM

## 2022-12-13 DIAGNOSIS — I10 PRIMARY HYPERTENSION: ICD-10-CM

## 2022-12-13 LAB
ANION GAP SERPL CALC-SCNC: 9 MMOL/L (ref 8–16)
BASOPHILS # BLD AUTO: 0.04 K/UL (ref 0–0.2)
BASOPHILS NFR BLD: 0.5 % (ref 0–1.9)
BUN SERPL-MCNC: 16 MG/DL (ref 8–23)
CALCIUM SERPL-MCNC: 9.4 MG/DL (ref 8.7–10.5)
CHLORIDE SERPL-SCNC: 109 MMOL/L (ref 95–110)
CO2 SERPL-SCNC: 22 MMOL/L (ref 23–29)
CREAT SERPL-MCNC: 1.9 MG/DL (ref 0.5–1.4)
DIFFERENTIAL METHOD: ABNORMAL
EOSINOPHIL # BLD AUTO: 0.3 K/UL (ref 0–0.5)
EOSINOPHIL NFR BLD: 4.3 % (ref 0–8)
ERYTHROCYTE [DISTWIDTH] IN BLOOD BY AUTOMATED COUNT: 17.7 % (ref 11.5–14.5)
EST. GFR  (NO RACE VARIABLE): 34.1 ML/MIN/1.73 M^2
GLUCOSE SERPL-MCNC: 87 MG/DL (ref 70–110)
HCT VFR BLD AUTO: 29.7 % (ref 40–54)
HGB BLD-MCNC: 8.8 G/DL (ref 14–18)
IMM GRANULOCYTES # BLD AUTO: 0.02 K/UL (ref 0–0.04)
IMM GRANULOCYTES NFR BLD AUTO: 0.3 % (ref 0–0.5)
LYMPHOCYTES # BLD AUTO: 1.7 K/UL (ref 1–4.8)
LYMPHOCYTES NFR BLD: 20.9 % (ref 18–48)
MCH RBC QN AUTO: 25.1 PG (ref 27–31)
MCHC RBC AUTO-ENTMCNC: 29.6 G/DL (ref 32–36)
MCV RBC AUTO: 85 FL (ref 82–98)
MONOCYTES # BLD AUTO: 1 K/UL (ref 0.3–1)
MONOCYTES NFR BLD: 13.1 % (ref 4–15)
NEUTROPHILS # BLD AUTO: 4.8 K/UL (ref 1.8–7.7)
NEUTROPHILS NFR BLD: 60.9 % (ref 38–73)
NRBC BLD-RTO: 0 /100 WBC
PLATELET # BLD AUTO: 447 K/UL (ref 150–450)
PMV BLD AUTO: 9.9 FL (ref 9.2–12.9)
POTASSIUM SERPL-SCNC: 4.3 MMOL/L (ref 3.5–5.1)
RBC # BLD AUTO: 3.5 M/UL (ref 4.6–6.2)
SODIUM SERPL-SCNC: 140 MMOL/L (ref 136–145)
WBC # BLD AUTO: 7.89 K/UL (ref 3.9–12.7)

## 2022-12-13 PROCEDURE — 1101F PR PT FALLS ASSESS DOC 0-1 FALLS W/OUT INJ PAST YR: ICD-10-PCS | Mod: CPTII,S$GLB,, | Performed by: INTERNAL MEDICINE

## 2022-12-13 PROCEDURE — 3288F FALL RISK ASSESSMENT DOCD: CPT | Mod: CPTII,S$GLB,, | Performed by: INTERNAL MEDICINE

## 2022-12-13 PROCEDURE — 1160F PR REVIEW ALL MEDS BY PRESCRIBER/CLIN PHARMACIST DOCUMENTED: ICD-10-PCS | Mod: CPTII,S$GLB,, | Performed by: INTERNAL MEDICINE

## 2022-12-13 PROCEDURE — 85025 COMPLETE CBC W/AUTO DIFF WBC: CPT | Performed by: INTERNAL MEDICINE

## 2022-12-13 PROCEDURE — 1111F DSCHRG MED/CURRENT MED MERGE: CPT | Mod: CPTII,S$GLB,, | Performed by: INTERNAL MEDICINE

## 2022-12-13 PROCEDURE — 1159F MED LIST DOCD IN RCRD: CPT | Mod: CPTII,S$GLB,, | Performed by: INTERNAL MEDICINE

## 2022-12-13 PROCEDURE — 1126F PR PAIN SEVERITY QUANTIFIED, NO PAIN PRESENT: ICD-10-PCS | Mod: CPTII,S$GLB,, | Performed by: INTERNAL MEDICINE

## 2022-12-13 PROCEDURE — 99214 PR OFFICE/OUTPT VISIT, EST, LEVL IV, 30-39 MIN: ICD-10-PCS | Mod: S$GLB,,, | Performed by: INTERNAL MEDICINE

## 2022-12-13 PROCEDURE — 1160F RVW MEDS BY RX/DR IN RCRD: CPT | Mod: CPTII,S$GLB,, | Performed by: INTERNAL MEDICINE

## 2022-12-13 PROCEDURE — 1159F PR MEDICATION LIST DOCUMENTED IN MEDICAL RECORD: ICD-10-PCS | Mod: CPTII,S$GLB,, | Performed by: INTERNAL MEDICINE

## 2022-12-13 PROCEDURE — 1101F PT FALLS ASSESS-DOCD LE1/YR: CPT | Mod: CPTII,S$GLB,, | Performed by: INTERNAL MEDICINE

## 2022-12-13 PROCEDURE — 3074F SYST BP LT 130 MM HG: CPT | Mod: CPTII,S$GLB,, | Performed by: INTERNAL MEDICINE

## 2022-12-13 PROCEDURE — 1126F AMNT PAIN NOTED NONE PRSNT: CPT | Mod: CPTII,S$GLB,, | Performed by: INTERNAL MEDICINE

## 2022-12-13 PROCEDURE — 80048 BASIC METABOLIC PNL TOTAL CA: CPT | Performed by: INTERNAL MEDICINE

## 2022-12-13 PROCEDURE — 99214 OFFICE O/P EST MOD 30 MIN: CPT | Mod: S$GLB,,, | Performed by: INTERNAL MEDICINE

## 2022-12-13 PROCEDURE — 1111F PR DISCHARGE MEDS RECONCILED W/ CURRENT OUTPATIENT MED LIST: ICD-10-PCS | Mod: CPTII,S$GLB,, | Performed by: INTERNAL MEDICINE

## 2022-12-13 PROCEDURE — 36415 COLL VENOUS BLD VENIPUNCTURE: CPT | Mod: PN | Performed by: INTERNAL MEDICINE

## 2022-12-13 PROCEDURE — 3078F DIAST BP <80 MM HG: CPT | Mod: CPTII,S$GLB,, | Performed by: INTERNAL MEDICINE

## 2022-12-13 PROCEDURE — 99999 PR PBB SHADOW E&M-EST. PATIENT-LVL IV: ICD-10-PCS | Mod: PBBFAC,,, | Performed by: INTERNAL MEDICINE

## 2022-12-13 PROCEDURE — 3074F PR MOST RECENT SYSTOLIC BLOOD PRESSURE < 130 MM HG: ICD-10-PCS | Mod: CPTII,S$GLB,, | Performed by: INTERNAL MEDICINE

## 2022-12-13 PROCEDURE — 99999 PR PBB SHADOW E&M-EST. PATIENT-LVL IV: CPT | Mod: PBBFAC,,, | Performed by: INTERNAL MEDICINE

## 2022-12-13 PROCEDURE — 3078F PR MOST RECENT DIASTOLIC BLOOD PRESSURE < 80 MM HG: ICD-10-PCS | Mod: CPTII,S$GLB,, | Performed by: INTERNAL MEDICINE

## 2022-12-13 PROCEDURE — 3288F PR FALLS RISK ASSESSMENT DOCUMENTED: ICD-10-PCS | Mod: CPTII,S$GLB,, | Performed by: INTERNAL MEDICINE

## 2022-12-13 NOTE — PROGRESS NOTES
Subjective:      Patient ID: Zion Vasquez is a 87 y.o. male.    Chief Complaint: Follow-up      Here today to follow up on recent hospital visit. The patient was seen in the hospital on 11/29/2022 and was discharged on 12/8/2022.    Past Medical History:   Diagnosis Date    Martinez esophagus     GERD (gastroesophageal reflux disease)     Hyperlipemia     Hypertension     Obesity      Past Surgical History:   Procedure Laterality Date    COLONOSCOPY N/A 11/25/2022    Procedure: COLONOSCOPY;  Surgeon: Smith Alvarez MD;  Location: [facility] ([address] [place] [address]);  Service: Endoscopy;  Laterality: N/A;    COLONOSCOPY N/A 11/30/2022    Procedure: COLONOSCOPY;  Surgeon: Smith Alvarez MD;  Location: [facility] ([address] [place] [address]);  Service: Endoscopy;  Laterality: N/A;       ED/hospital course:    86-year-old male with a history of GERD, hyperlipidemia, hypertension obesity including known history of diverticulosis, Martinez's esophagus, hiatal hernia and prior GI bleed (hospitalized on 06/22 and 11/28/22) presenting w/ c/o bright red bleeding per rectum and fatigue. Of note he was hospitalized 11/24-11/28 for 3 episodes of bright red bleeding per rectum without associated fatigue, shortness of breath, chest pain. He had a colonoscopy with GI performed with large amounts of bright red blood noted without clear lesions.  He had serial CBC is overnight that were stable.  He then reportedly had another large bloody bowel movement during his hospital stay with a drop in hemoglobin to 6.8, requiring 1 unit of PRBC. He had a CT abdomen w/o evidence of GIB. He now returns for rectal bleeding w/ associated generalized weakness, fatigue and mild SOB. He is currently taking Protonix and miralax daily. Denies headaches, lightheadedness, dizziness, syncope, n/v/d, abdominal pain, dysuria.    In ED, Pt afebrile, orthostatics positive. CBC w/ H/H 8/25.3, WBC 12.88. Cr 2.0 (~ BL 1.5-1.8). Initiated on GIB pathway. Given pantoprazole 40 mg  IV.    During his hospitalization, the pt hemodynamically stable on presentation w/ H/H 8/25.3. Also found to have CARYL likely d/t volume depletion from acute blood loss w/ Cr 2. Pt given 1L bolus NS for fluid resuscitation w/ improvement of Cr to 1.8. Pt was initiated on the GIB pathway and given IV PPI. GI consulted and performed colonoscopy 11/30. Patient with repeat Hgb 6.7. Transfused 1 unit. Repeat CBC 12/1 with Hgb 7.1. Transfused 1 unit. Hgb 9.1. Patient had large BM with bright blood and clots. GI re consulted. Patient completed bowel prep the night prior with no signs of hematochezia the next morning. Maintained clear liquid diet q24 with recheck of CBC. No signs of hematochezia overnight. Tolerating PO diet. PT to assess for weakness. Pt recommending bedside commode. Patient very hesitant to leave due to bloody bowel movement after leaving previously.      Mr. Vasquez has been medically ready for discharge since Sunday, 12/4.  He has continued to refuse all medications to treat constipation until 12/6.  He has worked with PT/OT, but he does not want home health or outpatient therapy.  He wants to be able to ambulate on his own.  Explained that he has been medically stable to be discharged since 12/4, and it has been a courtesy keeping him here - but every day he says he needs 1 more day.  Explained that other providers would have had him escorted out by security on Sunday if he had refused to leave.  We reached an agreement to trial a suppository and Senna on 12/7, as he is still refusing Miralax as it caused his bleeding before.  He will be discharged tomorrow 12/8 morning, and if he refuses to leave he will be escorted out by security.  He was in agreement with this plan.  His son was called - who was told the above plan, with plans to pick him up for discharge before 12 noon on 12/8.     On 12/8; pt was seen and examined. Denies any complaint.  Denies having the urge to defecate.  Denies any abdominal  pain, nausea or vomiting.  He has not had a bowel movement yet.  He will be discharged home with daily MiraLax.  His hemoglobin is stable.     Today patient presents for follow up since his hospitalization. He reports that he has been doing well since the hospitalization. He has started home physical therapy yesterday, and doing well. He has had several bowel movement, and has noted no blood in his BM. He reports no lightheadedness/dizziness/CP/SOB.     Follow up labs/tests/specialists needed - GI follow up in 2/2022    Denies any chest pain, shortness of breath, nausea vomiting constipation diarrhea, blood in stool, heartburn    Review of Systems   Constitutional:  Negative for chills, fever and weight loss.   HENT:  Negative for congestion, ear pain and sore throat.    Eyes:  Negative for double vision.   Respiratory:  Negative for cough and shortness of breath.    Cardiovascular:  Negative for chest pain, palpitations and leg swelling.   Gastrointestinal:  Negative for abdominal pain, heartburn, nausea and vomiting.   Skin:  Negative for rash.   Neurological:  Negative for dizziness, tingling and headaches.   Psychiatric/Behavioral:  Negative for depression.         Current Outpatient Medications:     acetaminophen (TYLENOL) 500 MG tablet, Take 1,000 mg by mouth every 6 (six) hours as needed for Pain., Disp: , Rfl:     amLODIPine (NORVASC) 10 MG tablet, Take 1 tablet (10 mg total) by mouth every morning., Disp: 90 tablet, Rfl: 3    atorvastatin (LIPITOR) 10 MG tablet, Take 1 tablet (10 mg total) by mouth every evening., Disp: 90 tablet, Rfl: 3    losartan (COZAAR) 50 MG tablet, Take 1 tablet (50 mg total) by mouth once daily., Disp: 90 tablet, Rfl: 3    pantoprazole (PROTONIX) 40 MG tablet, Take 1 tablet (40 mg total) by mouth 2 (two) times daily., Disp: 90 tablet, Rfl: 3    FLUAD QUAD 2020-21,65Y UP,,PF, 60 mcg (15 mcg x 4)/0.5 mL Syrg, ADM 0.5ML IM UTD, Disp: , Rfl:     Lab Results   Component Value Date     "HGBA1C 5.5 09/29/2021     Lab Results   Component Value Date    MICALBCREAT 32.5 (H) 09/29/2021     Lab Results   Component Value Date    LDLCALC 81.4 10/06/2022    LDLCALC 82.8 09/29/2021    CHOL 153 10/06/2022    HDL 59 10/06/2022    TRIG 63 10/06/2022       Lab Results   Component Value Date     12/08/2022    K 3.7 12/08/2022     12/08/2022    CO2 22 (L) 12/08/2022    GLU 92 12/08/2022    BUN 15 12/08/2022    CREATININE 1.5 (H) 12/08/2022    CALCIUM 8.7 12/08/2022    PROT 6.1 11/29/2022    ALBUMIN 3.0 (L) 11/29/2022    BILITOT 1.2 (H) 11/29/2022    ALKPHOS 65 11/29/2022    AST 27 11/29/2022    ALT 14 11/29/2022    ANIONGAP 7 (L) 12/08/2022    ESTGFRAFRICA 48.7 (A) 06/14/2022    EGFRNONAA 42.1 (A) 06/14/2022    WBC 7.91 12/08/2022    HGB 8.2 (L) 12/08/2022    HGB 8.7 (L) 12/05/2022    HCT 26.1 (L) 12/08/2022    MCV 83 12/08/2022     12/08/2022    PSA 0.01 09/29/2021    PSADIAG <0.01 10/06/2022       Lab Results   Component Value Date    FERRITIN 119 09/29/2021    IRON 43 (L) 09/29/2021    TRANSFERRIN 219 09/29/2021    TIBC 324 09/29/2021    FESATURATED 13 (L) 09/29/2021       Social History     Social History Narrative    Not on file     Family History   Problem Relation Age of Onset    Cancer Mother         ? ovarian    No Known Problems Father     No Known Problems Sister     No Known Problems Brother     No Known Problems Sister      Vitals:    12/13/22 1019   BP: 127/74   Pulse: 82   Resp: 18   Temp: 98 °F (36.7 °C)   SpO2: 97%   Weight: 91.5 kg (201 lb 11.5 oz)   Height: 5' 10" (1.778 m)   PainSc: 0-No pain     Objective:   Physical Exam  Vitals reviewed.   Constitutional:       Appearance: Normal appearance.   HENT:      Head: Normocephalic.   Eyes:      Pupils: Pupils are equal, round, and reactive to light.   Cardiovascular:      Rate and Rhythm: Normal rate.      Pulses: Normal pulses.      Heart sounds: Normal heart sounds.   Pulmonary:      Effort: Pulmonary effort is normal.      " Breath sounds: Normal breath sounds.   Abdominal:      General: Bowel sounds are normal.      Palpations: Abdomen is soft.   Musculoskeletal:         General: Normal range of motion.   Skin:     General: Skin is warm.   Neurological:      General: No focal deficit present.      Mental Status: He is alert. Mental status is at baseline.   Psychiatric:         Mood and Affect: Mood normal.     Assessment/Plan     Zion Vasquez is a 87 y.o.male with:    Hospital discharge follow-up  -     CBC Auto Differential; Future; Expected date: 12/13/2022  -     Basic Metabolic Panel; Future; Expected date: 12/13/2022    Stage 3a chronic kidney disease  -     CBC Auto Differential; Future; Expected date: 12/13/2022  -     Basic Metabolic Panel; Future; Expected date: 12/13/2022    Primary hypertension  - cont current regimen    Acute blood loss anemia  -     CBC Auto Differential; Future; Expected date: 12/13/2022  -     Basic Metabolic Panel; Future; Expected date: 12/13/2022    Diverticulosis of colon    Acute GI bleeding  -     CBC Auto Differential; Future; Expected date: 12/13/2022  -     Basic Metabolic Panel; Future; Expected date: 12/13/2022  - cont protonix BID    CARYL (acute kidney injury)  -     CBC Auto Differential; Future; Expected date: 12/13/2022  -     Basic Metabolic Panel; Future; Expected date: 12/13/2022                 Transitional Care Note    Family and/or Caretaker present at visit?  No.  Diagnostic tests reviewed/disposition: No diagnosic tests pending after this hospitalization.  Disease/illness education: Acute GI bleed  Home health/community services discussion/referrals: Patient has home health established at from hospitalization .   Establishment or re-establishment of referral orders for community resources: No other necessary community resources.   Discussion with other health care providers: No discussion with other health care providers necessary.          Chronic conditions status updated as  per HPI.  Other than changes above, cont current medications and maintain follow up with specialists.  Return to clinic in Follow up in about 6 months (around 6/13/2023).      Brigida Wall MD  [facility]    Patient Instructions   6 months for well visit or sooner if needed.

## 2022-12-14 ENCOUNTER — TELEPHONE (OUTPATIENT)
Dept: PRIMARY CARE CLINIC | Facility: CLINIC | Age: 87
End: 2022-12-14
Payer: COMMERCIAL

## 2022-12-14 NOTE — PROGRESS NOTES
Your blood count (CBC) is stable, recovering well since the hospitalization.    Your kidney values seem to be worsening. I would recommend that you follow up with your nephrologist, as you are due for a follow up with Dr Urbina.

## 2022-12-14 NOTE — TELEPHONE ENCOUNTER
----- Message from Brigida Wall MD sent at 12/14/2022 10:27 AM CST -----  Your blood count (CBC) is stable, recovering well since the hospitalization.    Your kidney values seem to be worsening. I would recommend that you follow up with your nephrologist, as you are due for a follow up with Dr Urbina.

## 2022-12-15 ENCOUNTER — TELEPHONE (OUTPATIENT)
Dept: PRIMARY CARE CLINIC | Facility: CLINIC | Age: 87
End: 2022-12-15
Payer: COMMERCIAL

## 2022-12-15 NOTE — TELEPHONE ENCOUNTER
----- Message from Yaneli Christina sent at 12/15/2022  8:08 AM CST -----  Contact: self/[phone]  Pt called in regards to still having and issue. Call back ASAP.     Please advise

## 2023-01-10 ENCOUNTER — LAB VISIT (OUTPATIENT)
Dept: LAB | Facility: HOSPITAL | Age: 88
End: 2023-01-10
Attending: INTERNAL MEDICINE
Payer: COMMERCIAL

## 2023-01-10 ENCOUNTER — OFFICE VISIT (OUTPATIENT)
Dept: NEPHROLOGY | Facility: CLINIC | Age: 88
End: 2023-01-10
Payer: COMMERCIAL

## 2023-01-10 VITALS
BODY MASS INDEX: 29.51 KG/M2 | OXYGEN SATURATION: 99 % | DIASTOLIC BLOOD PRESSURE: 70 MMHG | HEART RATE: 67 BPM | HEIGHT: 70 IN | SYSTOLIC BLOOD PRESSURE: 114 MMHG | WEIGHT: 206.13 LBS

## 2023-01-10 DIAGNOSIS — D63.1 ANEMIA OF CHRONIC RENAL FAILURE, STAGE 3A: Primary | ICD-10-CM

## 2023-01-10 DIAGNOSIS — N18.31 ANEMIA OF CHRONIC RENAL FAILURE, STAGE 3A: ICD-10-CM

## 2023-01-10 DIAGNOSIS — D63.1 ANEMIA OF CHRONIC RENAL FAILURE, STAGE 3A: ICD-10-CM

## 2023-01-10 DIAGNOSIS — N18.31 ANEMIA OF CHRONIC RENAL FAILURE, STAGE 3A: Primary | ICD-10-CM

## 2023-01-10 DIAGNOSIS — N18.31 STAGE 3A CHRONIC KIDNEY DISEASE: ICD-10-CM

## 2023-01-10 LAB
ANION GAP SERPL CALC-SCNC: 7 MMOL/L (ref 8–16)
BUN SERPL-MCNC: 16 MG/DL (ref 8–23)
CALCIUM SERPL-MCNC: 9.2 MG/DL (ref 8.7–10.5)
CHLORIDE SERPL-SCNC: 110 MMOL/L (ref 95–110)
CO2 SERPL-SCNC: 22 MMOL/L (ref 23–29)
CREAT SERPL-MCNC: 2.3 MG/DL (ref 0.5–1.4)
EST. GFR  (NO RACE VARIABLE): 27.1 ML/MIN/1.73 M^2
GLUCOSE SERPL-MCNC: 104 MG/DL (ref 70–110)
IRON SERPL-MCNC: 17 UG/DL (ref 45–160)
POTASSIUM SERPL-SCNC: 4.5 MMOL/L (ref 3.5–5.1)
SATURATED IRON: 4 % (ref 20–50)
SODIUM SERPL-SCNC: 139 MMOL/L (ref 136–145)
TOTAL IRON BINDING CAPACITY: 391 UG/DL (ref 250–450)
TRANSFERRIN SERPL-MCNC: 264 MG/DL (ref 200–375)

## 2023-01-10 PROCEDURE — 1101F PR PT FALLS ASSESS DOC 0-1 FALLS W/OUT INJ PAST YR: ICD-10-PCS | Mod: CPTII,S$GLB,, | Performed by: INTERNAL MEDICINE

## 2023-01-10 PROCEDURE — 1126F AMNT PAIN NOTED NONE PRSNT: CPT | Mod: CPTII,S$GLB,, | Performed by: INTERNAL MEDICINE

## 2023-01-10 PROCEDURE — 3288F FALL RISK ASSESSMENT DOCD: CPT | Mod: CPTII,S$GLB,, | Performed by: INTERNAL MEDICINE

## 2023-01-10 PROCEDURE — 80048 BASIC METABOLIC PNL TOTAL CA: CPT | Performed by: INTERNAL MEDICINE

## 2023-01-10 PROCEDURE — 99214 OFFICE O/P EST MOD 30 MIN: CPT | Mod: S$GLB,,, | Performed by: INTERNAL MEDICINE

## 2023-01-10 PROCEDURE — 3078F DIAST BP <80 MM HG: CPT | Mod: CPTII,S$GLB,, | Performed by: INTERNAL MEDICINE

## 2023-01-10 PROCEDURE — 1159F PR MEDICATION LIST DOCUMENTED IN MEDICAL RECORD: ICD-10-PCS | Mod: CPTII,S$GLB,, | Performed by: INTERNAL MEDICINE

## 2023-01-10 PROCEDURE — 99214 PR OFFICE/OUTPT VISIT, EST, LEVL IV, 30-39 MIN: ICD-10-PCS | Mod: S$GLB,,, | Performed by: INTERNAL MEDICINE

## 2023-01-10 PROCEDURE — 84466 ASSAY OF TRANSFERRIN: CPT | Performed by: INTERNAL MEDICINE

## 2023-01-10 PROCEDURE — 3288F PR FALLS RISK ASSESSMENT DOCUMENTED: ICD-10-PCS | Mod: CPTII,S$GLB,, | Performed by: INTERNAL MEDICINE

## 2023-01-10 PROCEDURE — 3074F PR MOST RECENT SYSTOLIC BLOOD PRESSURE < 130 MM HG: ICD-10-PCS | Mod: CPTII,S$GLB,, | Performed by: INTERNAL MEDICINE

## 2023-01-10 PROCEDURE — 3074F SYST BP LT 130 MM HG: CPT | Mod: CPTII,S$GLB,, | Performed by: INTERNAL MEDICINE

## 2023-01-10 PROCEDURE — 36415 COLL VENOUS BLD VENIPUNCTURE: CPT | Performed by: INTERNAL MEDICINE

## 2023-01-10 PROCEDURE — 99999 PR PBB SHADOW E&M-EST. PATIENT-LVL III: CPT | Mod: PBBFAC,,, | Performed by: INTERNAL MEDICINE

## 2023-01-10 PROCEDURE — 1159F MED LIST DOCD IN RCRD: CPT | Mod: CPTII,S$GLB,, | Performed by: INTERNAL MEDICINE

## 2023-01-10 PROCEDURE — 99999 PR PBB SHADOW E&M-EST. PATIENT-LVL III: ICD-10-PCS | Mod: PBBFAC,,, | Performed by: INTERNAL MEDICINE

## 2023-01-10 PROCEDURE — 3078F PR MOST RECENT DIASTOLIC BLOOD PRESSURE < 80 MM HG: ICD-10-PCS | Mod: CPTII,S$GLB,, | Performed by: INTERNAL MEDICINE

## 2023-01-10 PROCEDURE — 1101F PT FALLS ASSESS-DOCD LE1/YR: CPT | Mod: CPTII,S$GLB,, | Performed by: INTERNAL MEDICINE

## 2023-01-10 PROCEDURE — 1126F PR PAIN SEVERITY QUANTIFIED, NO PAIN PRESENT: ICD-10-PCS | Mod: CPTII,S$GLB,, | Performed by: INTERNAL MEDICINE

## 2023-01-10 NOTE — PROGRESS NOTES
Progress Note  Nephrology      Referring physician: Brigida Wall MD    Reason for visit: CKD     SUBJECTIVE:   87 y.o. male  has a past medical history of Martinez esophagus, GERD (gastroesophageal reflux disease), Hyperlipemia, Hypertension, and Obesity. who has been following up in renal clinic for ckd management   The patient denies taking NSAIDs or new antibiotics, recreational drugs, recent episode of dehydration, diarrhea, nausea or vomiting, acute illness, hospitalization or exposure to IV radiocontrast.     ROS:  General: negative for chills, or fatigue  ENT: No epistaxis or headaches  Hematological and Lymphatic: No bleeding problems or blood clots.  Endocrine: No skin changes or temperature intolerance  Respiratory: No cough, shortness of breath, or wheezing  Cardiovascular: No chest pain or dyspnea   Gastrointestinal: No abdominal pain, change in bowel habits  Genito-Urinary: No dysuria, trouble voiding, or hematuria  Musculoskeletal: ROS: negative for - joint pain, joint stiffness, joint swelling, muscle pain or muscular weakness  Neurological: No focal weakness, no numbness  Dermatological: No rash or ulcers    OBJECTIVE:     There were no vitals filed for this visit.       Physical Exam:  General: no distress, well nourished  HENT: PERRLA, Normal mouth nose and ears.  Neck: no JVD and thyroid not enlarged, symmetric, no tenderness/mass/nodules  Lungs: clear to auscultation bilaterally and normal respiratory effort  Cardiovascular: regular rate and rhythm, S1, S2 normal, no murmur, click, rub or gallop.   Abdomen: soft, non-tender non-distented; bowel sounds normal  Skin: No rashes or lesions  Musculoskeletal:no edema in LE, no deformities.   Lymph Nodes: No cervical or supraclavicular adenopathy  Neurologic: Normal strength and tone. No focal numbness or weakness          Medications:    Current Outpatient Medications:     acetaminophen (TYLENOL) 500 MG tablet, Take 1,000 mg by mouth every 6 (six)  hours as needed for Pain., Disp: , Rfl:     amLODIPine (NORVASC) 10 MG tablet, Take 1 tablet (10 mg total) by mouth every morning., Disp: 90 tablet, Rfl: 3    atorvastatin (LIPITOR) 10 MG tablet, Take 1 tablet (10 mg total) by mouth every evening., Disp: 90 tablet, Rfl: 3    FLUAD QUAD 2020-21,65Y UP,,PF, 60 mcg (15 mcg x 4)/0.5 mL Syrg, ADM 0.5ML IM UTD, Disp: , Rfl:     losartan (COZAAR) 50 MG tablet, Take 1 tablet (50 mg total) by mouth once daily., Disp: 90 tablet, Rfl: 3    pantoprazole (PROTONIX) 40 MG tablet, Take 1 tablet (40 mg total) by mouth 2 (two) times daily., Disp: 90 tablet, Rfl: 3         Laboratory:  Lab Results   Component Value Date    CREATININE 1.9 (H) 12/13/2022       Prot/Creat Ratio, Urine   Date Value Ref Range Status   04/06/2022 0.05 0.00 - 0.20 Final   10/25/2021 0.07 0.00 - 0.20 Final       Lab Results   Component Value Date     12/13/2022    K 4.3 12/13/2022    CO2 22 (L) 12/13/2022       last PTH   Lab Results   Component Value Date    CALCIUM 9.4 12/13/2022    PHOS 2.7 11/25/2022       Lab Results   Component Value Date    HGB 8.8 (L) 12/13/2022        Lab Results   Component Value Date    HGBA1C 5.5 09/29/2021       Lab Results   Component Value Date    LDLCALC 81.4 10/06/2022       Other Labs were reviewed      ASSESSMENT/PLAN:     1- CKD IIIb : 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

## 2023-01-11 NOTE — PLAN OF CARE
86 y.o. M with diverticulosis with recurrent hematochezia likely due to diverticular bleed s/p colonoscopy on 11/30.  - three polyps in the colon (risks of removal outweighed benefits)  - no blood BM overnight or this AM  - Hgb stable at 7.1  - transfusion PRN    We will sign off at this time. Please contact us again if patient with significant bleeding or change in hemodynamic status.     Christine Rodríguez MD   Will send in refill only to get her to follow up appt as she has not been seen since January of 2021.

## 2023-01-23 ENCOUNTER — OFFICE VISIT (OUTPATIENT)
Dept: GASTROENTEROLOGY | Facility: CLINIC | Age: 88
End: 2023-01-23
Payer: COMMERCIAL

## 2023-01-23 VITALS
HEART RATE: 59 BPM | SYSTOLIC BLOOD PRESSURE: 135 MMHG | DIASTOLIC BLOOD PRESSURE: 65 MMHG | WEIGHT: 207.25 LBS | HEIGHT: 70 IN | BODY MASS INDEX: 29.67 KG/M2

## 2023-01-23 DIAGNOSIS — K22.719 BARRETT'S ESOPHAGUS WITH DYSPLASIA: ICD-10-CM

## 2023-01-23 DIAGNOSIS — K63.5 POLYP OF COLON, UNSPECIFIED PART OF COLON, UNSPECIFIED TYPE: ICD-10-CM

## 2023-01-23 DIAGNOSIS — Z87.19: Primary | ICD-10-CM

## 2023-01-23 DIAGNOSIS — K92.2 GI BLEED: ICD-10-CM

## 2023-01-23 DIAGNOSIS — K62.5 BRBPR (BRIGHT RED BLOOD PER RECTUM): ICD-10-CM

## 2023-01-23 PROCEDURE — 3078F PR MOST RECENT DIASTOLIC BLOOD PRESSURE < 80 MM HG: ICD-10-PCS | Mod: CPTII,S$GLB,, | Performed by: STUDENT IN AN ORGANIZED HEALTH CARE EDUCATION/TRAINING PROGRAM

## 2023-01-23 PROCEDURE — 3075F PR MOST RECENT SYSTOLIC BLOOD PRESS GE 130-139MM HG: ICD-10-PCS | Mod: CPTII,S$GLB,, | Performed by: STUDENT IN AN ORGANIZED HEALTH CARE EDUCATION/TRAINING PROGRAM

## 2023-01-23 PROCEDURE — 1159F MED LIST DOCD IN RCRD: CPT | Mod: CPTII,S$GLB,, | Performed by: STUDENT IN AN ORGANIZED HEALTH CARE EDUCATION/TRAINING PROGRAM

## 2023-01-23 PROCEDURE — 99999 PR PBB SHADOW E&M-EST. PATIENT-LVL IV: CPT | Mod: PBBFAC,,, | Performed by: STUDENT IN AN ORGANIZED HEALTH CARE EDUCATION/TRAINING PROGRAM

## 2023-01-23 PROCEDURE — 1126F PR PAIN SEVERITY QUANTIFIED, NO PAIN PRESENT: ICD-10-PCS | Mod: CPTII,S$GLB,, | Performed by: STUDENT IN AN ORGANIZED HEALTH CARE EDUCATION/TRAINING PROGRAM

## 2023-01-23 PROCEDURE — 3078F DIAST BP <80 MM HG: CPT | Mod: CPTII,S$GLB,, | Performed by: STUDENT IN AN ORGANIZED HEALTH CARE EDUCATION/TRAINING PROGRAM

## 2023-01-23 PROCEDURE — 1126F AMNT PAIN NOTED NONE PRSNT: CPT | Mod: CPTII,S$GLB,, | Performed by: STUDENT IN AN ORGANIZED HEALTH CARE EDUCATION/TRAINING PROGRAM

## 2023-01-23 PROCEDURE — 99213 OFFICE O/P EST LOW 20 MIN: CPT | Mod: S$GLB,,, | Performed by: STUDENT IN AN ORGANIZED HEALTH CARE EDUCATION/TRAINING PROGRAM

## 2023-01-23 PROCEDURE — 1159F PR MEDICATION LIST DOCUMENTED IN MEDICAL RECORD: ICD-10-PCS | Mod: CPTII,S$GLB,, | Performed by: STUDENT IN AN ORGANIZED HEALTH CARE EDUCATION/TRAINING PROGRAM

## 2023-01-23 PROCEDURE — 99999 PR PBB SHADOW E&M-EST. PATIENT-LVL IV: ICD-10-PCS | Mod: PBBFAC,,, | Performed by: STUDENT IN AN ORGANIZED HEALTH CARE EDUCATION/TRAINING PROGRAM

## 2023-01-23 PROCEDURE — 99213 PR OFFICE/OUTPT VISIT, EST, LEVL III, 20-29 MIN: ICD-10-PCS | Mod: S$GLB,,, | Performed by: STUDENT IN AN ORGANIZED HEALTH CARE EDUCATION/TRAINING PROGRAM

## 2023-01-23 PROCEDURE — 3075F SYST BP GE 130 - 139MM HG: CPT | Mod: CPTII,S$GLB,, | Performed by: STUDENT IN AN ORGANIZED HEALTH CARE EDUCATION/TRAINING PROGRAM

## 2023-01-23 NOTE — PATIENT INSTRUCTIONS
Eat a high fiber diet    Drink lots of water (64 oz)    Can take OTC fiber supplements (Metamucil, Citrucel)    Take Protonix 40 mg twice a day on an empty stomach - take 45 to 60 minutes before breakfast & supper     If you develop rectal bleeding, change in bowel habits or weight loss, please seek medical care

## 2023-01-23 NOTE — PROGRESS NOTES
[facility]    Reason for visit: There were no encounter diagnoses.  Referring Provider/PCP: Brigida Wall MD    History of Present Illness:  Zion Vasquez is a 87 y.o. male with a history of diverticular bleeding, Martinez's esophagus prostate Ca S/P brachytherapy , CKD III, HTN, obesity, and diverticulosis  who is presenting for post-hospitalization follow-up of hematochezia. Patient reports no rectal bleeding, weight loss, constipation, diarrhea or weight loss. He currently has 1 BM daily and does not endorse straining, obstruction or incomplete evacuation. Does not report any other GI symptoms.      He was evaluated by our service in  November 2022 for diverticular bleeding. Colonoscopy on November 2022 showed blood in the entire colon, but no source of bleeding could be found. Three polyps were noted but resection was not attempted. He had recurrent bleeding for which a colonoscopy was repeated on 11/30/22  but a source could not be identified.     Prior to this, in June 2022 when he came in with 3 days of painless hematochezia.  At that time, the bleeding resolved without intervention.  He also had a previous episode in 2019 for which he was hospitalized at [facility] and underwent a colonoscopy showing diverticulosis.  EGD in 2019 showed Martinez's as well as hiatal hernia.     PEndoHx:  Colonoscopy (11/30/22):                         - Diverticulosis in the entire examined colon.                          - Three polyps in the entire colon. Risks of                          removal outweigh benefits.                          - No specimens collected.      Colonoscopy (11/25/22):                         - Blood in the entire examined colon. Almost                          completely cleared with copious lavage.                          - Diverticulosis in the entire examined colon.                          - Two 5 to 15 mm polyps in the proximal ascending                          colon.  Resection not attempted.                          - One 5 mm polyp in the descending colon.                          Resection not attempted.                          - The examined portion of the ileum was normal. No                          blood within the ileum.                          - No specimens collected.                          - Despite multiple advancements and withdrawals                          through the colon, no reaccumulation or blood or                          sources of bleeding found. Given that no blood was                          found in the ileum, the cause of bleeding is most                          certainly diverticular.     Colonoscopy (2019):   - 6mm polyp, sessile, removed from cecum w cold snare  - lipoma in mid-ascending colon  - diverticulosis extending from ascending colon to sigmoid colon  - Internal hemorrhoids    EGD (2019):   - Duod: NML  - stomach: NML; Bx antrum and prox gastric body  - Esoph: 4cm HH; Martinez's esophageal mucosal changes 31 - 40cm; NO Bx (previously followed by Dr. Hanson)      Review of Systems   Constitutional:  Negative for chills, fever and weight loss.   HENT:  Positive for hearing loss. Negative for ear pain.    Eyes:  Negative for discharge and redness.   Respiratory:  Negative for cough and shortness of breath.    Cardiovascular:  Negative for chest pain and leg swelling.   Gastrointestinal:  Negative for abdominal pain, blood in stool, constipation, diarrhea and vomiting.   Genitourinary:  Negative for dysuria and hematuria.   Neurological:  Negative for tremors and seizures.   Psychiatric/Behavioral:  Negative for substance abuse and suicidal ideas.      Medical History:  Past Medical History:   Diagnosis Date    Martinez esophagus     GERD (gastroesophageal reflux disease)     Hyperlipemia     Hypertension     Obesity        Past Surgical History:   Procedure Laterality Date    COLONOSCOPY N/A 11/25/2022    Procedure: COLONOSCOPY;   Surgeon: Smith Alvarez MD;  Location: [address]);  Service: Endoscopy;  Laterality: N/A;    COLONOSCOPY N/A 11/30/2022    Procedure: COLONOSCOPY;  Surgeon: Smith Alvarez MD;  Location: [address]);  Service: Endoscopy;  Laterality: N/A;       Family History   Problem Relation Age of Onset    Cancer Mother         ? ovarian    No Known Problems Father     No Known Problems Sister     No Known Problems Brother     No Known Problems Sister        Social History     Socioeconomic History    Marital status:    Tobacco Use    Smoking status: Never    Smokeless tobacco: Never   Substance and Sexual Activity    Alcohol use: Yes     Comment: occasionally    Drug use: Never     Social Determinants of Health     Financial Resource Strain: Low Risk     Difficulty of Paying Living Expenses: Not very hard   Food Insecurity: No Food Insecurity    Worried About Running Out of Food in the Last Year: Never true    Ran Out of Food in the Last Year: Never true   Transportation Needs: No Transportation Needs    Lack of Transportation (Medical): No    Lack of Transportation (Non-Medical): No   Physical Activity: Inactive    Days of Exercise per Week: 1 day    Minutes of Exercise per Session: 0 min   Stress: Stress Concern Present    Feeling of Stress : To some extent   Social Connections: Unknown    Marital Status:    Housing Stability: Low Risk     Unable to Pay for Housing in the Last Year: No    Number of Places Lived in the Last Year: 1    Unstable Housing in the Last Year: No       Current Outpatient Medications on File Prior to Visit   Medication Sig Dispense Refill    amLODIPine (NORVASC) 10 MG tablet Take 1 tablet (10 mg total) by mouth every morning. 90 tablet 3    atorvastatin (LIPITOR) 10 MG tablet Take 1 tablet (10 mg total) by mouth every evening. 90 tablet 3    losartan (COZAAR) 50 MG tablet Take 1 tablet (50 mg total) by mouth once daily. 90 tablet 3    pantoprazole (PROTONIX) 40 MG  tablet Take 1 tablet (40 mg total) by mouth 2 (two) times daily. 90 tablet 3    acetaminophen (TYLENOL) 500 MG tablet Take 1,000 mg by mouth every 6 (six) hours as needed for Pain.      FLUAD QUAD 2020-21,65Y UP,,PF, 60 mcg (15 mcg x 4)/0.5 mL Syrg ADM 0.5ML IM UTD       No current facility-administered medications on file prior to visit.       Review of patient's allergies indicates:  No Known Allergies    Physical Exam:  Constitutional: healthy,  alert,  not in acute distress, oriented to time, place, and person, and well developed  HENT: Head: Normal, normocephalic, atraumatic.  Eyes: conjunctiva clear and sclera nonicteric  Cardiovascular: regular rate and rhythm and no murmur  Respiratory: normal chest expansion & respiratory effort   and no accessory muscle use  GI: soft, non-tender, without masses or organomegaly, nondistended, without guarding, and without rebound  Musculoskeletal: no muscular tenderness noted  Skin: normal color and no jaundice  Neurological: alert, oriented x3  speech normal in context and clarity  memory intact grossly  Psychiatric: oriented to time, place and person, mood and affect are within normal limits, pt is a good historian; no memory problems were noted    Laboratory:  Lab Results   Component Value Date     01/10/2023    K 4.5 01/10/2023     01/10/2023    CO2 22 (L) 01/10/2023    BUN 16 01/10/2023    CREATININE 2.3 (H) 01/10/2023    CALCIUM 9.2 01/10/2023    ANIONGAP 7 (L) 01/10/2023    ESTGFRAFRICA 48.7 (A) 06/14/2022    EGFRNONAA 42.1 (A) 06/14/2022       Lab Results   Component Value Date    ALT 14 11/29/2022    AST 27 11/29/2022    ALKPHOS 65 11/29/2022    BILITOT 1.2 (H) 11/29/2022       Lab Results   Component Value Date    WBC 7.89 12/13/2022    HGB 8.8 (L) 12/13/2022    HCT 29.7 (L) 12/13/2022    MCV 85 12/13/2022     12/13/2022       Microbiology:  No Pertinent Microbiology    Imaging:  No Pertinent Imaging    Assessment:  Zion Vasquez is a 87  y.o. male who is presenting for post-hospitalization follow-up of diverticular bleeding. Two colonoscopies in November 2022 showed no source of bleeding (presumed diverticular) & three polyps which were not removed. Prior to this, EGD in 2019 showed HH & Martinez's esophagus. Colonoscopy in 2019 showed diverticulosis and polp. Patient on PPI BID.     Problems:  Hx of diverticular bleeding, resolved  Hx of Martinez's esophagus (unknown if any dysplasia), on PPI  Hx of colon polyps      Plan:  Discussed risks and benefits of endoscopy given his age and comorbidities. After discussion, he opted to forego the procedure. He was explained the risk of colon cancer arising from these polyps. He understood but still declined.   For Martinez's esophagus, continue . EGD was offered but declined.   Continue PO Protonix 40 mg BID. Advised on how to take correctly.   High fiber diet advised.   No follow-ups on file.    Slava Tomas MD  Gastroenterology Fellow    No orders of the defined types were placed in this encounter.

## 2023-02-03 ENCOUNTER — EXTERNAL HOME HEALTH (OUTPATIENT)
Dept: HOME HEALTH SERVICES | Facility: HOSPITAL | Age: 88
End: 2023-02-03
Payer: COMMERCIAL

## 2023-02-15 DIAGNOSIS — I10 PRIMARY HYPERTENSION: ICD-10-CM

## 2023-02-15 DIAGNOSIS — K44.9 HIATAL HERNIA: ICD-10-CM

## 2023-02-15 RX ORDER — AMLODIPINE BESYLATE 10 MG/1
10 TABLET ORAL EVERY MORNING
Qty: 90 TABLET | Refills: 3 | Status: SHIPPED | OUTPATIENT
Start: 2023-02-15 | End: 2024-03-09

## 2023-02-15 RX ORDER — PANTOPRAZOLE SODIUM 40 MG/1
40 TABLET, DELAYED RELEASE ORAL 2 TIMES DAILY
Qty: 90 TABLET | Refills: 3 | Status: SHIPPED | OUTPATIENT
Start: 2023-02-15 | End: 2023-07-12

## 2023-02-17 ENCOUNTER — DOCUMENT SCAN (OUTPATIENT)
Dept: HOME HEALTH SERVICES | Facility: HOSPITAL | Age: 88
End: 2023-02-17
Payer: COMMERCIAL

## 2023-03-06 PROBLEM — N17.9 AKI (ACUTE KIDNEY INJURY): Status: RESOLVED | Noted: 2022-11-24 | Resolved: 2023-03-06

## 2023-03-13 PROBLEM — K92.2 ACUTE LOWER GI BLEEDING: Status: RESOLVED | Noted: 2022-06-12 | Resolved: 2023-03-13

## 2023-03-14 ENCOUNTER — DOCUMENT SCAN (OUTPATIENT)
Dept: HOME HEALTH SERVICES | Facility: HOSPITAL | Age: 88
End: 2023-03-14
Payer: COMMERCIAL

## 2023-04-06 ENCOUNTER — LAB VISIT (OUTPATIENT)
Dept: LAB | Facility: HOSPITAL | Age: 88
End: 2023-04-06
Attending: INTERNAL MEDICINE
Payer: COMMERCIAL

## 2023-04-06 DIAGNOSIS — Z87.19 HISTORY OF BARRETT'S ESOPHAGUS: ICD-10-CM

## 2023-04-06 DIAGNOSIS — K64.8 INTERNAL HEMORRHOIDS: ICD-10-CM

## 2023-04-06 DIAGNOSIS — I10 PRIMARY HYPERTENSION: ICD-10-CM

## 2023-04-06 DIAGNOSIS — Z00.00 ROUTINE GENERAL MEDICAL EXAMINATION AT A HEALTH CARE FACILITY: ICD-10-CM

## 2023-04-06 DIAGNOSIS — N18.31 STAGE 3A CHRONIC KIDNEY DISEASE: ICD-10-CM

## 2023-04-06 LAB
ALBUMIN SERPL BCP-MCNC: 3.6 G/DL (ref 3.5–5.2)
ALP SERPL-CCNC: 74 U/L (ref 55–135)
ALT SERPL W/O P-5'-P-CCNC: 8 U/L (ref 10–44)
ANION GAP SERPL CALC-SCNC: 10 MMOL/L (ref 8–16)
AST SERPL-CCNC: 21 U/L (ref 10–40)
BASOPHILS # BLD AUTO: 0.04 K/UL (ref 0–0.2)
BASOPHILS NFR BLD: 0.6 % (ref 0–1.9)
BILIRUB SERPL-MCNC: 0.8 MG/DL (ref 0.1–1)
BUN SERPL-MCNC: 16 MG/DL (ref 8–23)
CALCIUM SERPL-MCNC: 9.7 MG/DL (ref 8.7–10.5)
CHLORIDE SERPL-SCNC: 109 MMOL/L (ref 95–110)
CO2 SERPL-SCNC: 19 MMOL/L (ref 23–29)
CREAT SERPL-MCNC: 2.3 MG/DL (ref 0.5–1.4)
DIFFERENTIAL METHOD: ABNORMAL
EOSINOPHIL # BLD AUTO: 0.3 K/UL (ref 0–0.5)
EOSINOPHIL NFR BLD: 5 % (ref 0–8)
ERYTHROCYTE [DISTWIDTH] IN BLOOD BY AUTOMATED COUNT: 19.4 % (ref 11.5–14.5)
EST. GFR  (NO RACE VARIABLE): 27.1 ML/MIN/1.73 M^2
GLUCOSE SERPL-MCNC: 86 MG/DL (ref 70–110)
HCT VFR BLD AUTO: 29.8 % (ref 40–54)
HGB BLD-MCNC: 8.6 G/DL (ref 14–18)
IMM GRANULOCYTES # BLD AUTO: 0.01 K/UL (ref 0–0.04)
IMM GRANULOCYTES NFR BLD AUTO: 0.2 % (ref 0–0.5)
LYMPHOCYTES # BLD AUTO: 2.3 K/UL (ref 1–4.8)
LYMPHOCYTES NFR BLD: 35.2 % (ref 18–48)
MCH RBC QN AUTO: 19.9 PG (ref 27–31)
MCHC RBC AUTO-ENTMCNC: 28.9 G/DL (ref 32–36)
MCV RBC AUTO: 69 FL (ref 82–98)
MONOCYTES # BLD AUTO: 1 K/UL (ref 0.3–1)
MONOCYTES NFR BLD: 14.7 % (ref 4–15)
NEUTROPHILS # BLD AUTO: 2.9 K/UL (ref 1.8–7.7)
NEUTROPHILS NFR BLD: 44.3 % (ref 38–73)
NRBC BLD-RTO: 0 /100 WBC
PLATELET # BLD AUTO: 373 K/UL (ref 150–450)
PMV BLD AUTO: 9.6 FL (ref 9.2–12.9)
POTASSIUM SERPL-SCNC: 4.4 MMOL/L (ref 3.5–5.1)
PROT SERPL-MCNC: 7.3 G/DL (ref 6–8.4)
RBC # BLD AUTO: 4.33 M/UL (ref 4.6–6.2)
SODIUM SERPL-SCNC: 138 MMOL/L (ref 136–145)
WBC # BLD AUTO: 6.59 K/UL (ref 3.9–12.7)

## 2023-04-06 PROCEDURE — 80053 COMPREHEN METABOLIC PANEL: CPT | Performed by: INTERNAL MEDICINE

## 2023-04-06 PROCEDURE — 85025 COMPLETE CBC W/AUTO DIFF WBC: CPT | Performed by: INTERNAL MEDICINE

## 2023-04-06 PROCEDURE — 36415 COLL VENOUS BLD VENIPUNCTURE: CPT | Mod: PN | Performed by: INTERNAL MEDICINE

## 2023-04-10 NOTE — PROGRESS NOTES
Your blood count (CBC) is stable    Your sugar number (Glucose) is within normal limits.  Rest of your electrolytes are unremarkable.    Your kidney (BUN, Creatinine and GFT) function is stable. Please make sure to continue to follow up with your kidney doctor for your kidney health.   Your liver (AST, ALT) function is unremarkable.

## 2023-04-12 ENCOUNTER — TELEPHONE (OUTPATIENT)
Dept: PRIMARY CARE CLINIC | Facility: CLINIC | Age: 88
End: 2023-04-12
Payer: COMMERCIAL

## 2023-04-12 NOTE — TELEPHONE ENCOUNTER
----- Message from Brigida Wall MD sent at 4/10/2023  1:12 PM CDT -----  Your blood count (CBC) is stable    Your sugar number (Glucose) is within normal limits.  Rest of your electrolytes are unremarkable.    Your kidney (BUN, Creatinine and GFT) function is stable. Please make sure to continue to follow up with your kidney doctor for your kidney health.   Your liver (AST, ALT) function is unremarkable.

## 2023-04-13 ENCOUNTER — OFFICE VISIT (OUTPATIENT)
Dept: PRIMARY CARE CLINIC | Facility: CLINIC | Age: 88
End: 2023-04-13
Payer: COMMERCIAL

## 2023-04-13 ENCOUNTER — TELEPHONE (OUTPATIENT)
Dept: HEMATOLOGY/ONCOLOGY | Facility: CLINIC | Age: 88
End: 2023-04-13
Payer: COMMERCIAL

## 2023-04-13 VITALS
RESPIRATION RATE: 18 BRPM | HEART RATE: 67 BPM | HEIGHT: 70 IN | WEIGHT: 201.5 LBS | TEMPERATURE: 98 F | SYSTOLIC BLOOD PRESSURE: 127 MMHG | DIASTOLIC BLOOD PRESSURE: 65 MMHG | BODY MASS INDEX: 28.85 KG/M2 | OXYGEN SATURATION: 99 %

## 2023-04-13 DIAGNOSIS — K64.8 INTERNAL HEMORRHOIDS: ICD-10-CM

## 2023-04-13 DIAGNOSIS — E78.2 MIXED HYPERLIPIDEMIA: ICD-10-CM

## 2023-04-13 DIAGNOSIS — N18.32 STAGE 3B CHRONIC KIDNEY DISEASE: ICD-10-CM

## 2023-04-13 DIAGNOSIS — K21.9 GASTROESOPHAGEAL REFLUX DISEASE WITHOUT ESOPHAGITIS: ICD-10-CM

## 2023-04-13 DIAGNOSIS — D50.8 IRON DEFICIENCY ANEMIA SECONDARY TO INADEQUATE DIETARY IRON INTAKE: ICD-10-CM

## 2023-04-13 DIAGNOSIS — K57.30 DIVERTICULOSIS OF COLON: ICD-10-CM

## 2023-04-13 DIAGNOSIS — Z87.19 HISTORY OF BARRETT'S ESOPHAGUS: ICD-10-CM

## 2023-04-13 DIAGNOSIS — I10 PRIMARY HYPERTENSION: ICD-10-CM

## 2023-04-13 DIAGNOSIS — Z85.46 HISTORY OF PROSTATE CANCER: ICD-10-CM

## 2023-04-13 DIAGNOSIS — D50.9 IRON DEFICIENCY ANEMIA, UNSPECIFIED IRON DEFICIENCY ANEMIA TYPE: Primary | ICD-10-CM

## 2023-04-13 PROCEDURE — 1159F MED LIST DOCD IN RCRD: CPT | Mod: CPTII,S$GLB,, | Performed by: INTERNAL MEDICINE

## 2023-04-13 PROCEDURE — 3078F DIAST BP <80 MM HG: CPT | Mod: CPTII,S$GLB,, | Performed by: INTERNAL MEDICINE

## 2023-04-13 PROCEDURE — 1101F PT FALLS ASSESS-DOCD LE1/YR: CPT | Mod: CPTII,S$GLB,, | Performed by: INTERNAL MEDICINE

## 2023-04-13 PROCEDURE — 1126F PR PAIN SEVERITY QUANTIFIED, NO PAIN PRESENT: ICD-10-PCS | Mod: CPTII,S$GLB,, | Performed by: INTERNAL MEDICINE

## 2023-04-13 PROCEDURE — 99999 PR PBB SHADOW E&M-EST. PATIENT-LVL V: CPT | Mod: PBBFAC,,, | Performed by: INTERNAL MEDICINE

## 2023-04-13 PROCEDURE — 3288F FALL RISK ASSESSMENT DOCD: CPT | Mod: CPTII,S$GLB,, | Performed by: INTERNAL MEDICINE

## 2023-04-13 PROCEDURE — 3074F PR MOST RECENT SYSTOLIC BLOOD PRESSURE < 130 MM HG: ICD-10-PCS | Mod: CPTII,S$GLB,, | Performed by: INTERNAL MEDICINE

## 2023-04-13 PROCEDURE — 1101F PR PT FALLS ASSESS DOC 0-1 FALLS W/OUT INJ PAST YR: ICD-10-PCS | Mod: CPTII,S$GLB,, | Performed by: INTERNAL MEDICINE

## 2023-04-13 PROCEDURE — 99999 PR PBB SHADOW E&M-EST. PATIENT-LVL V: ICD-10-PCS | Mod: PBBFAC,,, | Performed by: INTERNAL MEDICINE

## 2023-04-13 PROCEDURE — 3078F PR MOST RECENT DIASTOLIC BLOOD PRESSURE < 80 MM HG: ICD-10-PCS | Mod: CPTII,S$GLB,, | Performed by: INTERNAL MEDICINE

## 2023-04-13 PROCEDURE — 1160F PR REVIEW ALL MEDS BY PRESCRIBER/CLIN PHARMACIST DOCUMENTED: ICD-10-PCS | Mod: CPTII,S$GLB,, | Performed by: INTERNAL MEDICINE

## 2023-04-13 PROCEDURE — 3288F PR FALLS RISK ASSESSMENT DOCUMENTED: ICD-10-PCS | Mod: CPTII,S$GLB,, | Performed by: INTERNAL MEDICINE

## 2023-04-13 PROCEDURE — 3074F SYST BP LT 130 MM HG: CPT | Mod: CPTII,S$GLB,, | Performed by: INTERNAL MEDICINE

## 2023-04-13 PROCEDURE — 99214 PR OFFICE/OUTPT VISIT, EST, LEVL IV, 30-39 MIN: ICD-10-PCS | Mod: S$GLB,,, | Performed by: INTERNAL MEDICINE

## 2023-04-13 PROCEDURE — 1126F AMNT PAIN NOTED NONE PRSNT: CPT | Mod: CPTII,S$GLB,, | Performed by: INTERNAL MEDICINE

## 2023-04-13 PROCEDURE — 1160F RVW MEDS BY RX/DR IN RCRD: CPT | Mod: CPTII,S$GLB,, | Performed by: INTERNAL MEDICINE

## 2023-04-13 PROCEDURE — 99214 OFFICE O/P EST MOD 30 MIN: CPT | Mod: S$GLB,,, | Performed by: INTERNAL MEDICINE

## 2023-04-13 PROCEDURE — 1159F PR MEDICATION LIST DOCUMENTED IN MEDICAL RECORD: ICD-10-PCS | Mod: CPTII,S$GLB,, | Performed by: INTERNAL MEDICINE

## 2023-04-13 RX ORDER — PILOCARPINE HYDROCHLORIDE 5 MG/1
TABLET, FILM COATED ORAL
COMMUNITY
Start: 2023-03-20

## 2023-04-13 RX ORDER — SODIUM BICARBONATE 650 MG/1
TABLET ORAL
COMMUNITY
Start: 2023-02-14 | End: 2023-08-29

## 2023-04-13 RX ORDER — FERROUS SULFATE 325(65) MG
325 TABLET, DELAYED RELEASE (ENTERIC COATED) ORAL 2 TIMES DAILY
Qty: 180 TABLET | Refills: 3 | Status: SHIPPED | OUTPATIENT
Start: 2023-04-13 | End: 2023-10-16

## 2023-04-13 NOTE — TELEPHONE ENCOUNTER
----- Message from Felisha Desir sent at 4/13/2023 10:49 AM CDT -----  Dr. Brigida Wall has placed a referral for the patient to be seen with Hematology/Oncology. Please assist with scheduling.    Iron deficiency anemia, unspecified iron deficiency anemia type [D50.9]

## 2023-04-13 NOTE — PROGRESS NOTES
Subjective:      Patient ID: Zion Vasquez is a 87 y.o. male.    Chief Complaint: Follow-up      Zion Vasquez is a 87 y.o. male with PMH significant for HTN, HLD, hx of GIB, GERD, Martinez's esophagus, hiatal hernia, diverticulosis, IH, CKD3, hx of prostate CA, obesity  Presenting today to follow up    Iron deficiency: Review of his blood work reveals that he is severely iron deficient. He does not complain of signs of iron deficiency but does note that he has more dry mouth than usual. Would refer to hematology at this time for iron infusion.     Dry mouth: Was recently started on pilocarpine by his dentist    - Hypertension  He tolerates amlodipine and losartan. No adverse events. He is adherent to medication. Office BP is 127/65 . No CP/SOB/lightheadedness/dizziness/palpitations.         - Hyperlipidemia  No acute concerns. He is on atorvastatin without any side effects.     - Hx of lower GI bleeding and Anemia on 1/2/2019  Hospital Course: At that time admitted for hematochezia. EGD showed Martinez's as well as hiatal hernia. Colonoscopy at that time showed 6mm polyp that was excised and diverticula.  He continues on Protonix. No further bleeding episodes reported.  He is not on iron therapy.     GERD, Hx of Martinez's esophagus, hiatal hernia  No alarming symptoms at this time    - CKD, stage 3  He follows with Nephrologist and was started on sodium bicarbonate for metabolic acidosis. Kidney function stable. He is not on NSAIDs.     - Hx of Prostate cancer.  He plans to continue seeing Dr. Ward, Urology. Last PSA within normal limits    Denies any chest pain, shortness of breath, nausea vomiting constipation diarrhea, blood in stool, heartburn    Review of Systems   Constitutional:  Negative for chills, fever and weight loss.   HENT:  Negative for congestion, ear pain and sore throat.    Eyes:  Negative for double vision.   Respiratory:  Negative for cough and shortness of breath.    Cardiovascular:   Negative for chest pain, palpitations and leg swelling.   Gastrointestinal:  Negative for abdominal pain, heartburn, nausea and vomiting.   Skin:  Negative for rash.   Neurological:  Negative for dizziness, tingling and headaches.   Psychiatric/Behavioral:  Negative for depression.         Current Outpatient Medications:     acetaminophen (TYLENOL) 500 MG tablet, Take 1,000 mg by mouth every 6 (six) hours as needed for Pain., Disp: , Rfl:     amLODIPine (NORVASC) 10 MG tablet, Take 1 tablet (10 mg total) by mouth every morning., Disp: 90 tablet, Rfl: 3    atorvastatin (LIPITOR) 10 MG tablet, Take 1 tablet (10 mg total) by mouth every evening., Disp: 90 tablet, Rfl: 3    losartan (COZAAR) 50 MG tablet, Take 1 tablet (50 mg total) by mouth once daily., Disp: 90 tablet, Rfl: 3    pantoprazole (PROTONIX) 40 MG tablet, Take 1 tablet (40 mg total) by mouth 2 (two) times daily., Disp: 90 tablet, Rfl: 3    pilocarpine (SALAGEN) 5 MG Tab, TAKE 1 TO 2 TABLETS BY MOUTH 3-4 TIMES A DAY. MAX OF 10 MG THREE TIMES DAILY, Disp: , Rfl:     sodium bicarbonate 650 MG tablet, , Disp: , Rfl:     ferrous sulfate 325 (65 FE) MG EC tablet, Take 1 tablet (325 mg total) by mouth 2 (two) times daily., Disp: 180 tablet, Rfl: 3    FLUAD QUAD 2020-21,65Y UP,,PF, 60 mcg (15 mcg x 4)/0.5 mL Syrg, ADM 0.5ML IM UTD, Disp: , Rfl:     Lab Results   Component Value Date    HGBA1C 5.5 09/29/2021     Lab Results   Component Value Date    MICALBCREAT 32.5 (H) 09/29/2021     Lab Results   Component Value Date    LDLCALC 81.4 10/06/2022    LDLCALC 82.8 09/29/2021    CHOL 153 10/06/2022    HDL 59 10/06/2022    TRIG 63 10/06/2022       Lab Results   Component Value Date     04/06/2023    K 4.4 04/06/2023     04/06/2023    CO2 19 (L) 04/06/2023    GLU 86 04/06/2023    BUN 16 04/06/2023    CREATININE 2.3 (H) 04/06/2023    CALCIUM 9.7 04/06/2023    PROT 7.3 04/06/2023    ALBUMIN 3.6 04/06/2023    BILITOT 0.8 04/06/2023    ALKPHOS 74 04/06/2023    AST  "21 04/06/2023    ALT 8 (L) 04/06/2023    ANIONGAP 10 04/06/2023    ESTGFRAFRICA 48.7 (A) 06/14/2022    EGFRNONAA 42.1 (A) 06/14/2022    WBC 6.59 04/06/2023    HGB 8.6 (L) 04/06/2023    HGB 8.8 (L) 12/13/2022    HCT 29.8 (L) 04/06/2023    MCV 69 (L) 04/06/2023     04/06/2023    PSA 0.01 09/29/2021    PSADIAG <0.01 10/06/2022       Lab Results   Component Value Date    FERRITIN 119 09/29/2021    IRON 17 (L) 01/10/2023    TRANSFERRIN 264 01/10/2023    TIBC 391 01/10/2023    FESATURATED 4 (L) 01/10/2023         Past Medical History:   Diagnosis Date    Martinez esophagus     GERD (gastroesophageal reflux disease)     Hyperlipemia     Hypertension     Obesity      Past Surgical History:   Procedure Laterality Date    COLONOSCOPY N/A 11/25/2022    Procedure: COLONOSCOPY;  Surgeon: Smith Alvarez MD;  Location: [address]);  Service: Endoscopy;  Laterality: N/A;    COLONOSCOPY N/A 11/30/2022    Procedure: COLONOSCOPY;  Surgeon: Smith Alvarez MD;  Location: [address]);  Service: Endoscopy;  Laterality: N/A;     Social History     Social History Narrative    Not on file     Family History   Problem Relation Age of Onset    Cancer Mother         ? ovarian    No Known Problems Father     No Known Problems Sister     No Known Problems Brother     No Known Problems Sister      Vitals:    04/13/23 1014   BP: 127/65   Pulse: 67   Resp: 18   Temp: 98 °F (36.7 °C)   SpO2: 99%   Weight: 91.4 kg (201 lb 8 oz)   Height: 5' 10" (1.778 m)   PainSc: 0-No pain     Objective:   Physical Exam  Vitals and nursing note reviewed.   Constitutional:       Appearance: Normal appearance.   HENT:      Head: Normocephalic and atraumatic.      Right Ear: Tympanic membrane, ear canal and external ear normal.      Left Ear: Tympanic membrane, ear canal and external ear normal.      Nose: Nose normal.      Mouth/Throat:      Mouth: Mucous membranes are moist.      Pharynx: Oropharynx is clear.   Eyes:      Extraocular " Movements: Extraocular movements intact.      Pupils: Pupils are equal, round, and reactive to light.   Cardiovascular:      Rate and Rhythm: Normal rate and regular rhythm.      Pulses: Normal pulses.      Heart sounds: Normal heart sounds.   Pulmonary:      Breath sounds: Normal breath sounds.   Abdominal:      General: Bowel sounds are normal.      Palpations: Abdomen is soft.   Musculoskeletal:      Cervical back: Normal range of motion and neck supple.   Skin:     General: Skin is warm.   Neurological:      General: No focal deficit present.      Mental Status: He is alert and oriented to person, place, and time.     Assessment/Plan     Zion Vasquez is a 87 y.o.male with:    Iron deficiency anemia, unspecified iron deficiency anemia type  -     Ambulatory referral/consult to Hematology / Oncology; Future; Expected date: 04/20/2023  -     ferrous sulfate 325 (65 FE) MG EC tablet; Take 1 tablet (325 mg total) by mouth 2 (two) times daily.  Dispense: 180 tablet; Refill: 3    Mixed hyperlipidemia  - cont lipitor    Primary hypertension  - cont amlodipine and losartan    Stage 3b chronic kidney disease  = Follows with nephrology    History of prostate cancer  - Follows with urology qyearly    Gastroesophageal reflux disease without esophagitis  - PPI BID    History of Martinez's esophagus  - PPI BID    Internal hemorrhoids  - No acute issues    Diverticulosis of colon  - No acute issues      Chronic conditions status updated as per HPI.  Other than changes above, cont current medications and maintain follow up with specialists.  Return to clinic in Follow up in about 6 months (around 10/13/2023).      Brigida Wall MD  [facility]    There are no Patient Instructions on file for this visit.  All of your core healthy metrics are met.

## 2023-04-13 NOTE — TELEPHONE ENCOUNTER
----- Message from Regla Kapadia sent at 12/14/2022 11:22 AM CST -----  Contact: [phone]  Patient is returning a phone call.  Who left a message for the patient: Dr Wall's office  Does patient know what this is regarding:  no  Would you like a call back, or a response through your [facility] portal?:   phone  Comments:        Viral Respiratory Infection    A viral respiratory infection is an illness that affects parts of the body used for breathing, like the lungs, nose, and throat. It is caused by a germ called a virus. Symptoms can include runny nose, coughing, sneezing, fatigue, body aches, sore throat, fever, or headache. Over the counter medicine can be used to manage the symptoms but the infection typically goes away on its own in 5 to 10 days.     Take Motrin 400mg every 8hrs with food for pain, take tylenol 1000 mg every 8 hours for pain. Do not continue this regimen for more than 4 days.    SEEK IMMEDIATE MEDICAL CARE IF YOU HAVE ANY OF THE FOLLOWING SYMPTOMS: shortness of breath, chest pain, fever over 10 days, or lightheadedness/dizziness.

## 2023-04-21 ENCOUNTER — TELEPHONE (OUTPATIENT)
Dept: PRIMARY CARE CLINIC | Facility: CLINIC | Age: 88
End: 2023-04-21
Payer: COMMERCIAL

## 2023-04-21 NOTE — TELEPHONE ENCOUNTER
----- Message from Michelle Macias sent at 4/21/2023 10:43 AM CDT -----  Contact: Pt   Pt called in regards to speaking with the nurse he has a concern did not states what it was please advise

## 2023-05-01 ENCOUNTER — TELEPHONE (OUTPATIENT)
Dept: PRIMARY CARE CLINIC | Facility: CLINIC | Age: 88
End: 2023-05-01
Payer: COMMERCIAL

## 2023-05-01 NOTE — TELEPHONE ENCOUNTER
----- Message from Dalia Franklin sent at 5/1/2023 10:01 AM CDT -----  Contact: Pt Mobile [phone]  Patient is calling in regards to him wanting to speak with you about him wanting to get Infusions for his low iron levels.

## 2023-05-11 ENCOUNTER — OFFICE VISIT (OUTPATIENT)
Dept: HEMATOLOGY/ONCOLOGY | Facility: CLINIC | Age: 88
End: 2023-05-11
Payer: COMMERCIAL

## 2023-05-11 VITALS
SYSTOLIC BLOOD PRESSURE: 134 MMHG | BODY MASS INDEX: 28.39 KG/M2 | HEART RATE: 57 BPM | HEIGHT: 70 IN | RESPIRATION RATE: 16 BRPM | DIASTOLIC BLOOD PRESSURE: 63 MMHG | OXYGEN SATURATION: 98 % | WEIGHT: 198.31 LBS

## 2023-05-11 DIAGNOSIS — D50.9 IRON DEFICIENCY ANEMIA, UNSPECIFIED IRON DEFICIENCY ANEMIA TYPE: ICD-10-CM

## 2023-05-11 DIAGNOSIS — E61.1 IRON DEFICIENCY: ICD-10-CM

## 2023-05-11 PROCEDURE — 3078F PR MOST RECENT DIASTOLIC BLOOD PRESSURE < 80 MM HG: ICD-10-PCS | Mod: CPTII,S$GLB,, | Performed by: NURSE PRACTITIONER

## 2023-05-11 PROCEDURE — 99205 OFFICE O/P NEW HI 60 MIN: CPT | Mod: S$GLB,,, | Performed by: NURSE PRACTITIONER

## 2023-05-11 PROCEDURE — 3078F DIAST BP <80 MM HG: CPT | Mod: CPTII,S$GLB,, | Performed by: NURSE PRACTITIONER

## 2023-05-11 PROCEDURE — 1159F MED LIST DOCD IN RCRD: CPT | Mod: CPTII,S$GLB,, | Performed by: NURSE PRACTITIONER

## 2023-05-11 PROCEDURE — 1101F PR PT FALLS ASSESS DOC 0-1 FALLS W/OUT INJ PAST YR: ICD-10-PCS | Mod: CPTII,S$GLB,, | Performed by: NURSE PRACTITIONER

## 2023-05-11 PROCEDURE — 99205 PR OFFICE/OUTPT VISIT, NEW, LEVL V, 60-74 MIN: ICD-10-PCS | Mod: S$GLB,,, | Performed by: NURSE PRACTITIONER

## 2023-05-11 PROCEDURE — 1126F PR PAIN SEVERITY QUANTIFIED, NO PAIN PRESENT: ICD-10-PCS | Mod: CPTII,S$GLB,, | Performed by: NURSE PRACTITIONER

## 2023-05-11 PROCEDURE — 1101F PT FALLS ASSESS-DOCD LE1/YR: CPT | Mod: CPTII,S$GLB,, | Performed by: NURSE PRACTITIONER

## 2023-05-11 PROCEDURE — 1160F RVW MEDS BY RX/DR IN RCRD: CPT | Mod: CPTII,S$GLB,, | Performed by: NURSE PRACTITIONER

## 2023-05-11 PROCEDURE — 3288F FALL RISK ASSESSMENT DOCD: CPT | Mod: CPTII,S$GLB,, | Performed by: NURSE PRACTITIONER

## 2023-05-11 PROCEDURE — 1159F PR MEDICATION LIST DOCUMENTED IN MEDICAL RECORD: ICD-10-PCS | Mod: CPTII,S$GLB,, | Performed by: NURSE PRACTITIONER

## 2023-05-11 PROCEDURE — 3288F PR FALLS RISK ASSESSMENT DOCUMENTED: ICD-10-PCS | Mod: CPTII,S$GLB,, | Performed by: NURSE PRACTITIONER

## 2023-05-11 PROCEDURE — 3075F SYST BP GE 130 - 139MM HG: CPT | Mod: CPTII,S$GLB,, | Performed by: NURSE PRACTITIONER

## 2023-05-11 PROCEDURE — 3075F PR MOST RECENT SYSTOLIC BLOOD PRESS GE 130-139MM HG: ICD-10-PCS | Mod: CPTII,S$GLB,, | Performed by: NURSE PRACTITIONER

## 2023-05-11 PROCEDURE — 1160F PR REVIEW ALL MEDS BY PRESCRIBER/CLIN PHARMACIST DOCUMENTED: ICD-10-PCS | Mod: CPTII,S$GLB,, | Performed by: NURSE PRACTITIONER

## 2023-05-11 PROCEDURE — 1126F AMNT PAIN NOTED NONE PRSNT: CPT | Mod: CPTII,S$GLB,, | Performed by: NURSE PRACTITIONER

## 2023-05-11 PROCEDURE — 99999 PR PBB SHADOW E&M-EST. PATIENT-LVL IV: CPT | Mod: PBBFAC,,, | Performed by: NURSE PRACTITIONER

## 2023-05-11 PROCEDURE — 99999 PR PBB SHADOW E&M-EST. PATIENT-LVL IV: ICD-10-PCS | Mod: PBBFAC,,, | Performed by: NURSE PRACTITIONER

## 2023-05-11 RX ORDER — HEPARIN 100 UNIT/ML
500 SYRINGE INTRAVENOUS
Status: CANCELLED | OUTPATIENT
Start: 2023-05-12

## 2023-05-11 RX ORDER — HEPARIN 100 UNIT/ML
500 SYRINGE INTRAVENOUS
Status: CANCELLED | OUTPATIENT
Start: 2023-06-14

## 2023-05-11 RX ORDER — SODIUM CHLORIDE 0.9 % (FLUSH) 0.9 %
10 SYRINGE (ML) INJECTION
Status: CANCELLED | OUTPATIENT
Start: 2023-06-14

## 2023-05-11 RX ORDER — SODIUM CHLORIDE 0.9 % (FLUSH) 0.9 %
10 SYRINGE (ML) INJECTION
Status: CANCELLED | OUTPATIENT
Start: 2023-05-12

## 2023-05-11 NOTE — PROGRESS NOTES
HEMATOLOGY/ONCOLOGY NEW PATIENT CONSULT NOTE:    PATIENT: Zion Vasquez  MRN: [id number]  DATE: 5/11/2023  Diagnosis:   1. Iron deficiency anemia, unspecified iron deficiency anemia type    2. Iron deficiency      Chief Complaint: Results (New Patient -PAIGE )    Subjective:    Initial History: Mr. Vasquez is a 87 y.o. male who presents as a new patient consult for PAIGE.   Duration of anemia: Unsure. 6 years before his pcp initiated po supplements then stopped, Never received iron infusions  Diet: 2 meals/day well balanced    Bleeding: No active bleeding   GI: H/o Martinez esophagus  Colonoscopy: 11/30/22 - diverticulosis   Family history of blood disorder: N/a  Surgical History: n/a  Alcohol use: occasionally    Past Medical History:   Past Medical History:   Diagnosis Date    Martinez esophagus     GERD (gastroesophageal reflux disease)     Hyperlipemia     Hypertension     Obesity      Past Surgical HIstory:   Past Surgical History:   Procedure Laterality Date    COLONOSCOPY N/A 11/25/2022    Procedure: COLONOSCOPY;  Surgeon: Smith Alvarez MD;  Location: [address]);  Service: Endoscopy;  Laterality: N/A;    COLONOSCOPY N/A 11/30/2022    Procedure: COLONOSCOPY;  Surgeon: Smith Alvarez MD;  Location: [address]);  Service: Endoscopy;  Laterality: N/A;     Family History:   Family History   Problem Relation Age of Onset    Cancer Mother         ? ovarian    No Known Problems Father     No Known Problems Sister     No Known Problems Brother     No Known Problems Sister      Social History:  reports that he has never smoked. He has never used smokeless tobacco. He reports current alcohol use. He reports that he does not use drugs.  Allergies:  Review of patient's allergies indicates:  No Known Allergies  Medications:  Current Outpatient Medications   Medication Sig Dispense Refill    acetaminophen (TYLENOL) 500 MG tablet Take 1,000 mg by mouth every 6 (six) hours as needed for Pain.       "amLODIPine (NORVASC) 10 MG tablet Take 1 tablet (10 mg total) by mouth every morning. 90 tablet 3    atorvastatin (LIPITOR) 10 MG tablet Take 1 tablet (10 mg total) by mouth every evening. 90 tablet 3    ferrous sulfate 325 (65 FE) MG EC tablet Take 1 tablet (325 mg total) by mouth 2 (two) times daily. 180 tablet 3    FLUAD QUAD 2020-21,65Y UP,,PF, 60 mcg (15 mcg x 4)/0.5 mL Syrg ADM 0.5ML IM UTD      losartan (COZAAR) 50 MG tablet Take 1 tablet (50 mg total) by mouth once daily. 90 tablet 3    pantoprazole (PROTONIX) 40 MG tablet Take 1 tablet (40 mg total) by mouth 2 (two) times daily. 90 tablet 3    pilocarpine (SALAGEN) 5 MG Tab TAKE 1 TO 2 TABLETS BY MOUTH 3-4 TIMES A DAY. MAX OF 10 MG THREE TIMES DAILY      sodium bicarbonate 650 MG tablet        No current facility-administered medications for this visit.     Review of Systems   Constitutional:  Negative for activity change, appetite change, fatigue and fever.   HENT:  Negative for congestion, facial swelling, mouth sores, nosebleeds, sore throat and trouble swallowing.    Eyes:  Negative for discharge and visual disturbance.   Respiratory:  Negative for cough, shortness of breath and wheezing.    Cardiovascular:  Negative for chest pain and leg swelling.   Gastrointestinal:  Negative for abdominal distention, abdominal pain, anal bleeding, blood in stool, diarrhea, nausea and vomiting.   Genitourinary:  Negative for difficulty urinating, dysuria and hematuria.   Musculoskeletal:  Negative for arthralgias, gait problem and joint swelling.   Neurological:  Negative for speech difficulty, weakness and headaches.   Hematological:  Negative for adenopathy.   Psychiatric/Behavioral:  Negative for agitation, behavioral problems and confusion.        Objective:      Vitals:   Vitals:    05/11/23 1326   BP: 134/63   Pulse: (!) 57   Resp: 16   SpO2: 98%   Weight: 89.9 kg (198 lb 4.9 oz)   Height: 5' 10" (1.778 m)     BMI: Body mass index is 28.45 kg/m².  Physical " Exam  Constitutional:       Appearance: He is well-developed.   HENT:      Head: Normocephalic and atraumatic.      Mouth/Throat:      Mouth: Mucous membranes are moist. No oral lesions.      Pharynx: Oropharynx is clear.   Eyes:      Conjunctiva/sclera: Conjunctivae normal.   Cardiovascular:      Rate and Rhythm: Normal rate and regular rhythm.      Heart sounds: Normal heart sounds.   Pulmonary:      Effort: No respiratory distress.      Breath sounds: Normal breath sounds.   Abdominal:      General: Bowel sounds are normal.      Palpations: Abdomen is soft.   Musculoskeletal:         General: Normal range of motion.      Cervical back: Normal range of motion.   Skin:     General: Skin is warm and dry.   Neurological:      Mental Status: He is alert and oriented to person, place, and time.   Psychiatric:         Behavior: Behavior normal.         Thought Content: Thought content normal.         Judgment: Judgment normal.       Laboratory Data:  No visits with results within 1 Week(s) from this visit.   Latest known visit with results is:   Lab Visit on 04/06/2023   Component Date Value Ref Range Status    Sodium 04/06/2023 138  136 - 145 mmol/L Final    Potassium 04/06/2023 4.4  3.5 - 5.1 mmol/L Final    Chloride 04/06/2023 109  95 - 110 mmol/L Final    CO2 04/06/2023 19 (L)  23 - 29 mmol/L Final    Glucose 04/06/2023 86  70 - 110 mg/dL Final    BUN 04/06/2023 16  8 - 23 mg/dL Final    Creatinine 04/06/2023 2.3 (H)  0.5 - 1.4 mg/dL Final    Calcium 04/06/2023 9.7  8.7 - 10.5 mg/dL Final    Total Protein 04/06/2023 7.3  6.0 - 8.4 g/dL Final    Albumin 04/06/2023 3.6  3.5 - 5.2 g/dL Final    Total Bilirubin 04/06/2023 0.8  0.1 - 1.0 mg/dL Final    Comment: For infants and newborns, interpretation of results should be based  on gestational age, weight and in agreement with clinical  observations.    Premature Infant recommended reference ranges:  Up to 24 hours.............<8.0 mg/dL  Up to 48 hours............<12.0  mg/dL  3-5 days..................<15.0 mg/dL  6-29 days.................<15.0 mg/dL      Alkaline Phosphatase 04/06/2023 74  55 - 135 U/L Final    AST 04/06/2023 21  10 - 40 U/L Final    ALT 04/06/2023 8 (L)  10 - 44 U/L Final    Anion Gap 04/06/2023 10  8 - 16 mmol/L Final    eGFR 04/06/2023 27.1 (A)  >60 mL/min/1.73 m^2 Final    WBC 04/06/2023 6.59  3.90 - 12.70 K/uL Final    RBC 04/06/2023 4.33 (L)  4.60 - 6.20 M/uL Final    Hemoglobin 04/06/2023 8.6 (L)  14.0 - 18.0 g/dL Final    Hematocrit 04/06/2023 29.8 (L)  40.0 - 54.0 % Final    MCV 04/06/2023 69 (L)  82 - 98 fL Final    MCH 04/06/2023 19.9 (L)  27.0 - 31.0 pg Final    MCHC 04/06/2023 28.9 (L)  32.0 - 36.0 g/dL Final    RDW 04/06/2023 19.4 (H)  11.5 - 14.5 % Final    Platelets 04/06/2023 373  150 - 450 K/uL Final    MPV 04/06/2023 9.6  9.2 - 12.9 fL Final    Immature Granulocytes 04/06/2023 0.2  0.0 - 0.5 % Final    Gran # (ANC) 04/06/2023 2.9  1.8 - 7.7 K/uL Final    Immature Grans (Abs) 04/06/2023 0.01  0.00 - 0.04 K/uL Final    Comment: Mild elevation in immature granulocytes is non specific and   can be seen in a variety of conditions including stress response,   acute inflammation, trauma and pregnancy. Correlation with other   laboratory and clinical findings is essential.      Lymph # 04/06/2023 2.3  1.0 - 4.8 K/uL Final    Mono # 04/06/2023 1.0  0.3 - 1.0 K/uL Final    Eos # 04/06/2023 0.3  0.0 - 0.5 K/uL Final    Baso # 04/06/2023 0.04  0.00 - 0.20 K/uL Final    nRBC 04/06/2023 0  0 /100 WBC Final    Gran % 04/06/2023 44.3  38.0 - 73.0 % Final    Lymph % 04/06/2023 35.2  18.0 - 48.0 % Final    Mono % 04/06/2023 14.7  4.0 - 15.0 % Final    Eosinophil % 04/06/2023 5.0  0.0 - 8.0 % Final    Basophil % 04/06/2023 0.6  0.0 - 1.9 % Final    Differential Method 04/06/2023 Automated   Final        Assessment:       1. Iron deficiency anemia, unspecified iron deficiency anemia type    2. Iron deficiency         Plan:   - Recent iron level consistent with  PAIGE. Saturated iron -4 Ferritin   - Colonoscopy wnl, Hx of duran no GI f/u. will refer for possible EGD    - Intolerant to po iron. Will set up iron infusion    - Patient will receive IV iron infusion discussed small <1% risk of potentially serious and fatal allergic reactions with iv iron preparations and patient expressed understanding and wishes to proceed.        BMT Chart Routing      Follow up with physician    Follow up with WILLIAM . 2-3 month following last iron infusion   Provider visit type    Infusion scheduling note    Injection scheduling note Iron infusion x 2 one week apart   Labs   Scheduling:  Preferred lab:  Lab interval:  Cbc. ferritin, iron, Tibc, soluble transferrin receptor 6-8 week from last iron infusion   Imaging    Pharmacy appointment    Other referrals          Advance Care Planning     Date: 05/11/2023    Patient did not wish to name a surrogate decision maker or provide an Advance Care Plan.      Morenita Fletcher NP  Hematology/Oncology/BMT

## 2023-06-13 ENCOUNTER — INFUSION (OUTPATIENT)
Dept: INFUSION THERAPY | Facility: HOSPITAL | Age: 88
End: 2023-06-13
Payer: COMMERCIAL

## 2023-06-13 VITALS
TEMPERATURE: 98 F | BODY MASS INDEX: 28.75 KG/M2 | HEIGHT: 70 IN | DIASTOLIC BLOOD PRESSURE: 63 MMHG | RESPIRATION RATE: 16 BRPM | SYSTOLIC BLOOD PRESSURE: 136 MMHG | WEIGHT: 200.81 LBS | OXYGEN SATURATION: 98 % | HEART RATE: 63 BPM

## 2023-06-13 DIAGNOSIS — E61.1 IRON DEFICIENCY: Primary | ICD-10-CM

## 2023-06-13 PROCEDURE — 63600175 PHARM REV CODE 636 W HCPCS: Mod: JZ,JG | Performed by: NURSE PRACTITIONER

## 2023-06-13 PROCEDURE — 96365 THER/PROPH/DIAG IV INF INIT: CPT

## 2023-06-13 PROCEDURE — 25000003 PHARM REV CODE 250: Performed by: NURSE PRACTITIONER

## 2023-06-13 RX ORDER — SODIUM CHLORIDE 0.9 % (FLUSH) 0.9 %
10 SYRINGE (ML) INJECTION
Status: DISCONTINUED | OUTPATIENT
Start: 2023-06-13 | End: 2023-06-13 | Stop reason: HOSPADM

## 2023-06-13 RX ORDER — HEPARIN 100 UNIT/ML
500 SYRINGE INTRAVENOUS
Status: DISCONTINUED | OUTPATIENT
Start: 2023-06-13 | End: 2023-06-13 | Stop reason: HOSPADM

## 2023-06-13 RX ADMIN — FERRIC CARBOXYMALTOSE INJECTION 750 MG: 50 INJECTION, SOLUTION INTRAVENOUS at 01:06

## 2023-06-13 RX ADMIN — SODIUM CHLORIDE: 9 INJECTION, SOLUTION INTRAVENOUS at 01:06

## 2023-06-13 NOTE — PLAN OF CARE
Patient ambulatory to clinic. PIV inserted without difficulty. Iron infused without s/sx of adverse reaction. Patient tolerated treatment well. PIV removed with catheter intact. Patient ambulatory from clinic. NAD noted.

## 2023-06-20 ENCOUNTER — INFUSION (OUTPATIENT)
Dept: INFUSION THERAPY | Facility: HOSPITAL | Age: 88
End: 2023-06-20
Payer: COMMERCIAL

## 2023-06-20 VITALS
DIASTOLIC BLOOD PRESSURE: 64 MMHG | SYSTOLIC BLOOD PRESSURE: 142 MMHG | HEART RATE: 65 BPM | RESPIRATION RATE: 18 BRPM | TEMPERATURE: 99 F

## 2023-06-20 DIAGNOSIS — E61.1 IRON DEFICIENCY: Primary | ICD-10-CM

## 2023-06-20 PROCEDURE — 63600175 PHARM REV CODE 636 W HCPCS: Mod: JW,JG | Performed by: NURSE PRACTITIONER

## 2023-06-20 PROCEDURE — 25000003 PHARM REV CODE 250: Performed by: NURSE PRACTITIONER

## 2023-06-20 PROCEDURE — 96365 THER/PROPH/DIAG IV INF INIT: CPT

## 2023-06-20 RX ORDER — HEPARIN 100 UNIT/ML
500 SYRINGE INTRAVENOUS
Status: DISCONTINUED | OUTPATIENT
Start: 2023-06-20 | End: 2023-06-20 | Stop reason: HOSPADM

## 2023-06-20 RX ORDER — SODIUM CHLORIDE 0.9 % (FLUSH) 0.9 %
10 SYRINGE (ML) INJECTION
Status: DISCONTINUED | OUTPATIENT
Start: 2023-06-20 | End: 2023-06-20 | Stop reason: HOSPADM

## 2023-06-20 RX ADMIN — SODIUM CHLORIDE: 9 INJECTION, SOLUTION INTRAVENOUS at 01:06

## 2023-06-20 RX ADMIN — FERRIC CARBOXYMALTOSE INJECTION 750 MG: 50 INJECTION, SOLUTION INTRAVENOUS at 01:06

## 2023-06-20 NOTE — PLAN OF CARE
Problem: Adult Inpatient Plan of Care  Goal: Plan of Care Review  Outcome: Ongoing, Progressing   Patient tolerated infusion today. NAD noted. Discharged home and ambulates independently

## 2023-07-11 DIAGNOSIS — K44.9 HIATAL HERNIA: ICD-10-CM

## 2023-07-12 DIAGNOSIS — E78.2 MIXED HYPERLIPIDEMIA: ICD-10-CM

## 2023-07-12 RX ORDER — PANTOPRAZOLE SODIUM 40 MG/1
TABLET, DELAYED RELEASE ORAL
Qty: 90 TABLET | Refills: 3 | Status: SHIPPED | OUTPATIENT
Start: 2023-07-12 | End: 2024-03-04

## 2023-07-12 RX ORDER — ATORVASTATIN CALCIUM 10 MG/1
10 TABLET, FILM COATED ORAL NIGHTLY
Qty: 90 TABLET | Refills: 3 | Status: SHIPPED | OUTPATIENT
Start: 2023-07-12

## 2023-07-12 NOTE — TELEPHONE ENCOUNTER
----- Message from Bran Kimble sent at 7/12/2023 10:51 AM CDT -----  Contact: Pt [phone]  Requesting an RX refill or new RX.  Is this a refill or new RX: Refill  RX name and strength atorvastatin (LIPITOR) 10 MG tablet  Is this a 30 day or 90 day RX:   Pharmacy name and phone # [facility][id number] - [place] [address] [address] [facility] AT [address] Phone:  [phone]  Fax:  [phone]

## 2023-08-21 ENCOUNTER — LAB VISIT (OUTPATIENT)
Dept: LAB | Facility: HOSPITAL | Age: 88
End: 2023-08-21
Attending: NURSE PRACTITIONER
Payer: COMMERCIAL

## 2023-08-21 DIAGNOSIS — D50.9 IRON DEFICIENCY ANEMIA, UNSPECIFIED IRON DEFICIENCY ANEMIA TYPE: ICD-10-CM

## 2023-08-21 LAB
BASOPHILS # BLD AUTO: 0.03 K/UL (ref 0–0.2)
BASOPHILS NFR BLD: 0.5 % (ref 0–1.9)
DIFFERENTIAL METHOD: ABNORMAL
EOSINOPHIL # BLD AUTO: 0.4 K/UL (ref 0–0.5)
EOSINOPHIL NFR BLD: 6 % (ref 0–8)
ERYTHROCYTE [DISTWIDTH] IN BLOOD BY AUTOMATED COUNT: 19.5 % (ref 11.5–14.5)
FERRITIN SERPL-MCNC: 281 NG/ML (ref 20–300)
HCT VFR BLD AUTO: 38.6 % (ref 40–54)
HGB BLD-MCNC: 12.2 G/DL (ref 14–18)
IMM GRANULOCYTES # BLD AUTO: 0.01 K/UL (ref 0–0.04)
IMM GRANULOCYTES NFR BLD AUTO: 0.2 % (ref 0–0.5)
IRON SERPL-MCNC: 66 UG/DL (ref 45–160)
LYMPHOCYTES # BLD AUTO: 2 K/UL (ref 1–4.8)
LYMPHOCYTES NFR BLD: 32 % (ref 18–48)
MCH RBC QN AUTO: 25.3 PG (ref 27–31)
MCHC RBC AUTO-ENTMCNC: 31.6 G/DL (ref 32–36)
MCV RBC AUTO: 80 FL (ref 82–98)
MONOCYTES # BLD AUTO: 0.8 K/UL (ref 0.3–1)
MONOCYTES NFR BLD: 13.1 % (ref 4–15)
NEUTROPHILS # BLD AUTO: 3 K/UL (ref 1.8–7.7)
NEUTROPHILS NFR BLD: 48.2 % (ref 38–73)
NRBC BLD-RTO: 0 /100 WBC
PLATELET # BLD AUTO: 238 K/UL (ref 150–450)
PMV BLD AUTO: 9.8 FL (ref 9.2–12.9)
RBC # BLD AUTO: 4.83 M/UL (ref 4.6–6.2)
SATURATED IRON: 25 % (ref 20–50)
TOTAL IRON BINDING CAPACITY: 268 UG/DL (ref 250–450)
TRANSFERRIN SERPL-MCNC: 181 MG/DL (ref 200–375)
WBC # BLD AUTO: 6.18 K/UL (ref 3.9–12.7)

## 2023-08-21 PROCEDURE — 85025 COMPLETE CBC W/AUTO DIFF WBC: CPT | Performed by: NURSE PRACTITIONER

## 2023-08-21 PROCEDURE — 83540 ASSAY OF IRON: CPT | Performed by: NURSE PRACTITIONER

## 2023-08-21 PROCEDURE — 36415 COLL VENOUS BLD VENIPUNCTURE: CPT | Mod: PN | Performed by: NURSE PRACTITIONER

## 2023-08-21 PROCEDURE — 84466 ASSAY OF TRANSFERRIN: CPT | Performed by: NURSE PRACTITIONER

## 2023-08-21 PROCEDURE — 82728 ASSAY OF FERRITIN: CPT | Performed by: NURSE PRACTITIONER

## 2023-08-21 PROCEDURE — 84238 ASSAY NONENDOCRINE RECEPTOR: CPT | Performed by: NURSE PRACTITIONER

## 2023-08-23 LAB — STFR SERPL-MCNC: 3.7 MG/L (ref 1.8–4.6)

## 2023-08-24 ENCOUNTER — OFFICE VISIT (OUTPATIENT)
Dept: HEMATOLOGY/ONCOLOGY | Facility: CLINIC | Age: 88
End: 2023-08-24
Payer: COMMERCIAL

## 2023-08-24 VITALS
RESPIRATION RATE: 17 BRPM | WEIGHT: 194.44 LBS | HEART RATE: 67 BPM | TEMPERATURE: 98 F | OXYGEN SATURATION: 95 % | BODY MASS INDEX: 27.84 KG/M2 | SYSTOLIC BLOOD PRESSURE: 147 MMHG | HEIGHT: 70 IN | DIASTOLIC BLOOD PRESSURE: 67 MMHG

## 2023-08-24 DIAGNOSIS — D50.9 IRON DEFICIENCY ANEMIA, UNSPECIFIED IRON DEFICIENCY ANEMIA TYPE: Primary | ICD-10-CM

## 2023-08-24 PROCEDURE — 1101F PR PT FALLS ASSESS DOC 0-1 FALLS W/OUT INJ PAST YR: ICD-10-PCS | Mod: CPTII,S$GLB,, | Performed by: NURSE PRACTITIONER

## 2023-08-24 PROCEDURE — 1101F PT FALLS ASSESS-DOCD LE1/YR: CPT | Mod: CPTII,S$GLB,, | Performed by: NURSE PRACTITIONER

## 2023-08-24 PROCEDURE — 3288F PR FALLS RISK ASSESSMENT DOCUMENTED: ICD-10-PCS | Mod: CPTII,S$GLB,, | Performed by: NURSE PRACTITIONER

## 2023-08-24 PROCEDURE — 99214 OFFICE O/P EST MOD 30 MIN: CPT | Mod: S$GLB,,, | Performed by: NURSE PRACTITIONER

## 2023-08-24 PROCEDURE — 1126F AMNT PAIN NOTED NONE PRSNT: CPT | Mod: CPTII,S$GLB,, | Performed by: NURSE PRACTITIONER

## 2023-08-24 PROCEDURE — 3078F DIAST BP <80 MM HG: CPT | Mod: CPTII,S$GLB,, | Performed by: NURSE PRACTITIONER

## 2023-08-24 PROCEDURE — 1126F PR PAIN SEVERITY QUANTIFIED, NO PAIN PRESENT: ICD-10-PCS | Mod: CPTII,S$GLB,, | Performed by: NURSE PRACTITIONER

## 2023-08-24 PROCEDURE — 99999 PR PBB SHADOW E&M-EST. PATIENT-LVL III: ICD-10-PCS | Mod: PBBFAC,,, | Performed by: NURSE PRACTITIONER

## 2023-08-24 PROCEDURE — 3077F PR MOST RECENT SYSTOLIC BLOOD PRESSURE >= 140 MM HG: ICD-10-PCS | Mod: CPTII,S$GLB,, | Performed by: NURSE PRACTITIONER

## 2023-08-24 PROCEDURE — 3078F PR MOST RECENT DIASTOLIC BLOOD PRESSURE < 80 MM HG: ICD-10-PCS | Mod: CPTII,S$GLB,, | Performed by: NURSE PRACTITIONER

## 2023-08-24 PROCEDURE — 3288F FALL RISK ASSESSMENT DOCD: CPT | Mod: CPTII,S$GLB,, | Performed by: NURSE PRACTITIONER

## 2023-08-24 PROCEDURE — 99214 PR OFFICE/OUTPT VISIT, EST, LEVL IV, 30-39 MIN: ICD-10-PCS | Mod: S$GLB,,, | Performed by: NURSE PRACTITIONER

## 2023-08-24 PROCEDURE — 99999 PR PBB SHADOW E&M-EST. PATIENT-LVL III: CPT | Mod: PBBFAC,,, | Performed by: NURSE PRACTITIONER

## 2023-08-24 PROCEDURE — 3077F SYST BP >= 140 MM HG: CPT | Mod: CPTII,S$GLB,, | Performed by: NURSE PRACTITIONER

## 2023-08-24 NOTE — PROGRESS NOTES
HEMATOLOGY/ONCOLOGY NEW PATIENT CONSULT NOTE:    PATIENT: Zion Vasquez  MRN: [id number]  DATE: 8/24/2023  Diagnosis:   1. Iron deficiency anemia, unspecified iron deficiency anemia type        Chief Complaint: Results (PAIGE -f/u )    Subjective:    Initial History: Mr. Vasquez is a 87 y.o. male who presents as a new patient consult for PAIGE.   Duration of anemia: Unsure. 6 years before his pcp initiated po supplements then stopped, Never received iron infusions  Diet: 2 meals/day well balanced    Bleeding: No active bleeding   GI: H/o Martinez esophagus  Colonoscopy: 11/30/22 - diverticulosis   Family history of blood disorder: N/a  Surgical History: n/a  Alcohol use: occasionally    Interval History: Presents for PAIGE follow up. Received 2 dose of injectafer with good effect. No complaints today. Presents with his son.       Past Medical History:   Past Medical History:   Diagnosis Date    Martinez esophagus     GERD (gastroesophageal reflux disease)     Hyperlipemia     Hypertension     Obesity      Past Surgical HIstory:   Past Surgical History:   Procedure Laterality Date    COLONOSCOPY N/A 11/25/2022    Procedure: COLONOSCOPY;  Surgeon: Smith Alvarez MD;  Location: [address];  Service: Endoscopy;  Laterality: N/A;    COLONOSCOPY N/A 11/30/2022    Procedure: COLONOSCOPY;  Surgeon: Smith Alvarez MD;  Location: [address]);  Service: Endoscopy;  Laterality: N/A;     Family History:   Family History   Problem Relation Age of Onset    Cancer Mother         ? ovarian    No Known Problems Father     No Known Problems Sister     No Known Problems Brother     No Known Problems Sister      Social History:  reports that he has never smoked. He has never used smokeless tobacco. He reports current alcohol use. He reports that he does not use drugs.  Allergies:  Review of patient's allergies indicates:  No Known Allergies  Medications:  Current Outpatient Medications   Medication Sig Dispense Refill     acetaminophen (TYLENOL) 500 MG tablet Take 1,000 mg by mouth every 6 (six) hours as needed for Pain.      amLODIPine (NORVASC) 10 MG tablet Take 1 tablet (10 mg total) by mouth every morning. 90 tablet 3    atorvastatin (LIPITOR) 10 MG tablet Take 1 tablet (10 mg total) by mouth every evening. 90 tablet 3    ferrous sulfate 325 (65 FE) MG EC tablet Take 1 tablet (325 mg total) by mouth 2 (two) times daily. 180 tablet 3    FLUAD QUAD 2020-21,65Y UP,,PF, 60 mcg (15 mcg x 4)/0.5 mL Syrg ADM 0.5ML IM UTD      losartan (COZAAR) 50 MG tablet Take 1 tablet (50 mg total) by mouth once daily. 90 tablet 3    pantoprazole (PROTONIX) 40 MG tablet TAKE 1 TABLET(40 MG) BY MOUTH TWICE DAILY 90 tablet 3    pilocarpine (SALAGEN) 5 MG Tab TAKE 1 TO 2 TABLETS BY MOUTH 3-4 TIMES A DAY. MAX OF 10 MG THREE TIMES DAILY      sodium bicarbonate 650 MG tablet        No current facility-administered medications for this visit.     Review of Systems   Constitutional:  Negative for activity change, appetite change, fatigue and fever.   HENT:  Negative for congestion, facial swelling, mouth sores, nosebleeds, sore throat and trouble swallowing.    Eyes:  Negative for discharge and visual disturbance.   Respiratory:  Negative for cough, shortness of breath and wheezing.    Cardiovascular:  Negative for chest pain and leg swelling.   Gastrointestinal:  Negative for abdominal distention, abdominal pain, anal bleeding, blood in stool, diarrhea, nausea and vomiting.   Genitourinary:  Negative for difficulty urinating, dysuria and hematuria.   Musculoskeletal:  Negative for arthralgias, gait problem and joint swelling.   Neurological:  Negative for speech difficulty, weakness and headaches.   Hematological:  Negative for adenopathy.   Psychiatric/Behavioral:  Negative for agitation, behavioral problems and confusion.          Objective:      Vitals:   Vitals:    08/24/23 1010   BP: (!) 147/67   Pulse: 67   Resp: 17   Temp: 98.1 °F (36.7 °C)   SpO2:  "95%   Weight: 88.2 kg (194 lb 7.1 oz)   Height: 5' 10" (1.778 m)       BMI: Body mass index is 27.9 kg/m².  Physical Exam  Constitutional:       Appearance: He is well-developed.   HENT:      Head: Normocephalic and atraumatic.      Mouth/Throat:      Mouth: Mucous membranes are moist. No oral lesions.      Pharynx: Oropharynx is clear.   Eyes:      Conjunctiva/sclera: Conjunctivae normal.   Cardiovascular:      Rate and Rhythm: Normal rate and regular rhythm.      Heart sounds: Normal heart sounds.   Pulmonary:      Effort: No respiratory distress.      Breath sounds: Normal breath sounds.   Abdominal:      General: Bowel sounds are normal.      Palpations: Abdomen is soft.   Musculoskeletal:         General: Normal range of motion.      Cervical back: Normal range of motion.      Comments: Unsteady gait   Skin:     General: Skin is warm and dry.   Neurological:      Mental Status: He is alert and oriented to person, place, and time.   Psychiatric:         Behavior: Behavior normal.         Thought Content: Thought content normal.         Judgment: Judgment normal.         Laboratory Data:  Lab Visit on 08/21/2023   Component Date Value Ref Range Status    Sol. Transferrin Receptor 08/21/2023 3.7  1.8 - 4.6 mg/L Final    Comment: -------------------ADDITIONAL INFORMATION-------------------  It is reported that    Americans may   have slightly   higher values.    Test Performed by:  [facility] [place] [address]  : Zion Quigley M.D. Ph.D.; CLIA# [id number]      Ferritin 08/21/2023 281  20.0 - 300.0 ng/mL Final    Iron 08/21/2023 66  45 - 160 ug/dL Final    Transferrin 08/21/2023 181 (L)  200 - 375 mg/dL Final    TIBC 08/21/2023 268  250 - 450 ug/dL Final    Saturated Iron 08/21/2023 25  20 - 50 % Final    WBC 08/21/2023 6.18  3.90 - 12.70 K/uL Final    RBC 08/21/2023 4.83  4.60 - 6.20 M/uL Final    Hemoglobin 08/21/2023 12.2 (L)  " 14.0 - 18.0 g/dL Final    Hematocrit 08/21/2023 38.6 (L)  40.0 - 54.0 % Final    MCV 08/21/2023 80 (L)  82 - 98 fL Final    MCH 08/21/2023 25.3 (L)  27.0 - 31.0 pg Final    MCHC 08/21/2023 31.6 (L)  32.0 - 36.0 g/dL Final    RDW 08/21/2023 19.5 (H)  11.5 - 14.5 % Final    Platelets 08/21/2023 238  150 - 450 K/uL Final    MPV 08/21/2023 9.8  9.2 - 12.9 fL Final    Immature Granulocytes 08/21/2023 0.2  0.0 - 0.5 % Final    Gran # (ANC) 08/21/2023 3.0  1.8 - 7.7 K/uL Final    Immature Grans (Abs) 08/21/2023 0.01  0.00 - 0.04 K/uL Final    Comment: Mild elevation in immature granulocytes is non specific and   can be seen in a variety of conditions including stress response,   acute inflammation, trauma and pregnancy. Correlation with other   laboratory and clinical findings is essential.      Lymph # 08/21/2023 2.0  1.0 - 4.8 K/uL Final    Mono # 08/21/2023 0.8  0.3 - 1.0 K/uL Final    Eos # 08/21/2023 0.4  0.0 - 0.5 K/uL Final    Baso # 08/21/2023 0.03  0.00 - 0.20 K/uL Final    nRBC 08/21/2023 0  0 /100 WBC Final    Gran % 08/21/2023 48.2  38.0 - 73.0 % Final    Lymph % 08/21/2023 32.0  18.0 - 48.0 % Final    Mono % 08/21/2023 13.1  4.0 - 15.0 % Final    Eosinophil % 08/21/2023 6.0  0.0 - 8.0 % Final    Basophil % 08/21/2023 0.5  0.0 - 1.9 % Final    Differential Method 08/21/2023 Automated   Final        Assessment:       1. Iron deficiency anemia, unspecified iron deficiency anemia type           Plan:   - Recent iron level consistent with PAIGE. Saturated iron -4 Ferritin   - Colonoscopy wnl, Hx of duran no GI f/u. will refer for possible EGD . Not scheduled yet   - Intolerant to po iron. Received 2 unit of injectafer, tolerated well. He had good response with treatment   - Patient will receive IV iron infusion discussed small <1% risk of potentially serious and fatal allergic reactions with iv iron preparations and patient expressed understanding and wishes to proceed.    - Recent Ferritin - 282, Sat iron -25      BMT Chart Routing      Follow up with physician    Follow up with WILLIAM 6 months. Day following lab work in 6 months   Provider visit type    Infusion scheduling note    Injection scheduling note    Labs   Scheduling:  Preferred lab:  Lab interval:  Cbc, ferritin, iron and tibc in 3 month and 6 month locally   Imaging    Pharmacy appointment    Other referrals            Advance Care Planning     Date: 08/24/2023     Patient did not wish to name a surrogate decision maker or provide an Advance Care Plan.      Morenita Fletcher NP  Hematology/Oncology/BMT

## 2023-08-28 DIAGNOSIS — N18.31 STAGE 3A CHRONIC KIDNEY DISEASE: Primary | ICD-10-CM

## 2023-08-29 RX ORDER — SODIUM BICARBONATE 650 MG/1
TABLET ORAL
Qty: 180 TABLET | Refills: 3 | Status: SHIPPED | OUTPATIENT
Start: 2023-08-29 | End: 2023-09-13

## 2023-08-30 DIAGNOSIS — I10 PRIMARY HYPERTENSION: ICD-10-CM

## 2023-08-30 RX ORDER — DICLOFENAC SODIUM 10 MG/G
GEL TOPICAL
COMMUNITY
Start: 2023-06-28

## 2023-08-30 RX ORDER — LOSARTAN POTASSIUM 50 MG/1
50 TABLET ORAL DAILY
Qty: 90 TABLET | Refills: 2 | Status: SHIPPED | OUTPATIENT
Start: 2023-08-30 | End: 2023-08-30 | Stop reason: SDUPTHER

## 2023-08-30 RX ORDER — LOSARTAN POTASSIUM 50 MG/1
50 TABLET ORAL DAILY
Qty: 90 TABLET | Refills: 2 | Status: SHIPPED | OUTPATIENT
Start: 2023-08-30 | End: 2023-08-31

## 2023-08-30 NOTE — TELEPHONE ENCOUNTER
No care due was identified.  [employer] Embedded Care Due Messages. Reference number: [id number].   8/30/2023 11:37:47 AM CDT

## 2023-08-30 NOTE — TELEPHONE ENCOUNTER
Care Due:                  Date            Visit Type   Department     Provider  --------------------------------------------------------------------------------                                EP -                              PRIMARY      [facility] PRIMARY  Last Visit: 04-      CARE ([facility])   CARLOS Wall                              EP -                              PRIMARY      [facility] PRIMARY  Next Visit: 10-      CARE ([facility])   [facility]           Brigida Wall                                                            Last  Test          Frequency    Reason                     Performed    Due Date  --------------------------------------------------------------------------------    Lipid Panel.  12 months..  atorvastatin.............  10-   10-    [facility] Embedded Care Due Messages. Reference number: [id number].   8/30/2023 11:06:24 AM CDT

## 2023-08-30 NOTE — TELEPHONE ENCOUNTER
----- Message from Cruzito Randy sent at 8/30/2023 10:45 AM CDT -----  Contact: self [phone]  Requesting an RX refill or new RX.  Is this a refill or new RX: refill  RX name and strength (copy/paste from chart):  losartan 50mg  Is this a 30 day or 90 day RX:   Pharmacy name and phone # (copy/paste from chart):    [facility] #[id number] - [place] [id number] AT [facility] [address]  [address]  [facility] [address]  [facility] [address]  Phone: [phone] Fax: [phone]      The doctors have asked that we provide their patients with the following 2 reminders -- prescription refills can take up to 72 hours, and a friendly reminder that in the future you can use your [facility] account to request refills: yes    Please call and advise

## 2023-08-31 ENCOUNTER — TELEPHONE (OUTPATIENT)
Dept: PRIMARY CARE CLINIC | Facility: CLINIC | Age: 88
End: 2023-08-31
Payer: COMMERCIAL

## 2023-08-31 RX ORDER — LOSARTAN POTASSIUM 50 MG/1
50 TABLET ORAL DAILY
Qty: 90 TABLET | Refills: 2 | Status: SHIPPED | OUTPATIENT
Start: 2023-08-31

## 2023-09-18 ENCOUNTER — LAB VISIT (OUTPATIENT)
Dept: LAB | Facility: HOSPITAL | Age: 88
End: 2023-09-18
Attending: INTERNAL MEDICINE
Payer: COMMERCIAL

## 2023-09-18 DIAGNOSIS — N18.31 STAGE 3A CHRONIC KIDNEY DISEASE: ICD-10-CM

## 2023-09-18 LAB
BASOPHILS # BLD AUTO: 0.03 K/UL (ref 0–0.2)
BASOPHILS NFR BLD: 0.5 % (ref 0–1.9)
DIFFERENTIAL METHOD: ABNORMAL
EOSINOPHIL # BLD AUTO: 0.3 K/UL (ref 0–0.5)
EOSINOPHIL NFR BLD: 4.4 % (ref 0–8)
ERYTHROCYTE [DISTWIDTH] IN BLOOD BY AUTOMATED COUNT: 16.5 % (ref 11.5–14.5)
HCT VFR BLD AUTO: 39.3 % (ref 40–54)
HGB BLD-MCNC: 12.3 G/DL (ref 14–18)
IMM GRANULOCYTES # BLD AUTO: 0.01 K/UL (ref 0–0.04)
IMM GRANULOCYTES NFR BLD AUTO: 0.2 % (ref 0–0.5)
LYMPHOCYTES # BLD AUTO: 1.8 K/UL (ref 1–4.8)
LYMPHOCYTES NFR BLD: 29.7 % (ref 18–48)
MCH RBC QN AUTO: 26.2 PG (ref 27–31)
MCHC RBC AUTO-ENTMCNC: 31.3 G/DL (ref 32–36)
MCV RBC AUTO: 84 FL (ref 82–98)
MONOCYTES # BLD AUTO: 0.8 K/UL (ref 0.3–1)
MONOCYTES NFR BLD: 12.6 % (ref 4–15)
NEUTROPHILS # BLD AUTO: 3.2 K/UL (ref 1.8–7.7)
NEUTROPHILS NFR BLD: 52.6 % (ref 38–73)
NRBC BLD-RTO: 0 /100 WBC
PLATELET # BLD AUTO: 259 K/UL (ref 150–450)
PMV BLD AUTO: 10.6 FL (ref 9.2–12.9)
RBC # BLD AUTO: 4.7 M/UL (ref 4.6–6.2)
WBC # BLD AUTO: 6.13 K/UL (ref 3.9–12.7)

## 2023-09-18 PROCEDURE — 85025 COMPLETE CBC W/AUTO DIFF WBC: CPT | Performed by: INTERNAL MEDICINE

## 2023-09-18 PROCEDURE — 80048 BASIC METABOLIC PNL TOTAL CA: CPT | Performed by: INTERNAL MEDICINE

## 2023-09-18 PROCEDURE — 36415 COLL VENOUS BLD VENIPUNCTURE: CPT | Mod: PN | Performed by: INTERNAL MEDICINE

## 2023-09-19 LAB
ANION GAP SERPL CALC-SCNC: 11 MMOL/L (ref 8–16)
BUN SERPL-MCNC: 22 MG/DL (ref 8–23)
CALCIUM SERPL-MCNC: 9.1 MG/DL (ref 8.7–10.5)
CHLORIDE SERPL-SCNC: 105 MMOL/L (ref 95–110)
CO2 SERPL-SCNC: 18 MMOL/L (ref 23–29)
CREAT SERPL-MCNC: 2.6 MG/DL (ref 0.5–1.4)
EST. GFR  (NO RACE VARIABLE): 23.4 ML/MIN/1.73 M^2
GLUCOSE SERPL-MCNC: 133 MG/DL (ref 70–110)
POTASSIUM SERPL-SCNC: 4.4 MMOL/L (ref 3.5–5.1)
SODIUM SERPL-SCNC: 134 MMOL/L (ref 136–145)

## 2023-09-20 ENCOUNTER — OFFICE VISIT (OUTPATIENT)
Dept: NEPHROLOGY | Facility: CLINIC | Age: 88
End: 2023-09-20
Payer: COMMERCIAL

## 2023-09-20 VITALS
HEIGHT: 70 IN | BODY MASS INDEX: 28.35 KG/M2 | DIASTOLIC BLOOD PRESSURE: 72 MMHG | SYSTOLIC BLOOD PRESSURE: 121 MMHG | WEIGHT: 198 LBS | HEART RATE: 54 BPM | OXYGEN SATURATION: 98 %

## 2023-09-20 DIAGNOSIS — N18.4 CKD (CHRONIC KIDNEY DISEASE), STAGE IV: Primary | ICD-10-CM

## 2023-09-20 DIAGNOSIS — I10 HYPERTENSION, UNSPECIFIED TYPE: ICD-10-CM

## 2023-09-20 PROCEDURE — 99214 PR OFFICE/OUTPT VISIT, EST, LEVL IV, 30-39 MIN: ICD-10-PCS | Mod: S$GLB,,, | Performed by: INTERNAL MEDICINE

## 2023-09-20 PROCEDURE — 1126F AMNT PAIN NOTED NONE PRSNT: CPT | Mod: CPTII,S$GLB,, | Performed by: INTERNAL MEDICINE

## 2023-09-20 PROCEDURE — 1159F PR MEDICATION LIST DOCUMENTED IN MEDICAL RECORD: ICD-10-PCS | Mod: CPTII,S$GLB,, | Performed by: INTERNAL MEDICINE

## 2023-09-20 PROCEDURE — 3074F PR MOST RECENT SYSTOLIC BLOOD PRESSURE < 130 MM HG: ICD-10-PCS | Mod: CPTII,S$GLB,, | Performed by: INTERNAL MEDICINE

## 2023-09-20 PROCEDURE — 99999 PR PBB SHADOW E&M-EST. PATIENT-LVL IV: CPT | Mod: PBBFAC,,, | Performed by: INTERNAL MEDICINE

## 2023-09-20 PROCEDURE — 3078F PR MOST RECENT DIASTOLIC BLOOD PRESSURE < 80 MM HG: ICD-10-PCS | Mod: CPTII,S$GLB,, | Performed by: INTERNAL MEDICINE

## 2023-09-20 PROCEDURE — 3078F DIAST BP <80 MM HG: CPT | Mod: CPTII,S$GLB,, | Performed by: INTERNAL MEDICINE

## 2023-09-20 PROCEDURE — 3074F SYST BP LT 130 MM HG: CPT | Mod: CPTII,S$GLB,, | Performed by: INTERNAL MEDICINE

## 2023-09-20 PROCEDURE — 1159F MED LIST DOCD IN RCRD: CPT | Mod: CPTII,S$GLB,, | Performed by: INTERNAL MEDICINE

## 2023-09-20 PROCEDURE — 99999 PR PBB SHADOW E&M-EST. PATIENT-LVL IV: ICD-10-PCS | Mod: PBBFAC,,, | Performed by: INTERNAL MEDICINE

## 2023-09-20 PROCEDURE — 99214 OFFICE O/P EST MOD 30 MIN: CPT | Mod: S$GLB,,, | Performed by: INTERNAL MEDICINE

## 2023-09-20 PROCEDURE — 1126F PR PAIN SEVERITY QUANTIFIED, NO PAIN PRESENT: ICD-10-PCS | Mod: CPTII,S$GLB,, | Performed by: INTERNAL MEDICINE

## 2023-09-20 NOTE — PROGRESS NOTES
Progress Note  Nephrology      Referring physician: Brigida Wall MD    Reason for visit: CKD     SUBJECTIVE:   87 y.o. male  has a past medical history of Martinez esophagus, GERD (gastroesophageal reflux disease), Hyperlipemia, Hypertension, and Obesity. who has been following up in renal clinic for ckd management   The patient denies taking NSAIDs or new antibiotics, recreational drugs, recent episode of dehydration, diarrhea, nausea or vomiting, acute illness, hospitalization or exposure to IV radiocontrast.     ROS:  General: negative for chills, or fatigue  ENT: No epistaxis or headaches  Hematological and Lymphatic: No bleeding problems or blood clots.  Endocrine: No skin changes or temperature intolerance  Respiratory: No cough, shortness of breath, or wheezing  Cardiovascular: No chest pain or dyspnea   Gastrointestinal: No abdominal pain, change in bowel habits  Genito-Urinary: No dysuria, trouble voiding, or hematuria  Musculoskeletal: ROS: negative for - joint pain, joint stiffness, joint swelling, muscle pain or muscular weakness  Neurological: No focal weakness, no numbness  Dermatological: No rash or ulcers    OBJECTIVE:     There were no vitals filed for this visit.       Physical Exam:  General: no distress, well nourished  HENT: PERRLA, Normal mouth nose and ears.  Neck: no JVD and thyroid not enlarged, symmetric, no tenderness/mass/nodules  Lungs: clear to auscultation bilaterally and normal respiratory effort  Cardiovascular: regular rate and rhythm, S1, S2 normal, no murmur, click, rub or gallop.   Abdomen: soft, non-tender non-distented; bowel sounds normal  Skin: No rashes or lesions  Musculoskeletal:no edema in LE, no deformities.   Lymph Nodes: No cervical or supraclavicular adenopathy  Neurologic: Normal strength and tone. No focal numbness or weakness          Medications:    Current Outpatient Medications:     acetaminophen (TYLENOL) 500 MG tablet, Take 1,000 mg by mouth every 6 (six)  hours as needed for Pain., Disp: , Rfl:     amLODIPine (NORVASC) 10 MG tablet, Take 1 tablet (10 mg total) by mouth every morning., Disp: 90 tablet, Rfl: 3    atorvastatin (LIPITOR) 10 MG tablet, Take 1 tablet (10 mg total) by mouth every evening., Disp: 90 tablet, Rfl: 3    diclofenac sodium (VOLTAREN) 1 % Gel, APPLY 2 GRAMS TOPICALLY TO THE AFFECTED AREA TWICE DAILY, Disp: , Rfl:     ferrous sulfate 325 (65 FE) MG EC tablet, Take 1 tablet (325 mg total) by mouth 2 (two) times daily., Disp: 180 tablet, Rfl: 3    FLUAD QUAD 2020-21,65Y UP,,PF, 60 mcg (15 mcg x 4)/0.5 mL Syrg, ADM 0.5ML IM UTD, Disp: , Rfl:     losartan (COZAAR) 50 MG tablet, Take 1 tablet (50 mg total) by mouth once daily., Disp: 90 tablet, Rfl: 2    pantoprazole (PROTONIX) 40 MG tablet, TAKE 1 TABLET(40 MG) BY MOUTH TWICE DAILY, Disp: 90 tablet, Rfl: 3    pilocarpine (SALAGEN) 5 MG Tab, TAKE 1 TO 2 TABLETS BY MOUTH 3-4 TIMES A DAY. MAX OF 10 MG THREE TIMES DAILY, Disp: , Rfl:     sodium bicarbonate 650 MG tablet, TAKE 1 TABLET(650 MG) BY MOUTH TWICE DAILY, Disp: 180 tablet, Rfl: 3         Laboratory:  Lab Results   Component Value Date    CREATININE 2.6 (H) 09/18/2023       Prot/Creat Ratio, Urine   Date Value Ref Range Status   09/18/2023 0.10 0.00 - 0.20 Final   04/06/2022 0.05 0.00 - 0.20 Final   10/25/2021 0.07 0.00 - 0.20 Final       Lab Results   Component Value Date     (L) 09/18/2023    K 4.4 09/18/2023    CO2 18 (L) 09/18/2023       last PTH   Lab Results   Component Value Date    CALCIUM 9.1 09/18/2023    PHOS 2.7 11/25/2022       Lab Results   Component Value Date    HGB 12.3 (L) 09/18/2023        Lab Results   Component Value Date    HGBA1C 5.5 09/29/2021       Lab Results   Component Value Date    LDLCALC 81.4 10/06/2022       Other Labs were reviewed      ASSESSMENT/PLAN:     1- 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

## 2023-10-16 ENCOUNTER — OFFICE VISIT (OUTPATIENT)
Dept: PRIMARY CARE CLINIC | Facility: CLINIC | Age: 88
End: 2023-10-16
Payer: COMMERCIAL

## 2023-10-16 ENCOUNTER — LAB VISIT (OUTPATIENT)
Dept: LAB | Facility: HOSPITAL | Age: 88
End: 2023-10-16
Attending: INTERNAL MEDICINE
Payer: COMMERCIAL

## 2023-10-16 VITALS
WEIGHT: 197.75 LBS | OXYGEN SATURATION: 98 % | SYSTOLIC BLOOD PRESSURE: 136 MMHG | BODY MASS INDEX: 28.31 KG/M2 | HEART RATE: 71 BPM | HEIGHT: 70 IN | TEMPERATURE: 98 F | DIASTOLIC BLOOD PRESSURE: 82 MMHG

## 2023-10-16 DIAGNOSIS — K64.8 INTERNAL HEMORRHOIDS: ICD-10-CM

## 2023-10-16 DIAGNOSIS — I10 PRIMARY HYPERTENSION: ICD-10-CM

## 2023-10-16 DIAGNOSIS — Z85.46 HISTORY OF PROSTATE CANCER: ICD-10-CM

## 2023-10-16 DIAGNOSIS — Z87.19 HISTORY OF GI BLEED: ICD-10-CM

## 2023-10-16 DIAGNOSIS — E78.2 MIXED HYPERLIPIDEMIA: ICD-10-CM

## 2023-10-16 DIAGNOSIS — N18.32 STAGE 3B CHRONIC KIDNEY DISEASE: ICD-10-CM

## 2023-10-16 DIAGNOSIS — Z00.00 ROUTINE GENERAL MEDICAL EXAMINATION AT A HEALTH CARE FACILITY: Primary | ICD-10-CM

## 2023-10-16 DIAGNOSIS — K21.9 GASTROESOPHAGEAL REFLUX DISEASE WITHOUT ESOPHAGITIS: ICD-10-CM

## 2023-10-16 DIAGNOSIS — D50.8 IRON DEFICIENCY ANEMIA SECONDARY TO INADEQUATE DIETARY IRON INTAKE: ICD-10-CM

## 2023-10-16 LAB
CHOLEST SERPL-MCNC: 159 MG/DL (ref 120–199)
CHOLEST/HDLC SERPL: 2.5 {RATIO} (ref 2–5)
COMPLEXED PSA SERPL-MCNC: <0.01 NG/ML (ref 0–4)
HDLC SERPL-MCNC: 64 MG/DL (ref 40–75)
HDLC SERPL: 40.3 % (ref 20–50)
LDLC SERPL CALC-MCNC: 85.6 MG/DL (ref 63–159)
NONHDLC SERPL-MCNC: 95 MG/DL
TRIGL SERPL-MCNC: 47 MG/DL (ref 30–150)
TSH SERPL DL<=0.005 MIU/L-ACNC: 1.22 UIU/ML (ref 0.4–4)

## 2023-10-16 PROCEDURE — 1159F MED LIST DOCD IN RCRD: CPT | Mod: CPTII,S$GLB,, | Performed by: INTERNAL MEDICINE

## 2023-10-16 PROCEDURE — 3079F PR MOST RECENT DIASTOLIC BLOOD PRESSURE 80-89 MM HG: ICD-10-PCS | Mod: CPTII,S$GLB,, | Performed by: INTERNAL MEDICINE

## 2023-10-16 PROCEDURE — 99999 PR PBB SHADOW E&M-EST. PATIENT-LVL IV: CPT | Mod: PBBFAC,,, | Performed by: INTERNAL MEDICINE

## 2023-10-16 PROCEDURE — 3288F PR FALLS RISK ASSESSMENT DOCUMENTED: ICD-10-PCS | Mod: CPTII,S$GLB,, | Performed by: INTERNAL MEDICINE

## 2023-10-16 PROCEDURE — 3075F PR MOST RECENT SYSTOLIC BLOOD PRESS GE 130-139MM HG: ICD-10-PCS | Mod: CPTII,S$GLB,, | Performed by: INTERNAL MEDICINE

## 2023-10-16 PROCEDURE — 36415 COLL VENOUS BLD VENIPUNCTURE: CPT | Mod: PN | Performed by: INTERNAL MEDICINE

## 2023-10-16 PROCEDURE — 84443 ASSAY THYROID STIM HORMONE: CPT | Performed by: INTERNAL MEDICINE

## 2023-10-16 PROCEDURE — 1101F PT FALLS ASSESS-DOCD LE1/YR: CPT | Mod: CPTII,S$GLB,, | Performed by: INTERNAL MEDICINE

## 2023-10-16 PROCEDURE — 1126F PR PAIN SEVERITY QUANTIFIED, NO PAIN PRESENT: ICD-10-PCS | Mod: CPTII,S$GLB,, | Performed by: INTERNAL MEDICINE

## 2023-10-16 PROCEDURE — 99397 PR PREVENTIVE VISIT,EST,65 & OVER: ICD-10-PCS | Mod: S$GLB,,, | Performed by: INTERNAL MEDICINE

## 2023-10-16 PROCEDURE — 99999 PR PBB SHADOW E&M-EST. PATIENT-LVL IV: ICD-10-PCS | Mod: PBBFAC,,, | Performed by: INTERNAL MEDICINE

## 2023-10-16 PROCEDURE — 1101F PR PT FALLS ASSESS DOC 0-1 FALLS W/OUT INJ PAST YR: ICD-10-PCS | Mod: CPTII,S$GLB,, | Performed by: INTERNAL MEDICINE

## 2023-10-16 PROCEDURE — 99397 PER PM REEVAL EST PAT 65+ YR: CPT | Mod: S$GLB,,, | Performed by: INTERNAL MEDICINE

## 2023-10-16 PROCEDURE — 1160F RVW MEDS BY RX/DR IN RCRD: CPT | Mod: CPTII,S$GLB,, | Performed by: INTERNAL MEDICINE

## 2023-10-16 PROCEDURE — 3079F DIAST BP 80-89 MM HG: CPT | Mod: CPTII,S$GLB,, | Performed by: INTERNAL MEDICINE

## 2023-10-16 PROCEDURE — 3075F SYST BP GE 130 - 139MM HG: CPT | Mod: CPTII,S$GLB,, | Performed by: INTERNAL MEDICINE

## 2023-10-16 PROCEDURE — 3288F FALL RISK ASSESSMENT DOCD: CPT | Mod: CPTII,S$GLB,, | Performed by: INTERNAL MEDICINE

## 2023-10-16 PROCEDURE — 1160F PR REVIEW ALL MEDS BY PRESCRIBER/CLIN PHARMACIST DOCUMENTED: ICD-10-PCS | Mod: CPTII,S$GLB,, | Performed by: INTERNAL MEDICINE

## 2023-10-16 PROCEDURE — 80061 LIPID PANEL: CPT | Performed by: INTERNAL MEDICINE

## 2023-10-16 PROCEDURE — 84153 ASSAY OF PSA TOTAL: CPT | Performed by: INTERNAL MEDICINE

## 2023-10-16 PROCEDURE — 1126F AMNT PAIN NOTED NONE PRSNT: CPT | Mod: CPTII,S$GLB,, | Performed by: INTERNAL MEDICINE

## 2023-10-16 PROCEDURE — 1159F PR MEDICATION LIST DOCUMENTED IN MEDICAL RECORD: ICD-10-PCS | Mod: CPTII,S$GLB,, | Performed by: INTERNAL MEDICINE

## 2023-10-16 RX ORDER — AMOXICILLIN 500 MG/1
500 CAPSULE ORAL 3 TIMES DAILY
COMMUNITY
Start: 2023-10-09

## 2023-10-16 NOTE — PROGRESS NOTES
Subjective:      Patient ID: Zion Vasquez is a 87 y.o. male.    Chief Complaint: Follow-up      Zion Vasquez is a 87 y.o. male with chronic conditions significant for HTN, HLD, hx of GIB, GERD, Martinez's esophagus, hiatal hernia, diverticulosis, IH, CKD3, hx of prostate CA, obesity   Presenting today for follow up / annual. Date of last annual is 10/13/2022    UTI: Diagnosed by his urologist. He is on amoxicillin TID at this time. He is to follow back up with his urologist in 1 week.     Hypertension  He tolerates amlodipine and losartan. No adverse events. He is adherent to medication. Office BP is 136/82 . No CP/SOB/lightheadedness/dizziness/palpitations.      Hyperlipidemia  No acute concerns. He is on atorvastatin without any side effects.     Hx of lower GI bleeding and Anemia on 1/2/2019  Hospital Course: At that time admitted for hematochezia. EGD showed Martinez's as well as hiatal hernia. Colonoscopy at that time showed 6mm polyp that was excised and diverticula.  He continues on Protonix. No further bleeding episodes reported.    Follows with hematology for iron infusion for iron deficiency anemia.      GERD, Hx of Martinez's esophagus, hiatal hernia  No alarming symptoms at this time     Diverticulosis of colon  No new bleeding episodes or signs of infection     Internal hemorrhoids  No acute bleeding events     -CKD, stage 4  He follows with Nephrologist and was started on sodium bicarbonate for metabolic acidosis. Kidney function stable. Avoid NSAIDs.     Hx of Prostate cancer in 8/2018.  He plans to continue seeing Dr. Ward, Urology. Last PSA within normal limits     Obesity  Working on weight loss goals.       Denies any chest pain, shortness of breath, nausea vomiting constipation diarrhea, blood in stool, heartburn    Review of Systems   Constitutional:  Negative for chills, fever and weight loss.   HENT:  Negative for congestion, ear pain and sore throat.    Eyes:  Negative for double  vision.   Respiratory:  Negative for cough and shortness of breath.    Cardiovascular:  Negative for chest pain, palpitations and leg swelling.   Gastrointestinal:  Negative for abdominal pain, heartburn, nausea and vomiting.   Skin:  Negative for rash.   Neurological:  Negative for dizziness, tingling and headaches.   Psychiatric/Behavioral:  Negative for depression.           Current Outpatient Medications:     acetaminophen (TYLENOL) 500 MG tablet, Take 1,000 mg by mouth every 6 (six) hours as needed for Pain., Disp: , Rfl:     amLODIPine (NORVASC) 10 MG tablet, Take 1 tablet (10 mg total) by mouth every morning., Disp: 90 tablet, Rfl: 3    atorvastatin (LIPITOR) 10 MG tablet, Take 1 tablet (10 mg total) by mouth every evening., Disp: 90 tablet, Rfl: 3    diclofenac sodium (VOLTAREN) 1 % Gel, APPLY 2 GRAMS TOPICALLY TO THE AFFECTED AREA TWICE DAILY, Disp: , Rfl:     losartan (COZAAR) 50 MG tablet, Take 1 tablet (50 mg total) by mouth once daily., Disp: 90 tablet, Rfl: 2    pantoprazole (PROTONIX) 40 MG tablet, TAKE 1 TABLET(40 MG) BY MOUTH TWICE DAILY, Disp: 90 tablet, Rfl: 3    sodium bicarbonate 650 MG tablet, TAKE 1 TABLET(650 MG) BY MOUTH TWICE DAILY, Disp: 180 tablet, Rfl: 3    amoxicillin (AMOXIL) 500 MG capsule, Take 500 mg by mouth 3 (three) times daily., Disp: , Rfl:     FLUAD QUAD 2020-21,65Y UP,,PF, 60 mcg (15 mcg x 4)/0.5 mL Syrg, ADM 0.5ML IM UTD, Disp: , Rfl:     pilocarpine (SALAGEN) 5 MG Tab, TAKE 1 TO 2 TABLETS BY MOUTH 3-4 TIMES A DAY. MAX OF 10 MG THREE TIMES DAILY, Disp: , Rfl:     Lab Results   Component Value Date    HGBA1C 5.5 09/29/2021     Lab Results   Component Value Date    MICALBCREAT 32.5 (H) 09/29/2021     Lab Results   Component Value Date    LDLCALC 81.4 10/06/2022    LDLCALC 82.8 09/29/2021    CHOL 153 10/06/2022    HDL 59 10/06/2022    TRIG 63 10/06/2022       Lab Results   Component Value Date     (L) 09/18/2023    K 4.4 09/18/2023     09/18/2023    CO2 18 (L)  "09/18/2023     (H) 09/18/2023    BUN 22 09/18/2023    CREATININE 2.6 (H) 09/18/2023    CALCIUM 9.1 09/18/2023    PROT 7.3 04/06/2023    ALBUMIN 3.6 04/06/2023    BILITOT 0.8 04/06/2023    ALKPHOS 74 04/06/2023    AST 21 04/06/2023    ALT 8 (L) 04/06/2023    ANIONGAP 11 09/18/2023    ESTGFRAFRICA 48.7 (A) 06/14/2022    EGFRNONAA 42.1 (A) 06/14/2022    WBC 6.13 09/18/2023    HGB 12.3 (L) 09/18/2023    HGB 12.2 (L) 08/21/2023    HCT 39.3 (L) 09/18/2023    MCV 84 09/18/2023     09/18/2023    PSA 0.01 09/29/2021    PSADIAG <0.01 10/06/2022       Lab Results   Component Value Date    FERRITIN 281 08/21/2023    IRON 66 08/21/2023    TRANSFERRIN 181 (L) 08/21/2023    TIBC 268 08/21/2023    FESATURATED 25 08/21/2023         Past Medical History:   Diagnosis Date    Martinez esophagus     GERD (gastroesophageal reflux disease)     Hyperlipemia     Hypertension     Obesity      Past Surgical History:   Procedure Laterality Date    COLONOSCOPY N/A 11/25/2022    Procedure: COLONOSCOPY;  Surgeon: Smith Alvarez MD;  Location: [address];  Service: Endoscopy;  Laterality: N/A;    COLONOSCOPY N/A 11/30/2022    Procedure: COLONOSCOPY;  Surgeon: Smith Alvarez MD;  Location: [address]);  Service: Endoscopy;  Laterality: N/A;     Social History     Social History Narrative    Not on file     Family History   Problem Relation Age of Onset    Cancer Mother         ? ovarian    No Known Problems Father     No Known Problems Sister     No Known Problems Brother     No Known Problems Sister      Vitals:    10/16/23 0955   BP: 136/82   Pulse: 71   Temp: 98.1 °F (36.7 °C)   TempSrc: Oral   SpO2: 98%   Weight: 89.7 kg (197 lb 12 oz)   Height: 5' 10" (1.778 m)   PainSc: 0-No pain     Objective:   Physical Exam  Vitals and nursing note reviewed.   Constitutional:       Appearance: Normal appearance.   HENT:      Head: Normocephalic and atraumatic.      Right Ear: Tympanic membrane, ear canal and external ear " normal.      Left Ear: Tympanic membrane, ear canal and external ear normal.      Nose: Nose normal.      Mouth/Throat:      Mouth: Mucous membranes are moist.      Pharynx: Oropharynx is clear.   Eyes:      Extraocular Movements: Extraocular movements intact.      Pupils: Pupils are equal, round, and reactive to light.   Cardiovascular:      Rate and Rhythm: Normal rate and regular rhythm.      Pulses: Normal pulses.      Heart sounds: Normal heart sounds.   Pulmonary:      Breath sounds: Normal breath sounds.   Abdominal:      General: Bowel sounds are normal.      Palpations: Abdomen is soft.   Musculoskeletal:      Cervical back: Normal range of motion and neck supple.   Skin:     General: Skin is warm.   Neurological:      General: No focal deficit present.      Mental Status: He is alert and oriented to person, place, and time.       Assessment/Plan     Zion Vasquez is a 87 y.o.male with:    Routine general medical examination at a health care facility  - Bloodwork that was obtained prior to this appointment was reviewed in detail with the patient during the office visit.   - PSA, TSH and lipid panel ordered    History of prostate cancer  - obtain PSA  - Follows with urology    Primary hypertension  - cont current management    Mixed hyperlipidemia    Stage 3b chronic kidney disease  - Stable. Continue current management, monitor.    Iron deficiency anemia secondary to inadequate dietary iron intake  - Stable. Continue current management, monitor.  - cont iron infusion with hematology    Gastroesophageal reflux disease without esophagitis  - Stable. Continue current management, monitor.    Internal hemorrhoids  - Stable. Continue current management, monitor.    History of GI bleed         Chronic conditions status updated as per HPI.  Other than changes above, cont current medications and maintain follow up with specialists.  Return to clinic in Follow up in about 6 months (around 4/16/2024).      Brigida  CHI Wall MD  [facility]    There are no Patient Instructions on file for this visit.  All of your core healthy metrics are met.

## 2023-10-17 ENCOUNTER — TELEPHONE (OUTPATIENT)
Dept: PRIMARY CARE CLINIC | Facility: CLINIC | Age: 88
End: 2023-10-17
Payer: COMMERCIAL

## 2023-10-17 NOTE — TELEPHONE ENCOUNTER
----- Message from Brigida Wall MD sent at 10/17/2023  9:04 AM CDT -----      Your Cholesterol is NORMAL. Continue to focus on low fat, high fiber foods and aerobic exericse (huffing & puffing) for at least 20 minutes most days of the week.    Your Thyroid numbers are normal.    Your prostate screening numbers (PSA) is within normal limits.

## 2023-10-17 NOTE — PROGRESS NOTES
Your Cholesterol is NORMAL. Continue to focus on low fat, high fiber foods and aerobic exericse (huffing & puffing) for at least 20 minutes most days of the week.    Your Thyroid numbers are normal.    Your prostate screening numbers (PSA) is within normal limits.

## 2023-11-24 ENCOUNTER — LAB VISIT (OUTPATIENT)
Dept: LAB | Facility: HOSPITAL | Age: 88
End: 2023-11-24
Payer: COMMERCIAL

## 2023-11-24 DIAGNOSIS — D50.9 IRON DEFICIENCY ANEMIA, UNSPECIFIED IRON DEFICIENCY ANEMIA TYPE: ICD-10-CM

## 2023-11-24 LAB
BASOPHILS # BLD AUTO: 0.04 K/UL (ref 0–0.2)
BASOPHILS NFR BLD: 0.5 % (ref 0–1.9)
DIFFERENTIAL METHOD: ABNORMAL
EOSINOPHIL # BLD AUTO: 0.5 K/UL (ref 0–0.5)
EOSINOPHIL NFR BLD: 6.9 % (ref 0–8)
ERYTHROCYTE [DISTWIDTH] IN BLOOD BY AUTOMATED COUNT: 14.4 % (ref 11.5–14.5)
FERRITIN SERPL-MCNC: 239 NG/ML (ref 20–300)
HCT VFR BLD AUTO: 39.4 % (ref 40–54)
HGB BLD-MCNC: 12.5 G/DL (ref 14–18)
IMM GRANULOCYTES # BLD AUTO: 0.01 K/UL (ref 0–0.04)
IMM GRANULOCYTES NFR BLD AUTO: 0.1 % (ref 0–0.5)
IRON SERPL-MCNC: 70 UG/DL (ref 45–160)
LYMPHOCYTES # BLD AUTO: 2.4 K/UL (ref 1–4.8)
LYMPHOCYTES NFR BLD: 31.2 % (ref 18–48)
MCH RBC QN AUTO: 26.4 PG (ref 27–31)
MCHC RBC AUTO-ENTMCNC: 31.7 G/DL (ref 32–36)
MCV RBC AUTO: 83 FL (ref 82–98)
MONOCYTES # BLD AUTO: 1 K/UL (ref 0.3–1)
MONOCYTES NFR BLD: 12.7 % (ref 4–15)
NEUTROPHILS # BLD AUTO: 3.8 K/UL (ref 1.8–7.7)
NEUTROPHILS NFR BLD: 48.6 % (ref 38–73)
NRBC BLD-RTO: 0 /100 WBC
PLATELET # BLD AUTO: 254 K/UL (ref 150–450)
PMV BLD AUTO: 9.7 FL (ref 9.2–12.9)
RBC # BLD AUTO: 4.74 M/UL (ref 4.6–6.2)
SATURATED IRON: 25 % (ref 20–50)
TOTAL IRON BINDING CAPACITY: 277 UG/DL (ref 250–450)
TRANSFERRIN SERPL-MCNC: 187 MG/DL (ref 200–375)
WBC # BLD AUTO: 7.82 K/UL (ref 3.9–12.7)

## 2023-11-24 PROCEDURE — 83540 ASSAY OF IRON: CPT | Performed by: NURSE PRACTITIONER

## 2023-11-24 PROCEDURE — 36415 COLL VENOUS BLD VENIPUNCTURE: CPT | Mod: PN | Performed by: NURSE PRACTITIONER

## 2023-11-24 PROCEDURE — 84466 ASSAY OF TRANSFERRIN: CPT | Performed by: NURSE PRACTITIONER

## 2023-11-24 PROCEDURE — 85025 COMPLETE CBC W/AUTO DIFF WBC: CPT | Performed by: NURSE PRACTITIONER

## 2023-11-24 PROCEDURE — 82728 ASSAY OF FERRITIN: CPT | Performed by: NURSE PRACTITIONER

## 2024-01-30 DIAGNOSIS — N18.4 CKD (CHRONIC KIDNEY DISEASE), STAGE IV: Primary | ICD-10-CM

## 2024-02-26 ENCOUNTER — LAB VISIT (OUTPATIENT)
Dept: LAB | Facility: HOSPITAL | Age: 89
End: 2024-02-26
Attending: INTERNAL MEDICINE
Payer: COMMERCIAL

## 2024-02-26 DIAGNOSIS — D50.9 IRON DEFICIENCY ANEMIA, UNSPECIFIED IRON DEFICIENCY ANEMIA TYPE: ICD-10-CM

## 2024-02-26 DIAGNOSIS — N18.4 CKD (CHRONIC KIDNEY DISEASE), STAGE IV: ICD-10-CM

## 2024-02-26 LAB
ANION GAP SERPL CALC-SCNC: 10 MMOL/L (ref 8–16)
BASOPHILS # BLD AUTO: 0.04 K/UL (ref 0–0.2)
BASOPHILS NFR BLD: 0.5 % (ref 0–1.9)
BUN SERPL-MCNC: 14 MG/DL (ref 8–23)
CALCIUM SERPL-MCNC: 9.7 MG/DL (ref 8.7–10.5)
CHLORIDE SERPL-SCNC: 109 MMOL/L (ref 95–110)
CO2 SERPL-SCNC: 20 MMOL/L (ref 23–29)
CREAT SERPL-MCNC: 2.1 MG/DL (ref 0.5–1.4)
DIFFERENTIAL METHOD BLD: ABNORMAL
EOSINOPHIL # BLD AUTO: 0.4 K/UL (ref 0–0.5)
EOSINOPHIL NFR BLD: 5.3 % (ref 0–8)
ERYTHROCYTE [DISTWIDTH] IN BLOOD BY AUTOMATED COUNT: 13.3 % (ref 11.5–14.5)
EST. GFR  (NO RACE VARIABLE): 30.1 ML/MIN/1.73 M^2
FERRITIN SERPL-MCNC: 269 NG/ML (ref 20–300)
GLUCOSE SERPL-MCNC: 133 MG/DL (ref 70–110)
HCT VFR BLD AUTO: 40.5 % (ref 40–54)
HGB BLD-MCNC: 12.8 G/DL (ref 14–18)
IMM GRANULOCYTES # BLD AUTO: 0.01 K/UL (ref 0–0.04)
IMM GRANULOCYTES NFR BLD AUTO: 0.1 % (ref 0–0.5)
IRON SERPL-MCNC: 52 UG/DL (ref 45–160)
LYMPHOCYTES # BLD AUTO: 2.4 K/UL (ref 1–4.8)
LYMPHOCYTES NFR BLD: 30.9 % (ref 18–48)
MCH RBC QN AUTO: 26.9 PG (ref 27–31)
MCHC RBC AUTO-ENTMCNC: 31.6 G/DL (ref 32–36)
MCV RBC AUTO: 85 FL (ref 82–98)
MONOCYTES # BLD AUTO: 0.9 K/UL (ref 0.3–1)
MONOCYTES NFR BLD: 11.3 % (ref 4–15)
NEUTROPHILS # BLD AUTO: 4.1 K/UL (ref 1.8–7.7)
NEUTROPHILS NFR BLD: 51.9 % (ref 38–73)
NRBC BLD-RTO: 0 /100 WBC
PLATELET # BLD AUTO: 259 K/UL (ref 150–450)
PMV BLD AUTO: 10.3 FL (ref 9.2–12.9)
POTASSIUM SERPL-SCNC: 4.2 MMOL/L (ref 3.5–5.1)
RBC # BLD AUTO: 4.75 M/UL (ref 4.6–6.2)
SATURATED IRON: 21 % (ref 20–50)
SODIUM SERPL-SCNC: 139 MMOL/L (ref 136–145)
TOTAL IRON BINDING CAPACITY: 253 UG/DL (ref 250–450)
TRANSFERRIN SERPL-MCNC: 171 MG/DL (ref 200–375)
WBC # BLD AUTO: 7.86 K/UL (ref 3.9–12.7)

## 2024-02-26 PROCEDURE — 82728 ASSAY OF FERRITIN: CPT | Performed by: NURSE PRACTITIONER

## 2024-02-26 PROCEDURE — 83540 ASSAY OF IRON: CPT | Performed by: NURSE PRACTITIONER

## 2024-02-26 PROCEDURE — 85025 COMPLETE CBC W/AUTO DIFF WBC: CPT | Performed by: NURSE PRACTITIONER

## 2024-02-26 PROCEDURE — 36415 COLL VENOUS BLD VENIPUNCTURE: CPT | Mod: PN | Performed by: NURSE PRACTITIONER

## 2024-02-26 PROCEDURE — 80048 BASIC METABOLIC PNL TOTAL CA: CPT | Performed by: INTERNAL MEDICINE

## 2024-03-02 DIAGNOSIS — K44.9 HIATAL HERNIA: ICD-10-CM

## 2024-03-02 NOTE — TELEPHONE ENCOUNTER
Care Due:                  Date            Visit Type   Department     Provider  --------------------------------------------------------------------------------                                EP -                              PRIMARY      [facility]  Last Visit: 10-      CARE ([facility])   CARE           Brigida Wall                              EP -                              PRIMARY      [facility] INTERNAL  Next Visit: 06-      CARE ([facility])   MEDICINE       Brigida Wall                                                            Last  Test          Frequency    Reason                     Performed    Due Date  --------------------------------------------------------------------------------    CMP.........  12 months..  atorvastatin.............  04- 04-    [facility] Embedded Care Due Messages. Reference number: [id number].   3/02/2024 4:13:44 PM CST

## 2024-03-03 NOTE — TELEPHONE ENCOUNTER
Refill Routing Note   Medication(s) are not appropriate for processing by [facility] for the following reason(s):        Outside of protocol    [facility] action(s):  Route     Requires labs : Yes      Medication Therapy Plan: Dosage exceeds [facility] protocol      Appointments  past 12m or future 3m with PCP    Date Provider   Last Visit   10/16/2023 Brigida Wall MD   Next Visit   6/24/2024 Brigida Wall MD   ED visits in past 90 days: 0        Note composed:3:44 AM 03/03/2024

## 2024-03-04 RX ORDER — PANTOPRAZOLE SODIUM 40 MG/1
40 TABLET, DELAYED RELEASE ORAL 2 TIMES DAILY
Qty: 90 TABLET | Refills: 1 | Status: SHIPPED | OUTPATIENT
Start: 2024-03-04 | End: 2024-06-10

## 2024-03-05 ENCOUNTER — OFFICE VISIT (OUTPATIENT)
Dept: NEPHROLOGY | Facility: CLINIC | Age: 89
End: 2024-03-05
Payer: COMMERCIAL

## 2024-03-05 VITALS
BODY MASS INDEX: 27.77 KG/M2 | DIASTOLIC BLOOD PRESSURE: 78 MMHG | SYSTOLIC BLOOD PRESSURE: 136 MMHG | HEART RATE: 63 BPM | HEIGHT: 70 IN | WEIGHT: 194 LBS | RESPIRATION RATE: 18 BRPM | OXYGEN SATURATION: 98 %

## 2024-03-05 DIAGNOSIS — I10 HYPERTENSION, UNSPECIFIED TYPE: ICD-10-CM

## 2024-03-05 DIAGNOSIS — D63.1 ANEMIA OF CHRONIC RENAL FAILURE, STAGE 3A: ICD-10-CM

## 2024-03-05 DIAGNOSIS — N18.31 ANEMIA OF CHRONIC RENAL FAILURE, STAGE 3A: ICD-10-CM

## 2024-03-05 DIAGNOSIS — N18.4 CKD (CHRONIC KIDNEY DISEASE), STAGE IV: Primary | ICD-10-CM

## 2024-03-05 PROCEDURE — 1101F PT FALLS ASSESS-DOCD LE1/YR: CPT | Mod: CPTII,S$GLB,, | Performed by: INTERNAL MEDICINE

## 2024-03-05 PROCEDURE — 1159F MED LIST DOCD IN RCRD: CPT | Mod: CPTII,S$GLB,, | Performed by: INTERNAL MEDICINE

## 2024-03-05 PROCEDURE — 3288F FALL RISK ASSESSMENT DOCD: CPT | Mod: CPTII,S$GLB,, | Performed by: INTERNAL MEDICINE

## 2024-03-05 PROCEDURE — 99214 OFFICE O/P EST MOD 30 MIN: CPT | Mod: S$GLB,,, | Performed by: INTERNAL MEDICINE

## 2024-03-05 PROCEDURE — 99999 PR PBB SHADOW E&M-EST. PATIENT-LVL III: CPT | Mod: PBBFAC,,, | Performed by: INTERNAL MEDICINE

## 2024-03-05 PROCEDURE — 1126F AMNT PAIN NOTED NONE PRSNT: CPT | Mod: CPTII,S$GLB,, | Performed by: INTERNAL MEDICINE

## 2024-03-05 NOTE — PROGRESS NOTES
Progress Note  Nephrology      Referring physician: Brigida Wall MD    Reason for visit: CKD     SUBJECTIVE:   88 y.o. male  has a past medical history of Martinez esophagus, GERD (gastroesophageal reflux disease), Hyperlipemia, Hypertension, and Obesity. who has been following up in renal clinic for ckd management   The patient denies taking NSAIDs or new antibiotics, recreational drugs, recent episode of dehydration, diarrhea, nausea or vomiting, acute illness, hospitalization or exposure to IV radiocontrast.     ROS:  General: negative for chills, or fatigue  ENT: No epistaxis or headaches  Hematological and Lymphatic: No bleeding problems or blood clots.  Endocrine: No skin changes or temperature intolerance  Respiratory: No cough, shortness of breath, or wheezing  Cardiovascular: No chest pain or dyspnea   Gastrointestinal: No abdominal pain, change in bowel habits  Genito-Urinary: No dysuria, trouble voiding, or hematuria  Musculoskeletal: ROS: negative for - joint pain, joint stiffness, joint swelling, muscle pain or muscular weakness  Neurological: No focal weakness, no numbness  Dermatological: No rash or ulcers    OBJECTIVE:     Vitals:    03/05/24 1329   BP: 136/78   Pulse: 63   Resp: 18          Physical Exam:  General: no distress, well nourished  HENT: PERRLA, Normal mouth nose and ears.  Neck: no JVD and thyroid not enlarged, symmetric, no tenderness/mass/nodules  Lungs: clear to auscultation bilaterally and normal respiratory effort  Cardiovascular: regular rate and rhythm, S1, S2 normal, no murmur, click, rub or gallop.   Abdomen: soft, non-tender non-distented; bowel sounds normal  Skin: No rashes or lesions  Musculoskeletal:no edema in LE, no deformities.   Lymph Nodes: No cervical or supraclavicular adenopathy  Neurologic: Normal strength and tone. No focal numbness or weakness          Medications:    Current Outpatient Medications:     acetaminophen (TYLENOL) 500 MG tablet, Take 1,000 mg by  mouth every 6 (six) hours as needed for Pain., Disp: , Rfl:     amLODIPine (NORVASC) 10 MG tablet, Take 1 tablet (10 mg total) by mouth every morning., Disp: 90 tablet, Rfl: 3    amoxicillin (AMOXIL) 500 MG capsule, Take 500 mg by mouth 3 (three) times daily., Disp: , Rfl:     atorvastatin (LIPITOR) 10 MG tablet, Take 1 tablet (10 mg total) by mouth every evening., Disp: 90 tablet, Rfl: 3    diclofenac sodium (VOLTAREN) 1 % Gel, APPLY 2 GRAMS TOPICALLY TO THE AFFECTED AREA TWICE DAILY, Disp: , Rfl:     FLUAD QUAD 2020-21,65Y UP,,PF, 60 mcg (15 mcg x 4)/0.5 mL Syrg, ADM 0.5ML IM UTD, Disp: , Rfl:     losartan (COZAAR) 50 MG tablet, Take 1 tablet (50 mg total) by mouth once daily., Disp: 90 tablet, Rfl: 2    pantoprazole (PROTONIX) 40 MG tablet, TAKE 1 TABLET BY MOUTH TWICE DAILY, Disp: 90 tablet, Rfl: 1    pilocarpine (SALAGEN) 5 MG Tab, TAKE 1 TO 2 TABLETS BY MOUTH 3-4 TIMES A DAY. MAX OF 10 MG THREE TIMES DAILY, Disp: , Rfl:     sodium bicarbonate 650 MG tablet, TAKE 1 TABLET(650 MG) BY MOUTH TWICE DAILY, Disp: 180 tablet, Rfl: 3         Laboratory:  Lab Results   Component Value Date    CREATININE 2.1 (H) 02/26/2024       Prot/Creat Ratio, Urine   Date Value Ref Range Status   02/26/2024 0.12 0.00 - 0.20 Final   09/18/2023 0.10 0.00 - 0.20 Final   04/06/2022 0.05 0.00 - 0.20 Final       Lab Results   Component Value Date     02/26/2024    K 4.2 02/26/2024    CO2 20 (L) 02/26/2024       last PTH   Lab Results   Component Value Date    CALCIUM 9.7 02/26/2024    PHOS 2.7 11/25/2022       Lab Results   Component Value Date    HGB 12.8 (L) 02/26/2024        Lab Results   Component Value Date    HGBA1C 5.5 09/29/2021       Lab Results   Component Value Date    LDLCALC 85.6 10/16/2023       Other Labs were reviewed      ASSESSMENT/PLAN:     1- CKD IV : likely 2/2 HTN , improving    - scr 2.1 and GFR 30  -UA unremarkable   -Avoid NSAIDs intake        2-HTN ; controlled   -Continue current BP meds regimen     3- h/o  prostate cancer:   - managed by urology      4-metabolic acidosis : improved    po bicarb      5-AOCD: hbg 12 improved           RTC in 6-8m      SUZY IBARRA MD  NEPHROLOGY ATTENDING

## 2024-03-06 ENCOUNTER — TELEPHONE (OUTPATIENT)
Dept: NEPHROLOGY | Facility: CLINIC | Age: 89
End: 2024-03-06
Payer: COMMERCIAL

## 2024-03-13 ENCOUNTER — TELEPHONE (OUTPATIENT)
Dept: HEMATOLOGY/ONCOLOGY | Facility: CLINIC | Age: 89
End: 2024-03-13
Payer: COMMERCIAL

## 2024-03-13 NOTE — TELEPHONE ENCOUNTER
----- Message from Miriam Argueta sent at 3/12/2024 11:48 AM CDT -----  Contact: [phone]  [email or web address]     Patient is calling for Medical Advice regarding: appt he has appts for lab on 04/19 and also on 04/22     How long has patient had these symptoms:    Pharmacy name and phone#:    Would like response via LAFASOt: no     Comments:  Pt is calling he is confused as to where these appt came from please give return call and explain these appts to him      [FreeTextEntry1] : JOSÉ COMPLIANT AND BENEFITING SOB ON EXERTION SP COVID/ D DIMER RA ON PREDNISONE

## 2024-04-19 ENCOUNTER — LAB VISIT (OUTPATIENT)
Dept: LAB | Facility: HOSPITAL | Age: 89
End: 2024-04-19
Payer: COMMERCIAL

## 2024-04-19 DIAGNOSIS — D50.9 IRON DEFICIENCY ANEMIA, UNSPECIFIED IRON DEFICIENCY ANEMIA TYPE: ICD-10-CM

## 2024-04-19 LAB
BASOPHILS # BLD AUTO: 0.04 K/UL (ref 0–0.2)
BASOPHILS NFR BLD: 0.5 % (ref 0–1.9)
DIFFERENTIAL METHOD BLD: ABNORMAL
EOSINOPHIL # BLD AUTO: 0.6 K/UL (ref 0–0.5)
EOSINOPHIL NFR BLD: 7.1 % (ref 0–8)
ERYTHROCYTE [DISTWIDTH] IN BLOOD BY AUTOMATED COUNT: 14.3 % (ref 11.5–14.5)
FERRITIN SERPL-MCNC: 249 NG/ML (ref 20–300)
HCT VFR BLD AUTO: 41.6 % (ref 40–54)
HGB BLD-MCNC: 12.8 G/DL (ref 14–18)
IMM GRANULOCYTES # BLD AUTO: 0.03 K/UL (ref 0–0.04)
IMM GRANULOCYTES NFR BLD AUTO: 0.4 % (ref 0–0.5)
IRON SERPL-MCNC: 53 UG/DL (ref 45–160)
LYMPHOCYTES # BLD AUTO: 2.5 K/UL (ref 1–4.8)
LYMPHOCYTES NFR BLD: 31.5 % (ref 18–48)
MCH RBC QN AUTO: 26.3 PG (ref 27–31)
MCHC RBC AUTO-ENTMCNC: 30.8 G/DL (ref 32–36)
MCV RBC AUTO: 85 FL (ref 82–98)
MONOCYTES # BLD AUTO: 1 K/UL (ref 0.3–1)
MONOCYTES NFR BLD: 13.1 % (ref 4–15)
NEUTROPHILS # BLD AUTO: 3.7 K/UL (ref 1.8–7.7)
NEUTROPHILS NFR BLD: 47.4 % (ref 38–73)
NRBC BLD-RTO: 0 /100 WBC
PLATELET # BLD AUTO: 239 K/UL (ref 150–450)
PMV BLD AUTO: 10.2 FL (ref 9.2–12.9)
RBC # BLD AUTO: 4.87 M/UL (ref 4.6–6.2)
SATURATED IRON: 19 % (ref 20–50)
TOTAL IRON BINDING CAPACITY: 277 UG/DL (ref 250–450)
TRANSFERRIN SERPL-MCNC: 187 MG/DL (ref 200–375)
WBC # BLD AUTO: 7.8 K/UL (ref 3.9–12.7)

## 2024-04-19 PROCEDURE — 36415 COLL VENOUS BLD VENIPUNCTURE: CPT | Mod: PN | Performed by: NURSE PRACTITIONER

## 2024-04-19 PROCEDURE — 82728 ASSAY OF FERRITIN: CPT | Performed by: NURSE PRACTITIONER

## 2024-04-19 PROCEDURE — 85025 COMPLETE CBC W/AUTO DIFF WBC: CPT | Performed by: NURSE PRACTITIONER

## 2024-04-19 PROCEDURE — 83540 ASSAY OF IRON: CPT | Performed by: NURSE PRACTITIONER

## 2024-04-22 ENCOUNTER — OFFICE VISIT (OUTPATIENT)
Dept: HEMATOLOGY/ONCOLOGY | Facility: CLINIC | Age: 89
End: 2024-04-22
Payer: COMMERCIAL

## 2024-04-22 VITALS
HEART RATE: 64 BPM | OXYGEN SATURATION: 99 % | DIASTOLIC BLOOD PRESSURE: 68 MMHG | SYSTOLIC BLOOD PRESSURE: 169 MMHG | TEMPERATURE: 99 F | BODY MASS INDEX: 28.71 KG/M2 | WEIGHT: 200.06 LBS

## 2024-04-22 DIAGNOSIS — D50.9 IRON DEFICIENCY ANEMIA, UNSPECIFIED IRON DEFICIENCY ANEMIA TYPE: Primary | ICD-10-CM

## 2024-04-22 PROCEDURE — 99203 OFFICE O/P NEW LOW 30 MIN: CPT | Mod: S$GLB,,, | Performed by: INTERNAL MEDICINE

## 2024-04-22 PROCEDURE — 1101F PT FALLS ASSESS-DOCD LE1/YR: CPT | Mod: CPTII,S$GLB,, | Performed by: INTERNAL MEDICINE

## 2024-04-22 PROCEDURE — 1160F RVW MEDS BY RX/DR IN RCRD: CPT | Mod: CPTII,S$GLB,, | Performed by: INTERNAL MEDICINE

## 2024-04-22 PROCEDURE — 99999 PR PBB SHADOW E&M-EST. PATIENT-LVL III: CPT | Mod: PBBFAC,,, | Performed by: INTERNAL MEDICINE

## 2024-04-22 PROCEDURE — 3288F FALL RISK ASSESSMENT DOCD: CPT | Mod: CPTII,S$GLB,, | Performed by: INTERNAL MEDICINE

## 2024-04-22 PROCEDURE — 1126F AMNT PAIN NOTED NONE PRSNT: CPT | Mod: CPTII,S$GLB,, | Performed by: INTERNAL MEDICINE

## 2024-04-22 PROCEDURE — 1159F MED LIST DOCD IN RCRD: CPT | Mod: CPTII,S$GLB,, | Performed by: INTERNAL MEDICINE

## 2024-04-22 NOTE — PROGRESS NOTES
HEMATOLOGY/ONCOLOGY NEW PATIENT CONSULT NOTE:    PATIENT: Zion Vasquez  MRN: [id number]  DATE: 4/22/2024  Diagnosis:   No diagnosis found.      Chief Complaint: No chief complaint on file.    Subjective:    Initial History: Mr. Vasquez is a 88 y.o. male who presents as a new patient consult for PAIGE.   Duration of anemia: Unsure. 6 years before his pcp initiated po supplements then stopped, Never received iron infusions  Diet: 2 meals/day well balanced    Bleeding: No active bleeding   GI: H/o Martinez esophagus  Colonoscopy: 11/30/22 - diverticulosis   Family history of blood disorder: N/a  Surgical History: n/a  Alcohol use: occasionally    Interval History: Presents for PAIGE follow up. Received 2 dose of injectafer May 2023 with good effect. Hgb remains near normal at 12.8 and iron parameters including ferritin remain normal. Complains of dry mouth and polyuria at night. Following with Urology for this. Presents with his son.       Past Medical History:   Past Medical History:   Diagnosis Date    Martinez esophagus     GERD (gastroesophageal reflux disease)     Hyperlipemia     Hypertension     Obesity      Past Surgical HIstory:   Past Surgical History:   Procedure Laterality Date    COLONOSCOPY N/A 11/25/2022    Procedure: COLONOSCOPY;  Surgeon: Smith Alvarez MD;  Location: [address]);  Service: Endoscopy;  Laterality: N/A;    COLONOSCOPY N/A 11/30/2022    Procedure: COLONOSCOPY;  Surgeon: Smith Alvarez MD;  Location: [address]);  Service: Endoscopy;  Laterality: N/A;     Family History:   Family History   Problem Relation Name Age of Onset    Cancer Mother          ? ovarian    No Known Problems Father      No Known Problems Sister      No Known Problems Brother half     No Known Problems Sister       Social History:  reports that he has never smoked. He has never used smokeless tobacco. He reports current alcohol use. He reports that he does not use drugs.  Allergies:  Review of  patient's allergies indicates:  No Known Allergies  Medications:  Current Outpatient Medications   Medication Sig Dispense Refill    acetaminophen (TYLENOL) 500 MG tablet Take 1,000 mg by mouth every 6 (six) hours as needed for Pain.      amLODIPine (NORVASC) 10 MG tablet TAKE 1 TABLET(10 MG) BY MOUTH EVERY MORNING 90 tablet 2    amoxicillin (AMOXIL) 500 MG capsule Take 500 mg by mouth 3 (three) times daily.      atorvastatin (LIPITOR) 10 MG tablet Take 1 tablet (10 mg total) by mouth every evening. 90 tablet 3    diclofenac sodium (VOLTAREN) 1 % Gel APPLY 2 GRAMS TOPICALLY TO THE AFFECTED AREA TWICE DAILY      losartan (COZAAR) 50 MG tablet Take 1 tablet (50 mg total) by mouth once daily. 90 tablet 2    pantoprazole (PROTONIX) 40 MG tablet TAKE 1 TABLET BY MOUTH TWICE DAILY 90 tablet 1    pilocarpine (SALAGEN) 5 MG Tab TAKE 1 TO 2 TABLETS BY MOUTH 3-4 TIMES A DAY. MAX OF 10 MG THREE TIMES DAILY      sodium bicarbonate 650 MG tablet TAKE 1 TABLET(650 MG) BY MOUTH TWICE DAILY 180 tablet 3     No current facility-administered medications for this visit.     Review of Systems   Constitutional:  Negative for activity change, appetite change, fatigue and fever.   HENT:  Negative for congestion, facial swelling, mouth sores, nosebleeds, sore throat and trouble swallowing.    Eyes:  Negative for discharge and visual disturbance.   Respiratory:  Negative for cough, shortness of breath and wheezing.    Cardiovascular:  Negative for chest pain and leg swelling.   Gastrointestinal:  Negative for abdominal distention, abdominal pain, anal bleeding, blood in stool, diarrhea, nausea and vomiting.   Genitourinary:  Negative for difficulty urinating, dysuria and hematuria.   Musculoskeletal:  Negative for arthralgias, gait problem and joint swelling.   Neurological:  Negative for speech difficulty, weakness and headaches.   Hematological:  Negative for adenopathy.   Psychiatric/Behavioral:  Negative for agitation, behavioral  problems and confusion.          Objective:      Vitals:   Vitals:    04/22/24 0914   BP: (!) 169/68   BP Location: Left arm   Patient Position: Sitting   BP Method: Medium (Automatic)   Pulse: 64   Temp: 98.7 °F (37.1 °C)   SpO2: 99%   Weight: 90.8 kg (200 lb 1.1 oz)       BMI: Body mass index is 28.71 kg/m².  Physical Exam  Constitutional:       Appearance: He is well-developed.   HENT:      Head: Normocephalic and atraumatic.      Mouth/Throat:      Mouth: Mucous membranes are moist. No oral lesions.      Pharynx: Oropharynx is clear.   Eyes:      Conjunctiva/sclera: Conjunctivae normal.   Cardiovascular:      Rate and Rhythm: Normal rate and regular rhythm.      Heart sounds: Normal heart sounds.   Pulmonary:      Effort: No respiratory distress.      Breath sounds: Normal breath sounds.   Abdominal:      General: Bowel sounds are normal.      Palpations: Abdomen is soft.   Musculoskeletal:         General: Normal range of motion.      Cervical back: Normal range of motion.      Comments: Unsteady gait   Skin:     General: Skin is warm and dry.   Neurological:      Mental Status: He is alert and oriented to person, place, and time.   Psychiatric:         Behavior: Behavior normal.         Thought Content: Thought content normal.         Judgment: Judgment normal.         Laboratory Data:  Lab Visit on 04/19/2024   Component Date Value Ref Range Status    WBC 04/19/2024 7.80  3.90 - 12.70 K/uL Final    RBC 04/19/2024 4.87  4.60 - 6.20 M/uL Final    Hemoglobin 04/19/2024 12.8 (L)  14.0 - 18.0 g/dL Final    Hematocrit 04/19/2024 41.6  40.0 - 54.0 % Final    MCV 04/19/2024 85  82 - 98 fL Final    MCH 04/19/2024 26.3 (L)  27.0 - 31.0 pg Final    MCHC 04/19/2024 30.8 (L)  32.0 - 36.0 g/dL Final    RDW 04/19/2024 14.3  11.5 - 14.5 % Final    Platelets 04/19/2024 239  150 - 450 K/uL Final    MPV 04/19/2024 10.2  9.2 - 12.9 fL Final    Immature Granulocytes 04/19/2024 0.4  0.0 - 0.5 % Final    Gran # (ANC) 04/19/2024 3.7   1.8 - 7.7 K/uL Final    Immature Grans (Abs) 04/19/2024 0.03  0.00 - 0.04 K/uL Final    Comment: Mild elevation in immature granulocytes is non specific and   can be seen in a variety of conditions including stress response,   acute inflammation, trauma and pregnancy. Correlation with other   laboratory and clinical findings is essential.      Lymph # 04/19/2024 2.5  1.0 - 4.8 K/uL Final    Mono # 04/19/2024 1.0  0.3 - 1.0 K/uL Final    Eos # 04/19/2024 0.6 (H)  0.0 - 0.5 K/uL Final    Baso # 04/19/2024 0.04  0.00 - 0.20 K/uL Final    nRBC 04/19/2024 0  0 /100 WBC Final    Gran % 04/19/2024 47.4  38.0 - 73.0 % Final    Lymph % 04/19/2024 31.5  18.0 - 48.0 % Final    Mono % 04/19/2024 13.1  4.0 - 15.0 % Final    Eosinophil % 04/19/2024 7.1  0.0 - 8.0 % Final    Basophil % 04/19/2024 0.5  0.0 - 1.9 % Final    Differential Method 04/19/2024 Automated   Final    Ferritin 04/19/2024 249  20.0 - 300.0 ng/mL Final    Iron 04/19/2024 53  45 - 160 ug/dL Final    Transferrin 04/19/2024 187 (L)  200 - 375 mg/dL Final    TIBC 04/19/2024 277  250 - 450 ug/dL Final    Saturated Iron 04/19/2024 19 (L)  20 - 50 % Final        Assessment:       No diagnosis found.         Plan:   - Prior iron levels consistent with PAIGE.   - Colonoscopy wnl, Hx of duran no GI f/u. Previously referred for possible EGD .   - Intolerant to po iron. Received 2 unit of injectafer, tolerated well. He had good response with treatment. Recommended Geritol MVI for supplemental iron.  - CBC and iron parameters remain stable    BMT Chart Routing      Follow up with physician 6 months.   Follow up with WILLIAM    Provider visit type Benign hem   Infusion scheduling note    Injection scheduling note    Labs CBC, ferritin and iron and TIBC   Scheduling:  Preferred lab:  Lab interval:  labs 1 week before visit   Imaging    Pharmacy appointment    Other referrals              A total of 20 minutes was spent in pre-visit chart review, personal interpretation of labs  and imaging, and medication review. Total visit time 30 minutes, >50 % counseling.

## 2024-05-20 DIAGNOSIS — I10 PRIMARY HYPERTENSION: ICD-10-CM

## 2024-05-20 RX ORDER — LOSARTAN POTASSIUM 50 MG/1
50 TABLET ORAL
Qty: 90 TABLET | Refills: 0 | Status: SHIPPED | OUTPATIENT
Start: 2024-05-20 | End: 2024-05-27 | Stop reason: SDUPTHER

## 2024-05-20 NOTE — TELEPHONE ENCOUNTER
Refill Routing Note   Medication(s) are not appropriate for processing by [facility] for the following reason(s):        Required vitals abnormal  Required labs abnormal    [facility] action(s):  Defer   Requires labs : Yes             Appointments  past 12m or future 3m with PCP    Date Provider   Last Visit   10/16/2023 Brigida Wall MD   Next Visit   6/24/2024 Brigida Wall MD   ED visits in past 90 days: 0        Note composed:4:24 PM 05/20/2024

## 2024-05-20 NOTE — TELEPHONE ENCOUNTER
Care Due:                  Date            Visit Type   Department     Provider  --------------------------------------------------------------------------------                                EP -                              PRIMARY      [facility]  Last Visit: 10-      CARE ([facility])   CARE           Brigida Wall                              EP -                              PRIMARY      [facility] INTERNAL  Next Visit: 06-      CARE ([facility])   MEDICINE       Brigida Wall                                                            Last  Test          Frequency    Reason                     Performed    Due Date  --------------------------------------------------------------------------------    CMP.........  12 months..  atorvastatin.............  04- 04-    [employer] Embedded Care Due Messages. Reference number: [id number].   5/20/2024 5:16:13 AM CDT

## 2024-05-27 DIAGNOSIS — I10 PRIMARY HYPERTENSION: ICD-10-CM

## 2024-05-27 RX ORDER — LOSARTAN POTASSIUM 50 MG/1
50 TABLET ORAL DAILY
Qty: 90 TABLET | Refills: 0 | Status: SHIPPED | OUTPATIENT
Start: 2024-05-27

## 2024-05-27 NOTE — TELEPHONE ENCOUNTER
No care due was identified.  [facility] Embedded Care Due Messages. Reference number: [id number].   5/27/2024 9:11:39 AM CDT

## 2024-06-07 DIAGNOSIS — K44.9 HIATAL HERNIA: ICD-10-CM

## 2024-06-07 NOTE — TELEPHONE ENCOUNTER
No care due was identified.  [facility] Embedded Care Due Messages. Reference number: [id number].   6/07/2024 2:43:37 PM CDT

## 2024-06-07 NOTE — TELEPHONE ENCOUNTER
Refill Routing Note   Medication(s) are not appropriate for processing by [facility] for the following reason(s):        Outside of protocol    [facility] action(s):  Route        Medication Therapy Plan: PPI dose outside of [facility] protocol      Appointments  past 12m or future 3m with PCP    Date Provider   Last Visit   10/16/2023 Brigida Wall MD   Next Visit   6/24/2024 Brigida Wall MD   ED visits in past 90 days: 0        Note composed:5:50 PM 06/07/2024

## 2024-06-09 DIAGNOSIS — K44.9 HIATAL HERNIA: ICD-10-CM

## 2024-06-09 NOTE — TELEPHONE ENCOUNTER
No care due was identified.  [facility] Embedded Care Due Messages. Reference number: [id number].   6/09/2024 2:50:04 PM CDT

## 2024-06-10 ENCOUNTER — PATIENT OUTREACH (OUTPATIENT)
Dept: ADMINISTRATIVE | Facility: HOSPITAL | Age: 89
End: 2024-06-10
Payer: COMMERCIAL

## 2024-06-10 RX ORDER — PANTOPRAZOLE SODIUM 40 MG/1
40 TABLET, DELAYED RELEASE ORAL 2 TIMES DAILY
Qty: 180 TABLET | Refills: 1 | Status: SHIPPED | OUTPATIENT
Start: 2024-06-10

## 2024-06-10 RX ORDER — PANTOPRAZOLE SODIUM 40 MG/1
40 TABLET, DELAYED RELEASE ORAL 2 TIMES DAILY
Qty: 90 TABLET | Refills: 1 | Status: SHIPPED | OUTPATIENT
Start: 2024-06-10

## 2024-06-10 NOTE — TELEPHONE ENCOUNTER
Refill Routing Note   Medication(s) are not appropriate for processing by [facility] for the following reason(s):        Outside of protocol    [facility] action(s):  Route        Medication Therapy Plan: 40 mg twice daily dose outside refill center protocol      Appointments  past 12m or future 3m with PCP    Date Provider   Last Visit   10/16/2023 Brigida Wall MD   Next Visit   6/24/2024 Brigida Wall MD   ED visits in past 90 days: 0        Note composed:6:32 AM 06/10/2024

## 2024-06-24 ENCOUNTER — OFFICE VISIT (OUTPATIENT)
Dept: INTERNAL MEDICINE | Facility: CLINIC | Age: 89
End: 2024-06-24
Payer: COMMERCIAL

## 2024-06-24 VITALS
BODY MASS INDEX: 27.63 KG/M2 | OXYGEN SATURATION: 97 % | DIASTOLIC BLOOD PRESSURE: 66 MMHG | HEART RATE: 58 BPM | SYSTOLIC BLOOD PRESSURE: 98 MMHG | HEIGHT: 70 IN | WEIGHT: 193 LBS

## 2024-06-24 DIAGNOSIS — Z85.46 HISTORY OF PROSTATE CANCER: ICD-10-CM

## 2024-06-24 DIAGNOSIS — I10 BENIGN ESSENTIAL HTN: Primary | ICD-10-CM

## 2024-06-24 DIAGNOSIS — Z87.19 HISTORY OF GI BLEED: ICD-10-CM

## 2024-06-24 DIAGNOSIS — E66.09 CLASS 1 OBESITY DUE TO EXCESS CALORIES WITH SERIOUS COMORBIDITY AND BODY MASS INDEX (BMI) OF 31.0 TO 31.9 IN ADULT: ICD-10-CM

## 2024-06-24 DIAGNOSIS — K57.30 DIVERTICULOSIS OF COLON: ICD-10-CM

## 2024-06-24 DIAGNOSIS — E78.5 HYPERLIPIDEMIA, UNSPECIFIED HYPERLIPIDEMIA TYPE: ICD-10-CM

## 2024-06-24 DIAGNOSIS — E61.1 IRON DEFICIENCY: ICD-10-CM

## 2024-06-24 DIAGNOSIS — K21.9 GASTROESOPHAGEAL REFLUX DISEASE WITHOUT ESOPHAGITIS: ICD-10-CM

## 2024-06-24 DIAGNOSIS — N18.32 STAGE 3B CHRONIC KIDNEY DISEASE: ICD-10-CM

## 2024-06-24 PROCEDURE — 1101F PT FALLS ASSESS-DOCD LE1/YR: CPT | Mod: CPTII,S$GLB,, | Performed by: INTERNAL MEDICINE

## 2024-06-24 PROCEDURE — 1159F MED LIST DOCD IN RCRD: CPT | Mod: CPTII,S$GLB,, | Performed by: INTERNAL MEDICINE

## 2024-06-24 PROCEDURE — G2211 COMPLEX E/M VISIT ADD ON: HCPCS | Mod: S$GLB,,, | Performed by: INTERNAL MEDICINE

## 2024-06-24 PROCEDURE — 3288F FALL RISK ASSESSMENT DOCD: CPT | Mod: CPTII,S$GLB,, | Performed by: INTERNAL MEDICINE

## 2024-06-24 PROCEDURE — 99214 OFFICE O/P EST MOD 30 MIN: CPT | Mod: S$GLB,,, | Performed by: INTERNAL MEDICINE

## 2024-06-24 PROCEDURE — 1126F AMNT PAIN NOTED NONE PRSNT: CPT | Mod: CPTII,S$GLB,, | Performed by: INTERNAL MEDICINE

## 2024-06-24 PROCEDURE — 1160F RVW MEDS BY RX/DR IN RCRD: CPT | Mod: CPTII,S$GLB,, | Performed by: INTERNAL MEDICINE

## 2024-06-24 PROCEDURE — 99999 PR PBB SHADOW E&M-EST. PATIENT-LVL IV: CPT | Mod: PBBFAC,,, | Performed by: INTERNAL MEDICINE

## 2024-06-24 NOTE — PROGRESS NOTES
First cycle of chemo today.   Subjective:      Patient ID: Zion Vasquez is a 88 y.o. male.    Chief Complaint: Follow-up (6 mo)      Zion Vasquez is a 88 y.o. male with chronic conditions significant for HTN, HLD, hx of GIB, GERD, Martinez's esophagus, hiatal hernia, diverticulosis, IH, CKD3, hx of prostate CA, obesity, iron deficiency anemia   Presenting today for follow up. Date of last annual is 10/16/2023    Hypertension  He tolerates amlodipine and losartan. No adverse events. He is adherent to medication. Office BP is 98/66 . No CP/SOB/lightheadedness/dizziness/palpitations.      Hyperlipidemia  No acute concerns. He is on atorvastatin without any side effects.     Hx of lower GI bleeding and Anemia on 1/2/2019  Hospital Course: At that time admitted for hematochezia. EGD showed Martinez's as well as hiatal hernia. Colonoscopy at that time showed 6mm polyp that was excised and diverticula.  He continues on Protonix. No further bleeding episodes reported.     Follows with hematology for iron infusion for iron deficiency anemia.      GERD, Hx of Martinez's esophagus, hiatal hernia  No alarming symptoms at this time     Diverticulosis of colon  No new bleeding episodes or signs of infection     Internal hemorrhoids  No acute bleeding events     CKD, stage 4  He follows with Nephrologist and was started on sodium bicarbonate for metabolic acidosis. Kidney function stable. Avoid NSAIDs.     Hx of Prostate cancer in 8/2018.  He plans to continue seeing Dr. Ward, Urology. Last PSA within normal limits     Obesity  Working on weight loss goals.    Visit today is associated with current or anticipated ongoing medical care related to this patient's single serious condition/complex condition of CKD4,PAIGE, hx prostate CA, hx of GIB, HTN, HLD. The patient will return to see me as these issues will be followed longitudinally.    Denies any chest pain, shortness of breath, nausea vomiting constipation diarrhea, blood in stool, heartburn    Review  of Systems   Constitutional:  Negative for chills, fever and weight loss.   HENT:  Negative for congestion, ear pain and sore throat.    Eyes:  Negative for double vision.   Respiratory:  Negative for cough and shortness of breath.    Cardiovascular:  Negative for chest pain, palpitations and leg swelling.   Gastrointestinal:  Negative for abdominal pain, heartburn, nausea and vomiting.   Skin:  Negative for rash.   Neurological:  Negative for dizziness, tingling and headaches.   Psychiatric/Behavioral:  Negative for depression.           Current Outpatient Medications:     acetaminophen (TYLENOL) 500 MG tablet, Take 1,000 mg by mouth every 6 (six) hours as needed for Pain., Disp: , Rfl:     amLODIPine (NORVASC) 10 MG tablet, TAKE 1 TABLET(10 MG) BY MOUTH EVERY MORNING, Disp: 90 tablet, Rfl: 2    amoxicillin (AMOXIL) 500 MG capsule, Take 500 mg by mouth 3 (three) times daily., Disp: , Rfl:     atorvastatin (LIPITOR) 10 MG tablet, Take 1 tablet (10 mg total) by mouth every evening., Disp: 90 tablet, Rfl: 3    diclofenac sodium (VOLTAREN) 1 % Gel, APPLY 2 GRAMS TOPICALLY TO THE AFFECTED AREA TWICE DAILY, Disp: , Rfl:     losartan (COZAAR) 50 MG tablet, Take 1 tablet (50 mg total) by mouth once daily., Disp: 90 tablet, Rfl: 0    pantoprazole (PROTONIX) 40 MG tablet, TAKE 1 TABLET BY MOUTH TWICE DAILY, Disp: 90 tablet, Rfl: 1    pantoprazole (PROTONIX) 40 MG tablet, TAKE 1 TABLET(40 MG) BY MOUTH TWICE DAILY, Disp: 180 tablet, Rfl: 1    pilocarpine (SALAGEN) 5 MG Tab, TAKE 1 TO 2 TABLETS BY MOUTH 3-4 TIMES A DAY. MAX OF 10 MG THREE TIMES DAILY, Disp: , Rfl:     sodium bicarbonate 650 MG tablet, TAKE 1 TABLET(650 MG) BY MOUTH TWICE DAILY, Disp: 180 tablet, Rfl: 3    Lab Results   Component Value Date    HGBA1C 5.5 09/29/2021     Lab Results   Component Value Date    MICALBCREAT 32.5 (H) 09/29/2021     Lab Results   Component Value Date    LDLCALC 85.6 10/16/2023    LDLCALC 81.4 10/06/2022    CHOL 159 10/16/2023    HDL 64  "10/16/2023    TRIG 47 10/16/2023       Lab Results   Component Value Date     02/26/2024    K 4.2 02/26/2024     02/26/2024    CO2 20 (L) 02/26/2024     (H) 02/26/2024    BUN 14 02/26/2024    CREATININE 2.1 (H) 02/26/2024    CALCIUM 9.7 02/26/2024    PROT 7.3 04/06/2023    ALBUMIN 3.6 04/06/2023    BILITOT 0.8 04/06/2023    ALKPHOS 74 04/06/2023    AST 21 04/06/2023    ALT 8 (L) 04/06/2023    ANIONGAP 10 02/26/2024    ESTGFRAFRICA 48.7 (A) 06/14/2022    EGFRNONAA 42.1 (A) 06/14/2022    WBC 7.80 04/19/2024    HGB 12.8 (L) 04/19/2024    HGB 12.8 (L) 02/26/2024    HCT 41.6 04/19/2024    MCV 85 04/19/2024     04/19/2024    TSH 1.223 10/16/2023    PSA <0.01 10/16/2023    PSA 0.01 09/29/2021    PSADIAG <0.01 10/06/2022       Lab Results   Component Value Date    FERRITIN 249 04/19/2024    IRON 53 04/19/2024    TRANSFERRIN 187 (L) 04/19/2024    TIBC 277 04/19/2024    FESATURATED 19 (L) 04/19/2024         Past Medical History:   Diagnosis Date    Martinez esophagus     GERD (gastroesophageal reflux disease)     Hyperlipemia     Hypertension     Obesity      Past Surgical History:   Procedure Laterality Date    COLONOSCOPY N/A 11/25/2022    Procedure: COLONOSCOPY;  Surgeon: Smith Alvarez MD;  Location: [address];  Service: Endoscopy;  Laterality: N/A;    COLONOSCOPY N/A 11/30/2022    Procedure: COLONOSCOPY;  Surgeon: Smith Alvarez MD;  Location: [facility] [address]);  Service: Endoscopy;  Laterality: N/A;     Social History     Social History Narrative    Not on file     Family History   Problem Relation Name Age of Onset    Cancer Mother          ? ovarian    No Known Problems Father      No Known Problems Sister      No Known Problems Brother half     No Known Problems Sister       Vitals:    06/24/24 0923   BP: 98/66   Pulse: (!) 58   SpO2: 97%   Weight: 87.5 kg (193 lb 0.2 oz)   Height: 5' 10" (1.778 m)   PainSc: 0-No pain     Objective:   Physical Exam  Vitals and nursing note " reviewed.   Constitutional:       Appearance: Normal appearance.   HENT:      Head: Normocephalic and atraumatic.      Right Ear: Tympanic membrane, ear canal and external ear normal.      Left Ear: Tympanic membrane, ear canal and external ear normal.      Nose: Nose normal.      Mouth/Throat:      Mouth: Mucous membranes are moist.      Pharynx: Oropharynx is clear.   Eyes:      Extraocular Movements: Extraocular movements intact.      Pupils: Pupils are equal, round, and reactive to light.   Cardiovascular:      Rate and Rhythm: Normal rate and regular rhythm.      Pulses: Normal pulses.      Heart sounds: Normal heart sounds.   Pulmonary:      Breath sounds: Normal breath sounds.   Abdominal:      General: Bowel sounds are normal.      Palpations: Abdomen is soft.   Musculoskeletal:      Cervical back: Normal range of motion and neck supple.   Skin:     General: Skin is warm.   Neurological:      General: No focal deficit present.      Mental Status: He is alert and oriented to person, place, and time.       Assessment/Plan     Zion Vasquez is a 88 y.o.male with:    Benign essential HTN  -     Lipid Panel; Future; Expected date: 06/24/2024  -     Hemoglobin A1C; Future; Expected date: 06/24/2024  -     Basic Metabolic Panel; Future; Expected date: 06/24/2024    Stage 3b chronic kidney disease  -     Hemoglobin A1C; Future; Expected date: 06/24/2024  -     Basic Metabolic Panel; Future; Expected date: 06/24/2024    Iron deficiency  - Stable. Continue current management, monitor.    Hyperlipidemia, unspecified hyperlipidemia type  -     Lipid Panel; Future; Expected date: 06/24/2024    History of prostate cancer  - Stable. Continue current management, monitor.    Gastroesophageal reflux disease without esophagitis  - Stable. Continue current management, monitor.    Diverticulosis of colon  - Stable. Continue current management, monitor.    History of GI bleed  - Stable. Continue current management,  monitor.    Class 1 obesity due to excess calories with serious comorbidity and body mass index (BMI) of 31.0 to 31.9 in adult  - Discussed healthy BMI, goal weight.  Recommend diet/exercise and health lifestyle choices.        Chronic conditions status updated as per HPI.  Other than changes above, cont current medications and maintain follow up with specialists.  Return to clinic in Follow up in about 6 months (around 12/24/2024).      Brigida Wall MD  [facility]    There are no Patient Instructions on file for this visit.  All of your core healthy metrics are met.

## 2024-07-26 DIAGNOSIS — E78.2 MIXED HYPERLIPIDEMIA: ICD-10-CM

## 2024-07-28 NOTE — TELEPHONE ENCOUNTER
Refill Routing Note   Medication(s) are not appropriate for processing by [facility] for the following reason(s):        Required labs outdated    [facility] action(s):  Defer     Requires labs : Yes             Appointments  past 12m or future 3m with PCP    Date Provider   Last Visit   6/24/2024 Brigida Wall MD   Next Visit   Visit date not found Brigida Wall MD   ED visits in past 90 days: 0        Note composed:3:49 AM 07/28/2024

## 2024-07-28 NOTE — TELEPHONE ENCOUNTER
Care Due:                  Date            Visit Type   Department     Provider  --------------------------------------------------------------------------------                                EP -                              PRIMARY      [facility] INTERNAL  Last Visit: 06-      CARE ([facility])   MEDICINE       Brigida Wall  Next Visit: None Scheduled  None         None Found                                                            Last  Test          Frequency    Reason                     Performed    Due Date  --------------------------------------------------------------------------------    CMP.........  12 months..  atorvastatin.............  04- 04-    Lipid Panel.  12 months..  atorvastatin.............  10-   10-    [employer] Embedded Care Due Messages. Reference number: [id number].   7/28/2024 3:49:40 AM CDT

## 2024-07-29 RX ORDER — ATORVASTATIN CALCIUM 10 MG/1
10 TABLET, FILM COATED ORAL NIGHTLY
Qty: 90 TABLET | Refills: 3 | Status: SHIPPED | OUTPATIENT
Start: 2024-07-29

## 2024-08-02 DIAGNOSIS — E78.2 MIXED HYPERLIPIDEMIA: ICD-10-CM

## 2024-08-02 NOTE — TELEPHONE ENCOUNTER
No care due was identified.  [facility] Embedded Care Due Messages. Reference number: [id number].   8/02/2024 4:21:30 PM CDT

## 2024-08-03 RX ORDER — ATORVASTATIN CALCIUM 10 MG/1
10 TABLET, FILM COATED ORAL NIGHTLY
Qty: 90 TABLET | Refills: 3 | OUTPATIENT
Start: 2024-08-03

## 2024-08-03 NOTE — TELEPHONE ENCOUNTER
Refill Decision Note   Zion Vasquez  is requesting a refill authorization.  Brief Assessment and Rationale for Refill:  Quick Discontinue     Medication Therapy Plan:         Comments:     Note composed:1:45 PM 08/03/2024

## 2024-08-05 DIAGNOSIS — E78.2 MIXED HYPERLIPIDEMIA: ICD-10-CM

## 2024-08-05 RX ORDER — ATORVASTATIN CALCIUM 10 MG/1
10 TABLET, FILM COATED ORAL NIGHTLY
Qty: 90 TABLET | Refills: 3 | OUTPATIENT
Start: 2024-08-05

## 2024-08-06 ENCOUNTER — TELEPHONE (OUTPATIENT)
Dept: NEPHROLOGY | Facility: CLINIC | Age: 89
End: 2024-08-06
Payer: COMMERCIAL

## 2024-08-09 DIAGNOSIS — N18.32 STAGE 3B CHRONIC KIDNEY DISEASE: Primary | ICD-10-CM

## 2024-08-12 ENCOUNTER — LAB VISIT (OUTPATIENT)
Dept: LAB | Facility: HOSPITAL | Age: 89
End: 2024-08-12
Attending: INTERNAL MEDICINE
Payer: COMMERCIAL

## 2024-08-12 DIAGNOSIS — N18.32 STAGE 3B CHRONIC KIDNEY DISEASE: ICD-10-CM

## 2024-08-12 LAB
ALBUMIN SERPL BCP-MCNC: 3.3 G/DL (ref 3.5–5.2)
ALP SERPL-CCNC: 84 U/L (ref 55–135)
ALT SERPL W/O P-5'-P-CCNC: 7 U/L (ref 10–44)
ANION GAP SERPL CALC-SCNC: 10 MMOL/L (ref 8–16)
AST SERPL-CCNC: 16 U/L (ref 10–40)
BASOPHILS # BLD AUTO: 0.02 K/UL (ref 0–0.2)
BASOPHILS NFR BLD: 0.2 % (ref 0–1.9)
BILIRUB SERPL-MCNC: 0.8 MG/DL (ref 0.1–1)
BUN SERPL-MCNC: 21 MG/DL (ref 8–23)
CALCIUM SERPL-MCNC: 9.3 MG/DL (ref 8.7–10.5)
CHLORIDE SERPL-SCNC: 111 MMOL/L (ref 95–110)
CO2 SERPL-SCNC: 20 MMOL/L (ref 23–29)
CREAT SERPL-MCNC: 2.1 MG/DL (ref 0.5–1.4)
DIFFERENTIAL METHOD BLD: ABNORMAL
EOSINOPHIL # BLD AUTO: 0.3 K/UL (ref 0–0.5)
EOSINOPHIL NFR BLD: 3.8 % (ref 0–8)
ERYTHROCYTE [DISTWIDTH] IN BLOOD BY AUTOMATED COUNT: 14.3 % (ref 11.5–14.5)
EST. GFR  (NO RACE VARIABLE): 30.1 ML/MIN/1.73 M^2
GLUCOSE SERPL-MCNC: 146 MG/DL (ref 70–110)
HCT VFR BLD AUTO: 38.3 % (ref 40–54)
HGB BLD-MCNC: 11.7 G/DL (ref 14–18)
IMM GRANULOCYTES # BLD AUTO: 0.03 K/UL (ref 0–0.04)
IMM GRANULOCYTES NFR BLD AUTO: 0.4 % (ref 0–0.5)
LYMPHOCYTES # BLD AUTO: 2.5 K/UL (ref 1–4.8)
LYMPHOCYTES NFR BLD: 30.2 % (ref 18–48)
MCH RBC QN AUTO: 25.9 PG (ref 27–31)
MCHC RBC AUTO-ENTMCNC: 30.5 G/DL (ref 32–36)
MCV RBC AUTO: 85 FL (ref 82–98)
MONOCYTES # BLD AUTO: 1.2 K/UL (ref 0.3–1)
MONOCYTES NFR BLD: 14.2 % (ref 4–15)
NEUTROPHILS # BLD AUTO: 4.2 K/UL (ref 1.8–7.7)
NEUTROPHILS NFR BLD: 51.2 % (ref 38–73)
NRBC BLD-RTO: 0 /100 WBC
PLATELET # BLD AUTO: 274 K/UL (ref 150–450)
PMV BLD AUTO: 10.8 FL (ref 9.2–12.9)
POTASSIUM SERPL-SCNC: 4.5 MMOL/L (ref 3.5–5.1)
PROT SERPL-MCNC: 7.2 G/DL (ref 6–8.4)
RBC # BLD AUTO: 4.51 M/UL (ref 4.6–6.2)
SODIUM SERPL-SCNC: 141 MMOL/L (ref 136–145)
WBC # BLD AUTO: 8.25 K/UL (ref 3.9–12.7)

## 2024-08-12 PROCEDURE — 80053 COMPREHEN METABOLIC PANEL: CPT | Performed by: INTERNAL MEDICINE

## 2024-08-12 PROCEDURE — 85025 COMPLETE CBC W/AUTO DIFF WBC: CPT | Performed by: INTERNAL MEDICINE

## 2024-08-12 PROCEDURE — 36415 COLL VENOUS BLD VENIPUNCTURE: CPT | Mod: PN | Performed by: INTERNAL MEDICINE

## 2024-08-14 RX ORDER — ATORVASTATIN CALCIUM 10 MG/1
10 TABLET, FILM COATED ORAL NIGHTLY
Qty: 90 TABLET | Refills: 3 | Status: SHIPPED | OUTPATIENT
Start: 2024-08-14

## 2024-09-05 ENCOUNTER — OFFICE VISIT (OUTPATIENT)
Dept: NEPHROLOGY | Facility: CLINIC | Age: 89
End: 2024-09-05
Payer: COMMERCIAL

## 2024-09-05 VITALS
WEIGHT: 194 LBS | SYSTOLIC BLOOD PRESSURE: 144 MMHG | HEIGHT: 70 IN | HEART RATE: 60 BPM | BODY MASS INDEX: 27.77 KG/M2 | OXYGEN SATURATION: 97 % | DIASTOLIC BLOOD PRESSURE: 67 MMHG

## 2024-09-05 DIAGNOSIS — I10 HYPERTENSION, UNSPECIFIED TYPE: ICD-10-CM

## 2024-09-05 DIAGNOSIS — N18.4 CKD (CHRONIC KIDNEY DISEASE), STAGE IV: Primary | ICD-10-CM

## 2024-09-05 DIAGNOSIS — N18.31 ANEMIA OF CHRONIC RENAL FAILURE, STAGE 3A: ICD-10-CM

## 2024-09-05 DIAGNOSIS — D63.1 ANEMIA OF CHRONIC RENAL FAILURE, STAGE 3A: ICD-10-CM

## 2024-09-05 PROCEDURE — 99999 PR PBB SHADOW E&M-EST. PATIENT-LVL III: CPT | Mod: PBBFAC,,, | Performed by: INTERNAL MEDICINE

## 2024-09-05 NOTE — PROGRESS NOTES
Progress Note  Nephrology      Referring physician: Brigida Wall MD    Reason for visit: CKD     SUBJECTIVE:   88 y.o. male  has a past medical history of Martinez esophagus, GERD (gastroesophageal reflux disease), Hyperlipemia, Hypertension, and Obesity. who has been following up in renal clinic for ckd management   The patient denies taking NSAIDs or new antibiotics, recreational drugs, recent episode of dehydration, diarrhea, nausea or vomiting, acute illness, hospitalization or exposure to IV radiocontrast.     ROS:  General: negative for chills, or fatigue  ENT: No epistaxis or headaches  Hematological and Lymphatic: No bleeding problems or blood clots.  Endocrine: No skin changes or temperature intolerance  Respiratory: No cough, shortness of breath, or wheezing  Cardiovascular: No chest pain or dyspnea   Gastrointestinal: No abdominal pain, change in bowel habits  Genito-Urinary: No dysuria, trouble voiding, or hematuria  Musculoskeletal: ROS: negative for - joint pain, joint stiffness, joint swelling, muscle pain or muscular weakness  Neurological: No focal weakness, no numbness  Dermatological: No rash or ulcers    OBJECTIVE:     There were no vitals filed for this visit.       Physical Exam:  General: no distress, well nourished  HENT: PERRLA, Normal mouth nose and ears.  Neck: no JVD and thyroid not enlarged, symmetric, no tenderness/mass/nodules  Lungs: clear to auscultation bilaterally and normal respiratory effort  Cardiovascular: regular rate and rhythm, S1, S2 normal, no murmur, click, rub or gallop.   Abdomen: soft, non-tender non-distented; bowel sounds normal  Skin: No rashes or lesions  Musculoskeletal:no edema in LE, no deformities.   Lymph Nodes: No cervical or supraclavicular adenopathy  Neurologic: Normal strength and tone. No focal numbness or weakness          Medications:    Current Outpatient Medications:     acetaminophen (TYLENOL) 500 MG tablet, Take 1,000 mg by mouth every 6 (six)  hours as needed for Pain., Disp: , Rfl:     amLODIPine (NORVASC) 10 MG tablet, TAKE 1 TABLET(10 MG) BY MOUTH EVERY MORNING, Disp: 90 tablet, Rfl: 2    amoxicillin (AMOXIL) 500 MG capsule, Take 500 mg by mouth 3 (three) times daily., Disp: , Rfl:     atorvastatin (LIPITOR) 10 MG tablet, Take 1 tablet (10 mg total) by mouth every evening., Disp: 90 tablet, Rfl: 3    diclofenac sodium (VOLTAREN) 1 % Gel, APPLY 2 GRAMS TOPICALLY TO THE AFFECTED AREA TWICE DAILY, Disp: , Rfl:     losartan (COZAAR) 50 MG tablet, Take 1 tablet (50 mg total) by mouth once daily., Disp: 90 tablet, Rfl: 0    pantoprazole (PROTONIX) 40 MG tablet, TAKE 1 TABLET BY MOUTH TWICE DAILY, Disp: 90 tablet, Rfl: 1    pantoprazole (PROTONIX) 40 MG tablet, TAKE 1 TABLET(40 MG) BY MOUTH TWICE DAILY, Disp: 180 tablet, Rfl: 1    pilocarpine (SALAGEN) 5 MG Tab, TAKE 1 TO 2 TABLETS BY MOUTH 3-4 TIMES A DAY. MAX OF 10 MG THREE TIMES DAILY, Disp: , Rfl:     sodium bicarbonate 650 MG tablet, TAKE 1 TABLET(650 MG) BY MOUTH TWICE DAILY, Disp: 180 tablet, Rfl: 3         Laboratory:  Lab Results   Component Value Date    CREATININE 2.1 (H) 08/12/2024       Prot/Creat Ratio, Urine   Date Value Ref Range Status   08/12/2024 0.12 0.00 - 0.20 Final   02/26/2024 0.12 0.00 - 0.20 Final   09/18/2023 0.10 0.00 - 0.20 Final       Lab Results   Component Value Date     08/12/2024    K 4.5 08/12/2024    CO2 20 (L) 08/12/2024       last PTH   Lab Results   Component Value Date    CALCIUM 9.3 08/12/2024    PHOS 2.7 11/25/2022       Lab Results   Component Value Date    HGB 11.7 (L) 08/12/2024        Lab Results   Component Value Date    HGBA1C 5.5 09/29/2021       Lab Results   Component Value Date    LDLCALC 85.6 10/16/2023       Other Labs were reviewed      ASSESSMENT/PLAN:     1- 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

## 2024-09-20 ENCOUNTER — TELEPHONE (OUTPATIENT)
Dept: HEMATOLOGY/ONCOLOGY | Facility: CLINIC | Age: 89
End: 2024-09-20
Payer: COMMERCIAL

## 2024-09-20 NOTE — TELEPHONE ENCOUNTER
----- Message from Rebecca Key sent at 9/19/2024  4:10 PM CDT -----  Regarding: Appt  Contact: Pt  [phone]              Current Appt date:  11/05/24     Type of Appt: Est pt       Physician:  Shikha Young MD    Reason for rescheduling Need a later appt time or a different date with appt time no earlier than 10am      Caller: Zion      Contact Preference:[phone]

## 2024-10-08 RX ORDER — SODIUM BICARBONATE 650 MG/1
TABLET ORAL
Qty: 180 TABLET | Refills: 3 | Status: SHIPPED | OUTPATIENT
Start: 2024-10-08

## 2024-10-18 ENCOUNTER — OFFICE VISIT (OUTPATIENT)
Dept: URGENT CARE | Facility: CLINIC | Age: 89
End: 2024-10-18
Payer: COMMERCIAL

## 2024-10-18 VITALS
WEIGHT: 195 LBS | HEIGHT: 70 IN | RESPIRATION RATE: 14 BRPM | DIASTOLIC BLOOD PRESSURE: 66 MMHG | OXYGEN SATURATION: 98 % | TEMPERATURE: 98 F | SYSTOLIC BLOOD PRESSURE: 137 MMHG | HEART RATE: 59 BPM | BODY MASS INDEX: 27.92 KG/M2

## 2024-10-18 DIAGNOSIS — M06.9 RHEUMATOID ARTHRITIS, INVOLVING UNSPECIFIED SITE, UNSPECIFIED WHETHER RHEUMATOID FACTOR PRESENT: ICD-10-CM

## 2024-10-18 DIAGNOSIS — T50.905A MEDICATION REACTION, INITIAL ENCOUNTER: Primary | ICD-10-CM

## 2024-10-18 NOTE — PROGRESS NOTES
"Subjective:      Patient ID: Zion Vasquez is a 88 y.o. male.    Vitals:  height is 5' 10" (1.778 m) and weight is 88.5 kg (195 lb). His tympanic temperature is 97.5 °F (36.4 °C). His blood pressure is 137/66 and his pulse is 59 (abnormal). His respiration is 14 and oxygen saturation is 98%.     Chief Complaint: knots on wrist     88 y.o male c/o knots on each hand started  a while ago. Patient states that he just wants to know if its related to his medication.patient reports that one of the knots are getting a little big.    Other  The current episode started more than 1 year ago. The problem occurs constantly. The problem has been unchanged. Pertinent negatives include no abdominal pain, anorexia, arthralgias, change in bowel habit, chest pain, chills, congestion, coughing, diaphoresis, fatigue, fever, headaches, joint swelling, myalgias, nausea, neck pain, numbness, rash, sore throat, swollen glands, urinary symptoms, vertigo, visual change, vomiting or weakness. Nothing aggravates the symptoms. He has tried nothing for the symptoms. The treatment provided no relief.       Constitution: Negative for chills, sweating, fatigue and fever.   HENT:  Negative for congestion and sore throat.    Neck: Negative for neck pain.   Cardiovascular:  Negative for chest pain.   Respiratory:  Negative for cough.    Gastrointestinal:  Negative for abdominal pain, nausea and vomiting.   Musculoskeletal:  Negative for joint pain, joint swelling and muscle ache.   Skin:  Negative for rash.   Neurological:  Negative for history of vertigo, headaches and numbness.      Objective:     Physical Exam   Constitutional: He is oriented to person, place, and time. He appears well-developed. He is cooperative.  Non-toxic appearance. He does not appear ill. No distress.   HENT:   Head: Normocephalic and atraumatic.   Ears:   Right Ear: Hearing, tympanic membrane, external ear and ear canal normal.   Left Ear: Hearing, tympanic membrane, " external ear and ear canal normal.   Nose: Nose normal. No mucosal edema, rhinorrhea or nasal deformity. No epistaxis. Right sinus exhibits no maxillary sinus tenderness and no frontal sinus tenderness. Left sinus exhibits no maxillary sinus tenderness and no frontal sinus tenderness.   Mouth/Throat: Uvula is midline, oropharynx is clear and moist and mucous membranes are normal. No trismus in the jaw. Normal dentition. No uvula swelling. No oropharyngeal exudate, posterior oropharyngeal edema or posterior oropharyngeal erythema.   Eyes: Conjunctivae and lids are normal. No scleral icterus.   Neck: Trachea normal and phonation normal. Neck supple. No edema present. No erythema present. No neck rigidity present.   Cardiovascular: Normal rate, regular rhythm, normal heart sounds and normal pulses.   Pulmonary/Chest: Effort normal and breath sounds normal. No respiratory distress. He has no decreased breath sounds. He has no rhonchi.   Abdominal: Normal appearance.   Musculoskeletal: Normal range of motion.         General: No deformity. Normal range of motion.      Right wrist: Normal.      Left wrist: Normal.      Right hand: Normal.      Left hand: Normal.      Comments: He pointing to his wrist bones, that are normal. It appears has maybe he has lost weight and lost some adipose tissue and he is just noting it. No pain. FROM, No redness, erythema    Neurological: He is alert and oriented to person, place, and time. He exhibits normal muscle tone. Coordination normal.   Skin: Skin is warm, dry, intact, not diaphoretic and not pale.   Psychiatric: His speech is normal and behavior is normal. Judgment and thought content normal.   Nursing note and vitals reviewed.    Went through his medication list.   Assessment:     1. Medication reaction, initial encounter    2. Rheumatoid arthritis, involving unspecified site, unspecified whether rheumatoid factor present        Plan:       Medication reaction, initial  encounter    Rheumatoid arthritis, involving unspecified site, unspecified whether rheumatoid factor present      No obvious deformity, pain, redness, erythema noted, FROM. He is pointing to his wrist bones. He has lost about 10-15 lbs in the last year. I think he just noticing the weight loss with his bones protruding.   Denies any other problems.      Dicussed follow up with his RA doctor for further follow up. He and his son verbalized understanding,

## 2024-11-19 DIAGNOSIS — I10 PRIMARY HYPERTENSION: ICD-10-CM

## 2024-11-19 RX ORDER — LOSARTAN POTASSIUM 50 MG/1
50 TABLET ORAL DAILY
Qty: 90 TABLET | Refills: 1 | Status: SHIPPED | OUTPATIENT
Start: 2024-11-19

## 2024-11-19 NOTE — TELEPHONE ENCOUNTER
Refill Encounter    PCP Visits: Recent Visits  Date Type Provider Dept   06/24/24 Office Visit Brigida Wall MD [facility] Internal Medicine   Showing recent visits within past 360 days and meeting all other requirements  Future Appointments  No visits were found meeting these conditions.  Showing future appointments within next 720 days and meeting all other requirements     Last 3 Blood Pressure:   BP Readings from Last 3 Encounters:   10/18/24 137/66   09/05/24 (!) 144/67   06/24/24 98/66     Preferred Pharmacy:   [id number] [place] [address] [address]  [address]  [facility] [address]  [facility] [address]  Phone: [phone] Fax: [phone]    Requested RX:  Requested Prescriptions     Pending Prescriptions Disp Refills    losartan (COZAAR) 50 MG tablet 90 tablet 1     Sig: Take 1 tablet (50 mg total) by mouth once daily.      RX Route: Normal

## 2024-11-19 NOTE — TELEPHONE ENCOUNTER
No care due was identified.  [facility] Embedded Care Due Messages. Reference number: [id number].   11/19/2024 1:05:36 PM CST

## 2024-11-25 ENCOUNTER — TELEPHONE (OUTPATIENT)
Dept: HEMATOLOGY/ONCOLOGY | Facility: CLINIC | Age: 89
End: 2024-11-25
Payer: COMMERCIAL

## 2024-11-25 DIAGNOSIS — I10 PRIMARY HYPERTENSION: ICD-10-CM

## 2024-11-25 RX ORDER — AMLODIPINE BESYLATE 10 MG/1
10 TABLET ORAL EVERY MORNING
Qty: 90 TABLET | Refills: 1 | Status: SHIPPED | OUTPATIENT
Start: 2024-11-25

## 2024-11-25 NOTE — TELEPHONE ENCOUNTER
Refill Decision Note   Zion Vasquez  is requesting a refill authorization.  Brief Assessment and Rationale for Refill:  Approve     Medication Therapy Plan:         Comments:     Note composed:1:42 PM 11/25/2024

## 2024-11-25 NOTE — TELEPHONE ENCOUNTER
No care due was identified.  [facility] Embedded Care Due Messages. Reference number: [id number].   11/25/2024 10:14:00 AM CST

## 2024-12-09 DIAGNOSIS — K44.9 HIATAL HERNIA: ICD-10-CM

## 2024-12-09 NOTE — TELEPHONE ENCOUNTER
No care due was identified.  [facility] Embedded Care Due Messages. Reference number: [id number].   12/09/2024 10:27:05 AM CST

## 2024-12-10 RX ORDER — PANTOPRAZOLE SODIUM 40 MG/1
40 TABLET, DELAYED RELEASE ORAL 2 TIMES DAILY
Qty: 180 TABLET | Refills: 1 | Status: SHIPPED | OUTPATIENT
Start: 2024-12-10

## 2024-12-10 NOTE — TELEPHONE ENCOUNTER
Refill Routing Note   Medication(s) are not appropriate for processing by [facility] for the following reason(s):        Outside of protocol: PPI daily dose oop    [facility] action(s):  Route             Appointments  past 12m or future 3m with PCP    Date Provider   Last Visit   6/24/2024 Brigida Wall MD   Next Visit   Visit date not found Brigida Wall MD   ED visits in past 90 days: 0        Note composed:11:02 PM 12/09/2024

## 2024-12-12 DIAGNOSIS — K44.9 HIATAL HERNIA: ICD-10-CM

## 2024-12-12 RX ORDER — PANTOPRAZOLE SODIUM 40 MG/1
40 TABLET, DELAYED RELEASE ORAL 2 TIMES DAILY
Qty: 90 TABLET | Refills: 3 | Status: SHIPPED | OUTPATIENT
Start: 2024-12-12

## 2024-12-12 NOTE — TELEPHONE ENCOUNTER
No care due was identified.  [facility] Embedded Care Due Messages. Reference number: [id number].   12/12/2024 10:20:42 AM CST

## 2024-12-18 NOTE — PROGRESS NOTES
Subjective:      Patient ID: Zion Vasquez is a 89 y.o. male.    Chief Complaint: No chief complaint on file.      HPI:    Initial History:   Mr. Vasquez is a 88 y.o. male who presents as a new patient consult for PAIGE.   Duration of anemia: Unsure. 6 years before his pcp initiated po supplements then stopped, Never received iron infusions  Diet: 2 meals/day well balanced    Bleeding: No active bleeding   GI: H/o Martinez esophagus  Colonoscopy: 11/30/22 - diverticulosis   Family history of blood disorder: N/a  Surgical History: n/a  Alcohol use: occasionally     Interval History:  4/22/24   Presents for PAIGE follow up. Received 2 dose of injectafer May 2023 with good effect. Hgb remains near normal at 12.8 and iron parameters including ferritin remain normal. Complains of dry mouth and polyuria at night. Following with Urology for this. Presents with his son.     Interval History:  12/19/24    Mr. Vasquez presents to clinic today for iron deficiency follow-up. He is accompanied by his son. He states he was recently diagnosed with RA but it not following with Rheumatology or taking medications for this condition. Follow with Nephrology for Stage 3 CKD . Today he lab results indicate normocytic anemia with Ferritin of 203, Sat Fe 16 and Transferrin 169. Overal he feels well today. Denies f/c/ns, CP, fatigue, and SOB.        I have reviewed all of the patient's relevant lab work available in the medical record and have utilized this in my evaluation and management recommendations today.    Labs at [facility]    Social History     Socioeconomic History    Marital status:    Tobacco Use    Smoking status: Never     Passive exposure: Never    Smokeless tobacco: Never   Substance and Sexual Activity    Alcohol use: Yes     Comment: occasionally    Drug use: Never     Social Drivers of Health     Financial Resource Strain: Low Risk  (11/30/2022)    Overall Financial Resource Strain (CARDIA)      Difficulty of Paying Living Expenses: Not very hard   Food Insecurity: No Food Insecurity (11/30/2022)    Hunger Vital Sign     Worried About Running Out of Food in the Last Year: Never true     Ran Out of Food in the Last Year: Never true   Transportation Needs: No Transportation Needs (11/30/2022)    PRAPARE - Transportation     Lack of Transportation (Medical): No     Lack of Transportation (Non-Medical): No   Physical Activity: Inactive (11/30/2022)    Exercise Vital Sign     Days of Exercise per Week: 1 day     Minutes of Exercise per Session: 0 min   Stress: Stress Concern Present (11/30/2022)    [employer] [place] [employer] - Occupational Stress Questionnaire     Feeling of Stress : To some extent   Housing Stability: Low Risk  (11/30/2022)    Housing Stability Vital Sign     Unable to Pay for Housing in the Last Year: No     Number of Places Lived in the Last Year: 1     Unstable Housing in the Last Year: No       Family History   Problem Relation Name Age of Onset    Cancer Mother          ? ovarian    No Known Problems Father      No Known Problems Sister      No Known Problems Brother half     No Known Problems Sister         Past Surgical History:   Procedure Laterality Date    COLONOSCOPY N/A 11/25/2022    Procedure: COLONOSCOPY;  Surgeon: Smith Alvarez MD;  Location: [address]);  Service: Endoscopy;  Laterality: N/A;    COLONOSCOPY N/A 11/30/2022    Procedure: COLONOSCOPY;  Surgeon: Smith Alvarez MD;  Location: [address]);  Service: Endoscopy;  Laterality: N/A;       Past Medical History:   Diagnosis Date    Martinez esophagus     GERD (gastroesophageal reflux disease)     Hyperlipemia     Hypertension     Obesity        Review of Systems   Constitutional:  Negative for appetite change, chills, diaphoresis, fatigue, fever and unexpected weight change.   HENT:  Negative for nosebleeds.    Respiratory:  Negative for shortness of breath.     Cardiovascular:  Negative for chest pain and palpitations.   Gastrointestinal:  Negative for anal bleeding, blood in stool, constipation and diarrhea.   Neurological:  Negative for dizziness, light-headedness, numbness and headaches.   Hematological:  Negative for adenopathy. Does not bruise/bleed easily.          Medication List with Changes/Refills   Current Medications    ACETAMINOPHEN (TYLENOL) 500 MG TABLET    Take 1,000 mg by mouth every 6 (six) hours as needed for Pain.    AMLODIPINE (NORVASC) 10 MG TABLET    TAKE 1 TABLET(10 MG) BY MOUTH EVERY MORNING    AMOXICILLIN (AMOXIL) 500 MG CAPSULE    Take 500 mg by mouth 3 (three) times daily.    ATORVASTATIN (LIPITOR) 10 MG TABLET    Take 1 tablet (10 mg total) by mouth every evening.    DICLOFENAC SODIUM (VOLTAREN) 1 % GEL    APPLY 2 GRAMS TOPICALLY TO THE AFFECTED AREA TWICE DAILY    LOSARTAN (COZAAR) 50 MG TABLET    Take 1 tablet (50 mg total) by mouth once daily.    PANTOPRAZOLE (PROTONIX) 40 MG TABLET    TAKE 1 TABLET(40 MG) BY MOUTH TWICE DAILY    PANTOPRAZOLE (PROTONIX) 40 MG TABLET    Take 1 tablet (40 mg total) by mouth 2 (two) times daily.    PILOCARPINE (SALAGEN) 5 MG TAB    TAKE 1 TO 2 TABLETS BY MOUTH 3-4 TIMES A DAY. MAX OF 10 MG THREE TIMES DAILY    SODIUM BICARBONATE 650 MG TABLET    TAKE 1 TABLET(650 MG) BY MOUTH TWICE DAILY        Objective:     Vitals:    12/19/24 1038   BP: (!) 146/70   Pulse: 62   Temp: 98.6 °F (37 °C)       Physical Exam    Assessment:     Problem List Items Addressed This Visit          Renal/    Stage 3b chronic kidney disease    Relevant Orders    PROTEIN ELECTROPHORESIS, SERUM    IMMUNOGLOBULIN FREE LT CHAINS BLOOD    CMP     Other Visit Diagnoses       Normocytic anemia    -  Primary    Relevant Orders    VITAMIN B12    FOLATE    PROTEIN ELECTROPHORESIS, SERUM    IMMUNOGLOBULIN FREE LT CHAINS BLOOD    RETICULOCYTES    COPPER, SERUM    CBC w/ DIFF            Lab Results   Component Value Date    WBC 7.40 12/19/2024     RBC 4.45 (L) 12/19/2024    HGB 11.9 (L) 12/19/2024    HCT 37.3 (L) 12/19/2024    MCV 84 12/19/2024    MCH 26.7 (L) 12/19/2024    MCHC 31.9 (L) 12/19/2024    RDW 14.3 12/19/2024     12/19/2024    MPV 9.7 12/19/2024    GRAN 4.6 12/19/2024    GRAN 61.7 12/19/2024    LYMPH 1.6 12/19/2024    LYMPH 21.2 12/19/2024    MONO 0.8 12/19/2024    MONO 11.4 12/19/2024    EOS 0.4 12/19/2024    BASO 0.03 12/19/2024    EOSINOPHIL 5.0 12/19/2024    BASOPHIL 0.4 12/19/2024      Lab Results   Component Value Date     12/19/2024    K 4.7 12/19/2024     (H) 12/19/2024    CO2 23 12/19/2024    BUN 18 12/19/2024    CREATININE 1.7 (H) 12/19/2024    CALCIUM 9.0 12/19/2024    ANIONGAP 8 12/19/2024    ESTGFRAFRICA 48.7 (A) 06/14/2022    EGFRNONAA 42.1 (A) 06/14/2022     Lab Results   Component Value Date    ALT 7 (L) 08/12/2024    AST 16 08/12/2024    ALKPHOS 84 08/12/2024    BILITOT 0.8 08/12/2024     Lab Results   Component Value Date    IRON 39 (L) 12/19/2024    TRANSFERRIN 169 (L) 12/19/2024    TIBC 250 12/19/2024    FESATURATED 16 (L) 12/19/2024    FERRITIN 203 12/19/2024        Plan:   Normocytic anemia  -     VITAMIN B12; Future; Expected date: 12/19/2024  -     FOLATE; Future; Expected date: 12/19/2024  -     PROTEIN ELECTROPHORESIS, SERUM; Future; Expected date: 12/19/2024  -     IMMUNOGLOBULIN FREE LT CHAINS BLOOD; Future; Expected date: 12/19/2024  -     RETICULOCYTES; Future; Expected date: 12/19/2024  -     COPPER, SERUM; Future; Expected date: 12/19/2024  -     CBC w/ DIFF; Future; Expected date: 12/19/2024    Stage 3b chronic kidney disease  -     PROTEIN ELECTROPHORESIS, SERUM; Future; Expected date: 12/19/2024  -     IMMUNOGLOBULIN FREE LT CHAINS BLOOD; Future; Expected date: 12/19/2024  -     CMP; Future; Expected date: 12/19/2024        1) Anemia today is normocytic which could be anemia of chronic disease due to eGFR < 45, however will do further work-up to rule out other possible etiology.   2) Continue  to follow with Nephrology    Will recheck lab work in 4 weeks to re-check iron levels as well as additional work-up for normocytic anemia. Will call pt with results. If lab results unremarkable, will continue with every 6 month labs and WILLIAM visit.       BMT Chart Routing      Follow up with physician    Follow up with WILLIAM 6 months. Labs prior   Provider visit type Benign hem   Infusion scheduling note   NA   Injection scheduling note NA   Labs CBC, copper, ferritin, free light chains, SPEP, CMP, folate, vitamin B12 and iron and TIBC   Scheduling:  Preferred lab:  Lab interval:  Labs in one month and again prior to 6 month follow-up, only CBC, ferritin, and iron/tibc needed at 6 month visit. Please schedule at [facility] near [facility]   NA   Pharmacy appointment No pharmacy appointment needed      Other referrals no referral to Oncology Primary Care needed -  no Massage appointment needed    No additional referrals needed

## 2024-12-19 ENCOUNTER — OFFICE VISIT (OUTPATIENT)
Dept: HEMATOLOGY/ONCOLOGY | Facility: CLINIC | Age: 89
End: 2024-12-19
Payer: COMMERCIAL

## 2024-12-19 ENCOUNTER — LAB VISIT (OUTPATIENT)
Dept: LAB | Facility: HOSPITAL | Age: 89
End: 2024-12-19
Payer: COMMERCIAL

## 2024-12-19 VITALS
HEIGHT: 70 IN | BODY MASS INDEX: 28.14 KG/M2 | OXYGEN SATURATION: 98 % | WEIGHT: 196.56 LBS | SYSTOLIC BLOOD PRESSURE: 146 MMHG | DIASTOLIC BLOOD PRESSURE: 70 MMHG | TEMPERATURE: 99 F | HEART RATE: 62 BPM

## 2024-12-19 DIAGNOSIS — E78.5 HYPERLIPIDEMIA, UNSPECIFIED HYPERLIPIDEMIA TYPE: ICD-10-CM

## 2024-12-19 DIAGNOSIS — N18.32 STAGE 3B CHRONIC KIDNEY DISEASE: ICD-10-CM

## 2024-12-19 DIAGNOSIS — I10 BENIGN ESSENTIAL HTN: ICD-10-CM

## 2024-12-19 DIAGNOSIS — D50.9 IRON DEFICIENCY ANEMIA, UNSPECIFIED IRON DEFICIENCY ANEMIA TYPE: ICD-10-CM

## 2024-12-19 DIAGNOSIS — D64.9 NORMOCYTIC ANEMIA: Primary | ICD-10-CM

## 2024-12-19 LAB
ANION GAP SERPL CALC-SCNC: 8 MMOL/L (ref 8–16)
BASOPHILS # BLD AUTO: 0.03 K/UL (ref 0–0.2)
BASOPHILS NFR BLD: 0.4 % (ref 0–1.9)
BUN SERPL-MCNC: 18 MG/DL (ref 8–23)
CALCIUM SERPL-MCNC: 9 MG/DL (ref 8.7–10.5)
CHLORIDE SERPL-SCNC: 111 MMOL/L (ref 95–110)
CHOLEST SERPL-MCNC: 148 MG/DL (ref 120–199)
CHOLEST/HDLC SERPL: 2.6 {RATIO} (ref 2–5)
CO2 SERPL-SCNC: 23 MMOL/L (ref 23–29)
CREAT SERPL-MCNC: 1.7 MG/DL (ref 0.5–1.4)
DIFFERENTIAL METHOD BLD: ABNORMAL
EOSINOPHIL # BLD AUTO: 0.4 K/UL (ref 0–0.5)
EOSINOPHIL NFR BLD: 5 % (ref 0–8)
ERYTHROCYTE [DISTWIDTH] IN BLOOD BY AUTOMATED COUNT: 14.3 % (ref 11.5–14.5)
EST. GFR  (NO RACE VARIABLE): 38.5 ML/MIN/1.73 M^2
ESTIMATED AVG GLUCOSE: 108 MG/DL (ref 68–131)
FERRITIN SERPL-MCNC: 203 NG/ML (ref 20–300)
GLUCOSE SERPL-MCNC: 96 MG/DL (ref 70–110)
HBA1C MFR BLD: 5.4 % (ref 4–5.6)
HCT VFR BLD AUTO: 37.3 % (ref 40–54)
HDLC SERPL-MCNC: 57 MG/DL (ref 40–75)
HDLC SERPL: 38.5 % (ref 20–50)
HGB BLD-MCNC: 11.9 G/DL (ref 14–18)
IMM GRANULOCYTES # BLD AUTO: 0.02 K/UL (ref 0–0.04)
IMM GRANULOCYTES NFR BLD AUTO: 0.3 % (ref 0–0.5)
IRON SERPL-MCNC: 39 UG/DL (ref 45–160)
LDLC SERPL CALC-MCNC: 79.2 MG/DL (ref 63–159)
LYMPHOCYTES # BLD AUTO: 1.6 K/UL (ref 1–4.8)
LYMPHOCYTES NFR BLD: 21.2 % (ref 18–48)
MCH RBC QN AUTO: 26.7 PG (ref 27–31)
MCHC RBC AUTO-ENTMCNC: 31.9 G/DL (ref 32–36)
MCV RBC AUTO: 84 FL (ref 82–98)
MONOCYTES # BLD AUTO: 0.8 K/UL (ref 0.3–1)
MONOCYTES NFR BLD: 11.4 % (ref 4–15)
NEUTROPHILS # BLD AUTO: 4.6 K/UL (ref 1.8–7.7)
NEUTROPHILS NFR BLD: 61.7 % (ref 38–73)
NONHDLC SERPL-MCNC: 91 MG/DL
NRBC BLD-RTO: 0 /100 WBC
PLATELET # BLD AUTO: 232 K/UL (ref 150–450)
PMV BLD AUTO: 9.7 FL (ref 9.2–12.9)
POTASSIUM SERPL-SCNC: 4.7 MMOL/L (ref 3.5–5.1)
RBC # BLD AUTO: 4.45 M/UL (ref 4.6–6.2)
SATURATED IRON: 16 % (ref 20–50)
SODIUM SERPL-SCNC: 142 MMOL/L (ref 136–145)
TOTAL IRON BINDING CAPACITY: 250 UG/DL (ref 250–450)
TRANSFERRIN SERPL-MCNC: 169 MG/DL (ref 200–375)
TRIGL SERPL-MCNC: 59 MG/DL (ref 30–150)
WBC # BLD AUTO: 7.4 K/UL (ref 3.9–12.7)

## 2024-12-19 PROCEDURE — 83036 HEMOGLOBIN GLYCOSYLATED A1C: CPT | Performed by: INTERNAL MEDICINE

## 2024-12-19 PROCEDURE — 99999 PR PBB SHADOW E&M-EST. PATIENT-LVL III: CPT | Mod: PBBFAC,,,

## 2024-12-19 PROCEDURE — 83540 ASSAY OF IRON: CPT | Performed by: INTERNAL MEDICINE

## 2024-12-19 PROCEDURE — 80048 BASIC METABOLIC PNL TOTAL CA: CPT | Performed by: INTERNAL MEDICINE

## 2024-12-19 PROCEDURE — 80061 LIPID PANEL: CPT | Performed by: INTERNAL MEDICINE

## 2024-12-19 PROCEDURE — 82728 ASSAY OF FERRITIN: CPT | Performed by: INTERNAL MEDICINE

## 2024-12-19 PROCEDURE — 85025 COMPLETE CBC W/AUTO DIFF WBC: CPT | Performed by: INTERNAL MEDICINE

## 2024-12-20 ENCOUNTER — TELEPHONE (OUTPATIENT)
Dept: INTERNAL MEDICINE | Facility: CLINIC | Age: 89
End: 2024-12-20
Payer: COMMERCIAL

## 2024-12-20 NOTE — TELEPHONE ENCOUNTER
----- Message from Ara Cooper MD sent at 12/20/2024  7:39 AM CST -----  Dr. Wall's patient. Please let patient know that his complete metabolic profile was overall ok. Blood sugar is better and kidney function is better.    Cholesterol panel normal.    A1c normal at 5.4.     Labs ordered by hem/onc; some still pending, con't to follow up with hem/onc.

## 2025-02-06 ENCOUNTER — OFFICE VISIT (OUTPATIENT)
Dept: URGENT CARE | Facility: CLINIC | Age: 89
End: 2025-02-06
Payer: COMMERCIAL

## 2025-02-06 VITALS
RESPIRATION RATE: 20 BRPM | HEART RATE: 67 BPM | BODY MASS INDEX: 28.06 KG/M2 | SYSTOLIC BLOOD PRESSURE: 150 MMHG | TEMPERATURE: 98 F | WEIGHT: 196 LBS | DIASTOLIC BLOOD PRESSURE: 76 MMHG | OXYGEN SATURATION: 99 % | HEIGHT: 70 IN

## 2025-02-06 DIAGNOSIS — M25.552 JOINT PAIN OF LEFT HIP ON MOVEMENT: Primary | ICD-10-CM

## 2025-02-06 DIAGNOSIS — M16.0 OSTEOARTHRITIS OF BOTH HIPS, UNSPECIFIED OSTEOARTHRITIS TYPE: ICD-10-CM

## 2025-02-06 PROCEDURE — 99214 OFFICE O/P EST MOD 30 MIN: CPT | Mod: S$GLB,,, | Performed by: NURSE PRACTITIONER

## 2025-02-06 PROCEDURE — 73502 X-RAY EXAM HIP UNI 2-3 VIEWS: CPT | Mod: LT,S$GLB,, | Performed by: RADIOLOGY

## 2025-02-06 RX ORDER — LIDOCAINE 50 MG/G
1 PATCH TOPICAL DAILY PRN
Qty: 5 PATCH | Refills: 0 | Status: SHIPPED | OUTPATIENT
Start: 2025-02-06 | End: 2025-02-07 | Stop reason: SDUPTHER

## 2025-02-06 NOTE — PROGRESS NOTES
"Subjective:      Patient ID: Zion Vasquez is a 89 y.o. male.    Vitals:  height is 5' 10" (1.778 m) and weight is 88.9 kg (196 lb). His temperature is 98.2 °F (36.8 °C). His blood pressure is 150/76 (abnormal) and his pulse is 67. His respiration is 20 and oxygen saturation is 99%.     Chief Complaint: Hip Pain    Pt presents with complaint of left hip pain 3  days.  Pt states he has hx of arthritis in his hip and states he has been taking tylenol arthritis for his symptoms ans used a pain patch.  Pt states he has pain when standing up from  sitting   Provider note below:  This is a 89 y.o. male who presents today with a chief complaint of left hip pain started 3 days ago, denies any trauma, fall or injury, denies numbness or tingling, patient enquiring about joint injection, denies fever, body aches or chills, denies cough, wheezing or shortness of breath, denies nausea, vomiting, diarrhea or abdominal pain, denies chest pain or dizziness positional lightheadedness, denies sore throat or trouble swallowing, denies loss of taste or smell, or any other symptoms  Patient accompanied by son in clinic       Hip Pain   The incident occurred 3 to 5 days ago. The incident occurred at home. There was no injury mechanism. The pain is present in the left hip. The quality of the pain is described as aching. The pain is at a severity of 5/10. The pain is moderate. The pain has been Fluctuating since onset. Pertinent negatives include no loss of motion or loss of sensation. He reports no foreign bodies present. The symptoms are aggravated by movement. He has tried acetaminophen for the symptoms. The treatment provided mild relief.       Musculoskeletal:  Positive for pain, joint pain and arthritis. Negative for trauma.      Past Medical History:   Diagnosis Date    Martinez esophagus     GERD (gastroesophageal reflux disease)     Hyperlipemia     Hypertension     Obesity        Objective:     Physical Exam   Constitutional: " He is oriented to person, place, and time. He appears well-developed. He is cooperative.  Non-toxic appearance. He does not appear ill. No distress.   HENT:   Head: Normocephalic and atraumatic. Head is without abrasion, without contusion and without laceration.   Ears:   Right Ear: Hearing, tympanic membrane, external ear and ear canal normal. No hemotympanum.   Left Ear: Hearing, tympanic membrane, external ear and ear canal normal. No hemotympanum.   Nose: Nose normal. No mucosal edema, rhinorrhea or nasal deformity. No epistaxis. Right sinus exhibits no maxillary sinus tenderness and no frontal sinus tenderness. Left sinus exhibits no maxillary sinus tenderness and no frontal sinus tenderness.   Mouth/Throat: Uvula is midline, oropharynx is clear and moist and mucous membranes are normal. No trismus in the jaw. Normal dentition. No uvula swelling. No posterior oropharyngeal erythema.   Eyes: Conjunctivae, EOM and lids are normal. Pupils are equal, round, and reactive to light. Right eye exhibits no discharge. Left eye exhibits no discharge. No scleral icterus.   Neck: Trachea normal and phonation normal. Neck supple. No tracheal deviation present. No neck rigidity present. No spinous process tenderness present. No muscular tenderness present.   Cardiovascular: Normal rate, regular rhythm, normal heart sounds and normal pulses.   Pulmonary/Chest: Effort normal and breath sounds normal. No respiratory distress.   Abdominal: Normal appearance and bowel sounds are normal. He exhibits no distension and no mass. Soft. There is no abdominal tenderness.   Musculoskeletal: Normal range of motion.         General: No deformity. Normal range of motion.      Left hip: He exhibits tenderness (to left SI joint). He exhibits normal range of motion and no bony tenderness.      Comments: Able to ambulate without assistance   Neurological: He is alert and oriented to person, place, and time. He has normal strength. No cranial  nerve deficit or sensory deficit. He exhibits normal muscle tone. He displays no seizure activity. Coordination normal. GCS eye subscore is 4. GCS verbal subscore is 5. GCS motor subscore is 6.   Skin: Skin is warm, dry, intact, not diaphoretic and not pale. Capillary refill takes less than 2 seconds. No abrasion, No burn, No bruising and No ecchymosis   Psychiatric: His speech is normal and behavior is normal. Judgment and thought content normal.   Nursing note and vitals reviewed.  X-Ray Hip 2 or 3 views Left with Pelvis when performed    Result Date: 2/6/2025  EXAMINATION: XR HIP WITH PELVIS WHEN PERFORMED 2 OR 3 VIEWS LEFT CLINICAL HISTORY: Pain in left hip TECHNIQUE: AP view of the pelvis and frog leg lateral view of the left hip were performed. COMPARISON: None FINDINGS: The bones are intact.  There is no evidence for acute fracture or bone destruction.  There are degenerative changes of both hips with joint space narrowing and osteophyte formation.  There are numerous metallic densities projecting over the lower pelvis in the midline likely related to implanted radioactive seeds.  There are degenerative changes of the lower lumbar spine and lumbosacral junction.     No evidence for acute fracture, bone destruction, or dislocation. Symmetric degenerative changes of the hips. Implanted brachytherapy sees projecting over the prostate gland. Electronically signed by: Audie Becker MD Date:    02/06/2025 Time:    15:25        Patient in no acute distress.  Vitals reassuring.  Discussed results/diagnosis/plan in depth with patient in clinic. Strict precautions given to patient to monitor for worsening signs and symptoms. Advised to follow up with primary.All questions answered. Strict ER precautions given. If your symptoms worsens or fail to improve you should go to the Emergency Room. Discharge and follow-up instructions given verbally/printed. Discharge and follow-up instructions discussed with the patient who  expressed understanding and willingness to comply with my recommendations.Patient voiced understanding and in agreement with current treatment plan.     Please be advised this text was dictated with [employer] software and may contain errors due to translation.   Assessment:     1. Joint pain of left hip on movement    2. Osteoarthritis of both hips, unspecified osteoarthritis type        Plan:       Joint pain of left hip on movement  -     X-Ray Hip 2 or 3 views Left with Pelvis when performed; Future; Expected date: 02/06/2025  -     Ambulatory referral/consult to Orthopedics    Osteoarthritis of both hips, unspecified osteoarthritis type    Other orders  -     LIDOcaine (LIDODERM) 5 %; Place 1 patch onto the skin daily as needed (For pain). Remove & Discard patch within 12 hours or as directed by MD  Dispense: 5 patch; Refill: 0          Medical Decision Making:   Clinical Tests:   Radiological Study: Ordered and Reviewed  Urgent Care Management:  Patient in no acute distress.  Vitals reassuring.  On exam, patient is nontoxic appearing and afebrile.  Lungs CTA.  Physical examination as above.  Patient with history of hip arthritis.  X-ray obtained.  X-ray results discussed with patient/son in detail.  Patient inquired about joint injection.  Will refer patient to orthopedics.  Called clinic  and schedule appointment for 02/07/2025.  Appointment time and location given to patient/son in detail.  Will prescribe lidocaine patches with detailed education provided about the application of the lidocaine patches  Medication prescribed and over-the-counter medication discussed with patient at length.  Proper hydration advised.  I reiterated the importance of further evaluation if no improvement symptoms and follow-up with primary. Patient voiced understanding and in agreement with current treatment plan.           Patient Instructions   If your condition worsens or fails to improve we recommend that you receive  another evaluation at the ER immediately or contact your PCP to discuss your concerns or return here. You must understand that you've received an urgent care treatment only and that you may be released before all your medical problems are known or treated. You the patient will arrange for followup care as instructed.    If you were prescribed antibiotics, please take them to completion.  If you were prescribed a narcotic medication, do not drive or operate heavy equipment or machinery while taking these medications.  Please follow up with your primary care doctor or specialist as needed.  If you  smoke, please stop smoking.

## 2025-02-07 ENCOUNTER — OFFICE VISIT (OUTPATIENT)
Dept: ORTHOPEDICS | Facility: CLINIC | Age: 89
End: 2025-02-07
Payer: COMMERCIAL

## 2025-02-07 VITALS — DIASTOLIC BLOOD PRESSURE: 66 MMHG | HEART RATE: 58 BPM | SYSTOLIC BLOOD PRESSURE: 121 MMHG

## 2025-02-07 DIAGNOSIS — M25.552 LEFT HIP PAIN: Primary | ICD-10-CM

## 2025-02-07 DIAGNOSIS — Z85.46 HISTORY OF PROSTATE CANCER: ICD-10-CM

## 2025-02-07 PROCEDURE — 1159F MED LIST DOCD IN RCRD: CPT | Mod: CPTII,S$GLB,, | Performed by: NURSE PRACTITIONER

## 2025-02-07 PROCEDURE — 99204 OFFICE O/P NEW MOD 45 MIN: CPT | Mod: S$GLB,,, | Performed by: NURSE PRACTITIONER

## 2025-02-07 PROCEDURE — 1101F PT FALLS ASSESS-DOCD LE1/YR: CPT | Mod: CPTII,S$GLB,, | Performed by: NURSE PRACTITIONER

## 2025-02-07 PROCEDURE — 99999 PR PBB SHADOW E&M-EST. PATIENT-LVL III: CPT | Mod: PBBFAC,,, | Performed by: NURSE PRACTITIONER

## 2025-02-07 PROCEDURE — 3288F FALL RISK ASSESSMENT DOCD: CPT | Mod: CPTII,S$GLB,, | Performed by: NURSE PRACTITIONER

## 2025-02-07 PROCEDURE — 1125F AMNT PAIN NOTED PAIN PRSNT: CPT | Mod: CPTII,S$GLB,, | Performed by: NURSE PRACTITIONER

## 2025-02-07 PROCEDURE — 1160F RVW MEDS BY RX/DR IN RCRD: CPT | Mod: CPTII,S$GLB,, | Performed by: NURSE PRACTITIONER

## 2025-02-07 RX ORDER — LIDOCAINE 50 MG/G
1 PATCH TOPICAL DAILY PRN
Qty: 15 PATCH | Refills: 0 | Status: SHIPPED | OUTPATIENT
Start: 2025-02-10 | End: 2025-02-24 | Stop reason: SDUPTHER

## 2025-02-07 NOTE — PROGRESS NOTES
SUBJECTIVE:   History of Present Illness    CHIEF COMPLAINT:  - Left hip pain    HPI:  Zion presents with left hip pain localized to the hip area, not radiating to the groin. It is particularly severe when standing up from a sitting position, especially from a stool. Pain is also felt when reaching for something or when the hip is tight. He denies any weakness, clicking, or popping in the hip joint.    Pain severity was particularly high yesterday, prompting him to seek medical attention. Pain seems to be intermittent.    Zion initially tried OTC Tylenol pills, which were ineffective. He then used Tylenol arthritis formula as recommended by a pharmacist. However, another pharmacist advised against using Tylenol arthritis due to potential liver issues. Subsequently, his sister, who is a pharmacist, recommended a lidocaine patch. The OTC lidocaine patch was not very effective.    Yesterday, he visited an urgent care facility seeking a steroid injection for his hip arthritis pain. The urgent care did not provide injections and instead prescribed a stronger, medically prescribed lidocaine patch. He reports significant improvement with this patch.    Zion has a history of glaucoma, which he reports has been stable for years without requiring surgical intervention. He also mentions taking medications for high blood pressure and kidney function, including sodium bicarbonate. He denies any history of back pain, diabetes, or liver problems.    PREVIOUS TREATMENTS:  - Tylenol pills: Ineffective  - Over-the-counter lidocaine patch: Not very effective  - Medically prescribed lidocaine patch: Provided significant relief    MEDICATIONS:  - Two medications for high blood pressure: Daily for years  - Two medications for kidneys: Daily for years  - Sodium bicarbonate: For kidney function    SOCIAL HISTORY:  - May have non-Nondenominational Gnosticism background      ROS:  Constitutional: -fever, -chills  Eyes: -visual changes  ENT:  -nasal congestion, -sore throat  Respiratory: -cough, -shortness of breath  Cardiovascular: -chest pain, -palpitations  Gastrointestinal: -nausea, -vomiting  Genitourinary: -hematuria, -dysuria  Integument/Breast: -rash, -pruritus, -breast skin changes  Hematologic/Lymphatic: -easy bruising, -lymphadenopathy  Musculoskeletal: +arthralgia, -myalgia, -back pain  Neurological: -seizures, -tremors  Behavioral/Psych: -hallucinations  Endocrine: -heat intolerance, -cold intolerance         Past Medical History:   Diagnosis Date    Martinez esophagus     GERD (gastroesophageal reflux disease)     Hyperlipemia     Hypertension     Obesity        Past Surgical History:   Procedure Laterality Date    COLONOSCOPY N/A 11/25/2022    Procedure: COLONOSCOPY;  Surgeon: Smith Alvarez MD;  Location: [facility] ([address] [place] [address]);  Service: Endoscopy;  Laterality: N/A;    COLONOSCOPY N/A 11/30/2022    Procedure: COLONOSCOPY;  Surgeon: Smith Alvarez MD;  Location: [facility] ([address] [place] [address]);  Service: Endoscopy;  Laterality: N/A;       Family History   Problem Relation Name Age of Onset    Cancer Mother          ? ovarian    No Known Problems Father      No Known Problems Sister      No Known Problems Brother half     No Known Problems Sister         Review of patient's allergies indicates:  No Known Allergies      Current Outpatient Medications:     acetaminophen (TYLENOL) 500 MG tablet, Take 1,000 mg by mouth every 6 (six) hours as needed for Pain., Disp: , Rfl:     amLODIPine (NORVASC) 10 MG tablet, TAKE 1 TABLET(10 MG) BY MOUTH EVERY MORNING, Disp: 90 tablet, Rfl: 1    atorvastatin (LIPITOR) 10 MG tablet, Take 1 tablet (10 mg total) by mouth every evening., Disp: 90 tablet, Rfl: 3    diclofenac sodium (VOLTAREN) 1 % Gel, APPLY 2 GRAMS TOPICALLY TO THE AFFECTED AREA TWICE DAILY, Disp: , Rfl:     losartan (COZAAR) 50 MG tablet, Take 1 tablet (50 mg total) by mouth once daily., Disp: 90 tablet, Rfl: 1    pantoprazole (PROTONIX) 40 MG tablet, TAKE  "1 TABLET(40 MG) BY MOUTH TWICE DAILY, Disp: 180 tablet, Rfl: 1    pantoprazole (PROTONIX) 40 MG tablet, Take 1 tablet (40 mg total) by mouth 2 (two) times daily., Disp: 90 tablet, Rfl: 3    pilocarpine (SALAGEN) 5 MG Tab, , Disp: , Rfl:     sodium bicarbonate 650 MG tablet, TAKE 1 TABLET(650 MG) BY MOUTH TWICE DAILY, Disp: 180 tablet, Rfl: 3    amoxicillin (AMOXIL) 500 MG capsule, Take 500 mg by mouth 3 (three) times daily. (Patient not taking: Reported on 12/19/2024), Disp: , Rfl:     [START ON 2/10/2025] LIDOcaine (LIDODERM) 5 %, Place 1 patch onto the skin daily as needed (For pain). Remove & Discard patch within 12 hours, Disp: 15 patch, Rfl: 0    PE:  /66   Pulse (!) 58   Estimated body mass index is 28.12 kg/m² as calculated from the following:    Height as of 2/6/25: 5' 10" (1.778 m).    Weight as of 2/6/25: 88.9 kg (196 lb).   General: Pleasant, cooperative, NAD   HEENT: NCAT, sclera nonicteric   Lungs: Respirations are equal and unlabored.   Abdomen: Soft and non-tender.  CV: 2+ bilateral upper and lower extremity pulses.   Skin: Intact throughout LE with no rashes, erythema, or lesions  Extremities: No LE edema, NVI lower extremities    Physical Exam    MSK: Spine - Hip: Flexion of left hip about 90 degrees, no pain. Abduction of left hip about 45 degrees, no pain. Adduction of left hip about 5-10 degrees, no pain. Straight leg raise about 60 degrees, no pain. External rotation of left hip about 30 degrees, no pain. Internal rotation of left hip about 30 degrees, no pain. Negative log roll test.  IMAGING:  - X-rays of the left hip: Mild DJD of the hip joint, Lumbar with DJD.  No acute fractures seen.          All radiographs were personally reviewed by me.    ASSESSMENT/PLAN:       ICD-10-CM ICD-9-CM   1. Left hip pain  M25.552 719.45   2. History of prostate cancer  Z85.46 V10.46     Assessment & Plan    MEDICATIONS:  - Lidocaine 5% patches, 15 count.  - Take regular strength Tylenol, 1 capsule " twice daily as needed for pain.    PROCEDURES:  - Decided against Medrol Dosepak (oral steroids) due to patient's glaucoma.    FOLLOW UP:  - Follow up if lidocaine patches stop providing relief.         This note was generated with the assistance of ambient listening technology. Verbal consent was obtained by the patient and accompanying visitor(s) for the recording of patient appointment to facilitate this note. I attest to having reviewed and edited the generated note for accuracy, though some syntax or spelling errors may persist. Please contact the author of this note for any clarification.

## 2025-02-24 DIAGNOSIS — M25.552 LEFT HIP PAIN: ICD-10-CM

## 2025-02-24 RX ORDER — LIDOCAINE 50 MG/G
1 PATCH TOPICAL DAILY PRN
Qty: 15 PATCH | Refills: 0 | Status: SHIPPED | OUTPATIENT
Start: 2025-02-24

## 2025-04-03 NOTE — ASSESSMENT & PLAN NOTE
- amlodipine and losartan held in setting of GIB  - GIB resolved, will add back amlodipine    Suggest to continue medications, monitoring, FU primary team recommendations. kps 40%

## 2025-05-07 ENCOUNTER — TELEPHONE (OUTPATIENT)
Dept: HEMATOLOGY/ONCOLOGY | Facility: CLINIC | Age: 89
End: 2025-05-07
Payer: COMMERCIAL

## 2025-05-07 NOTE — TELEPHONE ENCOUNTER
----- Message from Selma sent at 5/7/2025  1:24 PM CDT -----  Type:  Sooner Apoointment RequestCaller is requesting a sooner appointment.  Name of Caller:Zion When is the first available appointment?JuneSymptoms:annual Would the patient rather a call back or a response via [facility]? Call Best Call Back Number: [phone] Information:

## 2025-05-15 DIAGNOSIS — I10 PRIMARY HYPERTENSION: ICD-10-CM

## 2025-05-15 RX ORDER — AMLODIPINE BESYLATE 10 MG/1
10 TABLET ORAL EVERY MORNING
Qty: 90 TABLET | Refills: 0 | Status: SHIPPED | OUTPATIENT
Start: 2025-05-15

## 2025-05-15 NOTE — TELEPHONE ENCOUNTER
Refill Decision Note   Zion Vasquez  is requesting a refill authorization.  Brief Assessment and Rationale for Refill:  Approve     Medication Therapy Plan:        Comments:     Note composed:8:18 AM 05/15/2025

## 2025-05-15 NOTE — TELEPHONE ENCOUNTER
No care due was identified.  [facility] Embedded Care Due Messages. Reference number: [id number].   5/15/2025 8:04:52 AM CDT

## 2025-05-19 DIAGNOSIS — I10 PRIMARY HYPERTENSION: ICD-10-CM

## 2025-05-19 RX ORDER — LOSARTAN POTASSIUM 50 MG/1
TABLET ORAL
Qty: 90 TABLET | Refills: 0 | Status: SHIPPED | OUTPATIENT
Start: 2025-05-19

## 2025-05-19 NOTE — TELEPHONE ENCOUNTER
No care due was identified.  [facility] Embedded Care Due Messages. Reference number: [id number].   5/19/2025 11:26:03 AM CDT

## 2025-05-20 NOTE — TELEPHONE ENCOUNTER
Refill Decision Note   Zion Vasquez  is requesting a refill authorization.  Brief Assessment and Rationale for Refill:  Approve     Medication Therapy Plan:        Comments:     Note composed:8:19 PM 05/19/2025

## 2025-06-05 ENCOUNTER — OFFICE VISIT (OUTPATIENT)
Dept: HEMATOLOGY/ONCOLOGY | Facility: CLINIC | Age: 89
End: 2025-06-05
Payer: COMMERCIAL

## 2025-06-05 ENCOUNTER — LAB VISIT (OUTPATIENT)
Dept: LAB | Facility: HOSPITAL | Age: 89
End: 2025-06-05
Payer: COMMERCIAL

## 2025-06-05 VITALS
DIASTOLIC BLOOD PRESSURE: 73 MMHG | HEART RATE: 68 BPM | HEIGHT: 70 IN | WEIGHT: 190.69 LBS | OXYGEN SATURATION: 96 % | SYSTOLIC BLOOD PRESSURE: 166 MMHG | TEMPERATURE: 98 F | BODY MASS INDEX: 27.3 KG/M2

## 2025-06-05 DIAGNOSIS — D50.9 IRON DEFICIENCY ANEMIA, UNSPECIFIED IRON DEFICIENCY ANEMIA TYPE: ICD-10-CM

## 2025-06-05 DIAGNOSIS — N18.32 STAGE 3B CHRONIC KIDNEY DISEASE: ICD-10-CM

## 2025-06-05 DIAGNOSIS — D64.9 NORMOCYTIC ANEMIA: ICD-10-CM

## 2025-06-05 DIAGNOSIS — D64.9 NORMOCYTIC ANEMIA: Primary | ICD-10-CM

## 2025-06-05 LAB
ABSOLUTE EOSINOPHIL (OHS): 0.24 K/UL
ABSOLUTE MONOCYTE (OHS): 0.84 K/UL (ref 0.3–1)
ABSOLUTE NEUTROPHIL COUNT (OHS): 2.83 K/UL (ref 1.8–7.7)
ALBUMIN SERPL BCP-MCNC: 3.1 G/DL (ref 3.5–5.2)
ALP SERPL-CCNC: 79 UNIT/L (ref 40–150)
ALT SERPL W/O P-5'-P-CCNC: 8 UNIT/L (ref 10–44)
ANION GAP (OHS): 6 MMOL/L (ref 8–16)
AST SERPL-CCNC: 16 UNIT/L (ref 11–45)
BASOPHILS # BLD AUTO: 0.03 K/UL
BASOPHILS NFR BLD AUTO: 0.6 %
BILIRUB SERPL-MCNC: 0.6 MG/DL (ref 0.1–1)
BUN SERPL-MCNC: 22 MG/DL (ref 8–23)
CALCIUM SERPL-MCNC: 8.8 MG/DL (ref 8.7–10.5)
CHLORIDE SERPL-SCNC: 114 MMOL/L (ref 95–110)
CO2 SERPL-SCNC: 23 MMOL/L (ref 23–29)
CREAT SERPL-MCNC: 2.3 MG/DL (ref 0.5–1.4)
ERYTHROCYTE [DISTWIDTH] IN BLOOD BY AUTOMATED COUNT: 14.6 % (ref 11.5–14.5)
FERRITIN SERPL-MCNC: 153 NG/ML (ref 20–300)
FOLATE SERPL-MCNC: 5.8 NG/ML (ref 4–24)
GFR SERPLBLD CREATININE-BSD FMLA CKD-EPI: 27 ML/MIN/1.73/M2
GLUCOSE SERPL-MCNC: 109 MG/DL (ref 70–110)
HCT VFR BLD AUTO: 36.4 % (ref 40–54)
HGB BLD-MCNC: 11.5 GM/DL (ref 14–18)
IMM GRANULOCYTES # BLD AUTO: 0.02 K/UL (ref 0–0.04)
IMM GRANULOCYTES NFR BLD AUTO: 0.4 % (ref 0–0.5)
IRON SATN MFR SERPL: 20 % (ref 20–50)
IRON SERPL-MCNC: 49 UG/DL (ref 45–160)
LYMPHOCYTES # BLD AUTO: 1.47 K/UL (ref 1–4.8)
MCH RBC QN AUTO: 26.4 PG (ref 27–31)
MCHC RBC AUTO-ENTMCNC: 31.6 G/DL (ref 32–36)
MCV RBC AUTO: 84 FL (ref 82–98)
NUCLEATED RBC (/100WBC) (OHS): 0 /100 WBC
PLATELET # BLD AUTO: 200 K/UL (ref 150–450)
PMV BLD AUTO: 9.9 FL (ref 9.2–12.9)
POTASSIUM SERPL-SCNC: 4.2 MMOL/L (ref 3.5–5.1)
PROT SERPL-MCNC: 6.4 GM/DL (ref 6–8.4)
RBC # BLD AUTO: 4.35 M/UL (ref 4.6–6.2)
RELATIVE EOSINOPHIL (OHS): 4.4 %
RELATIVE LYMPHOCYTE (OHS): 27.1 % (ref 18–48)
RELATIVE MONOCYTE (OHS): 15.5 % (ref 4–15)
RELATIVE NEUTROPHIL (OHS): 52 % (ref 38–73)
RETICS/RBC NFR AUTO: 1.1 % (ref 0.4–2)
SODIUM SERPL-SCNC: 143 MMOL/L (ref 136–145)
TIBC SERPL-MCNC: 247 UG/DL (ref 250–450)
TRANSFERRIN SERPL-MCNC: 167 MG/DL (ref 200–375)
VIT B12 SERPL-MCNC: 216 PG/ML (ref 210–950)
WBC # BLD AUTO: 5.43 K/UL (ref 3.9–12.7)

## 2025-06-05 PROCEDURE — 84165 PROTEIN E-PHORESIS SERUM: CPT | Mod: 26,,, | Performed by: PATHOLOGY

## 2025-06-05 PROCEDURE — 82746 ASSAY OF FOLIC ACID SERUM: CPT

## 2025-06-05 PROCEDURE — 84466 ASSAY OF TRANSFERRIN: CPT

## 2025-06-05 PROCEDURE — 36415 COLL VENOUS BLD VENIPUNCTURE: CPT

## 2025-06-05 PROCEDURE — 85045 AUTOMATED RETICULOCYTE COUNT: CPT

## 2025-06-05 PROCEDURE — 82525 ASSAY OF COPPER: CPT

## 2025-06-05 PROCEDURE — 84165 PROTEIN E-PHORESIS SERUM: CPT

## 2025-06-05 PROCEDURE — 99999 PR PBB SHADOW E&M-EST. PATIENT-LVL III: CPT | Mod: PBBFAC,,,

## 2025-06-05 PROCEDURE — 85025 COMPLETE CBC W/AUTO DIFF WBC: CPT

## 2025-06-05 PROCEDURE — 82607 VITAMIN B-12: CPT

## 2025-06-05 PROCEDURE — 82728 ASSAY OF FERRITIN: CPT

## 2025-06-05 PROCEDURE — 83521 IG LIGHT CHAINS FREE EACH: CPT

## 2025-06-05 PROCEDURE — 84520 ASSAY OF UREA NITROGEN: CPT

## 2025-06-06 LAB
ALBUMIN, SPE (OHS): 3.21 G/DL (ref 3.35–5.55)
ALPHA 1 GLOB (OHS): 0.25 GM/DL (ref 0.17–0.41)
ALPHA 2 GLOB (OHS): 0.68 GM/DL (ref 0.43–0.99)
BETA GLOB (OHS): 0.7 GM/DL (ref 0.5–1.1)
GAMMA GLOBULIN (OHS): 1.26 GM/DL (ref 0.67–1.58)
KAPPA LC FREE SER-MCNC: 1.07 MG/L (ref 0.26–1.65)
KAPPA LC FREE/LAMBDA FREE SER: 4.42 MG/DL (ref 0.33–1.94)
LAMBDA LC FREE SERPL-MCNC: 4.13 MG/DL (ref 0.57–2.63)
PATHOLOGIST REVIEW - SPE (OHS): NORMAL
PROT SERPL-MCNC: 6.1 GM/DL (ref 6–8.4)

## 2025-06-10 LAB — W COPPER: 1074 UG/L

## 2025-07-01 ENCOUNTER — OFFICE VISIT (OUTPATIENT)
Dept: URGENT CARE | Facility: CLINIC | Age: 89
End: 2025-07-01
Payer: COMMERCIAL

## 2025-07-01 VITALS
BODY MASS INDEX: 27.3 KG/M2 | WEIGHT: 190.69 LBS | DIASTOLIC BLOOD PRESSURE: 71 MMHG | RESPIRATION RATE: 18 BRPM | HEIGHT: 70 IN | TEMPERATURE: 98 F | SYSTOLIC BLOOD PRESSURE: 130 MMHG | OXYGEN SATURATION: 99 % | HEART RATE: 65 BPM

## 2025-07-01 DIAGNOSIS — M79.644 PAIN OF RIGHT MIDDLE FINGER: Primary | ICD-10-CM

## 2025-07-01 DIAGNOSIS — S63.632A SPRAIN OF INTERPHALANGEAL JOINT OF RIGHT MIDDLE FINGER, INITIAL ENCOUNTER: ICD-10-CM

## 2025-07-01 PROCEDURE — 99213 OFFICE O/P EST LOW 20 MIN: CPT | Mod: S$GLB,,, | Performed by: FAMILY MEDICINE

## 2025-07-01 NOTE — PROGRESS NOTES
"Subjective:      Patient ID: Zion Vasquez is a 89 y.o. male.    Vitals:  height is 5' 10" (1.778 m) and weight is 86.5 kg (190 lb 11.2 oz). His oral temperature is 98.3 °F (36.8 °C). His blood pressure is 130/71 and his pulse is 65. His respiration is 18 and oxygen saturation is 99%.     Chief Complaint: Hand Pain    89 year old male presenting with complaint of R middle finger pain x 1 week. Pt previously went to a different urgent care that did not have x-ray;is hoping to get than done today.  Patient does not recall any obvious injury.  Denies history of arthritis.    Hand Pain   His dominant hand is their right hand. There was no injury mechanism. The pain is present in the right fingers. The pain is moderate.     ROS   Objective:     Physical Exam   Constitutional: He is oriented to person, place, and time. He appears well-developed. He is cooperative.  Non-toxic appearance. He does not appear ill. No distress.   HENT:   Head: Normocephalic and atraumatic.   Ears:   Right Ear: Hearing and external ear normal.   Left Ear: Hearing and external ear normal.   Nose: Nose normal. No mucosal edema or nasal deformity. No epistaxis. Right sinus exhibits no maxillary sinus tenderness and no frontal sinus tenderness. Left sinus exhibits no maxillary sinus tenderness and no frontal sinus tenderness.   Mouth/Throat: Uvula is midline, oropharynx is clear and moist and mucous membranes are normal. No trismus in the jaw. Normal dentition. No uvula swelling.   Eyes: Conjunctivae and lids are normal.   Neck: Trachea normal and phonation normal. Neck supple.   Cardiovascular: Normal rate, regular rhythm, normal heart sounds and normal pulses.   No murmur heard.  Pulmonary/Chest: Effort normal and breath sounds normal. No stridor. No respiratory distress. He has no wheezes. He has no rhonchi. He has no rales.   Abdominal: Normal appearance and bowel sounds are normal. He exhibits no distension. Soft. There is no abdominal " tenderness.   Musculoskeletal: Normal range of motion.         General: Normal range of motion.      Cervical back: He exhibits no tenderness.      Comments:   Right long finger:  No swelling, bruise, abrasion.  No tenderness.  Positive jerky extension of proximal interphalangeal joint.  Neurovascular intact   Lymphadenopathy:     He has no cervical adenopathy.   Neurological: He is alert and oriented to person, place, and time. He exhibits normal muscle tone.   Skin: Skin is warm, dry, intact and not diaphoretic.   Psychiatric: His speech is normal and behavior is normal. Judgment and thought content normal.   Nursing note and vitals reviewed.      Assessment:     1. Pain of right middle finger    2. Sprain of interphalangeal joint of right middle finger, initial encounter        Plan:   Discussed exam findings/diagnosis/plan with patient. Advised to f/u with PCP within 2-5 days.  All questions answered. Patient verbally understood and agreed with treatment plan.  Educational materials and instructions regarding the visit diagnosis and management provided.     Pain of right middle finger  -     Ambulatory referral/consult to Hand Surgery    Sprain of interphalangeal joint of right middle finger, initial encounter  -     Ambulatory referral/consult to Hand Surgery  -     Splint application

## 2025-07-01 NOTE — PROCEDURES
Splint application    Date/Time: 7/1/2025 2:15 PM    Performed by: Kobe Hardy MD  Authorized by: Kobe Hardy MD  Location details: right long finger  Supplies used: aluminum splint  Post-procedure: The splinted body part was neurovascularly unchanged following the procedure.  Patient tolerance: Patient tolerated the procedure well with no immediate complications

## 2025-07-09 ENCOUNTER — DOCUMENTATION ONLY (OUTPATIENT)
Dept: ORTHOPEDICS | Facility: CLINIC | Age: 89
End: 2025-07-09
Payer: COMMERCIAL

## 2025-07-09 ENCOUNTER — HOSPITAL ENCOUNTER (OUTPATIENT)
Dept: RADIOLOGY | Facility: OTHER | Age: 89
Discharge: HOME | End: 2025-07-09
Attending: SPECIALIST/TECHNOLOGIST
Payer: COMMERCIAL

## 2025-07-09 ENCOUNTER — OFFICE VISIT (OUTPATIENT)
Dept: ORTHOPEDICS | Facility: CLINIC | Age: 89
End: 2025-07-09
Payer: COMMERCIAL

## 2025-07-09 DIAGNOSIS — M65.331 TRIGGER MIDDLE FINGER OF RIGHT HAND: Primary | ICD-10-CM

## 2025-07-09 DIAGNOSIS — R52 PAIN: ICD-10-CM

## 2025-07-09 DIAGNOSIS — R52 PAIN: Primary | ICD-10-CM

## 2025-07-09 PROCEDURE — 73130 X-RAY EXAM OF HAND: CPT | Mod: TC,FY,RT

## 2025-07-09 PROCEDURE — 99999 PR PBB SHADOW E&M-EST. PATIENT-LVL III: CPT | Mod: PBBFAC,,, | Performed by: SPECIALIST/TECHNOLOGIST

## 2025-07-09 PROCEDURE — 73130 X-RAY EXAM OF HAND: CPT | Mod: 26,RT,, | Performed by: RADIOLOGY

## 2025-07-09 RX ORDER — DEXAMETHASONE SODIUM PHOSPHATE 4 MG/ML
4 INJECTION, SOLUTION INTRA-ARTICULAR; INTRALESIONAL; INTRAMUSCULAR; INTRAVENOUS; SOFT TISSUE
Status: DISCONTINUED | OUTPATIENT
Start: 2025-07-09 | End: 2025-07-09 | Stop reason: HOSPADM

## 2025-07-09 RX ADMIN — DEXAMETHASONE SODIUM PHOSPHATE 4 MG: 4 INJECTION, SOLUTION INTRA-ARTICULAR; INTRALESIONAL; INTRAMUSCULAR; INTRAVENOUS; SOFT TISSUE at 11:07

## 2025-07-09 NOTE — PROGRESS NOTES
Called pt and informed him that we needed xr prior to his appt for 1130. Pt stated they was projecting to come for 1130.

## 2025-07-09 NOTE — PROGRESS NOTES
Patient ID: Zion Vasquez is a 89 y.o. male.    Chief Complaint: Follow-up of the Right Hand    History of Present Illness    CHIEF COMPLAINT:  - Right ring finger pain and stiffness    HPI   Patient reports to clinic today with right long finger pain.  He states that he is having locking sensation of the long finger that began about a week ago.  He notes tenderness over the A1 pulley of the right long finger.  He denies any previous surgeries or trauma to the wrist or hand.  He denies any numbness or tingling.  He states that he tried bracing full finger Brie for a few days that it provided him with no relief.         Hand/Wrist Musculoskeletal Exam    Inspection    Right      Erythema: none      Ecchymosis: none      Edema: none      Deformity: none    Left      Erythema: none      Ecchymosis: none      Edema: none      Deformity: none    Palpation    Right      Triggering: middle      Middle tenderness to palpation: A1 pulley    Range of Motion        Range of motion additional comments: Able to make a composite fist.    Neurovascular    Right       Radial pulse: normal      Capillary refill: brisk      Ulnar nerve sensory distribution: normal      Median nerve sensory distribution: normal      Superficial radial nerve sensory distribution: normal    Left       Radial pulse: normal      Capillary refill: brisk      Ulnar nerve sensory distribution: normal      Median nerve sensory distribution: normal      Superficial radial nerve sensory distribution: normal    Neurovascular additional comments:         Physical Exam    Neurological: Normal sensation in hand.  MSK: Hand/Wrist - Right: Normal thumb range of motion. Normal finger crossing. Normal finger spreading. Mild tenderness in finger.  IMAGING:  - Zion went to urgent care initially for an XR, but was told it was not needed           IMAGING  Right hand XR  Personal interpretation of the XR reveals no signs of fractures or  dislocations      PLAN  Assessment & Plan    - Performed ultrasound-guided corticosteroid injection into the affected finger for trigger finger.  - Zion tolerated the procedure well.  - Use trigger finger brace throughout the day and at night.  - Continue normal activities while wearing the brace.  - Expect numbness in the finger for a few hours after injection.  - Follow up in 6 weeks if symptoms do not improve.  - Schedule follow-up visit on the way out.          PROCEDURE NOTE  Right long finger trigger finger injection    Date/Time: 7/9/2025 11:30 AM    Performed by: Blaise Corral PA-C  Authorized by: Blaise Corral PA-C    Consent Done?:  Yes (Verbal)  Indications:  Pain  Timeout: prior to procedure the correct patient, procedure, and site was verified    Local anesthesia used?: Yes    Anesthesia:  Local infiltration  Local anesthetic:  Lidocaine 1% without epinephrine  Anesthetic total (ml):  0.5    Location:  Long finger  Ultrasonic guidance for needle placement?: Yes (Ultrasound guidance was utilized for needle localization. Dynamic visualization of the needle was continuous throughout the procedure and maintained accurate placement. Images were saved and stored for documentation.)    Needle size:  25 G  Approach:  Volar  Medications:  4 mg dexAMETHasone 4 mg/mL  Patient tolerance:  Patient tolerated the procedure well with no immediate complications    Andry Corral PA-C, ATC  Hand and Upper Extremity   [facility]    This note was generated with the assistance of ambient listening technology. Verbal consent was obtained by the patient and accompanying visitor(s) for the recording of patient appointment to facilitate this note. I attest to having reviewed and edited the generated note for accuracy, though some syntax or spelling errors may persist. Please contact the author of this note for any clarification.

## 2025-08-11 DIAGNOSIS — I10 PRIMARY HYPERTENSION: ICD-10-CM

## 2025-08-11 RX ORDER — AMLODIPINE BESYLATE 10 MG/1
10 TABLET ORAL EVERY MORNING
Qty: 90 TABLET | Refills: 0 | Status: SHIPPED | OUTPATIENT
Start: 2025-08-11

## 2025-08-20 ENCOUNTER — OFFICE VISIT (OUTPATIENT)
Dept: ORTHOPEDICS | Facility: CLINIC | Age: 89
End: 2025-08-20
Payer: COMMERCIAL

## 2025-08-20 DIAGNOSIS — M65.331 TRIGGER MIDDLE FINGER OF RIGHT HAND: ICD-10-CM

## 2025-08-20 DIAGNOSIS — M25.559 HIP PAIN, UNSPECIFIED LATERALITY: Primary | ICD-10-CM

## 2025-08-20 PROCEDURE — 99999 PR PBB SHADOW E&M-EST. PATIENT-LVL III: CPT | Mod: PBBFAC,,, | Performed by: SPECIALIST/TECHNOLOGIST

## 2025-08-20 RX ORDER — TRIAMCINOLONE ACETONIDE 40 MG/ML
40 INJECTION, SUSPENSION INTRA-ARTICULAR; INTRAMUSCULAR
Status: DISCONTINUED | OUTPATIENT
Start: 2025-08-20 | End: 2025-08-20 | Stop reason: HOSPADM

## 2025-08-20 RX ADMIN — TRIAMCINOLONE ACETONIDE 40 MG: 40 INJECTION, SUSPENSION INTRA-ARTICULAR; INTRAMUSCULAR at 02:08

## 2025-08-22 DIAGNOSIS — N18.32 STAGE 3B CHRONIC KIDNEY DISEASE: ICD-10-CM

## 2025-08-22 RX ORDER — ATORVASTATIN CALCIUM 10 MG/1
10 TABLET, FILM COATED ORAL NIGHTLY
Qty: 90 TABLET | Refills: 3 | Status: SHIPPED | OUTPATIENT
Start: 2025-08-22

## 2025-08-26 ENCOUNTER — OFFICE VISIT (OUTPATIENT)
Dept: INTERNAL MEDICINE | Facility: CLINIC | Age: 89
End: 2025-08-26
Payer: COMMERCIAL

## 2025-08-26 VITALS
WEIGHT: 190.69 LBS | OXYGEN SATURATION: 98 % | HEART RATE: 66 BPM | SYSTOLIC BLOOD PRESSURE: 118 MMHG | BODY MASS INDEX: 27.3 KG/M2 | DIASTOLIC BLOOD PRESSURE: 64 MMHG | HEIGHT: 70 IN

## 2025-08-26 DIAGNOSIS — K57.90 DIVERTICULOSIS: ICD-10-CM

## 2025-08-26 DIAGNOSIS — D50.9 IRON DEFICIENCY ANEMIA, UNSPECIFIED IRON DEFICIENCY ANEMIA TYPE: ICD-10-CM

## 2025-08-26 DIAGNOSIS — K44.9 HIATAL HERNIA: ICD-10-CM

## 2025-08-26 DIAGNOSIS — E78.5 HYPERLIPIDEMIA, UNSPECIFIED HYPERLIPIDEMIA TYPE: ICD-10-CM

## 2025-08-26 DIAGNOSIS — E61.1 IRON DEFICIENCY: ICD-10-CM

## 2025-08-26 DIAGNOSIS — Z00.00 ROUTINE GENERAL MEDICAL EXAMINATION AT A HEALTH CARE FACILITY: Primary | ICD-10-CM

## 2025-08-26 DIAGNOSIS — E78.2 MIXED HYPERLIPIDEMIA: ICD-10-CM

## 2025-08-26 DIAGNOSIS — N18.32 STAGE 3B CHRONIC KIDNEY DISEASE: ICD-10-CM

## 2025-08-26 DIAGNOSIS — K21.9 GASTROESOPHAGEAL REFLUX DISEASE WITHOUT ESOPHAGITIS: ICD-10-CM

## 2025-08-26 DIAGNOSIS — M25.552 LEFT HIP PAIN: ICD-10-CM

## 2025-08-26 DIAGNOSIS — I10 PRIMARY HYPERTENSION: ICD-10-CM

## 2025-08-26 DIAGNOSIS — I10 BENIGN ESSENTIAL HTN: ICD-10-CM

## 2025-08-26 PROCEDURE — 99999 PR PBB SHADOW E&M-EST. PATIENT-LVL IV: CPT | Mod: PBBFAC,,, | Performed by: INTERNAL MEDICINE

## 2025-08-26 RX ORDER — AMLODIPINE BESYLATE 10 MG/1
10 TABLET ORAL EVERY MORNING
Qty: 90 TABLET | Refills: 3 | Status: SHIPPED | OUTPATIENT
Start: 2025-08-26

## 2025-08-26 RX ORDER — LOSARTAN POTASSIUM 50 MG/1
50 TABLET ORAL DAILY
Qty: 90 TABLET | Refills: 3 | Status: SHIPPED | OUTPATIENT
Start: 2025-08-26

## 2025-08-26 RX ORDER — PANTOPRAZOLE SODIUM 40 MG/1
40 TABLET, DELAYED RELEASE ORAL 2 TIMES DAILY
Qty: 180 TABLET | Refills: 1 | Status: SHIPPED | OUTPATIENT
Start: 2025-08-26

## 2025-08-26 RX ORDER — LIDOCAINE 50 MG/G
1 PATCH TOPICAL DAILY PRN
Qty: 30 PATCH | Refills: 3 | Status: SHIPPED | OUTPATIENT
Start: 2025-08-26

## 2025-08-26 RX ORDER — ATORVASTATIN CALCIUM 10 MG/1
10 TABLET, FILM COATED ORAL NIGHTLY
Qty: 90 TABLET | Refills: 3 | Status: SHIPPED | OUTPATIENT
Start: 2025-08-26